# Patient Record
Sex: MALE | Race: WHITE | NOT HISPANIC OR LATINO | Employment: FULL TIME | URBAN - METROPOLITAN AREA
[De-identification: names, ages, dates, MRNs, and addresses within clinical notes are randomized per-mention and may not be internally consistent; named-entity substitution may affect disease eponyms.]

---

## 2017-03-07 ENCOUNTER — GENERIC CONVERSION - ENCOUNTER (OUTPATIENT)
Dept: OTHER | Facility: OTHER | Age: 57
End: 2017-03-07

## 2017-03-07 ENCOUNTER — HOSPITAL ENCOUNTER (OUTPATIENT)
Dept: NON INVASIVE DIAGNOSTICS | Facility: CLINIC | Age: 57
Discharge: HOME | End: 2017-03-07
Payer: COMMERCIAL

## 2017-03-07 DIAGNOSIS — I25.10 ATHEROSCLEROSIS OF NATIVE CORONARY ARTERY WITHOUT ANGINA PECTORIS, UNSPECIFIED WHETHER NATIVE OR TRANSPLANTED HEART: ICD-10-CM

## 2017-03-07 LAB
CHEST PAIN STATEMENT: NORMAL
MAX DIASTOLIC BP: 98 MMHG
MAX HEART RATE: 130 BPM
MAX PREDICTED HEART RATE: 162 BPM
MAX. SYSTOLIC BP: 190 MMHG
PROTOCOL NAME: NORMAL
REASON FOR TERMINATION: NORMAL
TARGET HR FORMULA: NORMAL
TIME IN EXERCISE PHASE: 567 S

## 2017-03-07 PROCEDURE — 93350 STRESS TTE ONLY: CPT

## 2017-03-22 ENCOUNTER — ALLSCRIPTS OFFICE VISIT (OUTPATIENT)
Dept: OTHER | Facility: OTHER | Age: 57
End: 2017-03-22

## 2017-03-22 DIAGNOSIS — E78.00 PURE HYPERCHOLESTEROLEMIA: ICD-10-CM

## 2017-03-22 DIAGNOSIS — Z95.5 PRESENCE OF CORONARY ANGIOPLASTY IMPLANT AND GRAFT: ICD-10-CM

## 2017-03-22 DIAGNOSIS — I10 ESSENTIAL (PRIMARY) HYPERTENSION: ICD-10-CM

## 2017-03-22 DIAGNOSIS — I21.19 ST ELEVATION (STEMI) MYOCARDIAL INFARCTION INVOLVING OTHER CORONARY ARTERY OF INFERIOR WALL (HCC): ICD-10-CM

## 2017-11-05 ENCOUNTER — GENERIC CONVERSION - ENCOUNTER (OUTPATIENT)
Dept: OTHER | Facility: OTHER | Age: 57
End: 2017-11-05

## 2017-12-15 ENCOUNTER — ALLSCRIPTS OFFICE VISIT (OUTPATIENT)
Dept: OTHER | Facility: OTHER | Age: 57
End: 2017-12-15

## 2017-12-16 NOTE — PROGRESS NOTES
Assessment  1  Hypertension (401 9) (I10)  2  Hypercholesterolemia (272 0) (E78 00)  3  Presence of bare metal stent in right coronary artery (V45 82) (Z95 5)  4  History of Old inferior wall myocardial infarction (412) (I25 2)    Discussion/Summary  Patient Clearance: Surgical Clearance: He is at a LOW risk from a cardiovascular standpoint at this time without any additional cardiac testing  Reevaluation needed, if he should present with symptoms prior to surgery/procedure  Discussion Summary:   1  CAD, asymptomaticA  Inferior wall MI 2011 with BMS RCAB  Normal LVEF, stress echo 3/56318  Hypertension, controlled3  Former tobacco use4  Hypercholesterolemia, on low dose statin due to myalgias with higher dose5  pre op shoulder arthro surgeryPlan  No cardiac contraindication planned shoulder surgery, proceed as planned without need for cardiac testing or changes in medications  Annual follow up with cmp, lipids, sensible diet and daily exercise encouraged  Chief Complaint  Chief Complaint Free Text Note Form: patient at the office for cardia clearance bursitis of the left shoulder surgery on 12/27/2017  History of Present Illness  Pre-Op Visit (Brief): The patient is being seen for a preoperative visit-- and-- shoulder arthoscopy  Surgical Risk Assessment: Exercise Capacity: able to walk four blocks without symptoms  HPI: Patient here preop shoulder arthroscopic surgery cardiac clearancePatient with history of CAD with IWMI 2011, BMS RCA, asymptomatic since  BP has been well controlled  Low dose statin due to myalgias at higher doses  LDL 86 last year      Review of Systems  Cardiology Male ROS:    Cardiac: No complaints of chest pain, no palpitations, no fainiting  Skin: No complaints of nonhealing sores or skin rash    Genitourinary: No complaints of recurrent urinary tract infections, frequent urination at night, difficult urination, blood in urine, kidney stones, loss of bladder control, no kidney or prostate problems, no erectile dysfunction  Psychological: No complaints of feeling depressed, anxiety, panic attacks, or difficulty concentrating  General: No complaints of trouble sleeping, lack of energy, fatigue, appetite changes, weight changes, fever, frequent infections, or night sweats  Respiratory: No complaints of shortness of breath, cough with sputum, or wheezing  HEENT: No complaints of serious problems, hearing problems, nose problems, throat problems, or snoring  Gastrointestinal: No complaints of liver problems, nausea, vomiting, heartburn, constipation, bloody stools, diarrhea, problems swallowing, adbominal pain, or rectal bleeding  Hematologic: No complaints of bleeding disorders, anemia, blood clots, or excessive brusing  Neurological: No complaints of numbness, tingling, dizziness, weakness, seizures, headaches, syncope or fainting, AM fatigue, daytime sleepiness, no witnessed apnea episodes  Musculoskeletal: No complaints of arthritis, back pain, or painfull swelling  Active Problems  1  Coronary atherosclerosis of native coronary artery (414 01) (I25 10)  2  Hypercholesterolemia (272 0) (E78 00)  3  Hypertension (401 9) (I10)  4  Inferior MI (410 40) (I21 19)  5  Presence of bare metal stent in right coronary artery (V45 82) (Z95 5)    Past Medical History  1  History of Acute myocardial infarction of inferior wall (410 40) (I21 19)  2  History of Cardiac Arrest (427 5)  3  Inferior MI (410 40) (I21 19)  4  History of Old inferior wall myocardial infarction (412) (I25 2)  Active Problems And Past Medical History Reviewed: The active problems and past medical history were reviewed and updated today  Surgical History  1  History of Cath Stent Placement  Surgical History Reviewed: The surgical history was reviewed and updated today  Family History  Mother   1  Family history of Arthritis (V17 7)  Family History Reviewed:    The family history was reviewed and updated today  Social History   · Former smoker (J25 80) (U52 371)  Social History Reviewed: The social history was reviewed and updated today  Current Meds  1  Aspirin 81 MG Oral Tablet Chewable; CHEW AND SWALLOW 1 TABLET DAILY; Therapy: 98EBD6463 to (Evaluate:17Mar2018); Last Rx:22Mar2017 Ordered  2  Atorvastatin Calcium 10 MG Oral Tablet; Take 1 tablet daily; Therapy: 10BZQ4643 to (Evaluate:02Jun2018)  Requested for: 24Gzo5059; Last Rx:54Obf7923 Ordered  3  Ezetimibe 10 MG Oral Tablet; TAKE 1 TABLET DAILY; Therapy: 50UOW9367 to (Evaluate:17Mar2018)  Requested for: 49AGU7468; Last Rx:22Mar2017 Ordered  4  Lisinopril 2 5 MG Oral Tablet; Take 1 tablet daily; Therapy: 62AXW1551 to (Evaluate:23Feb2018)  Requested for: 37JMA9227; Last Rx:31Slx8444 Ordered  5  Metoprolol Succinate ER 25 MG Oral Tablet Extended Release 24 Hour; Take 1 tablet daily; Therapy: 52LZU3085 to (Evaluate:23Feb2018)  Requested for: 04REY8134; Last Rx:67Vaw7409 Ordered  6  Nitrolingual 0 4 MG/SPRAY Translingual Solution; USE 1 SPRAY UNDER THE TONGUE AS NEEDED FOR CHEST PAIN Recorded  Medication List Reviewed: The medication list was reviewed and updated today  Allergies  1   No Known Drug Allergies    Vitals  Signs   Recorded: 51Vwq0332 12:55PM   Heart Rate: 69  Systolic: 806  Diastolic: 80  Height: 5 ft 8 in  Weight: 214 lb 1 oz  BMI Calculated: 32 55  BSA Calculated: 2 1  O2 Saturation: 97    Signatures   Electronically signed by : Paola Maldonado DO; Dec 15 2017  1:36PM EST                       (Author)    Electronically signed by : Paola Maldonado DO; Dec 15 2017  1:46PM EST                       (Author)

## 2018-01-09 NOTE — RESULT NOTES
Verified Results  ECHO STRESS TEST W CONTRAST IF INDICATED [id number] 08:07AM Ilia Pollack     Test Name Result Flag Reference   ECHO STRESS TEST W CONTRAST IF INDICATED (Report)     [address], [address] [facility]   [phone]     Exercise Stress Echocardiography     Study date: 07-Mar-2017     Patient: Gloria Washburn   MR number: [id number]   Account number: [de-identified]   : 1960   Age: 60 years   Gender: Male   Study date: 07-Mar-2017   Status: Outpatient   Location: [address] [place] [id number] [facility] lab   Height: 68 in   Weight: 209 lb   BP: 157// 88 mmHg     Indications: Evaluation of known coronary artery disease  Assessment of left ventricular function  Diagnosis: I21 19 - ST elevation (STEMI) myocardial infarction involving other coronary artery of inferior wall, I25 10 - Atherosclerotic heart disease of native coronary artery without angina pectoris     Sonographer: CLARENCE Carlisle   Interpreting Physician: Kaila Epps DO   Referring Physician: Awa Parikh DO   Group: [facility]   EKG Technician: Issa Mc     IMPRESSIONS:   Normal study after maximal exercise  No ischemia  Normal LVEF 65%  METS 10 8  Test done on Beta blockers  SUMMARY     STRESS RESULTS:   Duration of exercise was 9 min and 27 sec  Maximal work rate was 10 8 METs  Maximal heart rate during stress was 130 bpm ( 80 % of maximal predicted heart rate)  Target heart rate was not achieved  There was resting hypertension with an appropriate blood pressure response to stress  There was no chest pain during stress  ECG CONCLUSIONS:   The stress ECG was negative for ischemia  BASELINE:   There were no regional wall motion abnormalities  Estimated left ventricular ejection fraction was 65 %   PEAK STRESS:   There were no regional wall motion abnormalities       ECHO CONCLUSIONS:   There was no echocardiographic evidence for stress-induced ischemia  HISTORY: The patient is a 60year old  male  Coronary artery disease risk factors: hypertension  Cardiovascular history: coronary artery disease and prior myocardial infarction  Prior cardiovascular procedures: percutaneous   transcoronary angioplasty  Medications: a beta blocker, aspirin, a lipid lowering agent, and an antihypertensive agent  REST ECG: Sinus bradycardia  PROCEDURE: The study was performed in the stress lab  The study was performed in the [address]  The procedure was explained to the patient and informed consent was obtained  Treadmill exercise testing was performed,   using the Raoul protocol  Stress and rest echocardiographic evaluation with 2D imaging, spectral Doppler, and color Doppler was performed from multiple acoustic windows for evaluation of ventricular function  RAOUL PROTOCOL:   HR bpm SBP mmHg DBP mmHg Symptoms   Baseline 58 157 88 none   Stage 1 79 167 90 none   Stage 2 90 175 90 none   Stage 3 114 190 98 none   Stage 4 122 190 98 none   Recovery 1 71 140 87 none     STRESS RESULTS: Duration of exercise was 9 min and 27 sec  The patient exercised to protocol stage 4  Maximal work rate was 10 8 METs  Maximal heart rate during stress was 130 bpm ( 80 % of maximal predicted heart rate)  Target heart rate   was not achieved  The heart rate response to stress was blunted  Maximal systolic blood pressure during stress was 190 mmHg  There was resting hypertension with an appropriate blood pressure response to stress  The rate-pressure product   for the peak heart rate and blood pressure was 42143  There was no chest pain during stress  The stress test was terminated due to protocol completion and mild fatigue  After the procedure, the patient was discharged to home  ECG CONCLUSIONS: The stress ECG was negative for ischemia   Arrhythmia during stress: isolated atrial premature beats      STRESS 2D ECHO RESULTS:     BASELINE: There were no regional wall motion abnormalities  Left ventricular size was normal  Overall left ventricular systolic function was normal  Estimated left ventricular ejection fraction was 65 %   PEAK STRESS: There were no regional wall motion abnormalities  There was an appropriate reduction in left ventricular size  There was an appropriate augmentation in LV function  ECHO CONCLUSIONS: There was no echocardiographic evidence for stress-induced ischemia       Prepared and electronically signed by     Shy Pacheco DO   Signed 07-Mar-2017 13:48:33

## 2018-01-12 VITALS
DIASTOLIC BLOOD PRESSURE: 78 MMHG | HEIGHT: 68 IN | HEART RATE: 55 BPM | WEIGHT: 212.38 LBS | BODY MASS INDEX: 32.19 KG/M2 | SYSTOLIC BLOOD PRESSURE: 128 MMHG

## 2018-01-23 VITALS
WEIGHT: 214.06 LBS | DIASTOLIC BLOOD PRESSURE: 80 MMHG | OXYGEN SATURATION: 97 % | HEART RATE: 69 BPM | HEIGHT: 68 IN | SYSTOLIC BLOOD PRESSURE: 140 MMHG | BODY MASS INDEX: 32.44 KG/M2

## 2018-03-16 RX ORDER — METOPROLOL SUCCINATE 25 MG/1
1 TABLET, EXTENDED RELEASE ORAL DAILY
COMMUNITY
Start: 2014-06-03 | End: 2018-05-09 | Stop reason: SDUPTHER

## 2018-03-16 RX ORDER — EZETIMIBE 10 MG/1
1 TABLET ORAL DAILY
COMMUNITY
Start: 2017-03-22 | End: 2018-05-09 | Stop reason: SDUPTHER

## 2018-03-16 RX ORDER — HYDROMORPHONE HYDROCHLORIDE 2 MG/1
TABLET ORAL
Refills: 0 | COMMUNITY
Start: 2017-12-27 | End: 2018-03-19

## 2018-03-16 RX ORDER — BENZONATATE 100 MG/1
CAPSULE ORAL
Refills: 0 | COMMUNITY
Start: 2018-03-02 | End: 2019-12-13 | Stop reason: ALTCHOICE

## 2018-03-16 RX ORDER — OXYCODONE HYDROCHLORIDE 5 MG/1
5 TABLET ORAL EVERY 4 HOURS PRN
Refills: 0 | COMMUNITY
Start: 2017-12-27 | End: 2018-03-19

## 2018-03-16 RX ORDER — NITROGLYCERIN 400 UG/1
1 SPRAY ORAL
COMMUNITY
End: 2021-05-05 | Stop reason: CLARIF

## 2018-03-16 RX ORDER — LISINOPRIL 2.5 MG/1
1 TABLET ORAL DAILY
COMMUNITY
Start: 2014-06-03 | End: 2018-05-09 | Stop reason: SDUPTHER

## 2018-03-16 RX ORDER — ATORVASTATIN CALCIUM 10 MG/1
1 TABLET, FILM COATED ORAL DAILY
COMMUNITY
Start: 2012-06-19 | End: 2018-09-30 | Stop reason: SDUPTHER

## 2018-03-16 RX ORDER — ASPIRIN 81 MG/1
1 TABLET, CHEWABLE ORAL DAILY
COMMUNITY
Start: 2017-03-22

## 2018-03-19 ENCOUNTER — OFFICE VISIT (OUTPATIENT)
Dept: CARDIOLOGY CLINIC | Facility: CLINIC | Age: 58
End: 2018-03-19
Payer: COMMERCIAL

## 2018-03-19 VITALS
HEIGHT: 70 IN | BODY MASS INDEX: 30.21 KG/M2 | WEIGHT: 211 LBS | SYSTOLIC BLOOD PRESSURE: 132 MMHG | DIASTOLIC BLOOD PRESSURE: 78 MMHG | HEART RATE: 55 BPM

## 2018-03-19 DIAGNOSIS — Z95.5 STENTED CORONARY ARTERY: ICD-10-CM

## 2018-03-19 DIAGNOSIS — I10 ESSENTIAL HYPERTENSION: ICD-10-CM

## 2018-03-19 DIAGNOSIS — E78.5 HYPERLIPIDEMIA, UNSPECIFIED HYPERLIPIDEMIA TYPE: ICD-10-CM

## 2018-03-19 DIAGNOSIS — I25.2 HISTORY OF ACUTE INFERIOR WALL MI: ICD-10-CM

## 2018-03-19 DIAGNOSIS — Z95.5 PRESENCE OF BARE METAL STENT IN RIGHT CORONARY ARTERY: ICD-10-CM

## 2018-03-19 DIAGNOSIS — I25.2 OLD MYOCARDIAL INFARCTION: Primary | ICD-10-CM

## 2018-03-19 DIAGNOSIS — I25.10 CAD IN NATIVE ARTERY: ICD-10-CM

## 2018-03-19 PROCEDURE — 99214 OFFICE O/P EST MOD 30 MIN: CPT | Performed by: INTERNAL MEDICINE

## 2018-03-19 PROCEDURE — 93000 ELECTROCARDIOGRAM COMPLETE: CPT | Performed by: INTERNAL MEDICINE

## 2018-03-19 NOTE — PROGRESS NOTES
Cardiology Follow Up Visit    Leah Fritz  1960  [id number]  [address] [facility]  [address]    1  Old myocardial infarction  POCT ECG   2  Stented coronary artery  POCT ECG   3  History of acute inferior wall MI     4  CAD in native artery     5  Presence of bare metal stent in right coronary artery     6  Essential hypertension     7  Hyperlipidemia, unspecified hyperlipidemia type         Interval History:     Patient comes the office today in follow-up history of coronary artery disease  Had inferior wall myocardial infarction 2011  Has been asymptomatic since  Has had arthroscopic shoulder surgery since  Shoulder somewhat improved  He denies any lightheadedness dizziness  Gets stress testing every 2 years to comply with [employer] license  Last stress test March 2017 no evidence of ischemia  LVEF normal       No recent blood work or cholesterol profile  Last LDL was in the 80 range  On lower doses of statins due to myalgias at higher doses  Blood pressure has been well controlled on listed therapy    Patient Active Problem List   Diagnosis    CAD in native artery    History of acute inferior wall MI    Presence of bare metal stent in right coronary artery    Essential hypertension    Hyperlipidemia     Past Medical History:   Diagnosis Date    Hypercholesterolemia     Hypertension     Myocardial infarction      Social History     Social History    Marital status: /Civil Union     Spouse name: N/A    Number of children: N/A    Years of education: N/A     Occupational History    Not on file       Social History Main Topics    Smoking status: Former Smoker    Smokeless tobacco: Never Used    Alcohol use Not on file    Drug use: Unknown    Sexual activity: Not on file     Other Topics Concern    Not on file     Social History Narrative    No narrative on file      No family history on file  Past Surgical History:   Procedure Laterality Date    CORONARY STENT PLACEMENT      bare metal stent in Rt coronary artery       Current Outpatient Prescriptions:     aspirin 81 mg chewable tablet, Chew 1 tablet daily, Disp: , Rfl:     atorvastatin (LIPITOR) 10 mg tablet, Take 1 tablet by mouth daily, Disp: , Rfl:     ezetimibe (ZETIA) 10 mg tablet, Take 1 tablet by mouth daily, Disp: , Rfl:     lisinopril (ZESTRIL) 2 5 mg tablet, Take 1 tablet by mouth daily, Disp: , Rfl:     metoprolol succinate (TOPROL-XL) 25 mg 24 hr tablet, Take 1 tablet by mouth daily, Disp: , Rfl:     benzonatate (TESSALON PERLES) 100 mg capsule, TAKE 1 CAPSULE(S) 3 TIMES A DAY BY ORAL ROUTE AS NEEDED , Disp: , Rfl: 0    nitroglycerin (NITROLINGUAL) 0 4 mg/spray spray, 1 spray, Disp: , Rfl:   No Known Allergies    Labs:     No results found for: NA, K, CL, CO2, BUN, CREATININE, LABGLOM, GLUCOSE, CALCIUM    No results found for: WBC, HGB, HCT, PLT    Lab Results   Component Value Date    CHOL 162 06/24/2014     Lab Results   Component Value Date    HDL 41 06/24/2014     No results found for: [facility]  Lab Results   Component Value Date    TRIG 176 (H) 06/24/2014       No results found for: ALT, AST    No results found for: INR    No results found for: NTBNP    No results found for: HGBA1C    Imaging: Reviewed in epic    ECG:  Sinus bradycardia, otherwise within normal limits    Review of Systems:  14 systems reviewed and negative with exception of the above and the following  Review of Systems    PHYSICAL EXAM:    Physical Exam    Vitals:    03/19/18 0855   BP: 132/78     Body mass index is 30 28 kg/m²    Weight (last 2 days)     Date/Time   Weight    03/19/18 0855  95 7 (211)              Gen: No acute distress  HEENT: anicteric, mucous membranes moist  Neck: supple, no jugular venous distention, or carotid bruit  Heart: regular, normal s1 and s2, no murmur/rub or gallop  Lungs :clear to auscultation bilaterally, no rales/rhonchi or wheeze  Abdomen: soft nontender, normoactive bowel sounds, no organomegaly  Ext: warm and perfused, normal femoral pulses, no edema, or clubbing  Skin: warm, no rashes  Neuro: AAO x 3, no focal findings  Psychiatric: normal affect  Musculoskeletal: no obvious joint deformities  Discussion/Summary:    1  CAD, asymptomatic  Inferior wall MI 2011 with BMS RCA  A   Normal LVEF, stress echo 4/2015 no ischemia, 3/2016 no ischemia >10MET level      2  Hypertension, controlled  3  Former tobacco use  4  Hypercholesterolemia, on low dose statin due to myalgias with higher dose    Plan:  Patient continues to do well  Remains asymptomatic  We will keep meds same  Mediterranean type diet and daily exercise recommended  CMP and lipids ordered    Will continue to follow up annually, certainly sooner if clinically needed

## 2018-03-30 LAB
ALBUMIN SERPL-MCNC: 4.5 G/DL (ref 3.6–5.1)
ALBUMIN/GLOB SERPL: 2.3 (CALC) (ref 1–2.5)
ALP SERPL-CCNC: 73 U/L (ref 40–115)
ALT SERPL-CCNC: 21 U/L (ref 9–46)
AST SERPL-CCNC: 16 U/L (ref 10–35)
BILIRUB SERPL-MCNC: 0.4 MG/DL (ref 0.2–1.2)
BUN SERPL-MCNC: 26 MG/DL (ref 7–25)
BUN/CREAT SERPL: 25 (CALC) (ref 6–22)
CALCIUM SERPL-MCNC: 9.4 MG/DL (ref 8.6–10.3)
CHLORIDE SERPL-SCNC: 110 MMOL/L (ref 98–110)
CHOLEST SERPL-MCNC: 172 MG/DL
CHOLEST/HDLC SERPL: 4 (CALC)
CO2 SERPL-SCNC: 26 MMOL/L (ref 20–31)
CREAT SERPL-MCNC: 1.03 MG/DL (ref 0.7–1.33)
GLOBULIN SER CALC-MCNC: 2 G/DL (CALC) (ref 1.9–3.7)
GLUCOSE SERPL-MCNC: 97 MG/DL (ref 65–99)
HDLC SERPL-MCNC: 43 MG/DL
LDLC SERPL CALC-MCNC: 113 MG/DL (CALC)
NONHDLC SERPL-MCNC: 129 MG/DL (CALC)
POTASSIUM SERPL-SCNC: 4.5 MMOL/L (ref 3.5–5.3)
PROT SERPL-MCNC: 6.5 G/DL (ref 6.1–8.1)
SL AMB EGFR AFRICAN AMERICAN: 92 ML/MIN/1.73M2
SL AMB EGFR NON AFRICAN AMERICAN: 79 ML/MIN/1.73M2
SODIUM SERPL-SCNC: 143 MMOL/L (ref 135–146)
TRIGL SERPL-MCNC: 71 MG/DL

## 2018-05-09 DIAGNOSIS — E78.5 HYPERLIPIDEMIA, UNSPECIFIED HYPERLIPIDEMIA TYPE: ICD-10-CM

## 2018-05-09 DIAGNOSIS — I10 HYPERTENSION, UNSPECIFIED TYPE: Primary | ICD-10-CM

## 2018-05-11 RX ORDER — METOPROLOL SUCCINATE 25 MG/1
25 TABLET, EXTENDED RELEASE ORAL DAILY
Qty: 90 TABLET | Refills: 3 | Status: SHIPPED | OUTPATIENT
Start: 2018-05-11 | End: 2019-05-25 | Stop reason: SDUPTHER

## 2018-05-11 RX ORDER — LISINOPRIL 2.5 MG/1
2.5 TABLET ORAL DAILY
Qty: 90 TABLET | Refills: 3 | Status: SHIPPED | OUTPATIENT
Start: 2018-05-11 | End: 2019-05-25 | Stop reason: SDUPTHER

## 2018-05-11 RX ORDER — EZETIMIBE 10 MG/1
10 TABLET ORAL DAILY
Qty: 90 TABLET | Refills: 3 | Status: SHIPPED | OUTPATIENT
Start: 2018-05-11 | End: 2019-05-31 | Stop reason: SDUPTHER

## 2018-09-30 DIAGNOSIS — E78.00 PURE HYPERCHOLESTEROLEMIA: Primary | ICD-10-CM

## 2018-10-02 RX ORDER — ATORVASTATIN CALCIUM 10 MG/1
TABLET, FILM COATED ORAL
Qty: 90 TABLET | Refills: 2 | Status: SHIPPED | OUTPATIENT
Start: 2018-10-02 | End: 2019-05-31 | Stop reason: SDUPTHER

## 2019-03-21 ENCOUNTER — OFFICE VISIT (OUTPATIENT)
Dept: CARDIOLOGY CLINIC | Facility: CLINIC | Age: 59
End: 2019-03-21
Payer: COMMERCIAL

## 2019-03-21 VITALS
DIASTOLIC BLOOD PRESSURE: 76 MMHG | HEART RATE: 60 BPM | HEIGHT: 71 IN | SYSTOLIC BLOOD PRESSURE: 110 MMHG | BODY MASS INDEX: 29.79 KG/M2 | WEIGHT: 212.8 LBS

## 2019-03-21 DIAGNOSIS — E78.00 PURE HYPERCHOLESTEROLEMIA: ICD-10-CM

## 2019-03-21 DIAGNOSIS — I10 ESSENTIAL HYPERTENSION: Primary | ICD-10-CM

## 2019-03-21 DIAGNOSIS — I25.2 HISTORY OF ACUTE INFERIOR WALL MI: ICD-10-CM

## 2019-03-21 DIAGNOSIS — Z95.5 PRESENCE OF BARE METAL STENT IN RIGHT CORONARY ARTERY: ICD-10-CM

## 2019-03-21 PROCEDURE — 93000 ELECTROCARDIOGRAM COMPLETE: CPT | Performed by: INTERNAL MEDICINE

## 2019-03-21 PROCEDURE — 99213 OFFICE O/P EST LOW 20 MIN: CPT | Performed by: INTERNAL MEDICINE

## 2019-03-21 NOTE — PROGRESS NOTES
Cardiology Follow Up Visit    Lizy Hood  1960  [id number]  [address] [facility] Thao Sharif [address] [place] [id number]    1  Essential hypertension  POCT ECG       Interval History:  Patient presents follow-up known coronary artery disease  Inferior wall microinfarction 2011, bare metal stent placed right coronary artery  Asymptomatic since including stopping smoking since MI 2011  Patient is asymptomatic today  Offers no complaints  Specifically, no symptoms of coronary artery disease, blood pressure control, only able to tolerate low-dose atorvastatin 10 mg  Was on Zetia but stated felt worse on that (felt as though it affected his bones)      Patient Active Problem List   Diagnosis    CAD in native artery    History of acute inferior wall MI    Presence of bare metal stent in right coronary artery    Essential hypertension    Hyperlipidemia     Past Medical History:   Diagnosis Date    Hypercholesterolemia     Hypertension     Myocardial infarction [facility])      Social History     Socioeconomic History    Marital status: /Civil Union     Spouse name: Not on file    Number of children: Not on file    Years of education: Not on file    Highest education level: Not on file   Occupational History    Not on file   Social Needs    Financial resource strain: Not on file    Food insecurity:     Worry: Not on file     Inability: Not on file    Transportation needs:     Medical: Not on file     Non-medical: Not on file   Tobacco Use    Smoking status: Former Smoker    Smokeless tobacco: Never Used   Substance and Sexual Activity    Alcohol use: Not on file    Drug use: Not on file    Sexual activity: Not on file   Lifestyle    Physical activity:     Days per week: Not on file     Minutes per session: Not on file    Stress: Not on file   Relationships    Social connections: Talks on phone: Not on file     Gets together: Not on file     Attends Oriental orthodox service: Not on file     Active member of club or organization: Not on file     Attends meetings of clubs or organizations: Not on file     Relationship status: Not on file    Intimate partner violence:     Fear of current or ex partner: Not on file     Emotionally abused: Not on file     Physically abused: Not on file     Forced sexual activity: Not on file   Other Topics Concern    Not on file   Social History Narrative    Not on file      No family history on file    Past Surgical History:   Procedure Laterality Date    CORONARY STENT PLACEMENT      bare metal stent in Rt coronary artery       Current Outpatient Medications:     aspirin 81 mg chewable tablet, Chew 1 tablet daily, Disp: , Rfl:     atorvastatin (LIPITOR) 10 mg tablet, TAKE 1 TABLET DAILY, Disp: 90 tablet, Rfl: 2    lisinopril (ZESTRIL) 2 5 mg tablet, Take 1 tablet (2 5 mg total) by mouth daily, Disp: 90 tablet, Rfl: 3    metoprolol succinate (TOPROL-XL) 25 mg 24 hr tablet, Take 1 tablet (25 mg total) by mouth daily, Disp: 90 tablet, Rfl: 3    benzonatate (TESSALON PERLES) 100 mg capsule, TAKE 1 CAPSULE(S) 3 TIMES A DAY BY ORAL ROUTE AS NEEDED , Disp: , Rfl: 0    ezetimibe (ZETIA) 10 mg tablet, Take 1 tablet (10 mg total) by mouth daily (Patient not taking: Reported on 3/21/2019), Disp: 90 tablet, Rfl: 3    nitroglycerin (NITROLINGUAL) 0 4 mg/spray spray, 1 spray, Disp: , Rfl:   No Known Allergies      Social, Family, Medication history reviewed and updated as necessary      Labs:     Lab Results   Component Value Date    K 4 5 03/30/2018     03/30/2018    CO2 26 03/30/2018    BUN 26 (H) 03/30/2018    CALCIUM 9 4 03/30/2018       No results found for: WBC, HGB, HCT, PLT    Lab Results   Component Value Date    CHOL 162 06/24/2014     Lab Results   Component Value Date    HDL 43 03/30/2018    HDL 41 06/24/2014     No results found for: LDLCALC  No results found for: LDLDIRECT          Lab Results   Component Value Date    TRIG 71 03/30/2018    TRIG 176 (H) 06/24/2014       Lab Results   Component Value Date    ALT 21 03/30/2018    AST 16 03/30/2018       No results found for: INR    No results found for: NTBNP    No results found for: HGBA1C        Imaging: Reviewed in epic    ECG: Normal sinus rhythm    Review of Systems:  14 systems reviewed and negative with exception of the above       PHYSICAL EXAM:      Vitals:    03/21/19 1408   BP: 110/76   Pulse: 60     Body mass index is 30 1 kg/m²  Weight (last 2 days)     Date/Time   Weight    03/21/19 1408   96 5 (212 8)                Gen: No acute distress  HEENT: anicteric, mucous membranes moist  Neck: supple, no jugular venous distention, or carotid bruit  Heart: regular, normal s1 and s2, no murmur/rub or gallop  Lungs :clear to auscultation bilaterally, no rales/rhonchi or wheeze  Abdomen: soft nontender, normoactive bowel sounds, no organomegaly  Ext: warm and perfused, normal femoral pulses, no edema, or clubbing  Skin: warm, no rashes  Neuro: AAO x 3, no focal findings  Psychiatric: normal affect  Musculoskeletal: no obvious joint deformities   Discussion/Summary    1  Coronary artery disease, inferior wall myocardial infarction 2011, asymptomatic since     A  Stress test March 2017 no ischemia, normal LVEF    2  Hyperlipidemia, cholesterol status stable, higher doses of statins has cause myalgias in the past     3  Essential hypertension, controlled    Flank:  Patient continues to do well  Made no changes in his medical regimen  Continued Mediterranean type diet, daily activity/exercise    Will be 3 years since last stress test in next year's visit will plan a stress echocardiogram before

## 2019-05-25 DIAGNOSIS — I10 HYPERTENSION, UNSPECIFIED TYPE: ICD-10-CM

## 2019-05-28 RX ORDER — LISINOPRIL 2.5 MG/1
TABLET ORAL
Qty: 90 TABLET | Refills: 3 | Status: SHIPPED | OUTPATIENT
Start: 2019-05-28 | End: 2019-05-31 | Stop reason: SDUPTHER

## 2019-05-28 RX ORDER — METOPROLOL SUCCINATE 25 MG/1
TABLET, EXTENDED RELEASE ORAL
Qty: 90 TABLET | Refills: 3 | Status: SHIPPED | OUTPATIENT
Start: 2019-05-28 | End: 2019-05-31 | Stop reason: SDUPTHER

## 2019-05-29 DIAGNOSIS — I10 HYPERTENSION, UNSPECIFIED TYPE: ICD-10-CM

## 2019-05-29 RX ORDER — LISINOPRIL 2.5 MG/1
2.5 TABLET ORAL DAILY
Qty: 90 TABLET | Refills: 3 | Status: CANCELLED | OUTPATIENT
Start: 2019-05-29

## 2019-05-31 DIAGNOSIS — E78.5 HYPERLIPIDEMIA, UNSPECIFIED HYPERLIPIDEMIA TYPE: ICD-10-CM

## 2019-05-31 DIAGNOSIS — E78.00 PURE HYPERCHOLESTEROLEMIA: ICD-10-CM

## 2019-05-31 DIAGNOSIS — I10 HYPERTENSION, UNSPECIFIED TYPE: ICD-10-CM

## 2019-05-31 RX ORDER — METOPROLOL SUCCINATE 25 MG/1
25 TABLET, EXTENDED RELEASE ORAL DAILY
Qty: 90 TABLET | Refills: 3 | Status: SHIPPED | OUTPATIENT
Start: 2019-05-31 | End: 2020-09-22 | Stop reason: SDUPTHER

## 2019-05-31 RX ORDER — ATORVASTATIN CALCIUM 10 MG/1
10 TABLET, FILM COATED ORAL DAILY
Qty: 90 TABLET | Refills: 3 | Status: SHIPPED | OUTPATIENT
Start: 2019-05-31 | End: 2020-07-22 | Stop reason: SDUPTHER

## 2019-05-31 RX ORDER — LISINOPRIL 2.5 MG/1
2.5 TABLET ORAL DAILY
Qty: 90 TABLET | Refills: 3 | Status: SHIPPED | OUTPATIENT
Start: 2019-05-31 | End: 2020-08-25 | Stop reason: SDUPTHER

## 2019-05-31 RX ORDER — EZETIMIBE 10 MG/1
10 TABLET ORAL DAILY
Qty: 90 TABLET | Refills: 3 | Status: SHIPPED | OUTPATIENT
Start: 2019-05-31 | End: 2019-11-05 | Stop reason: SINTOL

## 2019-11-05 ENCOUNTER — OFFICE VISIT (OUTPATIENT)
Dept: FAMILY MEDICINE CLINIC | Facility: CLINIC | Age: 59
End: 2019-11-05
Payer: COMMERCIAL

## 2019-11-05 VITALS
HEART RATE: 62 BPM | TEMPERATURE: 98.2 F | OXYGEN SATURATION: 97 % | RESPIRATION RATE: 16 BRPM | HEIGHT: 71 IN | DIASTOLIC BLOOD PRESSURE: 92 MMHG | WEIGHT: 214 LBS | BODY MASS INDEX: 29.96 KG/M2 | SYSTOLIC BLOOD PRESSURE: 156 MMHG

## 2019-11-05 DIAGNOSIS — Z12.11 ENCOUNTER FOR SCREENING COLONOSCOPY: ICD-10-CM

## 2019-11-05 DIAGNOSIS — E78.49 OTHER HYPERLIPIDEMIA: ICD-10-CM

## 2019-11-05 DIAGNOSIS — R10.12 LUQ PAIN: Primary | ICD-10-CM

## 2019-11-05 DIAGNOSIS — Z12.5 PROSTATE CANCER SCREENING: ICD-10-CM

## 2019-11-05 DIAGNOSIS — I10 ESSENTIAL HYPERTENSION: ICD-10-CM

## 2019-11-05 DIAGNOSIS — K76.0 FATTY LIVER: ICD-10-CM

## 2019-11-05 DIAGNOSIS — M54.50 CHRONIC LEFT-SIDED LOW BACK PAIN WITHOUT SCIATICA: ICD-10-CM

## 2019-11-05 DIAGNOSIS — Z23 NEED FOR INFLUENZA VACCINATION: ICD-10-CM

## 2019-11-05 DIAGNOSIS — Z23 NEED FOR PNEUMOCOCCAL VACCINATION: ICD-10-CM

## 2019-11-05 DIAGNOSIS — G89.29 CHRONIC LEFT-SIDED LOW BACK PAIN WITHOUT SCIATICA: ICD-10-CM

## 2019-11-05 PROCEDURE — 99203 OFFICE O/P NEW LOW 30 MIN: CPT | Performed by: FAMILY MEDICINE

## 2019-11-05 PROCEDURE — 90732 PPSV23 VACC 2 YRS+ SUBQ/IM: CPT | Performed by: FAMILY MEDICINE

## 2019-11-05 PROCEDURE — 90471 IMMUNIZATION ADMIN: CPT | Performed by: FAMILY MEDICINE

## 2019-11-05 PROCEDURE — 90472 IMMUNIZATION ADMIN EACH ADD: CPT | Performed by: FAMILY MEDICINE

## 2019-11-05 PROCEDURE — 90686 IIV4 VACC NO PRSV 0.5 ML IM: CPT | Performed by: FAMILY MEDICINE

## 2019-11-05 RX ORDER — METHOCARBAMOL 500 MG/1
500 TABLET, FILM COATED ORAL 3 TIMES DAILY
Qty: 30 TABLET | Refills: 0 | Status: SHIPPED | OUTPATIENT
Start: 2019-11-05 | End: 2019-12-13 | Stop reason: ALTCHOICE

## 2019-11-05 RX ORDER — PANTOPRAZOLE SODIUM 20 MG/1
40 TABLET, DELAYED RELEASE ORAL DAILY
Refills: 0 | COMMUNITY
Start: 2019-10-29 | End: 2019-12-13 | Stop reason: DRUGHIGH

## 2019-11-05 NOTE — PATIENT INSTRUCTIONS

## 2019-11-05 NOTE — PROGRESS NOTES
Subjective:   Chief Complaint   Patient presents with    Back Pain     left sided back pain radiating into the abdomen and side seen in the ER     Abdominal Pain        Patient ID: Cyrus Kussmaul is a 62 y o  male  Patient new in my office and he concerned about the abdomen pain in the left upper quadrant her he describe it as achy and comes and goes and not related to postural not related to food not associated with any weight loss the and no fever no chills no constipation no diarrhea no blood in the stool patient was in the emergency on October 24, 2019 for same reason and and he did have a blood work and urine test and the CT scan of the abdomen review with the patient CT scan show he had mild enlarged in the prostate and mild the fatty liver  Patient also complained about the back pain and lower back mostly on the left side radiate to the front and no numbness no weakness no lose control of the urine or stool and no the recent trauma and no fever no change in the weight he describes his pain as achy 7/81 certain range of motion aggravated the pain patient work requires him to lift and push heavy stuff deny increased frequency urination no blood in the urine and no flank pain      The following portions of the patient's history were reviewed and updated as appropriate: allergies, current medications, past family history, past medical history, past social history, past surgical history and problem list     Review of Systems   Constitutional: Negative for fatigue and fever  HENT: Negative for ear pain, sinus pressure, sinus pain and sore throat  Eyes: Negative for pain and redness  Respiratory: Negative for cough, chest tightness and shortness of breath  Cardiovascular: Negative for chest pain, palpitations and leg swelling  Gastrointestinal: Positive for abdominal pain  Negative for blood in stool, constipation, diarrhea and nausea     Genitourinary: Negative for flank pain, frequency and hematuria  Musculoskeletal: Positive for back pain  Negative for joint swelling  Skin: Negative for rash  Neurological: Negative for dizziness, numbness and headaches  Hematological: Does not bruise/bleed easily  Objective:  Vitals:    11/05/19 1131   BP: 156/92   BP Location: Left arm   Patient Position: Sitting   Cuff Size: Large   Pulse: 62   Resp: 16   Temp: 98 2 °F (36 8 °C)   TempSrc: Tympanic   SpO2: 97%   Weight: 97 1 kg (214 lb)   Height: 5' 10 5" (1 791 m)      Physical Exam   Constitutional: He is oriented to person, place, and time  He appears well-developed and well-nourished  HENT:   Head: Normocephalic  Right Ear: External ear normal    Left Ear: External ear normal    Eyes: Conjunctivae and EOM are normal  Right eye exhibits no discharge  Left eye exhibits no discharge  Neck: No JVD present  Cardiovascular: Normal rate, regular rhythm and normal heart sounds  Exam reveals no gallop  No murmur heard  Pulmonary/Chest: Effort normal  No respiratory distress  He has no wheezes  He has no rales  He exhibits no tenderness  Abdominal: He exhibits no mass  There is no splenomegaly or hepatomegaly  There is no tenderness  There is no rigidity, no rebound and no guarding  Musculoskeletal: He exhibits tenderness  He exhibits no edema  Positive tenderness in lumbar spine leg raise test is negative bilateral   Neurological: He is alert and oriented to person, place, and time  Skin: No rash noted  No erythema           Assessment/Plan:    LUQ pain  Acute symptomatic status post ER visit the hospital record including blood work urine test and the CT scan of the abdomen discussed with the patient no significant sent finding  The positive patient get bloating and burping plan to give him pantoprazole 20 mg once a day for 4 week proper use of medication possible side effect discussed with the patient avoid eat and lie down avoid provoke food and will re-evaluate    Chronic left-sided low back pain without sciatica  New diagnosis chronic the plan to have x-ray of the lumbar spine will start him on Robaxin 500 mg 3 times a day proper use of medication possible side effect discussed with the patient    BMI 30 0-30 9,adult  The BMI is above average  BMI counseling and education was provided to the patient  Nutrition recommendations include reducing portion sizes, decreasing overall calorie intake, 3-5 servings of fruits/vegetables daily, reducing fast food intake, consuming healthier snacks, decreasing soda and/or juice intake, moderation in carbohydrate intake and reducing intake of saturated fat and trans fat  Exercise recommendations include moderate aerobic physical activity for 150 minutes/week, exercising 3-5 times per week and joining a gym  Fatty liver  Incident finding on the CT scan of the abdomen discussed the patient important lose weight and follow up with the low carb diet       Diagnoses and all orders for this visit:    LUQ pain    Chronic left-sided low back pain without sciatica  -     methocarbamol (ROBAXIN) 500 mg tablet; Take 1 tablet (500 mg total) by mouth 3 (three) times a day  -     XR spine lumbar minimum 4 views non injury; Future    BMI 30 0-30 9,adult    Need for influenza vaccination  -     FLUZONE: influenza vaccine, quadrivalent, 0 5 mL    Essential hypertension  -     Hepatitis C antibody; Future  -     TSH, 3rd generation with Free T4 reflex; Future  -     Lipid Panel with Direct LDL reflex; Future  -     CBC and differential; Future  -     Basic metabolic panel; Future    Other hyperlipidemia  -     Hepatitis C antibody; Future  -     TSH, 3rd generation with Free T4 reflex; Future  -     Lipid Panel with Direct LDL reflex; Future  -     CBC and differential; Future  -     Basic metabolic panel; Future    Prostate cancer screening  -     PSA, Total Screen;  Future    Encounter for screening colonoscopy  -     Ambulatory referral to Gastroenterology; Future    Need for pneumococcal vaccination  -     PNEUMOCOCCAL POLYSACCHARIDE VACCINE 23-VALENT =>1YO SQ IM    Fatty liver    Other orders  -     pantoprazole (PROTONIX) 20 mg tablet; Take 40 mg by mouth daily          BMI Counseling: Body mass index is 30 27 kg/m²  The BMI is above normal  Nutrition recommendations include reducing portion sizes, decreasing overall calorie intake, 3-5 servings of fruits/vegetables daily, reducing fast food intake and consuming healthier snacks  Exercise recommendations include moderate aerobic physical activity for 150 minutes/week

## 2019-11-07 ENCOUNTER — APPOINTMENT (OUTPATIENT)
Dept: RADIOLOGY | Facility: MEDICAL CENTER | Age: 59
End: 2019-11-07
Payer: COMMERCIAL

## 2019-11-07 ENCOUNTER — APPOINTMENT (OUTPATIENT)
Dept: LAB | Facility: MEDICAL CENTER | Age: 59
End: 2019-11-07
Payer: COMMERCIAL

## 2019-11-07 DIAGNOSIS — I10 ESSENTIAL HYPERTENSION: ICD-10-CM

## 2019-11-07 DIAGNOSIS — G89.29 CHRONIC LEFT-SIDED LOW BACK PAIN WITHOUT SCIATICA: ICD-10-CM

## 2019-11-07 DIAGNOSIS — E78.00 PURE HYPERCHOLESTEROLEMIA: ICD-10-CM

## 2019-11-07 DIAGNOSIS — I25.2 HISTORY OF ACUTE INFERIOR WALL MI: ICD-10-CM

## 2019-11-07 DIAGNOSIS — E78.49 OTHER HYPERLIPIDEMIA: ICD-10-CM

## 2019-11-07 DIAGNOSIS — Z12.5 PROSTATE CANCER SCREENING: ICD-10-CM

## 2019-11-07 DIAGNOSIS — M54.50 CHRONIC LEFT-SIDED LOW BACK PAIN WITHOUT SCIATICA: ICD-10-CM

## 2019-11-07 PROBLEM — K76.0 FATTY LIVER: Status: ACTIVE | Noted: 2019-11-07

## 2019-11-07 PROBLEM — N40.0 BENIGN PROSTATIC HYPERPLASIA WITHOUT LOWER URINARY TRACT SYMPTOMS: Status: ACTIVE | Noted: 2019-11-07

## 2019-11-07 PROBLEM — K76.0 FATTY LIVER: Status: ACTIVE | Noted: 2019-11-05

## 2019-11-07 LAB
ALBUMIN SERPL BCP-MCNC: 4.5 G/DL (ref 3.5–5)
ALP SERPL-CCNC: 91 U/L (ref 46–116)
ALT SERPL W P-5'-P-CCNC: 33 U/L (ref 12–78)
ANION GAP SERPL CALCULATED.3IONS-SCNC: 6 MMOL/L (ref 4–13)
AST SERPL W P-5'-P-CCNC: 17 U/L (ref 5–45)
BASOPHILS # BLD AUTO: 0.11 THOUSANDS/ΜL (ref 0–0.1)
BASOPHILS NFR BLD AUTO: 1 % (ref 0–1)
BILIRUB SERPL-MCNC: 0.87 MG/DL (ref 0.2–1)
BUN SERPL-MCNC: 17 MG/DL (ref 5–25)
CALCIUM SERPL-MCNC: 9.1 MG/DL (ref 8.3–10.1)
CHLORIDE SERPL-SCNC: 107 MMOL/L (ref 100–108)
CHOLEST SERPL-MCNC: 162 MG/DL (ref 50–200)
CO2 SERPL-SCNC: 27 MMOL/L (ref 21–32)
CREAT SERPL-MCNC: 1.03 MG/DL (ref 0.6–1.3)
EOSINOPHIL # BLD AUTO: 0.37 THOUSAND/ΜL (ref 0–0.61)
EOSINOPHIL NFR BLD AUTO: 3 % (ref 0–6)
ERYTHROCYTE [DISTWIDTH] IN BLOOD BY AUTOMATED COUNT: 12.7 % (ref 11.6–15.1)
GFR SERPL CREATININE-BSD FRML MDRD: 78 ML/MIN/1.73SQ M
GLUCOSE P FAST SERPL-MCNC: 94 MG/DL (ref 65–99)
HCT VFR BLD AUTO: 44.4 % (ref 36.5–49.3)
HCV AB SER QL: NORMAL
HDLC SERPL-MCNC: 43 MG/DL
HGB BLD-MCNC: 14.3 G/DL (ref 12–17)
IMM GRANULOCYTES # BLD AUTO: 0.05 THOUSAND/UL (ref 0–0.2)
IMM GRANULOCYTES NFR BLD AUTO: 0 % (ref 0–2)
LDLC SERPL CALC-MCNC: 104 MG/DL (ref 0–100)
LYMPHOCYTES # BLD AUTO: 1.75 THOUSANDS/ΜL (ref 0.6–4.47)
LYMPHOCYTES NFR BLD AUTO: 14 % (ref 14–44)
MCH RBC QN AUTO: 30.6 PG (ref 26.8–34.3)
MCHC RBC AUTO-ENTMCNC: 32.2 G/DL (ref 31.4–37.4)
MCV RBC AUTO: 95 FL (ref 82–98)
MONOCYTES # BLD AUTO: 0.83 THOUSAND/ΜL (ref 0.17–1.22)
MONOCYTES NFR BLD AUTO: 7 % (ref 4–12)
NEUTROPHILS # BLD AUTO: 9.35 THOUSANDS/ΜL (ref 1.85–7.62)
NEUTS SEG NFR BLD AUTO: 75 % (ref 43–75)
NRBC BLD AUTO-RTO: 0 /100 WBCS
PLATELET # BLD AUTO: 232 THOUSANDS/UL (ref 149–390)
PMV BLD AUTO: 10.4 FL (ref 8.9–12.7)
POTASSIUM SERPL-SCNC: 4.4 MMOL/L (ref 3.5–5.3)
PROT SERPL-MCNC: 7.5 G/DL (ref 6.4–8.2)
PSA SERPL-MCNC: 5.4 NG/ML (ref 0–4)
RBC # BLD AUTO: 4.68 MILLION/UL (ref 3.88–5.62)
SODIUM SERPL-SCNC: 140 MMOL/L (ref 136–145)
TRIGL SERPL-MCNC: 73 MG/DL
TSH SERPL DL<=0.05 MIU/L-ACNC: 0.74 UIU/ML (ref 0.36–3.74)
WBC # BLD AUTO: 12.46 THOUSAND/UL (ref 4.31–10.16)

## 2019-11-07 PROCEDURE — 36415 COLL VENOUS BLD VENIPUNCTURE: CPT

## 2019-11-07 PROCEDURE — 80061 LIPID PANEL: CPT

## 2019-11-07 PROCEDURE — 84443 ASSAY THYROID STIM HORMONE: CPT

## 2019-11-07 PROCEDURE — 86803 HEPATITIS C AB TEST: CPT

## 2019-11-07 PROCEDURE — 85025 COMPLETE CBC W/AUTO DIFF WBC: CPT

## 2019-11-07 PROCEDURE — G0103 PSA SCREENING: HCPCS

## 2019-11-07 PROCEDURE — 80053 COMPREHEN METABOLIC PANEL: CPT

## 2019-11-07 PROCEDURE — 72110 X-RAY EXAM L-2 SPINE 4/>VWS: CPT

## 2019-11-07 NOTE — ASSESSMENT & PLAN NOTE
New diagnosis chronic the plan to have x-ray of the lumbar spine will start him on Robaxin 500 mg 3 times a day proper use of medication possible side effect discussed with the patient

## 2019-11-07 NOTE — ASSESSMENT & PLAN NOTE
Incident finding on the CT scan of the abdomen discussed the patient important lose weight and follow up with the low carb diet

## 2019-11-07 NOTE — ASSESSMENT & PLAN NOTE
Acute symptomatic status post ER visit the hospital record including blood work urine test and the CT scan of the abdomen discussed with the patient no significant sent finding  The positive patient get bloating and burping plan to give him pantoprazole 20 mg once a day for 4 week proper use of medication possible side effect discussed with the patient avoid eat and lie down avoid provoke food and will re-evaluate

## 2019-11-08 ENCOUNTER — TELEPHONE (OUTPATIENT)
Dept: FAMILY MEDICINE CLINIC | Facility: CLINIC | Age: 59
End: 2019-11-08

## 2019-11-08 DIAGNOSIS — R97.20 ELEVATED PSA: ICD-10-CM

## 2019-11-08 DIAGNOSIS — G89.29 CHRONIC LEFT-SIDED LOW BACK PAIN WITHOUT SCIATICA: Primary | ICD-10-CM

## 2019-11-08 DIAGNOSIS — D72.829 LEUKOCYTOSIS, UNSPECIFIED TYPE: Primary | ICD-10-CM

## 2019-11-08 DIAGNOSIS — M54.50 CHRONIC LEFT-SIDED LOW BACK PAIN WITHOUT SCIATICA: Primary | ICD-10-CM

## 2019-11-08 NOTE — TELEPHONE ENCOUNTER
left message for patient to return call to set up fu up appointment after 4 weeks reay of labs patient had elvated WBC and elevated PSA labs placed for CBC in 1 month and  psa in 3 months

## 2019-11-08 NOTE — TELEPHONE ENCOUNTER
----- Message from Luna Yen MD sent at 11/8/2019  7:49 AM EST -----  Patietn does have elevated WBC ,to repeat in 4 w and schedule appoitment to f/up  Also patient has elevated PSA ,to repeat in 3month

## 2019-12-03 ENCOUNTER — APPOINTMENT (OUTPATIENT)
Dept: LAB | Facility: MEDICAL CENTER | Age: 59
End: 2019-12-03
Payer: COMMERCIAL

## 2019-12-03 DIAGNOSIS — D72.829 LEUKOCYTOSIS, UNSPECIFIED TYPE: ICD-10-CM

## 2019-12-03 DIAGNOSIS — R97.20 ELEVATED PSA: ICD-10-CM

## 2019-12-03 LAB
BASOPHILS # BLD AUTO: 0.1 THOUSANDS/ΜL (ref 0–0.1)
BASOPHILS NFR BLD AUTO: 1 % (ref 0–1)
EOSINOPHIL # BLD AUTO: 0.34 THOUSAND/ΜL (ref 0–0.61)
EOSINOPHIL NFR BLD AUTO: 4 % (ref 0–6)
ERYTHROCYTE [DISTWIDTH] IN BLOOD BY AUTOMATED COUNT: 12.9 % (ref 11.6–15.1)
HCT VFR BLD AUTO: 43.9 % (ref 36.5–49.3)
HGB BLD-MCNC: 14 G/DL (ref 12–17)
IMM GRANULOCYTES # BLD AUTO: 0.03 THOUSAND/UL (ref 0–0.2)
IMM GRANULOCYTES NFR BLD AUTO: 0 % (ref 0–2)
LYMPHOCYTES # BLD AUTO: 2.14 THOUSANDS/ΜL (ref 0.6–4.47)
LYMPHOCYTES NFR BLD AUTO: 28 % (ref 14–44)
MCH RBC QN AUTO: 30.2 PG (ref 26.8–34.3)
MCHC RBC AUTO-ENTMCNC: 31.9 G/DL (ref 31.4–37.4)
MCV RBC AUTO: 95 FL (ref 82–98)
MONOCYTES # BLD AUTO: 0.64 THOUSAND/ΜL (ref 0.17–1.22)
MONOCYTES NFR BLD AUTO: 8 % (ref 4–12)
NEUTROPHILS # BLD AUTO: 4.53 THOUSANDS/ΜL (ref 1.85–7.62)
NEUTS SEG NFR BLD AUTO: 59 % (ref 43–75)
NRBC BLD AUTO-RTO: 0 /100 WBCS
PLATELET # BLD AUTO: 258 THOUSANDS/UL (ref 149–390)
PMV BLD AUTO: 10.4 FL (ref 8.9–12.7)
PSA SERPL-MCNC: 6.5 NG/ML (ref 0–4)
RBC # BLD AUTO: 4.63 MILLION/UL (ref 3.88–5.62)
WBC # BLD AUTO: 7.78 THOUSAND/UL (ref 4.31–10.16)

## 2019-12-03 PROCEDURE — 36415 COLL VENOUS BLD VENIPUNCTURE: CPT

## 2019-12-03 PROCEDURE — 85025 COMPLETE CBC W/AUTO DIFF WBC: CPT

## 2019-12-03 PROCEDURE — 84153 ASSAY OF PSA TOTAL: CPT

## 2019-12-13 ENCOUNTER — OFFICE VISIT (OUTPATIENT)
Dept: FAMILY MEDICINE CLINIC | Facility: CLINIC | Age: 59
End: 2019-12-13
Payer: COMMERCIAL

## 2019-12-13 VITALS
WEIGHT: 218 LBS | OXYGEN SATURATION: 97 % | HEIGHT: 69 IN | HEART RATE: 65 BPM | BODY MASS INDEX: 32.29 KG/M2 | SYSTOLIC BLOOD PRESSURE: 130 MMHG | DIASTOLIC BLOOD PRESSURE: 80 MMHG | TEMPERATURE: 98.1 F

## 2019-12-13 DIAGNOSIS — K21.9 GASTROESOPHAGEAL REFLUX DISEASE WITHOUT ESOPHAGITIS: Primary | ICD-10-CM

## 2019-12-13 DIAGNOSIS — I10 ESSENTIAL HYPERTENSION: ICD-10-CM

## 2019-12-13 DIAGNOSIS — E78.49 OTHER HYPERLIPIDEMIA: ICD-10-CM

## 2019-12-13 DIAGNOSIS — N40.0 BENIGN PROSTATIC HYPERPLASIA WITHOUT LOWER URINARY TRACT SYMPTOMS: ICD-10-CM

## 2019-12-13 DIAGNOSIS — R97.20 ABNORMAL PROSTATE SPECIFIC ANTIGEN (PSA): ICD-10-CM

## 2019-12-13 PROCEDURE — 3008F BODY MASS INDEX DOCD: CPT | Performed by: FAMILY MEDICINE

## 2019-12-13 PROCEDURE — 3075F SYST BP GE 130 - 139MM HG: CPT | Performed by: FAMILY MEDICINE

## 2019-12-13 PROCEDURE — 99214 OFFICE O/P EST MOD 30 MIN: CPT | Performed by: FAMILY MEDICINE

## 2019-12-13 PROCEDURE — 1036F TOBACCO NON-USER: CPT | Performed by: FAMILY MEDICINE

## 2019-12-13 PROCEDURE — 3079F DIAST BP 80-89 MM HG: CPT | Performed by: FAMILY MEDICINE

## 2019-12-13 RX ORDER — PANTOPRAZOLE SODIUM 40 MG/1
40 TABLET, DELAYED RELEASE ORAL
Qty: 30 TABLET | Refills: 2 | Status: SHIPPED | OUTPATIENT
Start: 2019-12-13 | End: 2020-01-06 | Stop reason: SDUPTHER

## 2019-12-13 NOTE — ASSESSMENT & PLAN NOTE
Chronic symptomatic patient was off the medication for while and recently the symptom coming back we will refill on his pantoprazole 40 mg once a day proper use discussed with the patient discussed avoid provoke food do not eat and lie down

## 2019-12-13 NOTE — ASSESSMENT & PLAN NOTE
Chronic asymptomatic fair control continue with atorvastatin 10 mg once a day the low-fat diet important lose weight discussed with the patient

## 2019-12-13 NOTE — PROGRESS NOTES
Subjective:   Chief Complaint   Patient presents with    Physical Exam    Follow-up     chronic conditions        Patient ID: Lali Page is a 62 y o  male  Patient here follow-up with a chronic condition patient history of hypertension blood pressure fair control on current medication tolerated well without any side effect deny any chest pain short of breath no palpitation no dyspnea on exertion and no lower extremity edema patient was history of hyperlipidemia fair control on current medication tolerated well without any side effect deny any chest pain short of breath no palpitation no TIA symptom patient with no history of for GERD he been on the medication for while and recently he start having symptom heartburn nausea but no vomiting no abdomen distension no constipation no diarrhea no lose weight no blood in the stool  Recent blood work discussed with the patient show elevated PSA patient deny any family history of prostate cancer no blood in the urine no flank pain no nocturia no weak stream      The following portions of the patient's history were reviewed and updated as appropriate: allergies, current medications, past family history, past medical history, past social history, past surgical history and problem list     Review of Systems   Constitutional: Negative for fatigue and fever  HENT: Negative for ear pain, sinus pressure, sinus pain and sore throat  Eyes: Negative for pain and redness  Respiratory: Negative for cough, chest tightness and shortness of breath  Cardiovascular: Negative for chest pain, palpitations and leg swelling  Gastrointestinal: Negative for abdominal pain, blood in stool, constipation, diarrhea and nausea  Genitourinary: Negative for flank pain, frequency and hematuria  Musculoskeletal: Negative for back pain and joint swelling  Skin: Negative for rash  Neurological: Negative for dizziness, numbness and headaches     Hematological: Does not bruise/bleed easily  Objective:  Vitals:    12/13/19 1327   BP: 130/80   Pulse: 65   Temp: 98 1 °F (36 7 °C)   TempSrc: Tympanic   SpO2: 97%   Weight: 98 9 kg (218 lb)   Height: 5' 9" (1 753 m)      Physical Exam   Constitutional: He is oriented to person, place, and time  He appears well-developed and well-nourished  HENT:   Head: Normocephalic  Right Ear: External ear normal    Left Ear: External ear normal    Eyes: Conjunctivae and EOM are normal  Right eye exhibits no discharge  Left eye exhibits no discharge  Neck: No JVD present  Cardiovascular: Normal rate, regular rhythm and normal heart sounds  Exam reveals no gallop  No murmur heard  Pulmonary/Chest: Effort normal  No respiratory distress  He has no wheezes  He has no rales  He exhibits no tenderness  Abdominal: He exhibits no mass  There is no tenderness  There is no rebound  Musculoskeletal: He exhibits no edema or tenderness  Neurological: He is alert and oriented to person, place, and time  Skin: No rash noted  No erythema           Assessment/Plan:    Abnormal prostate specific antigen (PSA)  A new diagnosis asymptomatic will refer the patient to see Urology    Hyperlipidemia  Chronic asymptomatic fair control continue with atorvastatin 10 mg once a day the low-fat diet important lose weight discussed with the patient    Benign prostatic hyperplasia without lower urinary tract symptoms  A new finding on the CT scan of the abdomen and large in the prostate patient will be seen by Urology    Essential hypertension  A chronic asymptomatic continue current management discussed the patient important lose weight and low salt diet discussed with the patient    Gastroesophageal reflux disease without esophagitis  Chronic symptomatic patient was off the medication for while and recently the symptom coming back we will refill on his pantoprazole 40 mg once a day proper use discussed with the patient discussed avoid provoke food do not eat and lie down       Diagnoses and all orders for this visit:    Gastroesophageal reflux disease without esophagitis  -     pantoprazole (PROTONIX) 40 mg tablet; Take 1 tablet (40 mg total) by mouth daily before breakfast  -     CBC and differential; Future  -     Basic metabolic panel; Future  -     Lipid Panel with Direct LDL reflex; Future  -     TSH, 3rd generation with Free T4 reflex; Future    Benign prostatic hyperplasia without lower urinary tract symptoms  -     CBC and differential; Future  -     Basic metabolic panel; Future  -     Lipid Panel with Direct LDL reflex; Future  -     TSH, 3rd generation with Free T4 reflex; Future    Other hyperlipidemia  -     CBC and differential; Future  -     Basic metabolic panel; Future  -     Lipid Panel with Direct LDL reflex; Future  -     TSH, 3rd generation with Free T4 reflex; Future    Essential hypertension  -     CBC and differential; Future  -     Basic metabolic panel; Future  -     Lipid Panel with Direct LDL reflex; Future  -     TSH, 3rd generation with Free T4 reflex; Future    Abnormal prostate specific antigen (PSA)  -     Ambulatory referral to Urology; Future  -     CBC and differential; Future  -     Basic metabolic panel; Future  -     Lipid Panel with Direct LDL reflex; Future  -     TSH, 3rd generation with Free T4 reflex;  Future

## 2019-12-13 NOTE — ASSESSMENT & PLAN NOTE
A new finding on the CT scan of the abdomen and large in the prostate patient will be seen by Urology

## 2019-12-13 NOTE — ASSESSMENT & PLAN NOTE
A chronic asymptomatic continue current management discussed the patient important lose weight and low salt diet discussed with the patient

## 2020-01-06 DIAGNOSIS — K21.9 GASTROESOPHAGEAL REFLUX DISEASE WITHOUT ESOPHAGITIS: ICD-10-CM

## 2020-01-06 RX ORDER — PANTOPRAZOLE SODIUM 40 MG/1
40 TABLET, DELAYED RELEASE ORAL
Qty: 30 TABLET | Refills: 2 | Status: SHIPPED | OUTPATIENT
Start: 2020-01-06 | End: 2020-04-13

## 2020-02-20 ENCOUNTER — CONSULT (OUTPATIENT)
Dept: UROLOGY | Facility: MEDICAL CENTER | Age: 60
End: 2020-02-20
Payer: COMMERCIAL

## 2020-02-20 VITALS
BODY MASS INDEX: 31.4 KG/M2 | WEIGHT: 212 LBS | HEIGHT: 69 IN | HEART RATE: 57 BPM | SYSTOLIC BLOOD PRESSURE: 148 MMHG | DIASTOLIC BLOOD PRESSURE: 86 MMHG

## 2020-02-20 DIAGNOSIS — N40.2 PROSTATE NODULE WITHOUT URINARY OBSTRUCTION: ICD-10-CM

## 2020-02-20 DIAGNOSIS — R97.20 ABNORMAL PROSTATE SPECIFIC ANTIGEN (PSA): Primary | ICD-10-CM

## 2020-02-20 LAB
SL AMB  POCT GLUCOSE, UA: NEGATIVE
SL AMB LEUKOCYTE ESTERASE,UA: NEGATIVE
SL AMB POCT BILIRUBIN,UA: NEGATIVE
SL AMB POCT BLOOD,UA: ABNORMAL
SL AMB POCT CLARITY,UA: CLEAR
SL AMB POCT COLOR,UA: YELLOW
SL AMB POCT KETONES,UA: NEGATIVE
SL AMB POCT NITRITE,UA: NEGATIVE
SL AMB POCT PH,UA: 5.5
SL AMB POCT SPECIFIC GRAVITY,UA: 1.02
SL AMB POCT URINE PROTEIN: NEGATIVE
SL AMB POCT UROBILINOGEN: 0.2

## 2020-02-20 PROCEDURE — 81003 URINALYSIS AUTO W/O SCOPE: CPT | Performed by: UROLOGY

## 2020-02-20 PROCEDURE — 99243 OFF/OP CNSLTJ NEW/EST LOW 30: CPT | Performed by: UROLOGY

## 2020-02-20 PROCEDURE — 3079F DIAST BP 80-89 MM HG: CPT | Performed by: UROLOGY

## 2020-02-20 PROCEDURE — 1036F TOBACCO NON-USER: CPT | Performed by: UROLOGY

## 2020-02-20 PROCEDURE — 3077F SYST BP >= 140 MM HG: CPT | Performed by: UROLOGY

## 2020-02-20 PROCEDURE — 3008F BODY MASS INDEX DOCD: CPT | Performed by: UROLOGY

## 2020-02-20 RX ORDER — CIPROFLOXACIN 500 MG/1
500 TABLET, FILM COATED ORAL EVERY 12 HOURS SCHEDULED
Qty: 2 TABLET | Refills: 0 | Status: SHIPPED | OUTPATIENT
Start: 2020-02-20 | End: 2020-02-23

## 2020-02-20 NOTE — PROGRESS NOTES
Assessment/Plan:    Abnormal prostate specific antigen (PSA)  The patient has a persistently elevated PSA and a palpable prostate nodule  Biopsy scheduled  Diagnoses and all orders for this visit:    Abnormal prostate specific antigen (PSA)  -     Ambulatory referral to Urology  -     POCT urine dip auto non-scope  -     ciprofloxacin (CIPRO) 500 mg tablet; Take 1 tablet (500 mg total) by mouth every 12 (twelve) hours for 3 days Take one tablet the morning of the biopsy and one in the evening  Prostate nodule without urinary obstruction          Subjective:      Patient ID: Reji Pal is a 62 y o  male  HPI   Elevated PSA - Prostate nodule:  PSA on November 7, 2019 was 5 4 rising to 6 5 on December 3, 2019  There are no free PSA levels  There are no prior PSA's  No urinary or sexual sx  BPH:  He notes weak stream   He denies other significant urinary symptoms  He denies gross hematuria, urinary tract infections or incontinence  He is taking neither medications nor supplements for his symptoms  AUA SYMPTOM SCORE      Most Recent Value   AUA SYMPTOM SCORE   How often have you had a sensation of not emptying your bladder completely after you finished urinating? 0   How often have you had to urinate again less than two hours after you finished urinating? 0   How often have you found you stopped and started again several times when you urinate? 1   How often have you found it difficult to postpone urination? 0   How often have you had a weak urinary stream?  2   How often have you had to push or strain to begin urination?   0   How many times did you most typically get up to urinate from the time you went to bed at night until the time you got up in the morning?  0   Quality of Life: If you were to spend the rest of your life with your urinary condition just the way it is now, how would you feel about that?  1   AUA SYMPTOM SCORE  3          The following portions of the patient's history were reviewed and updated as appropriate: allergies, current medications, past family history, past medical history, past social history, past surgical history and problem list     Review of Systems   Constitutional: Negative for activity change and fatigue  Respiratory: Negative for shortness of breath and wheezing  Cardiovascular: Negative for chest pain  Hypertension  MI in 2011  Caries NGG but has never taken it  Followed annually by cardiologist     Gastrointestinal: Negative for abdominal pain  Genitourinary: Negative for difficulty urinating, dysuria, frequency, hematuria and urgency  Musculoskeletal: Negative for back pain and gait problem  Skin: Negative  Allergic/Immunologic: Negative  Neurological: Negative  Psychiatric/Behavioral: Negative  Objective:      /86 (BP Location: Left arm, Patient Position: Sitting, Cuff Size: Standard)   Pulse 57   Ht 5' 9" (1 753 m)   Wt 96 2 kg (212 lb)   BMI 31 31 kg/m²          Physical Exam   Constitutional: He is oriented to person, place, and time  He appears well-developed and well-nourished  HENT:   Head: Normocephalic and atraumatic  Eyes: EOM are normal    Neck: Normal range of motion  Neck supple  Pulmonary/Chest: Effort normal    Genitourinary: Rectum normal    Genitourinary Comments: The prostate is 40 grams, firm, smooth and non-tender  There is a pea-sized nodule along the right lat border at the mid-gland level  Musculoskeletal: Normal range of motion  Neurological: He is alert and oriented to person, place, and time  Skin: Skin is warm and dry  Psychiatric: He has a normal mood and affect   His behavior is normal  Judgment and thought content normal

## 2020-02-27 ENCOUNTER — TELEPHONE (OUTPATIENT)
Dept: CARDIOLOGY CLINIC | Facility: CLINIC | Age: 60
End: 2020-02-27

## 2020-02-27 NOTE — TELEPHONE ENCOUNTER
Received fax from [employer] (powering [employer]) plan authorization for ECHO STRESS TEST  was denied by medical review -office notes submitted from last office visit on 3/21/2019 are not sufficient - they are  requesting recent office note with new blood vessel complaints that show need for this test   The notes could give new exam findings that show why test is needed- otherwise this test is not covered service with members plan- patient will most likely be cancelled

## 2020-03-02 ENCOUNTER — CONSULT (OUTPATIENT)
Dept: GASTROENTEROLOGY | Facility: MEDICAL CENTER | Age: 60
End: 2020-03-02
Payer: COMMERCIAL

## 2020-03-02 VITALS
SYSTOLIC BLOOD PRESSURE: 118 MMHG | WEIGHT: 226 LBS | BODY MASS INDEX: 33.47 KG/M2 | DIASTOLIC BLOOD PRESSURE: 72 MMHG | HEIGHT: 69 IN | TEMPERATURE: 98.1 F | HEART RATE: 56 BPM

## 2020-03-02 DIAGNOSIS — K76.0 FATTY LIVER: ICD-10-CM

## 2020-03-02 DIAGNOSIS — Z12.11 ENCOUNTER FOR SCREENING COLONOSCOPY: Primary | ICD-10-CM

## 2020-03-02 PROCEDURE — 99244 OFF/OP CNSLTJ NEW/EST MOD 40: CPT | Performed by: INTERNAL MEDICINE

## 2020-03-02 RX ORDER — EZETIMIBE 10 MG/1
10 TABLET ORAL DAILY
COMMUNITY
End: 2020-03-26 | Stop reason: ALTCHOICE

## 2020-03-02 NOTE — PATIENT INSTRUCTIONS
The patient is scheduled at [facility] for a colonoscopy with Oscar Izaguirre on 04/27/2020   suprep  prep instructions have been gone over in the office, with the patient, by the MA  The patient is aware that they will receive a call with the arrival time the day prior to procedure and that they will need a  the day of the procedure   I have asked the patient to call with any questions that they might have prior to procedure

## 2020-03-02 NOTE — PROGRESS NOTES
[facility] - Outpatient Consultation  Claudia Ramirez 62 y o  male MRN: [id number]  Encounter: [id number]          ASSESSMENT AND PLAN:    Claudia Ramirez is a 62 y o  male who presents with complaint of fatty liver (previously seen on imaging) and need for a colonoscopy  He notes a recent abnormal BRICE  No GI symptoms  Available labs and imaging reviewed  1  Encounter for screening colonoscopy    2  Fatty liver        Orders Placed This Encounter   Procedures    Colonoscopy     -- Colonoscopy for screening  -- Diet and exercise to help with fatty liver previously seen on imaging    ______________________________________________________________________    HPI:    Claudia Ramirez is a 62 y o  male who presents with complaint of colonsocopy    He had a prostate exam and a lesion was found  Ion 3/17 he has a biopsy planned  No heartburn, dysphagia, odynophagia, nausea, vomiting, diarrhea, constipation, BRBPR, melena, abdominal pain, weight loss, fevers, chills  No prior colonoscopy  No prior EGD  + FH of colon cancer in maternal aunt (in her 63's)       REVIEW OF SYSTEMS:  10 point ROS reviewed and negative, except as above    Historical Information   Past Medical History:   Diagnosis Date    Coronary artery disease     Elevated PSA     Gastroesophageal reflux disease without esophagitis 12/13/2019    Hypercholesterolemia     Hypertension     Myocardial infarction [facility])      Past Surgical History:   Procedure Laterality Date    CORONARY STENT PLACEMENT      bare metal stent in Rt coronary artery    SHOULDER SURGERY Left      Social History   Social History     Substance and Sexual Activity   Alcohol Use Yes    Frequency: Monthly or less    Drinks per session: 1 or 2    Binge frequency: Never    Comment: rarely      Social History     Substance and Sexual Activity   Drug Use Never     Social History     Tobacco Use   Smoking Status Former Smoker   Smokeless Tobacco Former User Family History   Problem Relation Age of Onset    Sleep apnea Mother     Heart attack Father        Meds/Allergies       Current Outpatient Medications:     aspirin 81 mg chewable tablet    atorvastatin (LIPITOR) 10 mg tablet    ezetimibe (ZETIA) 10 mg tablet    lisinopril (ZESTRIL) 2 5 mg tablet    metoprolol succinate (TOPROL-XL) 25 mg 24 hr tablet    nitroglycerin (NITROLINGUAL) 0 4 mg/spray spray    pantoprazole (PROTONIX) 40 mg tablet    Na Sulfate-K Sulfate-Mg Sulf (Suprep Bowel Prep Kit) 17 5-3 13-1 6 GM/177ML SOLN    No Known Allergies        Objective     Blood pressure 118/72, pulse 56, temperature 98 1 °F (36 7 °C), height 5' 9" (1 753 m), weight 103 kg (226 lb)  Body mass index is 33 37 kg/m²  PHYSICAL EXAM:      General Appearance:   Alert, cooperative, no distress   HEENT:   Normocephalic, atraumatic, anicteric  Neck:  Supple, symmetrical, trachea midline   Lungs:   Clear to auscultation bilaterally; no rales, rhonchi or wheezing; respirations unlabored    Heart[de-identified]   Regular rate and rhythm; no murmur, rub, or gallop  Abdomen:   Soft, non-tender, non-distended; normal bowel sounds; no masses, no organomegaly    Genitalia:   Deferred    Rectal:   Deferred    Extremities:  No cyanosis, clubbing or edema    Pulses:  2+ and symmetric    Skin:  No jaundice, rashes, or lesions    Lymph nodes:  No palpable cervical lymphadenopathy        Lab Results:   No visits with results within 1 Day(s) from this visit     Latest known visit with results is:   Consult on 02/20/2020   Component Date Value     COLOR,UA 02/20/2020 Yellow     CLARITY,UA 02/20/2020 Clear     SPECIFIC GRAVITY,UA 02/20/2020 1 025      PH,UA 02/20/2020 5 5     LEUKOCYTE ESTERASE,UA 02/20/2020 Negative     NITRITE,UA 02/20/2020 Negative     GLUCOSE, UA 02/20/2020 Negative     KETONES,UA 02/20/2020 Negative     BILIRUBIN,UA 02/20/2020 Negative     BLOOD,UA 02/20/2020 Trace-Intact     POCT URINE PROTEIN 02/20/2020 Negative     SL AMB POCT UROBILINOGEN 02/20/2020 0 2        Lab Results   Component Value Date    WBC 7 78 12/03/2019    HGB 14 0 12/03/2019    HCT 43 9 12/03/2019    MCV 95 12/03/2019     12/03/2019       Lab Results   Component Value Date    SODIUM 140 11/07/2019    K 4 4 11/07/2019     11/07/2019    CO2 27 11/07/2019    AGAP 6 11/07/2019    BUN 17 11/07/2019    CREATININE 1 03 11/07/2019    GLUC 97 03/30/2018    GLUF 94 11/07/2019    CALCIUM 9 1 11/07/2019    AST 17 11/07/2019    ALT 33 11/07/2019    ALKPHOS 91 11/07/2019    TP 7 5 11/07/2019    TBILI 0 87 11/07/2019    EGFR 78 11/07/2019       No results found for: CRP    Lab Results   Component Value Date    ICK8PINNQICA 0 738 11/07/2019       No results found for: IRON, TIBC, FERRITIN    Radiology Results:   No results found

## 2020-03-16 ENCOUNTER — TELEPHONE (OUTPATIENT)
Dept: UROLOGY | Facility: MEDICAL CENTER | Age: 60
End: 2020-03-16

## 2020-03-16 NOTE — TELEPHONE ENCOUNTER
Left message on patients machine to give us a call back so we can properly screen him for his apt with us tomorrow

## 2020-03-17 ENCOUNTER — PROCEDURE VISIT (OUTPATIENT)
Dept: UROLOGY | Facility: MEDICAL CENTER | Age: 60
End: 2020-03-17
Payer: COMMERCIAL

## 2020-03-17 VITALS
SYSTOLIC BLOOD PRESSURE: 140 MMHG | HEART RATE: 58 BPM | DIASTOLIC BLOOD PRESSURE: 82 MMHG | BODY MASS INDEX: 32.38 KG/M2 | HEIGHT: 69 IN | WEIGHT: 218.6 LBS

## 2020-03-17 DIAGNOSIS — R97.20 ABNORMAL PROSTATE SPECIFIC ANTIGEN (PSA): Primary | ICD-10-CM

## 2020-03-17 LAB
SL AMB  POCT GLUCOSE, UA: NEGATIVE
SL AMB LEUKOCYTE ESTERASE,UA: NEGATIVE
SL AMB POCT BILIRUBIN,UA: NEGATIVE
SL AMB POCT BLOOD,UA: NEGATIVE
SL AMB POCT CLARITY,UA: CLEAR
SL AMB POCT COLOR,UA: YELLOW
SL AMB POCT KETONES,UA: NEGATIVE
SL AMB POCT NITRITE,UA: NEGATIVE
SL AMB POCT PH,UA: 5.5
SL AMB POCT SPECIFIC GRAVITY,UA: >=1.03
SL AMB POCT URINE PROTEIN: NEGATIVE
SL AMB POCT UROBILINOGEN: 0.2

## 2020-03-17 PROCEDURE — 96372 THER/PROPH/DIAG INJ SC/IM: CPT

## 2020-03-17 PROCEDURE — 88344 IMHCHEM/IMCYTCHM EA MLT ANTB: CPT | Performed by: PATHOLOGY

## 2020-03-17 PROCEDURE — 81003 URINALYSIS AUTO W/O SCOPE: CPT

## 2020-03-17 PROCEDURE — G0416 PROSTATE BIOPSY, ANY MTHD: HCPCS | Performed by: PATHOLOGY

## 2020-03-17 PROCEDURE — 55700 PR BIOPSY OF PROSTATE,NEEDLE/PUNCH: CPT

## 2020-03-17 PROCEDURE — 76942 ECHO GUIDE FOR BIOPSY: CPT

## 2020-03-17 RX ORDER — GENTAMICIN SULFATE 40 MG/ML
80 INJECTION, SOLUTION INTRAMUSCULAR; INTRAVENOUS ONCE
Status: COMPLETED | OUTPATIENT
Start: 2020-03-17 | End: 2020-03-17

## 2020-03-17 RX ADMIN — GENTAMICIN SULFATE 80 MG: 40 INJECTION, SOLUTION INTRAMUSCULAR; INTRAVENOUS at 08:41

## 2020-03-17 NOTE — PROGRESS NOTES
Procedures      Preoperative diagnosis:  Elevated PSA     Postoperative diagnosis: Same    Operation:  Transrectal ultrasound of the prostate with biopsy    Surgeon: Jose Luis Aguilera MD, FACS    Anesthesia:  Local    Procedure: The patient was placed on the examining table with his left side down  Xylocaine jelly, 10 cc,  was instilled into the rectum for mucosal analgesia and anesthesia  The ultrasound probe was inserted  Five mL of 1% xylocaine without epinephrine was infiltrated into each neurovascular bundle in the groove between the base of the prostate and the seminal vesicles  A total of 10 mL was injected  Ultrasound images were obtained scanning from the base to the apex and from the left side to the right side  The prostate volume was 58 g  No hypoechoic lesions were identified  Twelve biopsies were taken per protocol  The procedure was completed  The patient tolerated well  He was discharged home in satisfactory condition  Blood loss was insignificant  The patient was advised to complete his antibiotics  He was alerted to the possibility of hematuria, blood per rectum and blood his semen  The patient has a follow-up appointment on March 30, 2020  When we have the results, I will contact the patient  If the biopsy is benign, we can cancel the appointment on March 30

## 2020-03-18 ENCOUNTER — TELEPHONE (OUTPATIENT)
Dept: OTHER | Facility: HOSPITAL | Age: 60
End: 2020-03-18

## 2020-03-18 ENCOUNTER — TELEPHONE (OUTPATIENT)
Dept: OTHER | Facility: OTHER | Age: 60
End: 2020-03-18

## 2020-03-18 NOTE — TELEPHONE ENCOUNTER
Patient called with concerns s/p Prostate Biopsy in the office yesterday  Nausea, Fever to 100 6, Burning and Blood in urine  Patient advised with fever, should be evaluated in the ER for possible UTI  If fever persists or worsens, will proceed to ER  Recommended hydration for urinary symptoms       Estefany Padron PA-C  Date: 3/18/2020 Time: 7:13 PM

## 2020-03-19 ENCOUNTER — TELEPHONE (OUTPATIENT)
Dept: UROLOGY | Facility: MEDICAL CENTER | Age: 60
End: 2020-03-19

## 2020-03-19 NOTE — TELEPHONE ENCOUNTER
Telephone call from [facility] ([facility]) pathologist who wants Dr Autumn He to know pt has 2 positive cores on his prostate biopsy report  Routing to Dr Caitlyn Neves attention

## 2020-03-23 ENCOUNTER — TELEPHONE (OUTPATIENT)
Dept: UROLOGY | Facility: MEDICAL CENTER | Age: 60
End: 2020-03-23

## 2020-03-23 DIAGNOSIS — C61 PROSTATE CANCER (HCC): Primary | ICD-10-CM

## 2020-03-23 NOTE — TELEPHONE ENCOUNTER
I called the patient regarding his pathology report  He has 2 cores of [place] 6 prostate cancer, each with 40% involvement  We discussed NCCN guidelines as best we can over the phone in the era of Covid 19  Per NCCN guidelines, he falls into the very low risk group for progressive disease  We will plan active surveillance  He should return in 6 months with a PSA then  When I called the patient this morning, he was being discharged from [facility] having been admitted 1 day following his biopsy with E coli in his blood in urine  The E coli was resistant to Cipro but sensitive to all other antibiotics, particularly gentamicin and ceftriaxone  At this point, he is making an uneventful recovery from this event

## 2020-03-24 ENCOUNTER — TELEPHONE (OUTPATIENT)
Dept: UROLOGY | Facility: MEDICAL CENTER | Age: 60
End: 2020-03-24

## 2020-03-24 NOTE — TELEPHONE ENCOUNTER
Patient managed by Dr Keron Hussein seen at [facility]  Patient called in stating he had a biospy last Tuesday 3/17/2020 and was admitted on 3/18/2020  Patient called in requesting if he can get a work excuse   Patient can be reached at [phone]

## 2020-03-25 ENCOUNTER — TRANSITIONAL CARE MANAGEMENT (OUTPATIENT)
Dept: FAMILY MEDICINE CLINIC | Facility: CLINIC | Age: 60
End: 2020-03-25

## 2020-03-25 ENCOUNTER — TELEPHONE (OUTPATIENT)
Dept: FAMILY MEDICINE CLINIC | Facility: CLINIC | Age: 60
End: 2020-03-25

## 2020-03-25 NOTE — TELEPHONE ENCOUNTER
Second request  Patient called again in regard to Work release note    Patient can be reached at [phone]  Thank you

## 2020-03-26 ENCOUNTER — OFFICE VISIT (OUTPATIENT)
Dept: FAMILY MEDICINE CLINIC | Facility: CLINIC | Age: 60
End: 2020-03-26
Payer: COMMERCIAL

## 2020-03-26 VITALS
DIASTOLIC BLOOD PRESSURE: 70 MMHG | BODY MASS INDEX: 31.07 KG/M2 | SYSTOLIC BLOOD PRESSURE: 120 MMHG | WEIGHT: 217 LBS | OXYGEN SATURATION: 97 % | HEIGHT: 70 IN | HEART RATE: 64 BPM | TEMPERATURE: 97.7 F

## 2020-03-26 DIAGNOSIS — N39.0 URINARY TRACT INFECTION WITH HEMATURIA, SITE UNSPECIFIED: ICD-10-CM

## 2020-03-26 DIAGNOSIS — R31.9 URINARY TRACT INFECTION WITH HEMATURIA, SITE UNSPECIFIED: ICD-10-CM

## 2020-03-26 DIAGNOSIS — R71.8 ABNORMAL RBC: ICD-10-CM

## 2020-03-26 DIAGNOSIS — R31.29 OTHER MICROSCOPIC HEMATURIA: Primary | ICD-10-CM

## 2020-03-26 DIAGNOSIS — C61 ADENOCARCINOMA OF PROSTATE (HCC): ICD-10-CM

## 2020-03-26 DIAGNOSIS — A41.51 SEPSIS DUE TO ESCHERICHIA COLI, UNSPECIFIED WHETHER ACUTE ORGAN DYSFUNCTION PRESENT (HCC): ICD-10-CM

## 2020-03-26 DIAGNOSIS — R71.0 DROP IN HEMOGLOBIN: ICD-10-CM

## 2020-03-26 PROCEDURE — 99496 TRANSJ CARE MGMT HIGH F2F 7D: CPT | Performed by: FAMILY MEDICINE

## 2020-03-26 RX ORDER — SULFAMETHOXAZOLE AND TRIMETHOPRIM 800; 160 MG/1; MG/1
TABLET ORAL 4 TIMES DAILY
COMMUNITY
Start: 2020-03-23 | End: 2020-04-03

## 2020-03-26 NOTE — PROGRESS NOTES
Assessment/Plan:     Sepsis due to Escherichia coli ([facility] )  Status post hospitalization blood culture was positive for E coli patient had IV antibiotic cefepime seen by infection disease during hospitalization a repeat blood culture was negative patient was discharged home on Bactrim orally with a total course of 14 days  CBC show elevated white blood cell we repeat CBC in 1 week    UTI (urinary tract infection)  Status post hospitalization and the urine infection calls bacteremia and patient currently on oral antibiotic Bactrim for total 14 days plan to repeat urine culture in 1 week after finish the course of antibiotic discussed with the patient    Adenocarcinoma of prostate ([facility] )  A new diagnosis result of the biopsy was discussed with the patient he had adeno carcino will follow-up with the Urology       Diagnoses and all orders for this visit:    Other microscopic hematuria  -     UA w Reflex to Microscopic w Reflex to Culture -Lab Collect    Drop in hemoglobin  -     CBC and differential; Future    Abnormal RBC  -     CBC and differential; Future    Sepsis due to Escherichia coli, unspecified whether acute organ dysfunction present [facility])    Urinary tract infection with hematuria, site unspecified    Adenocarcinoma of prostate ([facility] )    Other orders  -     sulfamethoxazole-trimethoprim (BACTRIM DS) 800-160 mg per tablet; Take by mouth 4 (four) times a day         Subjective:     Patient ID: Tobe Lefort is a 62 y o  male      Patient here status post hospital patient went to [facility] ([place], [place]) on March 18, 2020 with the complain of fever chills blood in the urine and nausea day before on March 17 patient did have a prostate biopsy, in the emergency room urine had blood patient was admitted with the UTI patient did have the the positive for blood culture for E coli he was treated with IV antibiotic and and started with IV cefepime he been seen by infection disease during hospitalization he did have a repeat the blood culture was negative patient asymptomatic felt better he switch it to oral Bactrim and he discharged home to finish 14 days of antibiotic patient was discharged home in March 23rd with recommendation to follow-up with the infection disease and hospital record including blood work urine culture blood culture and CT scan of the abdomen and pelvic x-ray result discussed with the patient a blood work which show still elevated white blood cell  A medication reconciled with the patient  Patient today deny any abdomen pain no nausea no vomiting no decrease in oral intake no flank pain he does not have dysuria or increased frequency urination he does not see any blood in the urine      Review of Systems   Constitutional: Negative for fatigue and fever  HENT: Negative for ear pain, sinus pressure, sinus pain and sore throat  Eyes: Negative for pain and redness  Respiratory: Negative for cough, chest tightness and shortness of breath  Cardiovascular: Negative for chest pain, palpitations and leg swelling  Gastrointestinal: Negative for abdominal pain, blood in stool, constipation, diarrhea and nausea  Genitourinary: Negative for flank pain, frequency and hematuria  Musculoskeletal: Negative for back pain and joint swelling  Skin: Negative for rash  Neurological: Negative for dizziness, numbness and headaches  Hematological: Does not bruise/bleed easily  Objective:     Physical Exam   Constitutional: He is oriented to person, place, and time  He appears well-developed and well-nourished  HENT:   Head: Normocephalic  Right Ear: External ear normal    Left Ear: External ear normal    Eyes: Conjunctivae and EOM are normal  Right eye exhibits no discharge  Left eye exhibits no discharge  Neck: No JVD present  Cardiovascular: Normal rate, regular rhythm and normal heart sounds  Exam reveals no gallop  No murmur heard  Pulmonary/Chest: Effort normal  No respiratory distress   He has no wheezes  He has no rales  He exhibits no tenderness  Abdominal: He exhibits no mass  There is no tenderness  There is no rebound  Musculoskeletal: He exhibits no edema or tenderness  Neurological: He is alert and oriented to person, place, and time  Skin: No rash noted  No erythema  Vitals:    03/26/20 1415   BP: 120/70   Pulse: 64   Temp: 97 7 °F (36 5 °C)   TempSrc: Tympanic   SpO2: 97%   Weight: 98 4 kg (217 lb)   Height: 5' 10" (1 778 m)       Transitional Care Management Review:  Silvana Harris is a 62 y o  male here for TCM follow up  During the TCM phone call patient stated:    TCM Call (since 2/25/2020)     Date and time call was made  3/25/2020  1:35 PM    Hospital care reviewed  Records reviewed    Patient was hospitialized at  [facility]    Date of Admission  03/18/20    Date of discharge  03/23/20    Diagnosis  prostate biopsy     Disposition  Home    Were the patients medications reviewed and updated  No    Current Symptoms  None      TCM Call (since 2/25/2020)     Post hospital issues  None    Should patient be enrolled in anticoag monitoring? No    Scheduled for follow up?   Yes    Patients specialists  Urologist    Did you obtain your prescribed medications  Yes    Do you need help managing your prescriptions or medications  No    Is transportation to your appointment needed  No    I have advised the patient to call PCP with any new or worsening symptoms  leonarda ward     Living Arrangements  Family members    Support System  Family    The type of support provided  Physical; Emotional    Do you have social support  Yes, as much as I need    Are you recieving any outpatient services  No    Are you recieving home care services  No    Are you using any community resources  No    Current waiver services  No    Have you fallen in the last 12 months  No    Interperter language line needed  No    Counseling  Patient    Counseling topics  Importance of RX compliance Bryce Yanes MD

## 2020-03-27 ENCOUNTER — TELEPHONE (OUTPATIENT)
Dept: GASTROENTEROLOGY | Facility: MEDICAL CENTER | Age: 60
End: 2020-03-27

## 2020-03-27 PROBLEM — B96.20 BACTEREMIA DUE TO ESCHERICHIA COLI: Status: ACTIVE | Noted: 2020-03-19

## 2020-03-27 PROBLEM — C61 ADENOCARCINOMA OF PROSTATE (HCC): Status: ACTIVE | Noted: 2020-03-27

## 2020-03-27 PROBLEM — R78.81 BACTEREMIA DUE TO ESCHERICHIA COLI: Status: ACTIVE | Noted: 2020-03-19

## 2020-03-27 PROBLEM — N39.0 UTI (URINARY TRACT INFECTION): Status: ACTIVE | Noted: 2020-03-18

## 2020-03-27 PROBLEM — M75.50 BURSITIS OF SHOULDER: Status: ACTIVE | Noted: 2017-12-12

## 2020-03-27 PROBLEM — S40.012A CONTUSION OF LEFT SHOULDER: Status: ACTIVE | Noted: 2018-04-10

## 2020-03-27 PROBLEM — S46.009A INJURY OF TENDON OF ROTATOR CUFF: Status: ACTIVE | Noted: 2017-11-28

## 2020-03-27 PROBLEM — A41.51 SEPSIS DUE TO ESCHERICHIA COLI (HCC): Status: ACTIVE | Noted: 2020-03-19

## 2020-03-27 NOTE — ASSESSMENT & PLAN NOTE
A new diagnosis result of the biopsy was discussed with the patient he had adeno carcino will follow-up with the Urology

## 2020-03-27 NOTE — TELEPHONE ENCOUNTER
Left vm to call and reschedule procedure at [facility] with dr Jeannette Alvarado due to covid

## 2020-03-27 NOTE — ASSESSMENT & PLAN NOTE
Status post hospitalization blood culture was positive for E coli patient had IV antibiotic cefepime seen by infection disease during hospitalization a repeat blood culture was negative patient was discharged home on Bactrim orally with a total course of 14 days  CBC show elevated white blood cell we repeat CBC in 1 week

## 2020-04-13 DIAGNOSIS — K21.9 GASTROESOPHAGEAL REFLUX DISEASE WITHOUT ESOPHAGITIS: ICD-10-CM

## 2020-04-13 RX ORDER — PANTOPRAZOLE SODIUM 40 MG/1
TABLET, DELAYED RELEASE ORAL
Qty: 30 TABLET | Refills: 2 | Status: SHIPPED | OUTPATIENT
Start: 2020-04-13 | End: 2020-07-06

## 2020-04-21 ENCOUNTER — APPOINTMENT (OUTPATIENT)
Dept: LAB | Facility: MEDICAL CENTER | Age: 60
End: 2020-04-21
Payer: COMMERCIAL

## 2020-04-21 ENCOUNTER — DOCUMENTATION (OUTPATIENT)
Dept: UROLOGY | Facility: CLINIC | Age: 60
End: 2020-04-21

## 2020-04-21 DIAGNOSIS — R71.0 DROP IN HEMOGLOBIN: ICD-10-CM

## 2020-04-21 DIAGNOSIS — R71.8 ABNORMAL RBC: ICD-10-CM

## 2020-04-21 LAB
BASOPHILS # BLD AUTO: 0.12 THOUSANDS/ΜL (ref 0–0.1)
BASOPHILS NFR BLD AUTO: 1 % (ref 0–1)
BILIRUB UR QL STRIP: NEGATIVE
CLARITY UR: CLEAR
COLOR UR: YELLOW
EOSINOPHIL # BLD AUTO: 0.34 THOUSAND/ΜL (ref 0–0.61)
EOSINOPHIL NFR BLD AUTO: 4 % (ref 0–6)
ERYTHROCYTE [DISTWIDTH] IN BLOOD BY AUTOMATED COUNT: 13.9 % (ref 11.6–15.1)
GLUCOSE UR STRIP-MCNC: NEGATIVE MG/DL
HCT VFR BLD AUTO: 41.1 % (ref 36.5–49.3)
HGB BLD-MCNC: 13.3 G/DL (ref 12–17)
HGB UR QL STRIP.AUTO: NEGATIVE
IMM GRANULOCYTES # BLD AUTO: 0.02 THOUSAND/UL (ref 0–0.2)
IMM GRANULOCYTES NFR BLD AUTO: 0 % (ref 0–2)
KETONES UR STRIP-MCNC: NEGATIVE MG/DL
LEUKOCYTE ESTERASE UR QL STRIP: NEGATIVE
LYMPHOCYTES # BLD AUTO: 2.79 THOUSANDS/ΜL (ref 0.6–4.47)
LYMPHOCYTES NFR BLD AUTO: 33 % (ref 14–44)
MCH RBC QN AUTO: 30.4 PG (ref 26.8–34.3)
MCHC RBC AUTO-ENTMCNC: 32.4 G/DL (ref 31.4–37.4)
MCV RBC AUTO: 94 FL (ref 82–98)
MONOCYTES # BLD AUTO: 0.58 THOUSAND/ΜL (ref 0.17–1.22)
MONOCYTES NFR BLD AUTO: 7 % (ref 4–12)
NEUTROPHILS # BLD AUTO: 4.63 THOUSANDS/ΜL (ref 1.85–7.62)
NEUTS SEG NFR BLD AUTO: 55 % (ref 43–75)
NITRITE UR QL STRIP: NEGATIVE
NRBC BLD AUTO-RTO: 0 /100 WBCS
PH UR STRIP.AUTO: 5.5 [PH]
PLATELET # BLD AUTO: 225 THOUSANDS/UL (ref 149–390)
PMV BLD AUTO: 10.2 FL (ref 8.9–12.7)
PROT UR STRIP-MCNC: NEGATIVE MG/DL
RBC # BLD AUTO: 4.38 MILLION/UL (ref 3.88–5.62)
SP GR UR STRIP.AUTO: 1.03 (ref 1–1.03)
UROBILINOGEN UR QL STRIP.AUTO: 0.2 E.U./DL
WBC # BLD AUTO: 8.48 THOUSAND/UL (ref 4.31–10.16)

## 2020-04-21 PROCEDURE — 36415 COLL VENOUS BLD VENIPUNCTURE: CPT

## 2020-04-21 PROCEDURE — 85025 COMPLETE CBC W/AUTO DIFF WBC: CPT

## 2020-04-21 PROCEDURE — 81003 URINALYSIS AUTO W/O SCOPE: CPT | Performed by: FAMILY MEDICINE

## 2020-04-24 ENCOUNTER — OFFICE VISIT (OUTPATIENT)
Dept: FAMILY MEDICINE CLINIC | Facility: CLINIC | Age: 60
End: 2020-04-24
Payer: COMMERCIAL

## 2020-04-24 VITALS
HEART RATE: 50 BPM | HEIGHT: 70 IN | WEIGHT: 218 LBS | SYSTOLIC BLOOD PRESSURE: 130 MMHG | BODY MASS INDEX: 31.21 KG/M2 | DIASTOLIC BLOOD PRESSURE: 80 MMHG | OXYGEN SATURATION: 98 % | TEMPERATURE: 97.5 F

## 2020-04-24 DIAGNOSIS — K21.9 GASTROESOPHAGEAL REFLUX DISEASE WITHOUT ESOPHAGITIS: ICD-10-CM

## 2020-04-24 DIAGNOSIS — Z23 NEED FOR TDAP VACCINATION: ICD-10-CM

## 2020-04-24 DIAGNOSIS — Z00.01 ENCOUNTER FOR WELL ADULT EXAM WITH ABNORMAL FINDINGS: Primary | ICD-10-CM

## 2020-04-24 DIAGNOSIS — E78.49 OTHER HYPERLIPIDEMIA: ICD-10-CM

## 2020-04-24 DIAGNOSIS — Z12.11 COLON CANCER SCREENING: ICD-10-CM

## 2020-04-24 DIAGNOSIS — I25.10 CAD IN NATIVE ARTERY: ICD-10-CM

## 2020-04-24 DIAGNOSIS — I10 ESSENTIAL HYPERTENSION: ICD-10-CM

## 2020-04-24 DIAGNOSIS — K76.0 FATTY LIVER: ICD-10-CM

## 2020-04-24 PROCEDURE — 99396 PREV VISIT EST AGE 40-64: CPT | Performed by: FAMILY MEDICINE

## 2020-04-24 PROCEDURE — 90715 TDAP VACCINE 7 YRS/> IM: CPT

## 2020-04-24 PROCEDURE — 1036F TOBACCO NON-USER: CPT | Performed by: FAMILY MEDICINE

## 2020-04-24 PROCEDURE — 99214 OFFICE O/P EST MOD 30 MIN: CPT | Performed by: FAMILY MEDICINE

## 2020-04-24 PROCEDURE — 90471 IMMUNIZATION ADMIN: CPT

## 2020-04-24 PROCEDURE — 3079F DIAST BP 80-89 MM HG: CPT | Performed by: FAMILY MEDICINE

## 2020-04-24 PROCEDURE — 3075F SYST BP GE 130 - 139MM HG: CPT | Performed by: FAMILY MEDICINE

## 2020-04-26 PROBLEM — Z00.01 ENCOUNTER FOR WELL ADULT EXAM WITH ABNORMAL FINDINGS: Status: ACTIVE | Noted: 2020-04-26

## 2020-04-26 PROBLEM — Z00.01 ENCOUNTER FOR WELL ADULT EXAM WITH ABNORMAL FINDINGS: Status: ACTIVE | Noted: 2020-04-24

## 2020-05-28 ENCOUNTER — TELEPHONE (OUTPATIENT)
Dept: FAMILY MEDICINE CLINIC | Facility: CLINIC | Age: 60
End: 2020-05-28

## 2020-06-04 ENCOUNTER — TELEMEDICINE (OUTPATIENT)
Dept: CARDIOLOGY CLINIC | Facility: CLINIC | Age: 60
End: 2020-06-04
Payer: COMMERCIAL

## 2020-06-04 VITALS
DIASTOLIC BLOOD PRESSURE: 75 MMHG | WEIGHT: 212 LBS | HEART RATE: 55 BPM | BODY MASS INDEX: 30.35 KG/M2 | HEIGHT: 70 IN | SYSTOLIC BLOOD PRESSURE: 130 MMHG

## 2020-06-04 DIAGNOSIS — E78.5 HYPERLIPIDEMIA, UNSPECIFIED HYPERLIPIDEMIA TYPE: ICD-10-CM

## 2020-06-04 DIAGNOSIS — I25.10 CAD IN NATIVE ARTERY: Primary | ICD-10-CM

## 2020-06-04 DIAGNOSIS — I25.2 HISTORY OF ACUTE INFERIOR WALL MI: ICD-10-CM

## 2020-06-04 DIAGNOSIS — Z95.5 PRESENCE OF BARE METAL STENT IN RIGHT CORONARY ARTERY: ICD-10-CM

## 2020-06-04 DIAGNOSIS — M79.10 MYALGIA DUE TO STATIN: ICD-10-CM

## 2020-06-04 DIAGNOSIS — E78.00 HYPERCHOLESTEREMIA: Primary | ICD-10-CM

## 2020-06-04 DIAGNOSIS — T46.6X5A MYALGIA DUE TO STATIN: ICD-10-CM

## 2020-06-04 DIAGNOSIS — I10 ESSENTIAL HYPERTENSION: ICD-10-CM

## 2020-06-04 PROCEDURE — 99214 OFFICE O/P EST MOD 30 MIN: CPT | Performed by: INTERNAL MEDICINE

## 2020-06-04 PROCEDURE — 3078F DIAST BP <80 MM HG: CPT | Performed by: INTERNAL MEDICINE

## 2020-06-04 PROCEDURE — 3075F SYST BP GE 130 - 139MM HG: CPT | Performed by: INTERNAL MEDICINE

## 2020-06-05 ENCOUNTER — DOCUMENTATION (OUTPATIENT)
Dept: CARDIOLOGY CLINIC | Facility: CLINIC | Age: 60
End: 2020-06-05

## 2020-06-12 ENCOUNTER — TELEPHONE (OUTPATIENT)
Dept: FAMILY MEDICINE CLINIC | Facility: CLINIC | Age: 60
End: 2020-06-12

## 2020-06-12 DIAGNOSIS — R19.5 POSITIVE COLORECTAL CANCER SCREENING USING COLOGUARD TEST: Primary | ICD-10-CM

## 2020-06-17 ENCOUNTER — PREP FOR PROCEDURE (OUTPATIENT)
Dept: GASTROENTEROLOGY | Facility: MEDICAL CENTER | Age: 60
End: 2020-06-17

## 2020-06-17 ENCOUNTER — TELEPHONE (OUTPATIENT)
Dept: GASTROENTEROLOGY | Facility: MEDICAL CENTER | Age: 60
End: 2020-06-17

## 2020-06-17 DIAGNOSIS — Z20.822 ENCOUNTER FOR LABORATORY TESTING FOR COVID-19 VIRUS: Primary | ICD-10-CM

## 2020-06-17 DIAGNOSIS — Z20.822 ENCOUNTER FOR LABORATORY TESTING FOR COVID-19 VIRUS: ICD-10-CM

## 2020-06-17 PROCEDURE — U0003 INFECTIOUS AGENT DETECTION BY NUCLEIC ACID (DNA OR RNA); SEVERE ACUTE RESPIRATORY SYNDROME CORONAVIRUS 2 (SARS-COV-2) (CORONAVIRUS DISEASE [COVID-19]), AMPLIFIED PROBE TECHNIQUE, MAKING USE OF HIGH THROUGHPUT TECHNOLOGIES AS DESCRIBED BY CMS-2020-01-R: HCPCS

## 2020-06-18 ENCOUNTER — ANESTHESIA EVENT (OUTPATIENT)
Dept: GASTROENTEROLOGY | Facility: MEDICAL CENTER | Age: 60
End: 2020-06-18

## 2020-06-19 LAB — SARS-COV-2 RNA SPEC QL NAA+PROBE: NOT DETECTED

## 2020-06-22 ENCOUNTER — HOSPITAL ENCOUNTER (OUTPATIENT)
Dept: GASTROENTEROLOGY | Facility: MEDICAL CENTER | Age: 60
Setting detail: OUTPATIENT SURGERY
Discharge: HOME | End: 2020-06-22
Admitting: ANESTHESIOLOGY
Payer: COMMERCIAL

## 2020-06-22 ENCOUNTER — ANESTHESIA (OUTPATIENT)
Dept: GASTROENTEROLOGY | Facility: MEDICAL CENTER | Age: 60
End: 2020-06-22

## 2020-06-22 VITALS
DIASTOLIC BLOOD PRESSURE: 84 MMHG | TEMPERATURE: 98.6 F | OXYGEN SATURATION: 98 % | WEIGHT: 212 LBS | SYSTOLIC BLOOD PRESSURE: 149 MMHG | HEART RATE: 47 BPM | HEIGHT: 70 IN | RESPIRATION RATE: 18 BRPM | BODY MASS INDEX: 30.35 KG/M2

## 2020-06-22 DIAGNOSIS — Z20.822 ENCOUNTER FOR LABORATORY TESTING FOR COVID-19 VIRUS: ICD-10-CM

## 2020-06-22 PROCEDURE — 45385 COLONOSCOPY W/LESION REMOVAL: CPT | Performed by: INTERNAL MEDICINE

## 2020-06-22 PROCEDURE — 45380 COLONOSCOPY AND BIOPSY: CPT | Performed by: INTERNAL MEDICINE

## 2020-06-22 PROCEDURE — 88305 TISSUE EXAM BY PATHOLOGIST: CPT | Performed by: PATHOLOGY

## 2020-06-22 RX ORDER — PROPOFOL 10 MG/ML
INJECTION, EMULSION INTRAVENOUS AS NEEDED
Status: DISCONTINUED | OUTPATIENT
Start: 2020-06-22 | End: 2020-06-22 | Stop reason: SURG

## 2020-06-22 RX ORDER — LIDOCAINE HYDROCHLORIDE 20 MG/ML
INJECTION, SOLUTION EPIDURAL; INFILTRATION; INTRACAUDAL; PERINEURAL AS NEEDED
Status: DISCONTINUED | OUTPATIENT
Start: 2020-06-22 | End: 2020-06-22 | Stop reason: SURG

## 2020-06-22 RX ORDER — SODIUM CHLORIDE 9 MG/ML
125 INJECTION, SOLUTION INTRAVENOUS CONTINUOUS
Status: DISCONTINUED | OUTPATIENT
Start: 2020-06-22 | End: 2020-06-22

## 2020-06-22 RX ADMIN — PROPOFOL 50 MG: 10 INJECTION, EMULSION INTRAVENOUS at 14:18

## 2020-06-22 RX ADMIN — PROPOFOL 100 MG: 10 INJECTION, EMULSION INTRAVENOUS at 14:13

## 2020-06-22 RX ADMIN — PROPOFOL 50 MG: 10 INJECTION, EMULSION INTRAVENOUS at 14:44

## 2020-06-22 RX ADMIN — PROPOFOL 50 MG: 10 INJECTION, EMULSION INTRAVENOUS at 14:24

## 2020-06-22 RX ADMIN — PROPOFOL 50 MG: 10 INJECTION, EMULSION INTRAVENOUS at 14:37

## 2020-06-22 RX ADMIN — PROPOFOL 50 MG: 10 INJECTION, EMULSION INTRAVENOUS at 14:15

## 2020-06-22 RX ADMIN — PROPOFOL 50 MG: 10 INJECTION, EMULSION INTRAVENOUS at 14:29

## 2020-06-22 RX ADMIN — LIDOCAINE HYDROCHLORIDE 100 MG: 20 INJECTION, SOLUTION EPIDURAL; INFILTRATION; INTRACAUDAL; PERINEURAL at 14:13

## 2020-07-05 DIAGNOSIS — K21.9 GASTROESOPHAGEAL REFLUX DISEASE WITHOUT ESOPHAGITIS: ICD-10-CM

## 2020-07-06 RX ORDER — PANTOPRAZOLE SODIUM 40 MG/1
TABLET, DELAYED RELEASE ORAL
Qty: 30 TABLET | Refills: 2 | Status: SHIPPED | OUTPATIENT
Start: 2020-07-06 | End: 2020-11-24 | Stop reason: SDUPTHER

## 2020-07-22 DIAGNOSIS — E78.00 PURE HYPERCHOLESTEROLEMIA: ICD-10-CM

## 2020-07-22 RX ORDER — ATORVASTATIN CALCIUM 10 MG/1
10 TABLET, FILM COATED ORAL DAILY
Qty: 90 TABLET | Refills: 3 | Status: SHIPPED | OUTPATIENT
Start: 2020-07-22 | End: 2020-08-25 | Stop reason: SDUPTHER

## 2020-07-24 ENCOUNTER — OFFICE VISIT (OUTPATIENT)
Dept: FAMILY MEDICINE CLINIC | Facility: CLINIC | Age: 60
End: 2020-07-24
Payer: COMMERCIAL

## 2020-07-24 VITALS
DIASTOLIC BLOOD PRESSURE: 70 MMHG | SYSTOLIC BLOOD PRESSURE: 120 MMHG | OXYGEN SATURATION: 97 % | BODY MASS INDEX: 31.5 KG/M2 | TEMPERATURE: 97.6 F | WEIGHT: 220 LBS | HEART RATE: 58 BPM | HEIGHT: 70 IN

## 2020-07-24 DIAGNOSIS — E78.2 MIXED HYPERLIPIDEMIA: ICD-10-CM

## 2020-07-24 DIAGNOSIS — Z23 NEED FOR ZOSTER VACCINATION: ICD-10-CM

## 2020-07-24 DIAGNOSIS — I10 ESSENTIAL HYPERTENSION: Primary | ICD-10-CM

## 2020-07-24 DIAGNOSIS — K21.9 GASTROESOPHAGEAL REFLUX DISEASE WITHOUT ESOPHAGITIS: ICD-10-CM

## 2020-07-24 PROBLEM — K63.5 COLON POLYP: Status: ACTIVE | Noted: 2020-07-24

## 2020-07-24 PROCEDURE — 90750 HZV VACC RECOMBINANT IM: CPT

## 2020-07-24 PROCEDURE — 1036F TOBACCO NON-USER: CPT | Performed by: FAMILY MEDICINE

## 2020-07-24 PROCEDURE — 3008F BODY MASS INDEX DOCD: CPT | Performed by: FAMILY MEDICINE

## 2020-07-24 PROCEDURE — 90471 IMMUNIZATION ADMIN: CPT

## 2020-07-24 PROCEDURE — 3074F SYST BP LT 130 MM HG: CPT | Performed by: FAMILY MEDICINE

## 2020-07-24 PROCEDURE — 99214 OFFICE O/P EST MOD 30 MIN: CPT | Performed by: FAMILY MEDICINE

## 2020-07-24 PROCEDURE — 3078F DIAST BP <80 MM HG: CPT | Performed by: FAMILY MEDICINE

## 2020-07-24 NOTE — ASSESSMENT & PLAN NOTE
Chronic asymptomatic fair control patient on pantoprazole we discussed the patient important wean him self off of it if symptom recurrent to restart medication avoid provoke food do not eat and lie down

## 2020-07-24 NOTE — ASSESSMENT & PLAN NOTE
Chronic asymptomatic patient seen by Cardiology recently who start him on Repatha continue atorvastatin 10 mg low-fat diet discussed the patient

## 2020-07-24 NOTE — PROGRESS NOTES
BMI Counseling: Body mass index is 32 02 kg/m²  The BMI is above normal  Nutrition recommendations include consuming healthier snacks and limiting drinks that contain sugar  Exercise recommendations include exercising 3-5 times per week  Depression Screening and Follow-up Plan: Patient's depression screening was positive with a PHQ-2 score of 0  Clincally patient does not have depression  No treatment is required  Subjective:   Chief Complaint   Patient presents with    Follow-up     chronic confitions        Patient ID: Luis Miguel Weinberg is a 56 y o  male  Patient here follow-up with a chronic condition patient history of hyperlipidemia on statin recently seen by Cardiology who had the [place] [place] he tolerated well without any side effect patient deny any chest pain short of breath no palpitation no TIA symptom patient was history of GERD on pantoprazole he been on it for almost the 9 months deny any heartburn nausea no vomiting no abdomen distension and no weight loss and patient with the history of the hypertension blood pressure fair control on current medication tolerated well without any side effect a deny any chest pain short of breath no palpitation noted lower extremity edema no dyspnea on exertion patient who had a positive Cologuard as screening for colon cancer recently had a colonoscopy it show he had a multiple polyp the pathology report not back yet   Recent blood work discussed with the patient      The following portions of the patient's history were reviewed and updated as appropriate: allergies, current medications, past family history, past medical history, past social history, past surgical history and problem list     Review of Systems   Constitutional: Negative for activity change, appetite change, fatigue and fever  HENT: Negative for congestion, ear pain, sinus pressure, sinus pain and sore throat  Eyes: Negative for pain, discharge, redness and itching     Respiratory: Negative for cough, chest tightness, shortness of breath and stridor  Cardiovascular: Negative for chest pain, palpitations and leg swelling  Gastrointestinal: Negative for abdominal pain, blood in stool, constipation, diarrhea and nausea  Genitourinary: Negative for dysuria, flank pain, frequency and hematuria  Musculoskeletal: Negative for back pain, joint swelling and neck pain  Skin: Negative for pallor and rash  Neurological: Negative for dizziness, tremors, weakness, numbness and headaches  Hematological: Does not bruise/bleed easily  Objective:  Vitals:    07/24/20 1126   BP: 120/70   Pulse: 58   Temp: 97 6 °F (36 4 °C)   TempSrc: Tympanic   SpO2: 97%   Weight: 99 8 kg (220 lb)   Height: 5' 9 5" (1 765 m)      Physical Exam   Constitutional: He is oriented to person, place, and time  He appears well-developed and well-nourished  No distress  HENT:   Head: Normocephalic  Right Ear: External ear normal    Left Ear: External ear normal    Eyes: Conjunctivae and EOM are normal  Right eye exhibits no discharge  Left eye exhibits no discharge  Neck: Normal range of motion  Neck supple  No JVD present  Cardiovascular: Normal rate, regular rhythm and normal heart sounds  Exam reveals no gallop  No murmur heard  Pulmonary/Chest: Effort normal and breath sounds normal  No stridor  No respiratory distress  He has no wheezes  He has no rales  He exhibits no tenderness  Abdominal: Soft  He exhibits no distension and no mass  There is no tenderness  There is no rebound  Musculoskeletal: He exhibits no edema or tenderness  Lymphadenopathy:     He has no cervical adenopathy  Neurological: He is alert and oriented to person, place, and time  Skin: Skin is warm  No rash noted  He is not diaphoretic  No erythema           Assessment/Plan:    Essential hypertension  Chronic asymptomatic fair control continue current medication including lisinopril 2 5 mg once a day low-salt diet and important lose weight discussed the patient    Gastroesophageal reflux disease without esophagitis  Chronic asymptomatic fair control patient on pantoprazole we discussed the patient important wean him self off of it if symptom recurrent to restart medication avoid provoke food do not eat and lie down    Hyperlipidemia  Chronic asymptomatic patient seen by Cardiology recently who start him on Repatha continue atorvastatin 10 mg low-fat diet discussed the patient    BMI 32 0-32 9,adult  Chronic uncontrolled encouraged patient to watch for the portion low carb low-fat diet increase physical activity toe to 60 minute a day 5 days a week       Diagnoses and all orders for this visit:    Essential hypertension    BMI 32 0-32 9,adult    Need for zoster vaccination  -     Zoster Vaccine Recombinant IM    Gastroesophageal reflux disease without esophagitis    Mixed hyperlipidemia

## 2020-07-24 NOTE — ASSESSMENT & PLAN NOTE
Chronic uncontrolled encouraged patient to watch for the portion low carb low-fat diet increase physical activity toe to 60 minute a day 5 days a week

## 2020-07-24 NOTE — ASSESSMENT & PLAN NOTE
Chronic asymptomatic fair control continue current medication including lisinopril 2 5 mg once a day low-salt diet and important lose weight discussed the patient

## 2020-08-25 DIAGNOSIS — I10 HYPERTENSION, UNSPECIFIED TYPE: ICD-10-CM

## 2020-08-25 DIAGNOSIS — E78.00 PURE HYPERCHOLESTEROLEMIA: ICD-10-CM

## 2020-08-25 RX ORDER — ATORVASTATIN CALCIUM 10 MG/1
10 TABLET, FILM COATED ORAL DAILY
Qty: 90 TABLET | Refills: 3 | Status: SHIPPED | OUTPATIENT
Start: 2020-08-25 | End: 2020-09-22 | Stop reason: SDUPTHER

## 2020-08-25 RX ORDER — LISINOPRIL 2.5 MG/1
2.5 TABLET ORAL DAILY
Qty: 90 TABLET | Refills: 3 | Status: SHIPPED | OUTPATIENT
Start: 2020-08-25 | End: 2021-02-26

## 2020-09-22 DIAGNOSIS — I10 HYPERTENSION, UNSPECIFIED TYPE: ICD-10-CM

## 2020-09-22 DIAGNOSIS — E78.00 PURE HYPERCHOLESTEROLEMIA: ICD-10-CM

## 2020-09-22 RX ORDER — ATORVASTATIN CALCIUM 10 MG/1
10 TABLET, FILM COATED ORAL DAILY
Qty: 90 TABLET | Refills: 3 | Status: SHIPPED | OUTPATIENT
Start: 2020-09-22 | End: 2021-07-30

## 2020-09-22 RX ORDER — METOPROLOL SUCCINATE 25 MG/1
25 TABLET, EXTENDED RELEASE ORAL DAILY
Qty: 90 TABLET | Refills: 3 | Status: SHIPPED | OUTPATIENT
Start: 2020-09-22 | End: 2020-10-12 | Stop reason: SDUPTHER

## 2020-09-23 ENCOUNTER — OFFICE VISIT (OUTPATIENT)
Dept: UROLOGY | Facility: MEDICAL CENTER | Age: 60
End: 2020-09-23
Payer: COMMERCIAL

## 2020-09-23 VITALS
HEART RATE: 64 BPM | WEIGHT: 218 LBS | HEIGHT: 70 IN | SYSTOLIC BLOOD PRESSURE: 150 MMHG | DIASTOLIC BLOOD PRESSURE: 90 MMHG | TEMPERATURE: 97.7 F | BODY MASS INDEX: 31.21 KG/M2

## 2020-09-23 DIAGNOSIS — R97.20 ABNORMAL PROSTATE SPECIFIC ANTIGEN (PSA): ICD-10-CM

## 2020-09-23 DIAGNOSIS — C61 ADENOCARCINOMA OF PROSTATE (HCC): Primary | ICD-10-CM

## 2020-09-23 LAB
SL AMB  POCT GLUCOSE, UA: NORMAL
SL AMB LEUKOCYTE ESTERASE,UA: NORMAL
SL AMB POCT BILIRUBIN,UA: NORMAL
SL AMB POCT BLOOD,UA: NORMAL
SL AMB POCT CLARITY,UA: CLEAR
SL AMB POCT COLOR,UA: NORMAL
SL AMB POCT KETONES,UA: NORMAL
SL AMB POCT NITRITE,UA: NORMAL
SL AMB POCT PH,UA: 5.5
SL AMB POCT SPECIFIC GRAVITY,UA: 1.03
SL AMB POCT URINE PROTEIN: NORMAL
SL AMB POCT UROBILINOGEN: 0.2

## 2020-09-23 PROCEDURE — 99214 OFFICE O/P EST MOD 30 MIN: CPT | Performed by: UROLOGY

## 2020-09-23 PROCEDURE — 81003 URINALYSIS AUTO W/O SCOPE: CPT | Performed by: UROLOGY

## 2020-09-23 RX ORDER — EZETIMIBE 10 MG/1
10 TABLET ORAL DAILY
COMMUNITY
End: 2020-10-12 | Stop reason: SDUPTHER

## 2020-09-23 NOTE — ASSESSMENT & PLAN NOTE
Continue active surveillance  Note the prostate nodule palpable on the right side of the patient's prostate is unrelated to the positive biopsies which came from the left side

## 2020-09-23 NOTE — PROGRESS NOTES
Assessment/Plan:    Adenocarcinoma of prostate ([facility] )  Continue active surveillance  Note the prostate nodule palpable on the right side of the patient's prostate is unrelated to the positive biopsies which came from the left side  Diagnoses and all orders for this visit:    [facility] ([facility] )  -     PSA, Total Screen; Future    Abnormal prostate specific antigen (PSA)  -     POCT urine dip auto non-scope    Other orders  -     ezetimibe (ZETIA) 10 mg tablet; Take 10 mg by mouth daily          Subjective:      Patient ID: Adelina Linda is a 56 y o  male  HPI  Prostate Cancer on Active Surveillance:  Diagnosed with new 6 in 2 cores from the right side in March 2020  Presented at tumor board  Active surveillance recommended  He notes no significant urinary symptoms  He denies other significant urinary symptoms  He denies gross hematuria, urinary tract infections or incontinence  He is taking neither medications nor supplements for his symptoms  PSA:    No PSA since diagnosis  Scheduled for blood work with PCP next month       0   Lab Value Date/Time    PSA 6 5 (H) 12/03/2019 0709    PSA 5 4 (H) 11/07/2019 9923   ]    AUA SYMPTOM SCORE      Most Recent Value   AUA SYMPTOM SCORE   How often have you had a sensation of not emptying your bladder completely after you finished urinating? 0   How often have you had to urinate again less than two hours after you finished urinating? 0   How often have you found you stopped and started again several times when you urinate? 1   How often have you found it difficult to postpone urination? 0   How often have you had a weak urinary stream?  1   How often have you had to push or strain to begin urination?   0   How many times did you most typically get up to urinate from the time you went to bed at night until the time you got up in the morning?  0   Quality of Life: If you were to spend the rest of your life with your urinary condition just the way it is now, how would you feel about that?  0   AUA SYMPTOM SCORE  2          The following portions of the patient's history were reviewed and updated as appropriate: allergies, current medications, past family history, past medical history, past social history, past surgical history and problem list     Review of Systems    Constitutional: Negative for activity change and fatigue  Respiratory: Negative for shortness of breath and wheezing  Cardiovascular: Negative for chest pain  Hypertension  MI in 2011  Caries NGG but has never taken it  Followed annually by cardiologist     Gastrointestinal: Negative for abdominal pain  Genitourinary: Negative for difficulty urinating, dysuria, frequency, hematuria and urgency  Musculoskeletal: Negative for back pain and gait problem  Skin: Negative  Allergic/Immunologic: Negative  Neurological: Negative  Psychiatric/Behavioral: Negative  Objective:      /90   Pulse 64   Temp 97 7 °F (36 5 °C)   Ht 5' 9 5" (1 765 m)   Wt 98 9 kg (218 lb)   BMI 31 73 kg/m²          Physical Exam  Constitutional:       Appearance: He is well-developed  HENT:      Head: Normocephalic and atraumatic  Neck:      Musculoskeletal: Normal range of motion and neck supple  Pulmonary:      Effort: Pulmonary effort is normal    Genitourinary:     Rectum: Normal       Comments: The prostate is 40 grams, firm, smooth and non-tender  Pea-sized nodule at the right lat border  The tumor is on the left  Musculoskeletal: Normal range of motion  Skin:     General: Skin is warm and dry  Neurological:      Mental Status: He is alert and oriented to person, place, and time  Psychiatric:         Behavior: Behavior normal          Thought Content:  Thought content normal          Judgment: Judgment normal

## 2020-10-12 DIAGNOSIS — I10 HYPERTENSION, UNSPECIFIED TYPE: Primary | ICD-10-CM

## 2020-10-13 ENCOUNTER — OFFICE VISIT (OUTPATIENT)
Dept: FAMILY MEDICINE CLINIC | Facility: CLINIC | Age: 60
End: 2020-10-13
Payer: COMMERCIAL

## 2020-10-13 VITALS
BODY MASS INDEX: 31.35 KG/M2 | HEIGHT: 70 IN | OXYGEN SATURATION: 97 % | TEMPERATURE: 97 F | WEIGHT: 219 LBS | HEART RATE: 70 BPM | DIASTOLIC BLOOD PRESSURE: 88 MMHG | SYSTOLIC BLOOD PRESSURE: 132 MMHG | RESPIRATION RATE: 16 BRPM

## 2020-10-13 DIAGNOSIS — K21.9 GASTROESOPHAGEAL REFLUX DISEASE WITHOUT ESOPHAGITIS: ICD-10-CM

## 2020-10-13 DIAGNOSIS — Z23 NEED FOR INFLUENZA VACCINATION: ICD-10-CM

## 2020-10-13 DIAGNOSIS — R10.32 LLQ PAIN: Primary | ICD-10-CM

## 2020-10-13 LAB
BACTERIA UR QL AUTO: ABNORMAL /HPF
BILIRUB UR QL STRIP: ABNORMAL
CAOX CRY URNS QL MICRO: ABNORMAL /HPF
CLARITY UR: CLEAR
COLOR UR: ABNORMAL
GLUCOSE UR STRIP-MCNC: NEGATIVE MG/DL
HGB UR QL STRIP.AUTO: NEGATIVE
KETONES UR STRIP-MCNC: NEGATIVE MG/DL
LEUKOCYTE ESTERASE UR QL STRIP: NEGATIVE
NITRITE UR QL STRIP: NEGATIVE
NON-SQ EPI CELLS URNS QL MICRO: ABNORMAL /HPF
PH UR STRIP.AUTO: 5.5 [PH]
PROT UR STRIP-MCNC: ABNORMAL MG/DL
RBC #/AREA URNS AUTO: ABNORMAL /HPF
SP GR UR STRIP.AUTO: 1.03 (ref 1–1.03)
UROBILINOGEN UR QL STRIP.AUTO: 1 E.U./DL
WBC #/AREA URNS AUTO: ABNORMAL /HPF

## 2020-10-13 PROCEDURE — 81001 URINALYSIS AUTO W/SCOPE: CPT | Performed by: FAMILY MEDICINE

## 2020-10-13 PROCEDURE — 99214 OFFICE O/P EST MOD 30 MIN: CPT | Performed by: FAMILY MEDICINE

## 2020-10-13 PROCEDURE — 90471 IMMUNIZATION ADMIN: CPT | Performed by: FAMILY MEDICINE

## 2020-10-13 PROCEDURE — 90682 RIV4 VACC RECOMBINANT DNA IM: CPT | Performed by: FAMILY MEDICINE

## 2020-10-13 PROCEDURE — 1036F TOBACCO NON-USER: CPT | Performed by: FAMILY MEDICINE

## 2020-10-13 PROCEDURE — 3079F DIAST BP 80-89 MM HG: CPT | Performed by: FAMILY MEDICINE

## 2020-10-13 PROCEDURE — 87086 URINE CULTURE/COLONY COUNT: CPT | Performed by: FAMILY MEDICINE

## 2020-10-13 RX ORDER — METOPROLOL SUCCINATE 25 MG/1
25 TABLET, EXTENDED RELEASE ORAL DAILY
Qty: 90 TABLET | Refills: 3 | Status: SHIPPED | OUTPATIENT
Start: 2020-10-13 | End: 2021-05-05 | Stop reason: SDUPTHER

## 2020-10-13 RX ORDER — EZETIMIBE 10 MG/1
10 TABLET ORAL DAILY
Qty: 90 TABLET | Refills: 3 | Status: SHIPPED | OUTPATIENT
Start: 2020-10-13 | End: 2021-05-05 | Stop reason: ALTCHOICE

## 2020-10-14 LAB — BACTERIA UR CULT: NORMAL

## 2020-10-21 ENCOUNTER — HOSPITAL ENCOUNTER (OUTPATIENT)
Dept: CT IMAGING | Facility: HOSPITAL | Age: 60
Discharge: HOME | End: 2020-10-21
Payer: COMMERCIAL

## 2020-10-21 DIAGNOSIS — R10.32 LLQ PAIN: ICD-10-CM

## 2020-10-21 PROCEDURE — 74177 CT ABD & PELVIS W/CONTRAST: CPT

## 2020-10-21 PROCEDURE — G1004 CDSM NDSC: HCPCS

## 2020-10-21 RX ADMIN — IOHEXOL 100 ML: 350 INJECTION, SOLUTION INTRAVENOUS at 17:51

## 2020-11-17 ENCOUNTER — LAB (OUTPATIENT)
Dept: LAB | Facility: MEDICAL CENTER | Age: 60
End: 2020-11-17
Payer: COMMERCIAL

## 2020-11-17 DIAGNOSIS — E78.49 OTHER HYPERLIPIDEMIA: ICD-10-CM

## 2020-11-17 DIAGNOSIS — K76.0 FATTY LIVER: ICD-10-CM

## 2020-11-17 DIAGNOSIS — K21.9 GASTROESOPHAGEAL REFLUX DISEASE WITHOUT ESOPHAGITIS: ICD-10-CM

## 2020-11-17 DIAGNOSIS — I10 ESSENTIAL HYPERTENSION: ICD-10-CM

## 2020-11-17 LAB
ANION GAP SERPL CALCULATED.3IONS-SCNC: 4 MMOL/L (ref 4–13)
BASOPHILS # BLD AUTO: 0.1 THOUSANDS/ΜL (ref 0–0.1)
BASOPHILS NFR BLD AUTO: 1 % (ref 0–1)
BUN SERPL-MCNC: 23 MG/DL (ref 5–25)
CALCIUM SERPL-MCNC: 9.3 MG/DL (ref 8.3–10.1)
CHLORIDE SERPL-SCNC: 109 MMOL/L (ref 100–108)
CHOLEST SERPL-MCNC: 85 MG/DL (ref 50–200)
CO2 SERPL-SCNC: 27 MMOL/L (ref 21–32)
CREAT SERPL-MCNC: 1.06 MG/DL (ref 0.6–1.3)
EOSINOPHIL # BLD AUTO: 0.29 THOUSAND/ΜL (ref 0–0.61)
EOSINOPHIL NFR BLD AUTO: 4 % (ref 0–6)
ERYTHROCYTE [DISTWIDTH] IN BLOOD BY AUTOMATED COUNT: 13.2 % (ref 11.6–15.1)
GFR SERPL CREATININE-BSD FRML MDRD: 75 ML/MIN/1.73SQ M
GLUCOSE P FAST SERPL-MCNC: 94 MG/DL (ref 65–99)
HCT VFR BLD AUTO: 44.7 % (ref 36.5–49.3)
HDLC SERPL-MCNC: 46 MG/DL
HGB BLD-MCNC: 14.5 G/DL (ref 12–17)
IMM GRANULOCYTES # BLD AUTO: 0.02 THOUSAND/UL (ref 0–0.2)
IMM GRANULOCYTES NFR BLD AUTO: 0 % (ref 0–2)
LDLC SERPL CALC-MCNC: 22 MG/DL (ref 0–100)
LYMPHOCYTES # BLD AUTO: 2.03 THOUSANDS/ΜL (ref 0.6–4.47)
LYMPHOCYTES NFR BLD AUTO: 28 % (ref 14–44)
MCH RBC QN AUTO: 30.1 PG (ref 26.8–34.3)
MCHC RBC AUTO-ENTMCNC: 32.4 G/DL (ref 31.4–37.4)
MCV RBC AUTO: 93 FL (ref 82–98)
MONOCYTES # BLD AUTO: 0.5 THOUSAND/ΜL (ref 0.17–1.22)
MONOCYTES NFR BLD AUTO: 7 % (ref 4–12)
NEUTROPHILS # BLD AUTO: 4.27 THOUSANDS/ΜL (ref 1.85–7.62)
NEUTS SEG NFR BLD AUTO: 60 % (ref 43–75)
NRBC BLD AUTO-RTO: 0 /100 WBCS
PLATELET # BLD AUTO: 262 THOUSANDS/UL (ref 149–390)
PMV BLD AUTO: 10.4 FL (ref 8.9–12.7)
POTASSIUM SERPL-SCNC: 4.4 MMOL/L (ref 3.5–5.3)
RBC # BLD AUTO: 4.81 MILLION/UL (ref 3.88–5.62)
SODIUM SERPL-SCNC: 140 MMOL/L (ref 136–145)
TRIGL SERPL-MCNC: 85 MG/DL
TSH SERPL DL<=0.05 MIU/L-ACNC: 0.79 UIU/ML (ref 0.36–3.74)
WBC # BLD AUTO: 7.21 THOUSAND/UL (ref 4.31–10.16)

## 2020-11-17 PROCEDURE — 80048 BASIC METABOLIC PNL TOTAL CA: CPT

## 2020-11-17 PROCEDURE — 80061 LIPID PANEL: CPT

## 2020-11-17 PROCEDURE — 84443 ASSAY THYROID STIM HORMONE: CPT

## 2020-11-17 PROCEDURE — 36415 COLL VENOUS BLD VENIPUNCTURE: CPT

## 2020-11-17 PROCEDURE — 85025 COMPLETE CBC W/AUTO DIFF WBC: CPT

## 2020-11-19 ENCOUNTER — OFFICE VISIT (OUTPATIENT)
Dept: GASTROENTEROLOGY | Facility: MEDICAL CENTER | Age: 60
End: 2020-11-19
Payer: COMMERCIAL

## 2020-11-19 VITALS
DIASTOLIC BLOOD PRESSURE: 88 MMHG | BODY MASS INDEX: 31.07 KG/M2 | HEART RATE: 56 BPM | HEIGHT: 70 IN | TEMPERATURE: 98.2 F | WEIGHT: 217 LBS | SYSTOLIC BLOOD PRESSURE: 148 MMHG

## 2020-11-19 DIAGNOSIS — K21.9 GASTROESOPHAGEAL REFLUX DISEASE WITHOUT ESOPHAGITIS: Primary | ICD-10-CM

## 2020-11-19 DIAGNOSIS — R10.32 LLQ PAIN: ICD-10-CM

## 2020-11-19 PROCEDURE — 3008F BODY MASS INDEX DOCD: CPT | Performed by: FAMILY MEDICINE

## 2020-11-19 PROCEDURE — 3077F SYST BP >= 140 MM HG: CPT | Performed by: PHYSICIAN ASSISTANT

## 2020-11-19 PROCEDURE — 99214 OFFICE O/P EST MOD 30 MIN: CPT | Performed by: PHYSICIAN ASSISTANT

## 2020-11-19 PROCEDURE — 3079F DIAST BP 80-89 MM HG: CPT | Performed by: PHYSICIAN ASSISTANT

## 2020-11-19 RX ORDER — OXYCODONE HYDROCHLORIDE 5 MG/1
TABLET ORAL
COMMUNITY
End: 2020-11-19

## 2020-11-23 ENCOUNTER — HOSPITAL ENCOUNTER (OUTPATIENT)
Dept: ULTRASOUND IMAGING | Facility: HOSPITAL | Age: 60
Discharge: HOME | End: 2020-11-23
Payer: COMMERCIAL

## 2020-11-23 DIAGNOSIS — R10.32 LLQ PAIN: ICD-10-CM

## 2020-11-23 PROCEDURE — 76705 ECHO EXAM OF ABDOMEN: CPT

## 2020-11-24 ENCOUNTER — TELEMEDICINE (OUTPATIENT)
Dept: FAMILY MEDICINE CLINIC | Facility: CLINIC | Age: 60
End: 2020-11-24
Payer: COMMERCIAL

## 2020-11-24 VITALS — TEMPERATURE: 99.9 F

## 2020-11-24 DIAGNOSIS — R50.9 FEVER, UNSPECIFIED FEVER CAUSE: ICD-10-CM

## 2020-11-24 DIAGNOSIS — R50.9 FEVER, UNSPECIFIED FEVER CAUSE: Primary | ICD-10-CM

## 2020-11-24 DIAGNOSIS — K21.9 GASTROESOPHAGEAL REFLUX DISEASE WITHOUT ESOPHAGITIS: ICD-10-CM

## 2020-11-24 PROCEDURE — 99214 OFFICE O/P EST MOD 30 MIN: CPT | Performed by: FAMILY MEDICINE

## 2020-11-24 PROCEDURE — 3725F SCREEN DEPRESSION PERFORMED: CPT | Performed by: FAMILY MEDICINE

## 2020-11-24 PROCEDURE — U0003 INFECTIOUS AGENT DETECTION BY NUCLEIC ACID (DNA OR RNA); SEVERE ACUTE RESPIRATORY SYNDROME CORONAVIRUS 2 (SARS-COV-2) (CORONAVIRUS DISEASE [COVID-19]), AMPLIFIED PROBE TECHNIQUE, MAKING USE OF HIGH THROUGHPUT TECHNOLOGIES AS DESCRIBED BY CMS-2020-01-R: HCPCS | Performed by: FAMILY MEDICINE

## 2020-11-24 PROCEDURE — 1036F TOBACCO NON-USER: CPT | Performed by: FAMILY MEDICINE

## 2020-11-24 RX ORDER — PANTOPRAZOLE SODIUM 40 MG/1
40 TABLET, DELAYED RELEASE ORAL
Qty: 30 TABLET | Refills: 2 | Status: SHIPPED | OUTPATIENT
Start: 2020-11-24 | End: 2021-04-19

## 2020-11-25 ENCOUNTER — VBI (OUTPATIENT)
Dept: ADMINISTRATIVE | Facility: OTHER | Age: 60
End: 2020-11-25

## 2020-11-25 LAB — SARS-COV-2 RNA SPEC QL NAA+PROBE: DETECTED

## 2020-11-26 ENCOUNTER — TELEMEDICINE (OUTPATIENT)
Dept: FAMILY MEDICINE CLINIC | Facility: CLINIC | Age: 60
End: 2020-11-26
Payer: COMMERCIAL

## 2020-11-26 DIAGNOSIS — U07.1 COVID-19 VIRUS INFECTION: Primary | ICD-10-CM

## 2020-11-26 PROCEDURE — 99213 OFFICE O/P EST LOW 20 MIN: CPT | Performed by: FAMILY MEDICINE

## 2020-11-27 PROBLEM — U07.1 COVID-19 VIRUS INFECTION: Status: ACTIVE | Noted: 2020-11-26

## 2020-11-27 PROBLEM — U07.1 COVID-19 VIRUS INFECTION: Status: ACTIVE | Noted: 2020-11-27

## 2020-12-31 ENCOUNTER — ANESTHESIA EVENT (OUTPATIENT)
Dept: GASTROENTEROLOGY | Facility: MEDICAL CENTER | Age: 60
End: 2020-12-31

## 2020-12-31 RX ORDER — ONDANSETRON 2 MG/ML
4 INJECTION INTRAMUSCULAR; INTRAVENOUS EVERY 6 HOURS PRN
Status: CANCELLED | OUTPATIENT
Start: 2020-12-31

## 2021-01-04 ENCOUNTER — HOSPITAL ENCOUNTER (OUTPATIENT)
Dept: GASTROENTEROLOGY | Facility: MEDICAL CENTER | Age: 61
Setting detail: OUTPATIENT SURGERY
Discharge: HOME | End: 2021-01-04
Payer: COMMERCIAL

## 2021-01-04 ENCOUNTER — ANESTHESIA (OUTPATIENT)
Dept: GASTROENTEROLOGY | Facility: MEDICAL CENTER | Age: 61
End: 2021-01-04

## 2021-01-04 VITALS
DIASTOLIC BLOOD PRESSURE: 81 MMHG | OXYGEN SATURATION: 100 % | WEIGHT: 207 LBS | HEART RATE: 68 BPM | TEMPERATURE: 97.1 F | SYSTOLIC BLOOD PRESSURE: 134 MMHG | RESPIRATION RATE: 18 BRPM | BODY MASS INDEX: 28.98 KG/M2 | HEIGHT: 71 IN

## 2021-01-04 VITALS — HEART RATE: 52 BPM

## 2021-01-04 DIAGNOSIS — K21.9 GASTROESOPHAGEAL REFLUX DISEASE WITHOUT ESOPHAGITIS: ICD-10-CM

## 2021-01-04 PROBLEM — K22.70 BARRETT'S ESOPHAGUS: Status: ACTIVE | Noted: 2021-01-04

## 2021-01-04 PROCEDURE — 88305 TISSUE EXAM BY PATHOLOGIST: CPT | Performed by: PATHOLOGY

## 2021-01-04 PROCEDURE — 43239 EGD BIOPSY SINGLE/MULTIPLE: CPT | Performed by: INTERNAL MEDICINE

## 2021-01-04 RX ORDER — PROPOFOL 10 MG/ML
INJECTION, EMULSION INTRAVENOUS AS NEEDED
Status: DISCONTINUED | OUTPATIENT
Start: 2021-01-04 | End: 2021-01-04

## 2021-01-04 RX ORDER — SODIUM CHLORIDE 9 MG/ML
125 INJECTION, SOLUTION INTRAVENOUS CONTINUOUS
Status: DISCONTINUED | OUTPATIENT
Start: 2021-01-04 | End: 2021-01-08 | Stop reason: HOSPADM

## 2021-01-04 RX ADMIN — PROPOFOL 140 MG: 10 INJECTION, EMULSION INTRAVENOUS at 10:37

## 2021-01-04 RX ADMIN — PROPOFOL 50 MG: 10 INJECTION, EMULSION INTRAVENOUS at 10:42

## 2021-01-04 RX ADMIN — SODIUM CHLORIDE 125 ML/HR: 0.9 INJECTION, SOLUTION INTRAVENOUS at 10:25

## 2021-01-04 NOTE — ANESTHESIA PREPROCEDURE EVALUATION
Procedure:  EGD    Relevant Problems   CARDIO   (+) CAD in native artery   (+) Essential hypertension   (+) Hyperlipidemia   (+) Inferior MI (HCC)      GI/HEPATIC   (+) Fatty liver   (+) Gastroesophageal reflux disease without esophagitis      /RENAL   (+) Adenocarcinoma of prostate (HCC)   (+) Benign prostatic hyperplasia without lower urinary tract symptoms   (+) Prostate nodule without urinary obstruction      MUSCULOSKELETAL   (+) Chronic left-sided low back pain without sciatica      NEURO/PSYCH   (+) History of acute inferior wall MI        Physical Exam    Airway    Mallampati score: II  TM Distance: >3 FB  Neck ROM: full     Dental       Cardiovascular  Cardiovascular exam normal    Pulmonary  Pulmonary exam normal     Other Findings        Anesthesia Plan  ASA Score- 3     Anesthesia Type- IV sedation with anesthesia with ASA Monitors  Additional Monitors:   Airway Plan:           Plan Factors-Exercise tolerance (METS): >4 METS  Chart reviewed  Obstructive sleep apnea risk education given perioperatively  Induction- intravenous  Postoperative Plan-     Informed Consent- Anesthetic plan and risks discussed with patient

## 2021-01-04 NOTE — DISCHARGE INSTRUCTIONS
Upper Endoscopy   WHAT YOU NEED TO KNOW:   An upper endoscopy is also called an upper gastrointestinal (GI) endoscopy, or an esophagogastroduodenoscopy (EGD)  You may feel bloated, gassy, or have some abdominal discomfort after your procedure  Your throat may be sore for 24 to 36 hours  You may burp or pass gas from air that is still inside your body  DISCHARGE INSTRUCTIONS:   Call 911 for any of the following:   · You have sudden chest pain or trouble breathing  Seek care immediately if:   · You feel dizzy or faint  · You have trouble swallowing  · Your bowel movements are very dark or black  · Your abdomen is hard and firm and you have severe pain  · You vomit blood  Contact your healthcare provider if:   · You feel full or bloated and cannot burp or pass gas  · You have not had a bowel movement for 3 days after your procedure  · You have neck pain  · You have a fever or chills  · You have nausea or are vomiting  · You have a rash or hives  · You have questions or concerns about your endoscopy  Relieve a sore throat:  Suck on throat lozenges or crushed ice  Gargle with a small amount of warm salt water  Mix 1 teaspoon of salt and 1 cup of warm water to make salt water  Relieve gas and discomfort from bloating:  Lie on your right side with a heating pad on your abdomen  Take short walks to help pass gas  Eat small meals until bloating is relieved  Rest after your procedure: You have been given medicine to relax you  Do not  drive or make important decisions until the day after your procedure  Return to your normal activity as directed  You can usually return to work the day after your procedure  Follow up with your healthcare provider as directed:  Write down your questions so you remember to ask them during your visits     © 2017 [address] is for End User's use only and may not be sold, redistributed or otherwise used for commercial purposes  All illustrations and images included in CareNotes® are the copyrighted property of A [employer]  or [employer] Randhawa  The above information is an  only  It is not intended as medical advice for individual conditions or treatments  Talk to your doctor, nurse or pharmacist before following any medical regimen to see if it is safe and effective for you

## 2021-01-04 NOTE — H&P
History and Physical -  [facility]  Kaden Craig 56 y o  male MRN: [id number]                  HPI: Kaden Craig is a 56y o  year old male who presents for reflux  REVIEW OF SYSTEMS: Per the HPI, and otherwise unremarkable  Historical Information   Past Medical History:   Diagnosis Date    Adenocarcinoma of prostate ([address] ) 3/27/2020    Colon polyp     Coronary artery disease     Elevated PSA     Gastroesophageal reflux disease without esophagitis 12/13/2019    Hypercholesterolemia     Hypertension     Myocardial infarction [facility]) 2011    UTI (urinary tract infection) 3/18/2020     Past Surgical History:   Procedure Laterality Date    COLONOSCOPY      CORONARY STENT PLACEMENT      bare metal stent in Rt coronary artery    SHOULDER SURGERY Left      Social History   Social History     Substance and Sexual Activity   Alcohol Use Yes    Frequency: Monthly or less    Drinks per session: 1 or 2    Binge frequency: Never    Comment: rarely      Social History     Substance and Sexual Activity   Drug Use Never     Social History     Tobacco Use   Smoking Status Former Smoker    Types: Cigarettes    Quit date: 2011    Years since quitting: 10 0   Smokeless Tobacco Former User     Family History   Problem Relation Age of Onset    Sleep apnea Mother     Heart attack Father        Meds/Allergies     (Not in a hospital admission)      No Known Allergies    Objective     Ht 5' 10 75" (1 797 m)   Wt 93 9 kg (207 lb)   BMI 29 08 kg/m²       PHYSICAL EXAM    Gen: NAD  CV: RRR  CHEST: Clear  ABD: soft, NT/ND  EXT: no edema      ASSESSMENT/PLAN:  This is a 56y o  year old male here for upper endoscopy, and he is stable and optimized for his procedure

## 2021-01-05 ENCOUNTER — TELEPHONE (OUTPATIENT)
Dept: FAMILY MEDICINE CLINIC | Facility: CLINIC | Age: 61
End: 2021-01-05

## 2021-01-27 ENCOUNTER — OFFICE VISIT (OUTPATIENT)
Dept: GASTROENTEROLOGY | Facility: MEDICAL CENTER | Age: 61
End: 2021-01-27
Payer: COMMERCIAL

## 2021-01-27 VITALS
WEIGHT: 220 LBS | BODY MASS INDEX: 30.9 KG/M2 | DIASTOLIC BLOOD PRESSURE: 78 MMHG | SYSTOLIC BLOOD PRESSURE: 124 MMHG | TEMPERATURE: 98.4 F | HEART RATE: 95 BPM

## 2021-01-27 DIAGNOSIS — K22.70 BARRETT'S ESOPHAGUS WITHOUT DYSPLASIA: Primary | ICD-10-CM

## 2021-01-27 DIAGNOSIS — D36.9 TUBULAR ADENOMA: ICD-10-CM

## 2021-01-27 PROCEDURE — 99214 OFFICE O/P EST MOD 30 MIN: CPT | Performed by: PHYSICIAN ASSISTANT

## 2021-01-27 RX ORDER — FAMOTIDINE 40 MG/1
40 TABLET, FILM COATED ORAL DAILY
Qty: 90 TABLET | Refills: 1 | Status: SHIPPED | OUTPATIENT
Start: 2021-01-27 | End: 2021-07-12

## 2021-01-27 RX ORDER — SODIUM, POTASSIUM,MAG SULFATES 17.5-3.13G
SOLUTION, RECONSTITUTED, ORAL ORAL
Qty: 2 BOTTLE | Refills: 0 | Status: SHIPPED | OUTPATIENT
Start: 2021-01-27 | End: 2021-06-07 | Stop reason: ALTCHOICE

## 2021-01-27 NOTE — PATIENT INSTRUCTIONS
The patient is scheduled at [facility] for a colonoscopy with Dr Armain Epperson on 06/07/2021  Suprep prep instructions have been gone over in the office, with the patient, by the MA  The patient is aware that they will receive a call with the arrival time the day prior to procedure and that they will need a  the day of the procedure   I have asked the patient to call with any questions that they might have prior to procedure

## 2021-01-27 NOTE — PROGRESS NOTES
Assessment/Plan:     Diagnoses and all orders for this visit:    Gold's esophagus without dysplasia  Patient has a history of GERD, symptoms have improved with pantoprazole 40 mg daily and Pepcid 2 pills in the evening  This is likely 40 mg as these are OTC  I will send the prescription strength  We did discuss the reflux diet and foods to avoid as well as cutting back as he is a fairly heavy coffee and soda drinker  Hopefully patient's he can decrease some of these medications in the future  -     famotidine (PEPCID) 40 MG tablet; Take 1 tablet (40 mg total) by mouth daily      Tubular adenoma  Patient was found to have multiple tubular adenomatous and sessile serrated adenomas on his last colonoscopy in June  Is recommended to be repeated in 1 year's time  We will go ahead and schedule  -     Na Sulfate-K Sulfate-Mg Sulf (Suprep Bowel Prep Kit) 17 5-3 13-1 6 GM/177ML SOLN; day before, use 6 oz bottle  4 hours before procedure, take 2nd dose        -     Colonoscopy; Future    Will see him back after to discuss all results  Subjective:      Patient ID: Tatyana Faulkner is a 56 y o  male  HPI     This is a follow-up for GERD, abdominal pain and to discuss his recent upper endoscopy  She is currently taking pantoprazole 40 mg daily which seems to control his daytime symptoms however he does admit to nocturnal symptoms  He describes burning and reflux with coughing  He states he started taking over-the-counter Nexium at bedtime recently increased it to 2 pills with good improvement his symptoms  He does not follow a reflux diet  He was previously seen for left-sided abdominal pain but has not had this recently  He was diagnosed with COVID at the end of November  He states he had cough congestion, poor appetite, diarrhea   His symptoms have since resolved  His endoscopy did show small sliding hiatal hernia with Gold's  Biopsy confirmed this, no EOE     He underwent colonoscopy in June was found to have multiple sessile serrated and tubular adenomatous polyps and was recommended to have colonoscopy repeated in 1 year's time      Patient Active Problem List   Diagnosis    CAD in native artery    History of acute inferior wall MI    Presence of bare metal stent in right coronary artery    Essential hypertension    Hyperlipidemia    [facility]ior [facility] )    LUQ pain    Chronic left-sided low back pain without sciatica    BMI 32 0-32 9,adult    Fatty liver    Benign prostatic hyperplasia without lower urinary tract symptoms    Gastroesophageal reflux disease without esophagitis    Abnormal prostate specific antigen (PSA)    Prostate nodule without urinary obstruction    Bacteremia due to Escherichia coli    Bursitis of shoulder    Contusion of left shoulder    Injury of tendon of rotator cuff    Sepsis due to Escherichia coli ([facility] )    UTI (urinary tract infection)    Adenocarcinoma of prostate ([facility] )    Encounter for well adult exam with abnormal findings    Myalgia with higher doses statins    Colon polyp    LLQ pain    Fever    COVID-19 virus infection    Gold's esophagus     No Known Allergies  Current Outpatient Medications on File Prior to Visit   Medication Sig    aspirin 81 mg chewable tablet Chew 1 tablet daily    atorvastatin (LIPITOR) 10 mg tablet Take 1 tablet (10 mg total) by mouth daily    Evolocumab 140 MG/ML SOAJ Inject 1 mL (140 mg total) under the skin every 14 (fourteen) days    ezetimibe (ZETIA) 10 mg tablet Take 1 tablet (10 mg total) by mouth daily    lisinopril (ZESTRIL) 2 5 mg tablet Take 1 tablet (2 5 mg total) by mouth daily    metoprolol succinate (TOPROL-XL) 25 mg 24 hr tablet Take 1 tablet (25 mg total) by mouth daily    pantoprazole (PROTONIX) 40 mg tablet Take 1 tablet (40 mg total) by mouth daily before breakfast    nitroglycerin (NITROLINGUAL) 0 4 mg/spray spray 1 spray     No current facility-administered medications on file prior to visit        Family History   Problem Relation Age of Onset    Sleep apnea Mother     Heart attack Father      Past Medical History:   Diagnosis Date    Adenocarcinoma of [address] [facility] [address] ) 3/27/2020    Gold's esophagus 1/4/2021    EGD 01/04/21 recommend to repeat in 3 y    Colon polyp     Coronary artery disease     Elevated PSA     Gastroesophageal reflux disease without esophagitis 12/13/2019    Hypercholesterolemia     Hypertension     Myocardial infarction [facility]) 2011    UTI (urinary tract infection) 3/18/2020     Social History     Socioeconomic History    Marital status: /Civil Union     Spouse name: None    Number of children: None    Years of education: None    Highest education level: None   Occupational History    None   Social Needs    Financial resource strain: Not hard at all   [place] insecurity     Worry: Never true     Inability: Never true    Transportation needs     Medical: No     Non-medical: No   Tobacco Use    Smoking status: Former Smoker     Types: Cigarettes     Quit date: 2011     Years since quitting: 10 0    Smokeless tobacco: Former User   Substance and Sexual Activity    Alcohol use: Yes     Frequency: Monthly or less     Drinks per session: 1 or 2     Binge frequency: Never     Comment: rarely     Drug use: Never    Sexual activity: Yes     Partners: Female   Lifestyle    Physical activity     Days per week: 5 days     Minutes per session: 30 min    Stress: Not at all   Relationships    Social connections     Talks on phone: More than three times a week     Gets together: More than three times a week     Attends Faith service: More than 4 times per year     Active member of club or organization: No     Attends meetings of clubs or organizations: Never     Relationship status:     Intimate partner violence     Fear of current or ex partner: No     Emotionally abused: No     Physically abused: No     Forced sexual activity: No   Other Topics Concern    None   Social History Narrative    None     Past Surgical History:   Procedure Laterality Date    COLONOSCOPY      CORONARY STENT PLACEMENT      bare metal stent in Rt coronary artery    SHOULDER SURGERY Left          Review of Systems   All other systems reviewed and are negative  Objective:      /78   Pulse 95   Temp 98 4 °F (36 9 °C)   Wt 99 8 kg (220 lb)   BMI 30 90 kg/m²          Physical Exam  Constitutional:       Appearance: Normal appearance  He is well-developed  HENT:      Head: Normocephalic and atraumatic  Eyes:      Conjunctiva/sclera: Conjunctivae normal    Neck:      Musculoskeletal: Normal range of motion  Cardiovascular:      Rate and Rhythm: Normal rate and regular rhythm  Pulmonary:      Effort: Pulmonary effort is normal       Breath sounds: Normal breath sounds  Abdominal:      General: Bowel sounds are normal  There is no distension  Palpations: Abdomen is soft  Tenderness: There is no abdominal tenderness  Musculoskeletal: Normal range of motion  Skin:     General: Skin is warm and dry  Neurological:      Mental Status: He is alert and oriented to person, place, and time     Psychiatric:         Mood and Affect: Mood normal          Behavior: Behavior normal

## 2021-02-26 DIAGNOSIS — I10 HYPERTENSION, UNSPECIFIED TYPE: ICD-10-CM

## 2021-02-26 RX ORDER — LISINOPRIL 2.5 MG/1
2.5 TABLET ORAL DAILY
Qty: 90 TABLET | Refills: 3 | Status: SHIPPED | OUTPATIENT
Start: 2021-02-26 | End: 2022-04-14

## 2021-03-10 DIAGNOSIS — Z23 ENCOUNTER FOR IMMUNIZATION: ICD-10-CM

## 2021-03-23 ENCOUNTER — TELEPHONE (OUTPATIENT)
Dept: GASTROENTEROLOGY | Facility: MEDICAL CENTER | Age: 61
End: 2021-03-23

## 2021-03-23 NOTE — TELEPHONE ENCOUNTER
lvm for patient explaining that Dr Nancy Kowalski is no longer scoping that day  Patient is welcome to keep his procedure that day and Dr Rhonda Hanson would be the physician scoping or we can reschedule to another day when Dr Nancy Kowalski is available

## 2021-04-19 DIAGNOSIS — I10 ESSENTIAL HYPERTENSION: Primary | ICD-10-CM

## 2021-04-19 DIAGNOSIS — C61 ADENOCARCINOMA OF PROSTATE (HCC): ICD-10-CM

## 2021-04-19 DIAGNOSIS — E78.2 MIXED HYPERLIPIDEMIA: ICD-10-CM

## 2021-04-19 DIAGNOSIS — K21.9 GASTROESOPHAGEAL REFLUX DISEASE WITHOUT ESOPHAGITIS: ICD-10-CM

## 2021-04-19 RX ORDER — PANTOPRAZOLE SODIUM 40 MG/1
TABLET, DELAYED RELEASE ORAL
Qty: 30 TABLET | Refills: 0 | Status: SHIPPED | OUTPATIENT
Start: 2021-04-19 | End: 2021-05-16

## 2021-04-26 ENCOUNTER — TELEPHONE (OUTPATIENT)
Dept: FAMILY MEDICINE CLINIC | Facility: CLINIC | Age: 61
End: 2021-04-26

## 2021-04-27 ENCOUNTER — APPOINTMENT (OUTPATIENT)
Dept: LAB | Facility: MEDICAL CENTER | Age: 61
End: 2021-04-27
Payer: COMMERCIAL

## 2021-04-27 DIAGNOSIS — I10 ESSENTIAL HYPERTENSION: ICD-10-CM

## 2021-04-27 DIAGNOSIS — C61 ADENOCARCINOMA OF PROSTATE (HCC): ICD-10-CM

## 2021-04-27 DIAGNOSIS — E78.2 MIXED HYPERLIPIDEMIA: ICD-10-CM

## 2021-04-27 LAB
ALBUMIN SERPL BCP-MCNC: 4.2 G/DL (ref 3.5–5)
ALP SERPL-CCNC: 97 U/L (ref 46–116)
ALT SERPL W P-5'-P-CCNC: 41 U/L (ref 12–78)
ANION GAP SERPL CALCULATED.3IONS-SCNC: 2 MMOL/L (ref 4–13)
AST SERPL W P-5'-P-CCNC: 19 U/L (ref 5–45)
BASOPHILS # BLD AUTO: 0.1 THOUSANDS/ΜL (ref 0–0.1)
BASOPHILS NFR BLD AUTO: 1 % (ref 0–1)
BILIRUB SERPL-MCNC: 0.79 MG/DL (ref 0.2–1)
BUN SERPL-MCNC: 26 MG/DL (ref 5–25)
CALCIUM SERPL-MCNC: 9.5 MG/DL (ref 8.3–10.1)
CHLORIDE SERPL-SCNC: 111 MMOL/L (ref 100–108)
CHOLEST SERPL-MCNC: 73 MG/DL (ref 50–200)
CO2 SERPL-SCNC: 26 MMOL/L (ref 21–32)
CREAT SERPL-MCNC: 1.27 MG/DL (ref 0.6–1.3)
EOSINOPHIL # BLD AUTO: 0.29 THOUSAND/ΜL (ref 0–0.61)
EOSINOPHIL NFR BLD AUTO: 2 % (ref 0–6)
ERYTHROCYTE [DISTWIDTH] IN BLOOD BY AUTOMATED COUNT: 13.2 % (ref 11.6–15.1)
GFR SERPL CREATININE-BSD FRML MDRD: 60 ML/MIN/1.73SQ M
GLUCOSE P FAST SERPL-MCNC: 92 MG/DL (ref 65–99)
HCT VFR BLD AUTO: 46 % (ref 36.5–49.3)
HDLC SERPL-MCNC: 43 MG/DL
HGB BLD-MCNC: 15.1 G/DL (ref 12–17)
IMM GRANULOCYTES # BLD AUTO: 0.05 THOUSAND/UL (ref 0–0.2)
IMM GRANULOCYTES NFR BLD AUTO: 0 % (ref 0–2)
LDLC SERPL CALC-MCNC: 10 MG/DL (ref 0–100)
LYMPHOCYTES # BLD AUTO: 2.27 THOUSANDS/ΜL (ref 0.6–4.47)
LYMPHOCYTES NFR BLD AUTO: 19 % (ref 14–44)
MCH RBC QN AUTO: 30 PG (ref 26.8–34.3)
MCHC RBC AUTO-ENTMCNC: 32.8 G/DL (ref 31.4–37.4)
MCV RBC AUTO: 92 FL (ref 82–98)
MONOCYTES # BLD AUTO: 0.74 THOUSAND/ΜL (ref 0.17–1.22)
MONOCYTES NFR BLD AUTO: 6 % (ref 4–12)
NEUTROPHILS # BLD AUTO: 8.66 THOUSANDS/ΜL (ref 1.85–7.62)
NEUTS SEG NFR BLD AUTO: 72 % (ref 43–75)
NONHDLC SERPL-MCNC: 30 MG/DL
NRBC BLD AUTO-RTO: 0 /100 WBCS
PLATELET # BLD AUTO: 261 THOUSANDS/UL (ref 149–390)
PMV BLD AUTO: 10.3 FL (ref 8.9–12.7)
POTASSIUM SERPL-SCNC: 4.8 MMOL/L (ref 3.5–5.3)
PROT SERPL-MCNC: 7.6 G/DL (ref 6.4–8.2)
RBC # BLD AUTO: 5.03 MILLION/UL (ref 3.88–5.62)
SODIUM SERPL-SCNC: 139 MMOL/L (ref 136–145)
TRIGL SERPL-MCNC: 101 MG/DL
TSH SERPL DL<=0.05 MIU/L-ACNC: 0.87 UIU/ML (ref 0.36–3.74)
WBC # BLD AUTO: 12.11 THOUSAND/UL (ref 4.31–10.16)

## 2021-04-27 PROCEDURE — 85025 COMPLETE CBC W/AUTO DIFF WBC: CPT

## 2021-04-27 PROCEDURE — 36415 COLL VENOUS BLD VENIPUNCTURE: CPT

## 2021-04-27 PROCEDURE — 80053 COMPREHEN METABOLIC PANEL: CPT

## 2021-04-27 PROCEDURE — 84443 ASSAY THYROID STIM HORMONE: CPT

## 2021-04-27 PROCEDURE — 80061 LIPID PANEL: CPT

## 2021-04-29 ENCOUNTER — RA CDI HCC (OUTPATIENT)
Dept: OTHER | Facility: HOSPITAL | Age: 61
End: 2021-04-29

## 2021-04-29 NOTE — PROGRESS NOTES
[place] [address]  coding opportunities             Chart reviewed, (number of) suggestions sent to provider: 2     Problem listed updated  Provider Accepted, (number of) suggestions accepted: 2        Patients insurance company: [employer] (Medicare Advantage and Commercial)     Visit status: Patient arrived for their scheduled appointment        [place] [address]  coding opportunities             Chart reviewed, (number of) suggestions sent to provider: 2           Patients insurance company: [employer] (Medicare Advantage and [employer])      Provider did not use any suggestions      Found on active problem list:  1) I21 19 Inferior MI ([place] [address] )  Please assess current status and document for 2021 billing  ----As per CMS/ICD Guidelines - Aftercare code to be used if pt still receiving care related to MI    If healed, I25 2 Old Myocardial infarction may be appropriate    2) C61 Adenocarcinoma of prostate ([place] [address] ) - please assess using MEAT for 2021 billing

## 2021-04-30 ENCOUNTER — OFFICE VISIT (OUTPATIENT)
Dept: UROLOGY | Facility: MEDICAL CENTER | Age: 61
End: 2021-04-30
Payer: COMMERCIAL

## 2021-04-30 VITALS
HEIGHT: 71 IN | WEIGHT: 220 LBS | DIASTOLIC BLOOD PRESSURE: 80 MMHG | BODY MASS INDEX: 30.8 KG/M2 | SYSTOLIC BLOOD PRESSURE: 130 MMHG | HEART RATE: 56 BPM

## 2021-04-30 DIAGNOSIS — C61 ADENOCARCINOMA OF PROSTATE (HCC): Primary | ICD-10-CM

## 2021-04-30 DIAGNOSIS — R97.20 ABNORMAL PROSTATE SPECIFIC ANTIGEN (PSA): ICD-10-CM

## 2021-04-30 LAB
SL AMB  POCT GLUCOSE, UA: NORMAL
SL AMB LEUKOCYTE ESTERASE,UA: NORMAL
SL AMB POCT BILIRUBIN,UA: NORMAL
SL AMB POCT BLOOD,UA: NORMAL
SL AMB POCT CLARITY,UA: CLEAR
SL AMB POCT COLOR,UA: YELLOW
SL AMB POCT KETONES,UA: NORMAL
SL AMB POCT NITRITE,UA: NORMAL
SL AMB POCT PH,UA: 5.5
SL AMB POCT SPECIFIC GRAVITY,UA: 1.03
SL AMB POCT URINE PROTEIN: NORMAL
SL AMB POCT UROBILINOGEN: 0.2

## 2021-04-30 PROCEDURE — 3008F BODY MASS INDEX DOCD: CPT | Performed by: UROLOGY

## 2021-04-30 PROCEDURE — 1036F TOBACCO NON-USER: CPT | Performed by: UROLOGY

## 2021-04-30 PROCEDURE — 81003 URINALYSIS AUTO W/O SCOPE: CPT | Performed by: UROLOGY

## 2021-04-30 PROCEDURE — 3079F DIAST BP 80-89 MM HG: CPT | Performed by: UROLOGY

## 2021-04-30 PROCEDURE — 99214 OFFICE O/P EST MOD 30 MIN: CPT | Performed by: UROLOGY

## 2021-04-30 PROCEDURE — 3075F SYST BP GE 130 - 139MM HG: CPT | Performed by: UROLOGY

## 2021-04-30 NOTE — ASSESSMENT & PLAN NOTE
PSA pending  The patient's prostate nodule may be a little bit larger although I can not say that for sure  The patient and I discussed an MRI if his PSA continues to rise  Await lab results

## 2021-04-30 NOTE — PROGRESS NOTES
Assessment/Plan:    Adenocarcinoma of prostate ([facility] [address] )    PSA pending  The patient's prostate nodule may be a little bit larger although I can not say that for sure  The patient and I discussed an MRI if his PSA continues to rise  Await lab results  Diagnoses and all orders for this visit:    Adenocarcinoma of prostate ([facility] [address] )  -     PSA, Total Screen; Future  -     PSA, Total Screen; Future    Abnormal prostate specific antigen (PSA)  -     POCT urine dip auto non-scope          Subjective:      Patient ID: Melly Perez is a 56 y o  male  HPI  Prostate Cancer on Active Surveillance:  Diagnosed with new 6 in 2 cores from the right sideoin March 17, 2020  Presented at tumor board  Active surveillance recommended  He notes mild urinary frequency  He denies other significant urinary symptoms  He denies gross hematuria, urinary tract infections or incontinence  He is taking neither medications nor supplements for his symptoms  PSA:  [  0   Lab Value Date/Time    PSA 6 5 (H) 12/03/2019 0709    PSA 5 4 (H) 11/07/2019 8921   ]    AUA SYMPTOM SCORE      Most Recent Value   AUA SYMPTOM SCORE   How often have you had a sensation of not emptying your bladder completely after you finished urinating? 1   How often have you had to urinate again less than two hours after you finished urinating? 2   How often have you found you stopped and started again several times when you urinate? 1   How often have you found it difficult to postpone urination? 0   How often have you had a weak urinary stream?  1   How often have you had to push or strain to begin urination?   0   How many times did you most typically get up to urinate from the time you went to bed at night until the time you got up in the morning?  0   Quality of Life: If you were to spend the rest of your life with your urinary condition just the way it is now, how would you feel about that?  1   AUA SYMPTOM SCORE  5          The following portions of the patient's history were reviewed and updated as appropriate: allergies, current medications, past family history, past medical history, past social history, past surgical history and problem list     Review of Systems        Objective:      /80   Pulse 56   Ht 5' 10 5" (1 791 m)   Wt 99 8 kg (220 lb)   BMI 31 12 kg/m²          Physical Exam  Constitutional:       Appearance: He is well-developed  HENT:      Head: Normocephalic and atraumatic  Neck:      Musculoskeletal: Normal range of motion and neck supple  Pulmonary:      Effort: Pulmonary effort is normal    Genitourinary:     Rectum: Normal       Comments: The prostate is 40 grams, firm, smooth and non-tender  Lima bean-sized nodule at the right lat border  The tumor is on the left  I am not sure if this is a real change in size or just a change in my description  Note that the nodule was on the right side but the patient's positive biopsies came from the left  Musculoskeletal: Normal range of motion  Skin:     General: Skin is warm and dry  Neurological:      Mental Status: He is alert and oriented to person, place, and time  Psychiatric:         Behavior: Behavior normal          Thought Content:  Thought content normal          Judgment: Judgment normal

## 2021-05-05 ENCOUNTER — OFFICE VISIT (OUTPATIENT)
Dept: FAMILY MEDICINE CLINIC | Facility: CLINIC | Age: 61
End: 2021-05-05
Payer: COMMERCIAL

## 2021-05-05 VITALS
WEIGHT: 219 LBS | HEIGHT: 69 IN | HEART RATE: 52 BPM | BODY MASS INDEX: 32.44 KG/M2 | SYSTOLIC BLOOD PRESSURE: 130 MMHG | TEMPERATURE: 98.1 F | OXYGEN SATURATION: 98 % | DIASTOLIC BLOOD PRESSURE: 70 MMHG

## 2021-05-05 DIAGNOSIS — I10 ESSENTIAL HYPERTENSION: ICD-10-CM

## 2021-05-05 DIAGNOSIS — E78.00 HYPERCHOLESTEREMIA: ICD-10-CM

## 2021-05-05 DIAGNOSIS — D72.9 ABNORMAL WBC COUNT: ICD-10-CM

## 2021-05-05 DIAGNOSIS — E78.2 MIXED HYPERLIPIDEMIA: ICD-10-CM

## 2021-05-05 DIAGNOSIS — K22.70 BARRETT'S ESOPHAGUS WITHOUT DYSPLASIA: ICD-10-CM

## 2021-05-05 DIAGNOSIS — Z00.01 ENCOUNTER FOR WELL ADULT EXAM WITH ABNORMAL FINDINGS: Primary | ICD-10-CM

## 2021-05-05 PROCEDURE — 3725F SCREEN DEPRESSION PERFORMED: CPT | Performed by: FAMILY MEDICINE

## 2021-05-05 PROCEDURE — 3078F DIAST BP <80 MM HG: CPT | Performed by: FAMILY MEDICINE

## 2021-05-05 PROCEDURE — 3075F SYST BP GE 130 - 139MM HG: CPT | Performed by: FAMILY MEDICINE

## 2021-05-05 PROCEDURE — 99214 OFFICE O/P EST MOD 30 MIN: CPT | Performed by: FAMILY MEDICINE

## 2021-05-05 PROCEDURE — 99396 PREV VISIT EST AGE 40-64: CPT | Performed by: FAMILY MEDICINE

## 2021-05-05 RX ORDER — METOPROLOL SUCCINATE 25 MG/1
12.5 TABLET, EXTENDED RELEASE ORAL DAILY
Qty: 45 TABLET | Refills: 1
Start: 2021-05-05 | End: 2021-12-06 | Stop reason: SDUPTHER

## 2021-05-06 PROBLEM — D72.9 ABNORMAL WBC COUNT: Status: ACTIVE | Noted: 2021-05-06

## 2021-05-06 PROBLEM — A41.51 SEPSIS DUE TO ESCHERICHIA COLI (HCC): Status: RESOLVED | Noted: 2020-03-19 | Resolved: 2021-05-06

## 2021-05-06 PROBLEM — R10.12 LUQ PAIN: Status: RESOLVED | Noted: 2019-11-05 | Resolved: 2021-05-06

## 2021-05-06 PROBLEM — R10.32 LLQ PAIN: Status: RESOLVED | Noted: 2020-10-13 | Resolved: 2021-05-06

## 2021-05-06 PROBLEM — R50.9 FEVER: Status: RESOLVED | Noted: 2020-11-24 | Resolved: 2021-05-06

## 2021-05-07 NOTE — PATIENT INSTRUCTIONS

## 2021-05-07 NOTE — ASSESSMENT & PLAN NOTE
A chronic asymptomatic and uncontrolled discussed the patient portion control low carb low-fat diet increase physical activity
Advice and education were given regarding nutrition, aerobic exercises, weight bearing exercises, cardiovascular risk reduction, fall risk reduction, and age appropriate supplements  The patient was counseled regarding instructions for management, risk factor reductions, prognosis, risks and benefits of treatment options, patient and family education, and importance of compliance with treatment 
Chronic asymptomatic ,recent Blood work LD 10 continue with atorvastatin 10 mg once a day  Stop Zetia  Low fat diet discuss with patient
Chronic asymptomatic fair control the looking vital sign his heart rate and when he came to the office it was 55 I recheck it is 52 and patient on metoprolol  Succinate 25 mg  1 tablet a day will decrease it to half tablet a day   continue with the lisinopril 2 5 mg once a day low-salt diet discussed the patient case of the dizziness light headache to call the office control recommend to continue monitor blood pressure
Finding on recent blood work no fever no weight loss recommend to repeat it
New diagnosis ,on Pantoprazole a 40 mg once a day and patient does follow-up with GI recommend to repeat the endoscopy in 3 years   recommend to avoid provoke food do not eat and lie down and important lose weight
Yes

## 2021-05-07 NOTE — PROGRESS NOTES
BMI Counseling: Body mass index is 32 34 kg/m²  The BMI is above normal  Nutrition recommendations include decreasing portion sizes, decreasing fast food intake and limiting drinks that contain sugar  Exercise recommendations include exercising 3-5 times per week  No pharmacotherapy was ordered  Subjective:   Chief Complaint   Patient presents with    Physical Exam        Patient ID: Adelina Linda is a 56 y o  male  Patient here for annual physical exam follow-up with a chronic condition patient history of hyperlipidemia and on atorvastatin 10 mg and Zetia 10 mg tolerated well without side effect deny any rash or muscle pain patient history of hypertension  His currently on metoprolol succinate 25 mg and lisinopril 2 5 mg tolerated well the looking at his vital sign when his heart rate is low he deny dizziness light headache no fatigue a deny any chest pain short of breath no palpitation no dyspnea on exertion patient more recently seen by GI had EGD diagnosed with [place]  Currently on pantoprazole 40 mg tolerated well without side effect deny any heartburn nausea vomiting no abdomen distension recent blood work discussed with the patient      The following portions of the patient's history were reviewed and updated as appropriate: allergies, current medications, past family history, past medical history, past social history, past surgical history and problem list     Review of Systems   Constitutional: Negative for activity change, appetite change, fatigue and fever  HENT: Negative for congestion, ear pain, sinus pressure, sinus pain and sore throat  Eyes: Negative for pain, discharge, redness and itching  Respiratory: Negative for cough, chest tightness, shortness of breath and stridor  Cardiovascular: Negative for chest pain, palpitations and leg swelling  Gastrointestinal: Negative for abdominal pain, blood in stool, constipation, diarrhea and nausea     Genitourinary: Negative for dysuria, flank pain, frequency and hematuria  Musculoskeletal: Negative for back pain, joint swelling and neck pain  Skin: Negative for pallor and rash  Neurological: Negative for dizziness, tremors, weakness, numbness and headaches  Hematological: Does not bruise/bleed easily  Objective:  Vitals:    05/05/21 1351 05/05/21 1410   BP: 130/70    Pulse: (!) 54 (!) 52   Temp: 98 1 °F (36 7 °C)    TempSrc: Tympanic    SpO2: 98%    Weight: 99 3 kg (219 lb)    Height: 5' 9" (1 753 m)       Physical Exam  Vitals signs and nursing note reviewed  Constitutional:       General: He is not in acute distress  Appearance: Normal appearance  He is well-developed  He is not diaphoretic  HENT:      Head: Normocephalic  Right Ear: Tympanic membrane, ear canal and external ear normal       Left Ear: Tympanic membrane, ear canal and external ear normal       Nose: Nose normal  No congestion or rhinorrhea  Mouth/Throat:      Mouth: Mucous membranes are moist       Pharynx: Oropharynx is clear  No oropharyngeal exudate or posterior oropharyngeal erythema  Eyes:      General:         Right eye: No discharge  Left eye: No discharge  Conjunctiva/sclera: Conjunctivae normal    Neck:      Musculoskeletal: Normal range of motion and neck supple  Vascular: No JVD  Cardiovascular:      Rate and Rhythm: Normal rate and regular rhythm  Heart sounds: No murmur  No gallop  Pulmonary:      Effort: Pulmonary effort is normal  No respiratory distress  Breath sounds: Normal breath sounds  No stridor  No wheezing or rales  Chest:      Chest wall: No tenderness  Abdominal:      General: There is no distension  Palpations: Abdomen is soft  There is no mass  Tenderness: There is no abdominal tenderness  There is no rebound  Musculoskeletal: Normal range of motion  General: No tenderness  Lymphadenopathy:      Cervical: No cervical adenopathy     Skin: General: Skin is warm  Findings: No erythema or rash  Neurological:      Mental Status: He is alert and oriented to person, place, and time  Sensory: No sensory deficit  Gait: Gait normal    Psychiatric:         Mood and Affect: Mood normal          Behavior: Behavior normal            Assessment/Plan:    Encounter for well adult exam with abnormal findings    Advice and education were given regarding nutrition, aerobic exercises, weight bearing exercises, cardiovascular risk reduction, fall risk reduction, and age appropriate supplements  The patient was counseled regarding instructions for management, risk factor reductions, prognosis, risks and benefits of treatment options, patient and family education, and importance of compliance with treatment           BMI 32 0-32 9,adult   A chronic asymptomatic and uncontrolled discussed the patient portion control low carb low-fat diet increase physical activity    Essential hypertension   Chronic asymptomatic fair control the looking vital sign his heart rate and when he came to the office it was 55 I recheck it is 52 and patient on metoprolol  Succinate 25 mg  1 tablet a day will decrease it to half tablet a day   continue with the lisinopril 2 5 mg once a day low-salt diet discussed the patient case of the dizziness light headache to call the office control recommend to continue monitor blood pressure    Hyperlipidemia    Chronic asymptomatic ,recent Blood work LD 10 continue with atorvastatin 10 mg once a day  Stop Zetia  Low fat diet discuss with patient    Gold's esophagus  New diagnosis ,on Pantoprazole a 40 mg once a day and patient does follow-up with GI recommend to repeat the endoscopy in 3 years   recommend to avoid provoke food do not eat and lie down and important lose weight    Abnormal WBC count  Finding on recent blood work no fever no weight loss recommend to repeat it       Diagnoses and all orders for this visit:    Encounter for well adult exam with abnormal findings    BMI 32 0-32 9,adult    Abnormal WBC count  -     CBC and differential; Future  -     Comprehensive metabolic panel; Future  -     Lipid Panel with Direct LDL reflex; Future  -     TSH, 3rd generation with Free T4 reflex; Future  -     CBC and differential; Future    Mixed hyperlipidemia  -     Comprehensive metabolic panel; Future  -     Lipid Panel with Direct LDL reflex; Future  -     TSH, 3rd generation with Free T4 reflex; Future  -     CBC and differential; Future    Essential hypertension  -     metoprolol succinate (TOPROL-XL) 25 mg 24 hr tablet; Take 0 5 tablets (12 5 mg total) by mouth daily  -     Comprehensive metabolic panel; Future  -     Lipid Panel with Direct LDL reflex; Future  -     TSH, 3rd generation with Free T4 reflex;  Future  -     CBC and differential; Future    Gold's esophagus without dysplasia

## 2021-05-07 NOTE — PROGRESS NOTES
[id number]  PRIMARY CARE [facility]    NAME: Felicity Weathers  AGE: 56 y o   SEX: male  : 1960     DATE: 2021     Assessment and Plan:     Problem List Items Addressed This Visit        Digestive    Gold's esophagus     New diagnosis ,on Pantoprazole a 40 mg once a day and patient does follow-up with GI recommend to repeat the endoscopy in 3 years   recommend to avoid provoke food do not eat and lie down and important lose weight            Cardiovascular and Mediastinum    Essential hypertension      Chronic asymptomatic fair control the looking vital sign his heart rate and when he came to the office it was 55 I recheck it is 52 and patient on metoprolol  Succinate 25 mg  1 tablet a day will decrease it to half tablet a day   continue with the lisinopril 2 5 mg once a day low-salt diet discussed the patient case of the dizziness light headache to call the office control recommend to continue monitor blood pressure         Relevant Medications    metoprolol succinate (TOPROL-XL) 25 mg 24 hr tablet    Other Relevant Orders    Comprehensive metabolic panel    Lipid Panel with Direct LDL reflex    TSH, 3rd generation with Free T4 reflex    CBC and differential       Other    Hyperlipidemia       Chronic asymptomatic ,recent Blood work LD 10 continue with atorvastatin 10 mg once a day  Stop Zetia  Low fat diet discuss with patient         Relevant Orders    Comprehensive metabolic panel    Lipid Panel with Direct LDL reflex    TSH, 3rd generation with Free T4 reflex    CBC and differential    BMI 32 0-32 9,adult      A chronic asymptomatic and uncontrolled discussed the patient portion control low carb low-fat diet increase physical activity         Encounter for well adult exam with abnormal findings - Primary       Advice and education were given regarding nutrition, aerobic exercises, weight bearing exercises, cardiovascular risk reduction, fall risk reduction, and age appropriate supplements  The patient was counseled regarding instructions for management, risk factor reductions, prognosis, risks and benefits of treatment options, patient and family education, and importance of compliance with treatment  Abnormal WBC count     Finding on recent blood work no fever no weight loss recommend to repeat it         Relevant Orders    CBC and differential    Comprehensive metabolic panel    Lipid Panel with Direct LDL reflex    TSH, 3rd generation with Free T4 reflex    CBC and differential          Immunizations and preventive care screenings were discussed with patient today  Appropriate education was printed on patient's after visit summary  Counseling:  Alcohol/drug use: discussed moderation in alcohol intake, the recommendations for healthy alcohol use, and avoidance of illicit drug use  Dental Health: discussed importance of regular tooth brushing, flossing, and dental visits  Injury prevention: discussed safety/seat belts, safety helmets, smoke detectors, carbon dioxide detectors, and smoking near bedding or upholstery  Sexual health: discussed sexually transmitted diseases, partner selection, use of condoms, avoidance of unintended pregnancy, and contraceptive alternatives  · Exercise: the importance of regular exercise/physical activity was discussed  Recommend exercise 3-5 times per week for at least 30 minutes  BMI Counseling: Body mass index is 32 34 kg/m²  The BMI is above normal  Nutrition recommendations include decreasing portion sizes, decreasing fast food intake and limiting drinks that contain sugar  Exercise recommendations include exercising 3-5 times per week  No pharmacotherapy was ordered  No follow-ups on file       Chief Complaint:     Chief Complaint   Patient presents with    Physical Exam      History of Present Illness:     Adult Annual Physical   Patient here for a comprehensive physical exam  The patient reports F/up with chronic condition  Diet and Physical Activity  · Diet/Nutrition: no special diet  · Exercise: no formal exercise  Depression Screening  PHQ-9 Depression Screening    PHQ-9:   Frequency of the following problems over the past two weeks:      Little interest or pleasure in doing things: 0 - not at all  Feeling down, depressed, or hopeless: 0 - not at all  PHQ-2 Score: 0       General Health  · Sleep: sleeps well  · Hearing: normal - bilateral   · Vision: wears glasses  · Dental: brushes teeth twice daily   Health  · Symptoms include: patient with HX of BPH he does F/up with Urology     Review of Systems:     Review of Systems   Constitutional: Negative for activity change, appetite change, fatigue and fever  HENT: Negative for congestion, ear pain, sinus pressure, sinus pain and sore throat  Eyes: Negative for pain, discharge, redness and itching  Respiratory: Negative for cough, chest tightness, shortness of breath and stridor  Cardiovascular: Negative for chest pain, palpitations and leg swelling  Gastrointestinal: Negative for abdominal pain, blood in stool, constipation, diarrhea and nausea  Endocrine: Negative for heat intolerance and polydipsia  Genitourinary: Negative for dysuria, flank pain, frequency and hematuria  Musculoskeletal: Negative for back pain, joint swelling and neck pain  Skin: Negative for pallor and rash  Neurological: Negative for dizziness, tremors, weakness, numbness and headaches  Hematological: Does not bruise/bleed easily  Psychiatric/Behavioral: Negative for agitation and behavioral problems        Past Medical History:     Past Medical History:   Diagnosis Date    Adenocarcinoma of prostate ([facility] [address] ) 3/27/2020    Gold's esophagus 1/4/2021    EGD 01/04/21 recommend to repeat in 3 y    Colon polyp     Coronary artery disease     Elevated PSA     Fever 11/24/2020    Gastroesophageal reflux disease without esophagitis 12/13/2019    Hypercholesterolemia     Hypertension     Inferior [facility] ([facility] [address] ) 4/7/2016    LLQ pain 10/13/2020    LUQ pain 11/5/2019    Myocardial infarction [facility]) 2011    Sepsis due to Escherichia coli ([facility] [address] ) 3/19/2020    UTI (urinary tract infection) 3/18/2020      Past Surgical History:     Past Surgical History:   Procedure Laterality Date    COLONOSCOPY      CORONARY STENT PLACEMENT      bare metal stent in Rt coronary artery    SHOULDER SURGERY Left       Family History:     Family History   Problem Relation Age of Onset    Sleep apnea Mother     Heart attack Father       Social History:     E-Cigarette/Vaping    E-Cigarette Use Never User      E-Cigarette/Vaping Substances    Nicotine No     THC No     CBD No     Flavoring No     Other No     Unknown No      Social History     Socioeconomic History    Marital status: /Civil Union     Spouse name: None    Number of children: None    Years of education: None    Highest education level: None   Occupational History    None   Social Needs    Financial resource strain: Not hard at all   [employer] insecurity     Worry: Never true     Inability: Never true    Transportation needs     Medical: No     Non-medical: No   Tobacco Use    Smoking status: Former Smoker     Types: Cigarettes     Quit date: 2011     Years since quitting: 10 3    Smokeless tobacco: Former User   Substance and Sexual Activity    Alcohol use: Yes     Frequency: Monthly or less     Drinks per session: 1 or 2     Binge frequency: Never     Comment: rarely     Drug use: Never    Sexual activity: Yes     Partners: Female   Lifestyle    Physical activity     Days per week: 5 days     Minutes per session: 30 min    Stress: Not at all   Relationships    Social connections     Talks on phone: More than three times a week     Gets together: More than three times a week     Attends Congregational service: More than 4 times per year     Active member of club or organization: No     Attends meetings of clubs or organizations: Never     Relationship status:     Intimate partner violence     Fear of current or ex partner: No     Emotionally abused: No     Physically abused: No     Forced sexual activity: No   Other Topics Concern    None   Social History Narrative    None      Current Medications:     Current Outpatient Medications   Medication Sig Dispense Refill    aspirin 81 mg chewable tablet Chew 1 tablet daily      atorvastatin (LIPITOR) 10 mg tablet Take 1 tablet (10 mg total) by mouth daily 90 tablet 3    famotidine (PEPCID) 40 MG tablet Take 1 tablet (40 mg total) by mouth daily 90 tablet 1    lisinopril (ZESTRIL) 2 5 mg tablet Take 1 tablet (2 5 mg total) by mouth daily 90 tablet 3    metoprolol succinate (TOPROL-XL) 25 mg 24 hr tablet Take 0 5 tablets (12 5 mg total) by mouth daily 45 tablet 1    Na Sulfate-K Sulfate-Mg Sulf (Suprep Bowel Prep Kit) 17 5-3 13-1 6 GM/177ML SOLN day before, use 6 oz bottle  4 hours before procedure, take 2nd dose 2 Bottle 0    pantoprazole (PROTONIX) 40 mg tablet TAKE 1 TABLET BY MOUTH EVERY DAY BEFORE BREAKFAST 30 tablet 0    Evolocumab 140 MG/ML SOAJ Inject 1 mL (140 mg total) under the skin every 14 (fourteen) days 6 mL 3     No current facility-administered medications for this visit  Allergies:     No Known Allergies   Physical Exam:     /70   Pulse (!) 52   Temp 98 1 °F (36 7 °C) (Tympanic)   Ht 5' 9" (1 753 m)   Wt 99 3 kg (219 lb)   SpO2 98%   BMI 32 34 kg/m²     Physical Exam  Vitals signs and nursing note reviewed  Exam conducted with a chaperone present  Constitutional:       General: He is not in acute distress  Appearance: Normal appearance  He is not ill-appearing, toxic-appearing or diaphoretic  HENT:      Head: Normocephalic  Right Ear: Tympanic membrane, ear canal and external ear normal  There is no impacted cerumen        Left Ear: Ear canal and external ear normal  There is no impacted cerumen  Nose: Nose normal  No congestion or rhinorrhea  Mouth/Throat:      Mouth: Mucous membranes are moist       Pharynx: Oropharynx is clear  No oropharyngeal exudate or posterior oropharyngeal erythema  Eyes:      General:         Right eye: No discharge  Left eye: No discharge  Conjunctiva/sclera: Conjunctivae normal       Pupils: Pupils are equal, round, and reactive to light  Neck:      Musculoskeletal: Normal range of motion  Vascular: No JVD  Cardiovascular:      Rate and Rhythm: Normal rate and regular rhythm  Heart sounds: Normal heart sounds  No murmur  Pulmonary:      Effort: Pulmonary effort is normal       Breath sounds: Normal breath sounds  No wheezing or rales  Abdominal:      General: There is no distension  Palpations: Abdomen is soft  Tenderness: There is no abdominal tenderness  Hernia: No hernia is present  Genitourinary:     Scrotum/Testes: Normal    Musculoskeletal: Normal range of motion  General: No deformity  Right lower leg: No edema  Left lower leg: No edema  Lymphadenopathy:      Cervical: No cervical adenopathy  Skin:     General: Skin is warm  Coloration: Skin is not jaundiced  Findings: No bruising, erythema or rash  Neurological:      General: No focal deficit present  Mental Status: He is alert and oriented to person, place, and time  Sensory: No sensory deficit  Motor: No weakness        Gait: Gait normal    Psychiatric:         Mood and Affect: Mood normal          Behavior: Behavior normal           Carina Guardado MD  [address] [place] [address]

## 2021-05-14 ENCOUNTER — APPOINTMENT (OUTPATIENT)
Dept: LAB | Facility: MEDICAL CENTER | Age: 61
End: 2021-05-14
Attending: UROLOGY
Payer: COMMERCIAL

## 2021-05-14 ENCOUNTER — OFFICE VISIT (OUTPATIENT)
Dept: CARDIOLOGY CLINIC | Facility: CLINIC | Age: 61
End: 2021-05-14
Payer: COMMERCIAL

## 2021-05-14 VITALS
HEIGHT: 69 IN | SYSTOLIC BLOOD PRESSURE: 150 MMHG | HEART RATE: 56 BPM | DIASTOLIC BLOOD PRESSURE: 90 MMHG | BODY MASS INDEX: 32.44 KG/M2 | WEIGHT: 219 LBS

## 2021-05-14 DIAGNOSIS — E78.2 MIXED HYPERLIPIDEMIA: ICD-10-CM

## 2021-05-14 DIAGNOSIS — C61 ADENOCARCINOMA OF PROSTATE (HCC): ICD-10-CM

## 2021-05-14 DIAGNOSIS — I25.10 CAD IN NATIVE ARTERY: Primary | ICD-10-CM

## 2021-05-14 DIAGNOSIS — D72.9 ABNORMAL WBC COUNT: ICD-10-CM

## 2021-05-14 DIAGNOSIS — I10 ESSENTIAL HYPERTENSION: ICD-10-CM

## 2021-05-14 DIAGNOSIS — I25.2 HISTORY OF ACUTE INFERIOR WALL MI: ICD-10-CM

## 2021-05-14 LAB
BASOPHILS # BLD AUTO: 0.09 THOUSANDS/ΜL (ref 0–0.1)
BASOPHILS NFR BLD AUTO: 1 % (ref 0–1)
EOSINOPHIL # BLD AUTO: 0.32 THOUSAND/ΜL (ref 0–0.61)
EOSINOPHIL NFR BLD AUTO: 4 % (ref 0–6)
ERYTHROCYTE [DISTWIDTH] IN BLOOD BY AUTOMATED COUNT: 13.2 % (ref 11.6–15.1)
HCT VFR BLD AUTO: 45.4 % (ref 36.5–49.3)
HGB BLD-MCNC: 14.8 G/DL (ref 12–17)
IMM GRANULOCYTES # BLD AUTO: 0.03 THOUSAND/UL (ref 0–0.2)
IMM GRANULOCYTES NFR BLD AUTO: 0 % (ref 0–2)
LYMPHOCYTES # BLD AUTO: 2.06 THOUSANDS/ΜL (ref 0.6–4.47)
LYMPHOCYTES NFR BLD AUTO: 28 % (ref 14–44)
MCH RBC QN AUTO: 30.3 PG (ref 26.8–34.3)
MCHC RBC AUTO-ENTMCNC: 32.6 G/DL (ref 31.4–37.4)
MCV RBC AUTO: 93 FL (ref 82–98)
MONOCYTES # BLD AUTO: 0.57 THOUSAND/ΜL (ref 0.17–1.22)
MONOCYTES NFR BLD AUTO: 8 % (ref 4–12)
NEUTROPHILS # BLD AUTO: 4.18 THOUSANDS/ΜL (ref 1.85–7.62)
NEUTS SEG NFR BLD AUTO: 59 % (ref 43–75)
NRBC BLD AUTO-RTO: 0 /100 WBCS
PLATELET # BLD AUTO: 237 THOUSANDS/UL (ref 149–390)
PMV BLD AUTO: 10.6 FL (ref 8.9–12.7)
PSA SERPL-MCNC: 6.2 NG/ML (ref 0–4)
RBC # BLD AUTO: 4.88 MILLION/UL (ref 3.88–5.62)
WBC # BLD AUTO: 7.25 THOUSAND/UL (ref 4.31–10.16)

## 2021-05-14 PROCEDURE — 36415 COLL VENOUS BLD VENIPUNCTURE: CPT

## 2021-05-14 PROCEDURE — 3008F BODY MASS INDEX DOCD: CPT | Performed by: INTERNAL MEDICINE

## 2021-05-14 PROCEDURE — 85025 COMPLETE CBC W/AUTO DIFF WBC: CPT

## 2021-05-14 PROCEDURE — G0103 PSA SCREENING: HCPCS

## 2021-05-14 PROCEDURE — 1036F TOBACCO NON-USER: CPT | Performed by: INTERNAL MEDICINE

## 2021-05-14 PROCEDURE — 93000 ELECTROCARDIOGRAM COMPLETE: CPT | Performed by: INTERNAL MEDICINE

## 2021-05-14 PROCEDURE — 99214 OFFICE O/P EST MOD 30 MIN: CPT | Performed by: INTERNAL MEDICINE

## 2021-05-14 NOTE — PROGRESS NOTES
Cardiology Follow Up Visit    Luis Miguel Weinberg  1960  [id number]  [place]  [address]  [facility] [place] [facility]  [phone]    1  CAD in native artery  POCT ECG   2  Essential hypertension     3  History of acute inferior wall MI           Discussion/Summary:       1  CAD, asymptomatic  A  Inferior MI 2011, BMS RCA   B  St echo 2017 no ischemia     2  Hyperlipidemia, LDL   Doing well now on Repatha and low-dose atorvastatin    3  Essential htn,   Elevated on intake today, home blood pressure checks within normal limits per his report      Plan:   Patient doing very well  He is on Repatha low-dose atorvastatin with excellent reduction in his lipid panel  Would continue low-dose atorvastatin with Repatha  25% patients in [facility] trial achieved LDL levels below 30 with benefit so will continue given CAD and history of MI  This is not too low  Continue sensible diet and   Daily activity/exercise if blood pressure should  Be elevated would increase lisinopril to 10 mg daily        Interval History:     59year-old  Here for follow-up known coronary artery disease     Index inferior MI 2011 remote stent right coronary artery  Has done well since  No recurrent vascular events     Unable to tolerate coronary artery disease dose statin/high potency statin  Has been on Repatha now with good result  LDL at goal   Remains on atorvastatin 10 mg        blood pressure isolated elevation today  States home blood pressure checks all within normal limits      Patient Active Problem List   Diagnosis    CAD in native artery    History of acute inferior wall MI    Presence of bare metal stent in right coronary artery    Essential hypertension    Hyperlipidemia    Chronic left-sided low back pain without sciatica    BMI 32 0-32 9,adult    Fatty liver    Benign prostatic hyperplasia without lower urinary tract symptoms    Gastroesophageal reflux disease without esophagitis    Abnormal prostate specific antigen (PSA)    Prostate nodule without urinary obstruction    Bacteremia due to Escherichia coli    Bursitis of shoulder    Contusion of left shoulder    Injury of tendon of rotator cuff    UTI (urinary tract infection)    [facility] prostate ([facility] )    Encounter for well adult exam with abnormal findings    Myalgia with higher doses statins    Colon polyp    COVID-19 virus infection    Gold's esophagus    Abnormal WBC count     Past Medical History:   Diagnosis Date    Adenocarcinoma of prostate ([place] [address] ) 3/27/2020    Gold's esophagus 1/4/2021    EGD 01/04/21 recommend to repeat in 3 y    Colon polyp     Coronary artery disease     Elevated PSA     Fever 11/24/2020    Gastroesophageal reflux disease without esophagitis 12/13/2019    Hypercholesterolemia     Hypertension     Inferior MI ([place] [address] ) 4/7/2016    LLQ pain 10/13/2020    LUQ pain 11/5/2019    Myocardial infarction ([place] [address] ) 2011    Sepsis due to Escherichia coli ([place] [address] ) 3/19/2020    UTI (urinary tract infection) 3/18/2020     Social History     Socioeconomic History    Marital status: /Civil Union     Spouse name: Not on file    Number of children: Not on file    Years of education: Not on file    Highest education level: Not on file   Occupational History    Not on file   Social Needs    Financial resource strain: Not hard at all   [employer] insecurity     Worry: Never true     Inability: Never true    Transportation needs     Medical: No     Non-medical: No   Tobacco Use    Smoking status: Former Smoker     Types: Cigarettes     Quit date: 2011     Years since quitting: 10 3    Smokeless tobacco: Former User   Substance and Sexual Activity    Alcohol use: Yes     Frequency: Monthly or less     Drinks per session: 1 or 2     Binge frequency: Never     Comment: rarely     Drug use: Never    Sexual activity: Yes Partners: Female   Lifestyle    Physical activity     Days per week: 5 days     Minutes per session: 30 min    Stress: Not at all   Relationships    Social connections     Talks on phone: More than three times a week     Gets together: More than three times a week     Attends Rastafari service: More than 4 times per year     Active member of club or organization: No     Attends meetings of clubs or organizations: Never     Relationship status:     Intimate partner violence     Fear of current or ex partner: No     Emotionally abused: No     Physically abused: No     Forced sexual activity: No   Other Topics Concern    Not on file   Social History Narrative    Not on file      Family History   Problem Relation Age of Onset    Sleep apnea Mother     Heart attack Father      Past Surgical History:   Procedure Laterality Date    COLONOSCOPY      CORONARY STENT PLACEMENT      bare metal stent in Rt coronary artery    SHOULDER SURGERY Left        Current Outpatient Medications:     aspirin 81 mg chewable tablet, Chew 1 tablet daily, Disp: , Rfl:     atorvastatin (LIPITOR) 10 mg tablet, Take 1 tablet (10 mg total) by mouth daily, Disp: 90 tablet, Rfl: 3    Evolocumab 140 MG/ML SOAJ, Inject 1 mL (140 mg total) under the skin every 14 (fourteen) days, Disp: 6 mL, Rfl: 3    famotidine (PEPCID) 40 MG tablet, Take 1 tablet (40 mg total) by mouth daily, Disp: 90 tablet, Rfl: 1    lisinopril (ZESTRIL) 2 5 mg tablet, Take 1 tablet (2 5 mg total) by mouth daily, Disp: 90 tablet, Rfl: 3    metoprolol succinate (TOPROL-XL) 25 mg 24 hr tablet, Take 0 5 tablets (12 5 mg total) by mouth daily, Disp: 45 tablet, Rfl: 1    pantoprazole (PROTONIX) 40 mg tablet, TAKE 1 TABLET BY MOUTH EVERY DAY BEFORE BREAKFAST, Disp: 30 tablet, Rfl: 0    Na Sulfate-K Sulfate-Mg Sulf (Suprep Bowel Prep Kit) 17 5-3 13-1 6 GM/177ML SOLN, day before, use 6 oz bottle   4 hours before procedure, take 2nd dose (Patient not taking: Reported on 5/14/2021), Disp: 2 Bottle, Rfl: 0  No Known Allergies      Social, Family, Medication history reviewed and updated as necessary      Labs:     Lab Results   Component Value Date    K 4 8 04/27/2021     (H) 04/27/2021    CO2 26 04/27/2021    BUN 26 (H) 04/27/2021    CREATININE 1 27 04/27/2021    CREATININE 1 06 11/17/2020    CALCIUM 9 5 04/27/2021       Lab Results   Component Value Date    WBC 7 25 05/14/2021    HGB 14 8 05/14/2021    HGB 15 1 04/27/2021    HCT 45 4 05/14/2021    HCT 46 0 04/27/2021     05/14/2021     04/27/2021       Lab Results   Component Value Date    CHOL 162 06/24/2014     Lab Results   Component Value Date    HDL 43 04/27/2021    HDL 46 11/17/2020     Lab Results   Component Value Date    LDLCALC 10 04/27/2021    LDLCALC 22 11/17/2020     No results found for: LDLDIRECT          Lab Results   Component Value Date    TRIG 101 04/27/2021    TRIG 85 11/17/2020       Lab Results   Component Value Date    ALT 41 04/27/2021    ALT 33 11/07/2019    AST 19 04/27/2021    AST 17 11/07/2019       No results found for: INR    No results found for: NTBNP    No results found for: HGBA1C        Imaging: Reviewed in epic        Review of Systems:  14 systems reviewed and negative with exception of the above        PHYSICAL EXAM:      Vitals:    05/14/21 1408   BP: 150/90   Pulse: 56     Body mass index is 32 34 kg/m²     Weight (last 2 days)     Date/Time   Weight    05/14/21 1408   99 3 (219)                Gen: No acute distress  HEENT: anicteric, mucous membranes moist  Neck: supple, no jugular venous distention, or carotid bruit  Heart: regular, normal s1 and s2, no murmur/rub or gallop  Lungs :clear to auscultation bilaterally, no rales/rhonchi or wheeze  Abdomen: soft nontender, normoactive bowel sounds, no organomegaly  Ext: warm and perfused, normal femoral pulses, no edema, or clubbing  Skin: warm, no rashes  Neuro: AAO x 3, no focal findings  Psychiatric: normal affect  Musculoskeletal: no obvious joint deformities

## 2021-05-15 DIAGNOSIS — K21.9 GASTROESOPHAGEAL REFLUX DISEASE WITHOUT ESOPHAGITIS: ICD-10-CM

## 2021-05-16 RX ORDER — PANTOPRAZOLE SODIUM 40 MG/1
TABLET, DELAYED RELEASE ORAL
Qty: 30 TABLET | Refills: 0 | Status: SHIPPED | OUTPATIENT
Start: 2021-05-16 | End: 2021-06-15

## 2021-05-18 ENCOUNTER — TELEPHONE (OUTPATIENT)
Dept: UROLOGY | Facility: MEDICAL CENTER | Age: 61
End: 2021-05-18

## 2021-05-18 NOTE — TELEPHONE ENCOUNTER
----- Message from Hortensia Mead MD sent at 5/17/2021  8:34 PM EDT -----  PSA stable  No action now  The patient should keep his follow-up appointment as scheduled

## 2021-05-18 NOTE — TELEPHONE ENCOUNTER
4/30 appt states to "Return for Pending lab results "  No appt scheduled at this time  When should pt return?

## 2021-06-04 ENCOUNTER — TELEPHONE (OUTPATIENT)
Dept: GASTROENTEROLOGY | Facility: MEDICAL CENTER | Age: 61
End: 2021-06-04

## 2021-06-04 RX ORDER — SODIUM CHLORIDE 9 MG/ML
125 INJECTION, SOLUTION INTRAVENOUS CONTINUOUS
Status: CANCELLED | OUTPATIENT
Start: 2021-06-04

## 2021-06-06 ENCOUNTER — ANESTHESIA EVENT (OUTPATIENT)
Dept: ANESTHESIOLOGY | Facility: HOSPITAL | Age: 61
End: 2021-06-06

## 2021-06-06 ENCOUNTER — ANESTHESIA EVENT (OUTPATIENT)
Dept: GASTROENTEROLOGY | Facility: MEDICAL CENTER | Age: 61
End: 2021-06-06

## 2021-06-06 ENCOUNTER — ANESTHESIA (OUTPATIENT)
Dept: ANESTHESIOLOGY | Facility: HOSPITAL | Age: 61
End: 2021-06-06

## 2021-06-07 ENCOUNTER — TELEPHONE (OUTPATIENT)
Dept: GASTROENTEROLOGY | Facility: CLINIC | Age: 61
End: 2021-06-07

## 2021-06-07 ENCOUNTER — ANESTHESIA (OUTPATIENT)
Dept: GASTROENTEROLOGY | Facility: MEDICAL CENTER | Age: 61
End: 2021-06-07
Payer: COMMERCIAL

## 2021-06-07 ENCOUNTER — HOSPITAL ENCOUNTER (OUTPATIENT)
Dept: GASTROENTEROLOGY | Facility: MEDICAL CENTER | Age: 61
Setting detail: OUTPATIENT SURGERY
Discharge: HOME | End: 2021-06-07
Admitting: INTERNAL MEDICINE
Payer: COMMERCIAL

## 2021-06-07 VITALS
RESPIRATION RATE: 20 BRPM | SYSTOLIC BLOOD PRESSURE: 148 MMHG | BODY MASS INDEX: 30.24 KG/M2 | TEMPERATURE: 97.8 F | WEIGHT: 216 LBS | HEIGHT: 71 IN | OXYGEN SATURATION: 98 % | DIASTOLIC BLOOD PRESSURE: 82 MMHG | HEART RATE: 50 BPM

## 2021-06-07 DIAGNOSIS — D36.9 TUBULAR ADENOMA: ICD-10-CM

## 2021-06-07 PROCEDURE — 45385 COLONOSCOPY W/LESION REMOVAL: CPT | Performed by: INTERNAL MEDICINE

## 2021-06-07 PROCEDURE — 88305 TISSUE EXAM BY PATHOLOGIST: CPT | Performed by: PATHOLOGY

## 2021-06-07 RX ORDER — PROPOFOL 10 MG/ML
INJECTION, EMULSION INTRAVENOUS AS NEEDED
Status: DISCONTINUED | OUTPATIENT
Start: 2021-06-07 | End: 2021-06-07

## 2021-06-07 RX ORDER — SODIUM CHLORIDE 9 MG/ML
125 INJECTION, SOLUTION INTRAVENOUS CONTINUOUS
Status: DISCONTINUED | OUTPATIENT
Start: 2021-06-07 | End: 2021-06-11 | Stop reason: HOSPADM

## 2021-06-07 RX ADMIN — PROPOFOL 20 MG: 10 INJECTION, EMULSION INTRAVENOUS at 07:59

## 2021-06-07 RX ADMIN — PROPOFOL 30 MG: 10 INJECTION, EMULSION INTRAVENOUS at 08:06

## 2021-06-07 RX ADMIN — PROPOFOL 20 MG: 10 INJECTION, EMULSION INTRAVENOUS at 08:14

## 2021-06-07 RX ADMIN — PROPOFOL 100 MG: 10 INJECTION, EMULSION INTRAVENOUS at 07:41

## 2021-06-07 RX ADMIN — PROPOFOL 30 MG: 10 INJECTION, EMULSION INTRAVENOUS at 07:56

## 2021-06-07 RX ADMIN — PROPOFOL 30 MG: 10 INJECTION, EMULSION INTRAVENOUS at 08:18

## 2021-06-07 RX ADMIN — PROPOFOL 50 MG: 10 INJECTION, EMULSION INTRAVENOUS at 08:10

## 2021-06-07 RX ADMIN — PROPOFOL 30 MG: 10 INJECTION, EMULSION INTRAVENOUS at 07:47

## 2021-06-07 RX ADMIN — PROPOFOL 10 MG: 10 INJECTION, EMULSION INTRAVENOUS at 07:44

## 2021-06-07 RX ADMIN — SODIUM CHLORIDE 125 ML/HR: 0.9 INJECTION, SOLUTION INTRAVENOUS at 07:05

## 2021-06-07 RX ADMIN — PROPOFOL 30 MG: 10 INJECTION, EMULSION INTRAVENOUS at 07:51

## 2021-06-07 RX ADMIN — PROPOFOL 20 MG: 10 INJECTION, EMULSION INTRAVENOUS at 08:02

## 2021-06-07 NOTE — H&P
History and Physical - [facility]  Alli Lares 59 y o  male MRN: [id number]                  HPI: Alli Lares is a 59y o  year old male who presents for colonoscopy for history of colon polyps  REVIEW OF SYSTEMS: Per the HPI, and otherwise unremarkable      Historical Information   Past Medical History:   Diagnosis Date    Adenocarcinoma of prostate ([facility] ) 3/27/2020    Gold's esophagus 1/4/2021    EGD 01/04/21 recommend to repeat in 3 y    Colon polyp     Coronary artery disease     Elevated PSA     Fever 11/24/2020    Gastroesophageal reflux disease without esophagitis 12/13/2019    Hypercholesterolemia     Hypertension     Inferior MI ([facility] ) 4/7/2016    LLQ pain 10/13/2020    LUQ pain 11/5/2019    Myocardial infarction ([facility] ) 2011    Sepsis due to Escherichia coli ([place] [address] ) 3/19/2020    UTI (urinary tract infection) 3/18/2020     Past Surgical History:   Procedure Laterality Date    COLONOSCOPY      CORONARY STENT PLACEMENT      bare metal stent in Rt coronary artery    SHOULDER SURGERY Left      Social History   Social History     Substance and Sexual Activity   Alcohol Use Yes    Frequency: Monthly or less    Drinks per session: 1 or 2    Binge frequency: Never    Comment: rarely      Social History     Substance and Sexual Activity   Drug Use Never     Social History     Tobacco Use   Smoking Status Former Smoker    Types: Cigarettes    Quit date: 2011    Years since quitting: 10 4   Smokeless Tobacco Former User     Family History   Problem Relation Age of Onset    Sleep apnea Mother     Heart attack Father        Meds/Allergies       Current Outpatient Medications:     aspirin 81 mg chewable tablet    atorvastatin (LIPITOR) 10 mg tablet    Evolocumab 140 MG/ML SOAJ    famotidine (PEPCID) 40 MG tablet    lisinopril (ZESTRIL) 2 5 mg tablet    metoprolol succinate (TOPROL-XL) 25 mg 24 hr tablet    pantoprazole (PROTONIX) 40 mg tablet    Current Facility-Administered Medications:     sodium chloride 0 9 % infusion, 125 mL/hr, Intravenous, Continuous, 125 mL/hr at 06/07/21 0705    No Known Allergies    Objective     /89   Pulse 59   Temp 97 8 °F (36 6 °C)   Resp 16   Ht 5' 10 5" (1 791 m)   Wt 98 kg (216 lb)   SpO2 99%   BMI 30 55 kg/m²       PHYSICAL EXAM    Gen: NAD  Head: NCAT  CV: RRR  CHEST: Clear  ABD: soft, NT/ND  EXT: no edema      ASSESSMENT/PLAN:  This is a 59y o  year old male here for colonoscopy, and he is stable and optimized for his procedure

## 2021-06-07 NOTE — TELEPHONE ENCOUNTER
He was having some chills, questionable low-grade temperature and nausea/vomiting after his procedure today  I reached out to him he states he is feeling much better but also took an aspirin  We discussed closely monitoring symptoms  No alarm symptoms at this time which would warrant immediate ER visit  I will check back with him again later today

## 2021-06-07 NOTE — ANESTHESIA PREPROCEDURE EVALUATION
Procedure:  COLONOSCOPY    Relevant Problems   ANESTHESIA   (-) History of anesthesia complications      CARDIO   (+) CAD in native artery   (+) Essential hypertension   (+) Hyperlipidemia      GI/HEPATIC   (+) Fatty liver   (+) Gastroesophageal reflux disease without esophagitis      /RENAL   (+) Adenocarcinoma of prostate (HCC)   (+) Benign prostatic hyperplasia without lower urinary tract symptoms   (+) Prostate nodule without urinary obstruction      MUSCULOSKELETAL   (+) Chronic left-sided low back pain without sciatica      NEURO/PSYCH   (+) History of acute inferior wall MI        Physical Exam    Airway    Mallampati score: I  TM Distance: >3 FB  Neck ROM: full     Dental       Cardiovascular  Rhythm: regular, Rate: normal,     Pulmonary  Breath sounds clear to auscultation,     Other Findings        Anesthesia Plan  ASA Score- 3     Anesthesia Type- IV sedation with anesthesia with ASA Monitors  Additional Monitors:   Airway Plan:           Plan Factors-Exercise tolerance (METS): >4 METS  Chart reviewed  Existing labs reviewed  Patient is not a current smoker  Patient did not smoke on day of surgery  Induction- intravenous  Postoperative Plan-     Informed Consent- Anesthetic plan and risks discussed with patient

## 2021-06-07 NOTE — ANESTHESIA PREPROCEDURE EVALUATION
Procedure:  PRE-OP ONLY    Relevant Problems   CARDIO   (+) CAD in native artery   (+) Essential hypertension   (+) Hyperlipidemia      GI/HEPATIC   (+) Fatty liver   (+) Gastroesophageal reflux disease without esophagitis      /RENAL   (+) Adenocarcinoma of prostate (HCC)   (+) Benign prostatic hyperplasia without lower urinary tract symptoms   (+) Prostate nodule without urinary obstruction      MUSCULOSKELETAL   (+) Chronic left-sided low back pain without sciatica      NEURO/PSYCH   (+) History of acute inferior wall MI             Anesthesia Plan  ASA Score- 3     Anesthesia Type- IV sedation with anesthesia with ASA Monitors  Additional Monitors:   Airway Plan:           Plan Factors-    Induction- intravenous  Postoperative Plan-     Informed Consent- Anesthetic plan and risks discussed with patient

## 2021-06-07 NOTE — ANESTHESIA POSTPROCEDURE EVALUATION
Post-Op Assessment Note    CV Status:  Stable    Pain management: adequate     Mental Status:  Alert and awake   Hydration Status:  Stable   PONV Controlled:  Controlled   Airway Patency:  Patent      Post Op Vitals Reviewed: Yes      Staff: Anesthesiologist         No complications documented      BP      Temp      Pulse    Resp      SpO2

## 2021-06-07 NOTE — DISCHARGE INSTRUCTIONS
Colonoscopy   WHAT YOU NEED TO KNOW:   A colonoscopy is a procedure to examine the inside of your colon (intestine) with a scope  Polyps or tissue growths may have been removed during your colonoscopy  It is normal to feel bloated and to have some abdominal discomfort  You should be passing gas  If you have hemorrhoids or you had polyps removed, you may have a small amount of bleeding  DISCHARGE INSTRUCTIONS:   Seek care immediately if:    You have sudden, severe abdominal pain   You have problems swallowing   You have a large amount of black, sticky bowel movements or blood in your bowel movements   You have sudden trouble breathing   You feel weak, lightheaded, or faint or your heart beats faster than normal for you  Contact your healthcare provider if:    You have a fever and chills   You have nausea or are vomiting   Your abdomen is bloated or feels full and hard   You have abdominal pain   You have black, sticky bowel movements or blood in your bowel movements   You have not had a bowel movement for 3 days after your procedure   You have rash or hives   You have questions or concerns about your procedure  Activity:    Do not lift, strain, or run for 24 hours after your procedure   Rest after your procedure  You have been given medicine to relax you  Do not drive or make important decisions until the day after your procedure  Return to your normal activity as directed   Relieve gas and discomfort from bloating by lying on your right side with a heating pad on your abdomen  You may need to take short walks to help the gas move out  Eat small meals until bloating is relieved  Follow up with your healthcare provider as directed: Write down your questions so you remember to ask them during your visits  If you take a blood thinner, please review the specific instructions from your endoscopist about when you should resume it   These can be found in the Recommendation and Your Medication list sections of this After Visit Summary  Colorectal Polyps   WHAT YOU NEED TO KNOW:   Colorectal polyps are small growths of tissue in the lining of the colon and rectum  Most polyps are hyperplastic polyps and are usually benign (noncancerous)  Certain types of polyps, called adenomatous polyps, may turn into cancer  DISCHARGE INSTRUCTIONS:   Follow up with your healthcare provider or gastroenterologist as directed: You may need to return for more tests, such as another colonoscopy  Write down your questions so you remember to ask them during your visits  Reduce your risk for colorectal polyps:   · Eat a variety of healthy foods:  Healthy foods include fruit, vegetables, whole-grain breads, low-fat dairy products, beans, lean meat, and fish  Ask if you need to be on a special diet  · Maintain a healthy weight:  Ask your healthcare provider if you need to lose weight and how much you need to lose  Ask for help with a weight loss program     · Exercise:  Begin to exercise slowly and do more as you get stronger  Talk with your healthcare provider before you start an exercise program      · Limit alcohol:  Your risk for polyps increases the more you drink  · Do not smoke: If you smoke, it is never too late to quit  Ask for information about how to stop  For support and more information:   · Addy Israel ([facility]) [address], [place] [address]  Phone: [phone]  Web Address: www digestive  niddk nih gov    Contact your healthcare provider or gastroenterologist if:   · You have a fever  · You have chills, a cough, or feel weak and achy  · You have abdominal pain that does not go away or gets worse after you take medicine  · Your abdomen is swollen  · You are losing weight without trying  · You have questions or concerns about your condition or care      Seek care immediately or call 911 if:   · You have sudden shortness of breath  · You have a fast heart rate, fast breathing, or are too dizzy to stand up  · You have severe abdominal pain  · You see blood in your bowel movement  © Copyright [address] Information is for End User's use only and may not be sold, redistributed or otherwise used for commercial purposes  All illustrations and images included in CareNotes® are the copyrighted property of [employer]  or [facility] Milagro Sloan   The above information is an  only  It is not intended as medical advice for individual conditions or treatments  Talk to your doctor, nurse or pharmacist before following any medical regimen to see if it is safe and effective for you

## 2021-06-10 DIAGNOSIS — K21.9 GASTROESOPHAGEAL REFLUX DISEASE WITHOUT ESOPHAGITIS: ICD-10-CM

## 2021-06-15 RX ORDER — PANTOPRAZOLE SODIUM 40 MG/1
TABLET, DELAYED RELEASE ORAL
Qty: 90 TABLET | Refills: 0 | Status: SHIPPED | OUTPATIENT
Start: 2021-06-15 | End: 2021-09-20

## 2021-07-06 ENCOUNTER — TELEPHONE (OUTPATIENT)
Dept: CARDIOLOGY CLINIC | Facility: CLINIC | Age: 61
End: 2021-07-06

## 2021-07-11 DIAGNOSIS — K22.70 BARRETT'S ESOPHAGUS WITHOUT DYSPLASIA: ICD-10-CM

## 2021-07-12 RX ORDER — FAMOTIDINE 40 MG/1
TABLET, FILM COATED ORAL
Qty: 30 TABLET | Refills: 5 | Status: SHIPPED | OUTPATIENT
Start: 2021-07-12 | End: 2022-01-13

## 2021-07-30 DIAGNOSIS — E78.00 PURE HYPERCHOLESTEROLEMIA: ICD-10-CM

## 2021-07-30 RX ORDER — ATORVASTATIN CALCIUM 10 MG/1
10 TABLET, FILM COATED ORAL DAILY
Qty: 90 TABLET | Refills: 3 | Status: SHIPPED | OUTPATIENT
Start: 2021-07-30 | End: 2021-12-06 | Stop reason: SDUPTHER

## 2021-08-11 ENCOUNTER — OFFICE VISIT (OUTPATIENT)
Dept: FAMILY MEDICINE CLINIC | Facility: CLINIC | Age: 61
End: 2021-08-11
Payer: COMMERCIAL

## 2021-08-11 VITALS
WEIGHT: 213 LBS | OXYGEN SATURATION: 97 % | HEART RATE: 61 BPM | SYSTOLIC BLOOD PRESSURE: 120 MMHG | TEMPERATURE: 98.2 F | BODY MASS INDEX: 31.55 KG/M2 | DIASTOLIC BLOOD PRESSURE: 80 MMHG | HEIGHT: 69 IN

## 2021-08-11 DIAGNOSIS — R10.9 FLANK PAIN: ICD-10-CM

## 2021-08-11 DIAGNOSIS — R14.0 BLOATING: Primary | ICD-10-CM

## 2021-08-11 LAB
BACTERIA UR QL AUTO: NORMAL /HPF
BILIRUB UR QL STRIP: ABNORMAL
CLARITY UR: CLEAR
COLOR UR: ABNORMAL
GLUCOSE UR STRIP-MCNC: NEGATIVE MG/DL
HGB UR QL STRIP.AUTO: NEGATIVE
HYALINE CASTS #/AREA URNS LPF: NORMAL /LPF
KETONES UR STRIP-MCNC: NEGATIVE MG/DL
LEUKOCYTE ESTERASE UR QL STRIP: NEGATIVE
NITRITE UR QL STRIP: NEGATIVE
NON-SQ EPI CELLS URNS QL MICRO: NORMAL /HPF
PH UR STRIP.AUTO: 6 [PH]
PROT UR STRIP-MCNC: ABNORMAL MG/DL
RBC #/AREA URNS AUTO: NORMAL /HPF
SL AMB  POCT GLUCOSE, UA: ABNORMAL
SL AMB LEUKOCYTE ESTERASE,UA: ABNORMAL
SL AMB POCT BILIRUBIN,UA: ABNORMAL
SL AMB POCT BLOOD,UA: ABNORMAL
SL AMB POCT CLARITY,UA: CLEAR
SL AMB POCT COLOR,UA: ABNORMAL
SL AMB POCT KETONES,UA: ABNORMAL
SL AMB POCT NITRITE,UA: ABNORMAL
SL AMB POCT PH,UA: 6
SL AMB POCT SPECIFIC GRAVITY,UA: 1.02
SL AMB POCT URINE PROTEIN: ABNORMAL
SL AMB POCT UROBILINOGEN: 0.2
SP GR UR STRIP.AUTO: 1.03 (ref 1–1.03)
UROBILINOGEN UR QL STRIP.AUTO: 0.2 E.U./DL
WBC #/AREA URNS AUTO: NORMAL /HPF

## 2021-08-11 PROCEDURE — 81001 URINALYSIS AUTO W/SCOPE: CPT | Performed by: FAMILY MEDICINE

## 2021-08-11 PROCEDURE — 87086 URINE CULTURE/COLONY COUNT: CPT | Performed by: FAMILY MEDICINE

## 2021-08-11 PROCEDURE — 81002 URINALYSIS NONAUTO W/O SCOPE: CPT | Performed by: FAMILY MEDICINE

## 2021-08-11 PROCEDURE — 3725F SCREEN DEPRESSION PERFORMED: CPT | Performed by: FAMILY MEDICINE

## 2021-08-11 PROCEDURE — 99214 OFFICE O/P EST MOD 30 MIN: CPT | Performed by: FAMILY MEDICINE

## 2021-08-11 RX ORDER — SUCRALFATE ORAL 1 G/10ML
1 SUSPENSION ORAL 4 TIMES DAILY
Qty: 420 ML | Refills: 0 | Status: SHIPPED | OUTPATIENT
Start: 2021-08-11 | End: 2022-03-08 | Stop reason: ALTCHOICE

## 2021-08-12 LAB — BACTERIA UR CULT: NORMAL

## 2021-08-15 PROBLEM — R14.0 BLOATING: Status: ACTIVE | Noted: 2021-08-15

## 2021-08-15 PROBLEM — R10.9 FLANK PAIN: Status: ACTIVE | Noted: 2021-08-15

## 2021-08-15 PROBLEM — R14.0 BLOATING: Status: ACTIVE | Noted: 2021-08-11

## 2021-08-15 PROBLEM — R10.9 FLANK PAIN: Status: ACTIVE | Noted: 2021-08-11

## 2021-08-15 NOTE — ASSESSMENT & PLAN NOTE
Left flank pain and LLQ pain ,no fever no weight change ,dariniew his record Colonoscopy 01/21 seven polyp ,Ctscan of abdomen ad pelvic on 10/20 also review ,urine dip in office is negative will send out for UA and culture   Keep well hydration Tylenol for pain if persistent to call

## 2021-08-15 NOTE — PROGRESS NOTES
Subjective:   Chief Complaint   Patient presents with    Abdominal Pain     L side        Patient ID: Catina Cavanaugh is a 59 y o  male  Abdominal Pain  This is a new problem  The current episode started in the past 7 days  The problem occurs intermittently  The problem has been unchanged  The pain is located in the left flank  The pain is at a severity of 4/10  The quality of the pain is dull  The abdominal pain radiates to the LLQ  Associated symptoms include nausea  Pertinent negatives include no anorexia, belching, constipation, diarrhea, dysuria, fever, flatus, frequency, headaches, hematochezia, hematuria, melena, myalgias or weight loss  Nothing aggravates the pain  He has tried acetaminophen for the symptoms  His past medical history is significant for GERD  There is no history of abdominal surgery, colon cancer, pancreatitis or ulcerative colitis  The following portions of the patient's history were reviewed and updated as appropriate: allergies, current medications, past family history, past medical history, past social history, past surgical history and problem list     Review of Systems   Constitutional: Negative for activity change, appetite change, fatigue, fever and weight loss  HENT: Negative for congestion, ear pain, sinus pressure, sinus pain and sore throat  Eyes: Negative for pain, discharge, redness and itching  Respiratory: Negative for cough, chest tightness, shortness of breath and stridor  Cardiovascular: Negative for chest pain, palpitations and leg swelling  Gastrointestinal: Positive for abdominal pain and nausea  Negative for anorexia, blood in stool, constipation, diarrhea, flatus, hematochezia and melena  Bloating   Genitourinary: Negative for dysuria, flank pain, frequency and hematuria  Musculoskeletal: Negative for back pain, joint swelling, myalgias and neck pain  Skin: Negative for pallor and rash     Neurological: Negative for dizziness, tremors, weakness, numbness and headaches  Hematological: Does not bruise/bleed easily  Objective:  Vitals:    08/11/21 1317   BP: 120/80   Pulse: 61   Temp: 98 2 °F (36 8 °C)   TempSrc: Tympanic   SpO2: 97%   Weight: 96 6 kg (213 lb)   Height: 5' 9" (1 753 m)      Physical Exam  Vitals and nursing note reviewed  Constitutional:       General: He is not in acute distress  Appearance: Normal appearance  He is well-developed  He is not diaphoretic  HENT:      Head: Normocephalic  Right Ear: Tympanic membrane, ear canal and external ear normal       Left Ear: Tympanic membrane, ear canal and external ear normal       Nose: Nose normal  No congestion or rhinorrhea  Mouth/Throat:      Mouth: Mucous membranes are moist       Pharynx: Oropharynx is clear  No oropharyngeal exudate or posterior oropharyngeal erythema  Eyes:      General:         Right eye: No discharge  Left eye: No discharge  Conjunctiva/sclera: Conjunctivae normal    Neck:      Vascular: No JVD  Cardiovascular:      Rate and Rhythm: Normal rate and regular rhythm  Heart sounds: Normal heart sounds  No murmur heard  No gallop  Pulmonary:      Effort: Pulmonary effort is normal  No respiratory distress  Breath sounds: Normal breath sounds  No stridor  No wheezing or rales  Chest:      Chest wall: No tenderness  Abdominal:      General: There is no distension  Palpations: Abdomen is soft  There is no mass  Tenderness: There is no abdominal tenderness  There is no right CVA tenderness, left CVA tenderness or rebound  Negative signs include psoas sign and obturator sign  Musculoskeletal:         General: No tenderness  Normal range of motion  Cervical back: Normal range of motion and neck supple  Lymphadenopathy:      Cervical: No cervical adenopathy  Skin:     General: Skin is warm  Findings: No erythema or rash     Neurological:      Mental Status: He is alert and oriented to person, place, and time  Sensory: No sensory deficit  Gait: Gait normal    Psychiatric:         Mood and Affect: Mood normal          Behavior: Behavior normal            Assessment/Plan:    Flank pain  Left flank pain and LLQ pain ,no fever no weight change ,eldonw his record Colonoscopy 01/21 seven polyp ,Ctscan of abdomen ad pelvic on 10/20 also review ,urine dip in office is negative will send out for UA and culture   Keep well hydration Tylenol for pain if persistent to call    Bloating   Asymptomatic patient will continue with the pantoprazole 40 milligram will add Carafate the a to take 10 mL 4 times a day proper use discussed with the patient avoid provoke food do not eat and lie down       Diagnoses and all orders for this visit:    Bloating  -     sucralfate (CARAFATE) 1 g/10 mL suspension;  Take 10 mL (1 g total) by mouth 4 (four) times a day  -     UA (URINE) with reflex to Scope  -     Urine culture  -     Urine Microscopic    Flank pain  -     POCT urine dip  -     UA (URINE) with reflex to Scope  -     Urine culture  -     Urine Microscopic

## 2021-08-15 NOTE — ASSESSMENT & PLAN NOTE
Asymptomatic patient will continue with the pantoprazole 40 milligram will add Carafate the a to take 10 mL 4 times a day proper use discussed with the patient avoid provoke food do not eat and lie down

## 2021-08-18 ENCOUNTER — RA CDI HCC (OUTPATIENT)
Dept: OTHER | Facility: HOSPITAL | Age: 61
End: 2021-08-18

## 2021-08-18 NOTE — PROGRESS NOTES
[facility]  coding opportunities       Chart reviewed, no opportunity found: CHART REVIEWED, NO OPPORTUNITY FOUND                        Patients insurance company: [employer] (Medicare Advantage and Commercial)

## 2021-08-25 ENCOUNTER — OFFICE VISIT (OUTPATIENT)
Dept: FAMILY MEDICINE CLINIC | Facility: CLINIC | Age: 61
End: 2021-08-25
Payer: COMMERCIAL

## 2021-08-25 VITALS
SYSTOLIC BLOOD PRESSURE: 112 MMHG | BODY MASS INDEX: 31.84 KG/M2 | HEART RATE: 62 BPM | RESPIRATION RATE: 16 BRPM | TEMPERATURE: 97.4 F | OXYGEN SATURATION: 95 % | WEIGHT: 215 LBS | DIASTOLIC BLOOD PRESSURE: 72 MMHG | HEIGHT: 69 IN

## 2021-08-25 DIAGNOSIS — G89.29 CHRONIC LEFT-SIDED LOW BACK PAIN WITHOUT SCIATICA: ICD-10-CM

## 2021-08-25 DIAGNOSIS — M54.50 CHRONIC LEFT-SIDED LOW BACK PAIN WITHOUT SCIATICA: ICD-10-CM

## 2021-08-25 DIAGNOSIS — R60.9 SWELLING: Primary | ICD-10-CM

## 2021-08-25 PROCEDURE — 99214 OFFICE O/P EST MOD 30 MIN: CPT | Performed by: FAMILY MEDICINE

## 2021-08-25 PROCEDURE — 3008F BODY MASS INDEX DOCD: CPT | Performed by: FAMILY MEDICINE

## 2021-08-25 PROCEDURE — 1036F TOBACCO NON-USER: CPT | Performed by: FAMILY MEDICINE

## 2021-08-25 RX ORDER — METHOCARBAMOL 500 MG/1
500 TABLET, FILM COATED ORAL 2 TIMES DAILY
Qty: 20 TABLET | Refills: 0 | Status: SHIPPED | OUTPATIENT
Start: 2021-08-25 | End: 2021-08-25 | Stop reason: SDUPTHER

## 2021-08-25 RX ORDER — METHOCARBAMOL 500 MG/1
500 TABLET, FILM COATED ORAL 2 TIMES DAILY
Qty: 20 TABLET | Refills: 0 | Status: SHIPPED | OUTPATIENT
Start: 2021-08-25 | End: 2022-03-08 | Stop reason: ALTCHOICE

## 2021-08-26 ENCOUNTER — HOSPITAL ENCOUNTER (OUTPATIENT)
Dept: ULTRASOUND IMAGING | Facility: HOSPITAL | Age: 61
Discharge: HOME | End: 2021-08-26
Payer: COMMERCIAL

## 2021-08-26 DIAGNOSIS — R22.2 LUMP OF SKIN OF BACK: ICD-10-CM

## 2021-08-26 PROBLEM — R60.9 SWELLING: Status: ACTIVE | Noted: 2021-08-26

## 2021-08-26 PROBLEM — R60.9 SWELLING: Status: ACTIVE | Noted: 2021-08-25

## 2021-08-26 PROCEDURE — 76705 ECHO EXAM OF ABDOMEN: CPT

## 2021-08-26 NOTE — ASSESSMENT & PLAN NOTE
Swelling in the mid upper back and and he notice getting bigger recently and no redness the a no fever plan for ultrasound to rule out lipoma

## 2021-08-26 NOTE — PROGRESS NOTES
Subjective:   Chief Complaint   Patient presents with    Follow-up     chronic conditions        Patient ID: Elma Read is a 59 y o  male  Patient who known to have history of chronic back pain came concerned about the low back pain on the left side he graded as 6/10 describe it as achy worse when he twists or lie down localized in the area no radiation no numbness no muscle weakness no rash no fall no trauma no drop on the foot no weight change no fever also swelling in the upper back midline notice getting bigger recently no change in the color of the skin no itchy no rash and the no fever no weight change      The following portions of the patient's history were reviewed and updated as appropriate: allergies, current medications, past family history, past medical history, past social history, past surgical history and problem list     Review of Systems   Constitutional: Negative for activity change, appetite change, fatigue and fever  HENT: Negative for congestion, ear pain, sinus pressure, sinus pain and sore throat  Eyes: Negative for pain, discharge, redness and itching  Respiratory: Negative for cough, chest tightness, shortness of breath and stridor  Cardiovascular: Negative for chest pain, palpitations and leg swelling  Gastrointestinal: Negative for abdominal pain, blood in stool, constipation, diarrhea and nausea  Genitourinary: Negative for dysuria, flank pain, frequency and hematuria  Musculoskeletal: Positive for back pain  Negative for gait problem, joint swelling and neck pain  Skin: Negative for pallor and rash  Neurological: Negative for dizziness, tremors, weakness, numbness and headaches  Hematological: Does not bruise/bleed easily               Objective:  Vitals:    08/25/21 1314   BP: 112/72   BP Location: Left arm   Patient Position: Sitting   Cuff Size: Adult   Pulse: 62   Resp: 16   Temp: (!) 97 4 °F (36 3 °C)   TempSrc: Tympanic   SpO2: 95%   Weight: 97 5 kg (215 lb)   Height: 5' 9" (1 753 m)      Physical Exam  Vitals and nursing note reviewed  Constitutional:       General: He is not in acute distress  Appearance: Normal appearance  He is well-developed  He is not diaphoretic  HENT:      Head: Normocephalic  Right Ear: Tympanic membrane, ear canal and external ear normal       Left Ear: Tympanic membrane, ear canal and external ear normal       Nose: Nose normal  No congestion or rhinorrhea  Mouth/Throat:      Mouth: Mucous membranes are moist       Pharynx: Oropharynx is clear  No oropharyngeal exudate or posterior oropharyngeal erythema  Eyes:      General:         Right eye: No discharge  Left eye: No discharge  Conjunctiva/sclera: Conjunctivae normal    Neck:      Vascular: No JVD  Cardiovascular:      Rate and Rhythm: Normal rate and regular rhythm  Heart sounds: Normal heart sounds  No murmur heard  No gallop  Pulmonary:      Effort: Pulmonary effort is normal  No respiratory distress  Breath sounds: Normal breath sounds  No stridor  No wheezing or rales  Chest:      Chest wall: No tenderness  Abdominal:      General: There is no distension  Palpations: Abdomen is soft  There is no mass  Tenderness: There is no abdominal tenderness  There is no rebound  Musculoskeletal:      Cervical back: Normal range of motion and neck supple  Lumbar back: Tenderness present  No bony tenderness  Normal range of motion  Positive left straight leg raise test  No scoliosis  Lymphadenopathy:      Cervical: No cervical adenopathy  Skin:     General: Skin is warm  Findings: No erythema or rash  Neurological:      Mental Status: He is alert and oriented to person, place, and time  Sensory: No sensory deficit        Gait: Gait normal    Psychiatric:         Mood and Affect: Mood normal          Behavior: Behavior normal            Assessment/Plan:    Chronic left-sided low back pain without sciatica   Acute on chronic symptomatic secondary to muscle spasm start him on Robaxin 500 mg twice a day proper use and possible side effect discussed the patient and if there is no improvement to call the office proper postural important lose weight review with the patient    Swelling   Swelling in the mid upper back and and he notice getting bigger recently and no redness the a no fever plan for ultrasound to rule out lipoma       Diagnoses and all orders for this visit:    Swelling  -     Cancel: US extremity soft tissue; Future    Chronic left-sided low back pain without sciatica  -     Discontinue: methocarbamol (ROBAXIN) 500 mg tablet;  Take 1 tablet (500 mg total) by mouth 2 (two) times a day

## 2021-08-26 NOTE — ASSESSMENT & PLAN NOTE
Acute on chronic symptomatic secondary to muscle spasm start him on Robaxin 500 mg twice a day proper use and possible side effect discussed the patient and if there is no improvement to call the office proper postural important lose weight review with the patient

## 2021-09-03 ENCOUNTER — TELEPHONE (OUTPATIENT)
Dept: FAMILY MEDICINE CLINIC | Facility: CLINIC | Age: 61
End: 2021-09-03

## 2021-09-14 ENCOUNTER — CONSULT (OUTPATIENT)
Dept: SURGERY | Facility: CLINIC | Age: 61
End: 2021-09-14
Payer: COMMERCIAL

## 2021-09-14 VITALS
TEMPERATURE: 97 F | SYSTOLIC BLOOD PRESSURE: 128 MMHG | WEIGHT: 215 LBS | HEART RATE: 58 BPM | DIASTOLIC BLOOD PRESSURE: 84 MMHG | BODY MASS INDEX: 31.84 KG/M2 | HEIGHT: 69 IN

## 2021-09-14 DIAGNOSIS — D17.1 LIPOMA OF BACK: Primary | ICD-10-CM

## 2021-09-14 PROCEDURE — 1036F TOBACCO NON-USER: CPT | Performed by: SURGERY

## 2021-09-14 PROCEDURE — 99203 OFFICE O/P NEW LOW 30 MIN: CPT | Performed by: SURGERY

## 2021-09-14 PROCEDURE — 3008F BODY MASS INDEX DOCD: CPT | Performed by: SURGERY

## 2021-09-14 NOTE — PROGRESS NOTES
Assessment/Plan:   he has likely lipoma of his right upper mid back though he seems to be asymptomatic from this  He is not interested in surgery at this time  Discuss that if it were to increase in size or cause him any discomfort this can certainly be excised he will return as needed  No problem-specific Assessment & Plan notes found for this encounter  Diagnoses and all orders for this visit:    Lipoma of back          Subjective:      Patient ID: Nohemy Reyes is a 59 y o  male  He presents with swelling of his midback  He says his wife found it be he does not really notice anything nor does it cause him much pain or discomfort  The wound was primary care doctor who ordered an ultrasound that showed a 4 6 x 3 9 x 1 1 cm well-circumscribed mass in the right mid upper back consistent with a lipoma  He does not know if it is increasing in size as he does not really notice its presence  He does have a history of low back pain was recently started on a muscle relaxant  His back pain does not stem from the mass in his opinion  A chart review was performed and previous primary care visit notes were reviewed  All applicable imaging studies were reviewed and images were reviewed personally  All applicable laboratory studies were reviewed personally  Care everywhere review was performed if  available and all pertinent notes were reviewed      The following portions of the patient's history were reviewed and updated as appropriate:   He  has a past medical history of Adenocarcinoma of prostate ([address] ) (3/27/2020), Gold's esophagus (1/4/2021), Colon polyp, Coronary artery disease, Elevated PSA, Fever (11/24/2020), Gastroesophageal reflux disease without esophagitis (12/13/2019), Hypercholesterolemia, Hypertension, Inferior MI ([address] ) (4/7/2016), LLQ pain (10/13/2020), LUQ pain (11/5/2019), Myocardial infarction ([address] ) (2011), Sepsis due to Escherichia coli ([address] ) (3/19/2020), and UTI (urinary tract infection) (3/18/2020)  He   Patient Active Problem List    Diagnosis Date Noted    Swelling 08/25/2021    Bloating 08/11/2021    Flank pain 08/11/2021    Abnormal WBC count 05/06/2021    Gold's esophagus 01/04/2021    COVID-19 virus infection 11/26/2020    Colon polyp 07/24/2020    Myalgia with higher doses statins 06/04/2020    Encounter for well adult exam with abnormal findings 04/24/2020    Adenocarcinoma of [facility] ([facility] ) 03/27/2020    Bacteremia due to Escherichia coli 03/19/2020    UTI (urinary tract infection) 03/18/2020    Prostate nodule without urinary obstruction 02/20/2020    Gastroesophageal reflux disease without esophagitis 12/13/2019    Abnormal prostate specific antigen (PSA) 12/13/2019    BMI 32 0-32 9,adult 11/07/2019    Benign prostatic hyperplasia without lower urinary tract symptoms 11/07/2019    Chronic left-sided low back pain without sciatica 11/05/2019    Fatty liver 11/05/2019    Contusion of left shoulder 04/10/2018    CAD in native artery 03/19/2018    History of acute inferior wall MI 03/19/2018    Presence of bare metal stent in right coronary artery 03/19/2018    Essential hypertension 03/19/2018    Hyperlipidemia 03/19/2018    Bursitis of shoulder 12/12/2017    Injury of tendon of rotator cuff 11/28/2017     He  has a past surgical history that includes Coronary stent placement; Shoulder surgery (Left); and Colonoscopy  His family history includes Heart attack in his father; Sleep apnea in his mother  He  reports that he quit smoking about 10 years ago  His smoking use included cigarettes  He has quit using smokeless tobacco  He reports current alcohol use  He reports that he does not use drugs    Current Outpatient Medications   Medication Sig Dispense Refill    aspirin 81 mg chewable tablet Chew 1 tablet daily      atorvastatin (LIPITOR) 10 mg tablet Take 1 tablet (10 mg total) by mouth daily 90 tablet 3    Evolocumab 140 MG/ML SOAJ Inject 1 mL (140 mg total) under the skin every 14 (fourteen) days 6 mL 3    famotidine (PEPCID) 40 MG tablet TAKE 1 TABLET BY MOUTH EVERY DAY 30 tablet 5    lisinopril (ZESTRIL) 2 5 mg tablet Take 1 tablet (2 5 mg total) by mouth daily 90 tablet 3    methocarbamol (ROBAXIN) 500 mg tablet Take 1 tablet (500 mg total) by mouth 2 (two) times a day 20 tablet 0    metoprolol succinate (TOPROL-XL) 25 mg 24 hr tablet Take 0 5 tablets (12 5 mg total) by mouth daily 45 tablet 1    pantoprazole (PROTONIX) 40 mg tablet TAKE 1 TABLET BY MOUTH EVERY DAY BEFORE BREAKFAST 90 tablet 0    sucralfate (CARAFATE) 1 g/10 mL suspension Take 10 mL (1 g total) by mouth 4 (four) times a day 420 mL 0     No current facility-administered medications for this visit  He has No Known Allergies       Review of Systems   Musculoskeletal: Positive for back pain  All other systems reviewed and are negative  Objective: There were no vitals taken for this visit  Physical Exam  Vitals reviewed  Constitutional:       General: He is not in acute distress  Appearance: Normal appearance  He is obese  HENT:      Head: Normocephalic and atraumatic  Eyes:      Extraocular Movements: Extraocular movements intact  Conjunctiva/sclera: Conjunctivae normal       Pupils: Pupils are equal, round, and reactive to light  Cardiovascular:      Rate and Rhythm: Normal rate and regular rhythm  Pulmonary:      Effort: Pulmonary effort is normal  No respiratory distress  Breath sounds: Normal breath sounds  Abdominal:      General: Abdomen is flat  There is no distension  Palpations: Abdomen is soft  Tenderness: There is no abdominal tenderness  Musculoskeletal:         General: No swelling or tenderness  Normal range of motion  Cervical back: Normal range of motion and neck supple  Back:    Skin:     General: Skin is warm and dry  Neurological:      General: No focal deficit present        Mental Status: He is alert and oriented to person, place, and time

## 2021-09-18 DIAGNOSIS — K21.9 GASTROESOPHAGEAL REFLUX DISEASE WITHOUT ESOPHAGITIS: ICD-10-CM

## 2021-09-20 RX ORDER — PANTOPRAZOLE SODIUM 40 MG/1
TABLET, DELAYED RELEASE ORAL
Qty: 30 TABLET | Refills: 2 | Status: SHIPPED | OUTPATIENT
Start: 2021-09-20 | End: 2021-12-16

## 2021-11-08 ENCOUNTER — OFFICE VISIT (OUTPATIENT)
Dept: FAMILY MEDICINE CLINIC | Facility: CLINIC | Age: 61
End: 2021-11-08
Payer: COMMERCIAL

## 2021-11-08 VITALS
DIASTOLIC BLOOD PRESSURE: 80 MMHG | SYSTOLIC BLOOD PRESSURE: 132 MMHG | RESPIRATION RATE: 16 BRPM | HEART RATE: 62 BPM | TEMPERATURE: 97.2 F | HEIGHT: 69 IN | BODY MASS INDEX: 32.44 KG/M2 | OXYGEN SATURATION: 98 % | WEIGHT: 219 LBS

## 2021-11-08 DIAGNOSIS — Z23 NEED FOR INFLUENZA VACCINATION: ICD-10-CM

## 2021-11-08 DIAGNOSIS — Z23 NEED FOR SHINGLES VACCINE: ICD-10-CM

## 2021-11-08 DIAGNOSIS — K21.9 GASTROESOPHAGEAL REFLUX DISEASE WITHOUT ESOPHAGITIS: ICD-10-CM

## 2021-11-08 DIAGNOSIS — E78.2 MIXED HYPERLIPIDEMIA: ICD-10-CM

## 2021-11-08 DIAGNOSIS — I10 ESSENTIAL HYPERTENSION: Primary | ICD-10-CM

## 2021-11-08 DIAGNOSIS — C61 ADENOCARCINOMA OF PROSTATE (HCC): ICD-10-CM

## 2021-11-08 PROCEDURE — 90682 RIV4 VACC RECOMBINANT DNA IM: CPT

## 2021-11-08 PROCEDURE — 99214 OFFICE O/P EST MOD 30 MIN: CPT | Performed by: FAMILY MEDICINE

## 2021-11-08 PROCEDURE — 90472 IMMUNIZATION ADMIN EACH ADD: CPT

## 2021-11-08 PROCEDURE — 90750 HZV VACC RECOMBINANT IM: CPT

## 2021-11-08 PROCEDURE — 3725F SCREEN DEPRESSION PERFORMED: CPT | Performed by: FAMILY MEDICINE

## 2021-11-08 PROCEDURE — 1036F TOBACCO NON-USER: CPT | Performed by: FAMILY MEDICINE

## 2021-11-08 PROCEDURE — 90471 IMMUNIZATION ADMIN: CPT

## 2021-11-08 PROCEDURE — 3008F BODY MASS INDEX DOCD: CPT | Performed by: FAMILY MEDICINE

## 2021-11-10 PROBLEM — D72.9 ABNORMAL WBC COUNT: Status: RESOLVED | Noted: 2021-05-06 | Resolved: 2021-11-10

## 2021-11-10 PROBLEM — R60.9 SWELLING: Status: RESOLVED | Noted: 2021-08-25 | Resolved: 2021-11-10

## 2021-12-06 DIAGNOSIS — I10 ESSENTIAL HYPERTENSION: ICD-10-CM

## 2021-12-06 DIAGNOSIS — E78.00 PURE HYPERCHOLESTEROLEMIA: ICD-10-CM

## 2021-12-06 RX ORDER — METOPROLOL SUCCINATE 25 MG/1
12.5 TABLET, EXTENDED RELEASE ORAL DAILY
Qty: 45 TABLET | Refills: 1 | Status: SHIPPED | OUTPATIENT
Start: 2021-12-06 | End: 2021-12-22 | Stop reason: SDUPTHER

## 2021-12-06 RX ORDER — ATORVASTATIN CALCIUM 10 MG/1
10 TABLET, FILM COATED ORAL DAILY
Qty: 90 TABLET | Refills: 3 | Status: SHIPPED | OUTPATIENT
Start: 2021-12-06 | End: 2022-03-08 | Stop reason: SDUPTHER

## 2021-12-16 DIAGNOSIS — K21.9 GASTROESOPHAGEAL REFLUX DISEASE WITHOUT ESOPHAGITIS: ICD-10-CM

## 2021-12-16 RX ORDER — PANTOPRAZOLE SODIUM 40 MG/1
TABLET, DELAYED RELEASE ORAL
Qty: 30 TABLET | Refills: 2 | Status: SHIPPED | OUTPATIENT
Start: 2021-12-16 | End: 2022-02-14 | Stop reason: SDUPTHER

## 2021-12-17 ENCOUNTER — APPOINTMENT (OUTPATIENT)
Dept: LAB | Facility: MEDICAL CENTER | Age: 61
End: 2021-12-17
Payer: COMMERCIAL

## 2021-12-17 DIAGNOSIS — I10 ESSENTIAL HYPERTENSION: ICD-10-CM

## 2021-12-17 DIAGNOSIS — E78.2 MIXED HYPERLIPIDEMIA: ICD-10-CM

## 2021-12-17 LAB
ALBUMIN SERPL BCP-MCNC: 3.9 G/DL (ref 3.5–5)
ALP SERPL-CCNC: 87 U/L (ref 46–116)
ALT SERPL W P-5'-P-CCNC: 35 U/L (ref 12–78)
ANION GAP SERPL CALCULATED.3IONS-SCNC: 6 MMOL/L (ref 4–13)
AST SERPL W P-5'-P-CCNC: 17 U/L (ref 5–45)
BASOPHILS # BLD AUTO: 0.1 THOUSANDS/ΜL (ref 0–0.1)
BASOPHILS NFR BLD AUTO: 1 % (ref 0–1)
BILIRUB SERPL-MCNC: 0.56 MG/DL (ref 0.2–1)
BUN SERPL-MCNC: 18 MG/DL (ref 5–25)
CALCIUM SERPL-MCNC: 9.2 MG/DL (ref 8.3–10.1)
CHLORIDE SERPL-SCNC: 112 MMOL/L (ref 100–108)
CHOLEST SERPL-MCNC: 81 MG/DL
CO2 SERPL-SCNC: 25 MMOL/L (ref 21–32)
CREAT SERPL-MCNC: 1.15 MG/DL (ref 0.6–1.3)
EOSINOPHIL # BLD AUTO: 0.43 THOUSAND/ΜL (ref 0–0.61)
EOSINOPHIL NFR BLD AUTO: 5 % (ref 0–6)
ERYTHROCYTE [DISTWIDTH] IN BLOOD BY AUTOMATED COUNT: 12.8 % (ref 11.6–15.1)
GFR SERPL CREATININE-BSD FRML MDRD: 67 ML/MIN/1.73SQ M
GLUCOSE P FAST SERPL-MCNC: 100 MG/DL (ref 65–99)
HCT VFR BLD AUTO: 44.2 % (ref 36.5–49.3)
HDLC SERPL-MCNC: 45 MG/DL
HGB BLD-MCNC: 14.5 G/DL (ref 12–17)
IMM GRANULOCYTES # BLD AUTO: 0.03 THOUSAND/UL (ref 0–0.2)
IMM GRANULOCYTES NFR BLD AUTO: 0 % (ref 0–2)
LDLC SERPL CALC-MCNC: 20 MG/DL (ref 0–100)
LYMPHOCYTES # BLD AUTO: 2.48 THOUSANDS/ΜL (ref 0.6–4.47)
LYMPHOCYTES NFR BLD AUTO: 30 % (ref 14–44)
MCH RBC QN AUTO: 30.8 PG (ref 26.8–34.3)
MCHC RBC AUTO-ENTMCNC: 32.8 G/DL (ref 31.4–37.4)
MCV RBC AUTO: 94 FL (ref 82–98)
MONOCYTES # BLD AUTO: 0.58 THOUSAND/ΜL (ref 0.17–1.22)
MONOCYTES NFR BLD AUTO: 7 % (ref 4–12)
NEUTROPHILS # BLD AUTO: 4.66 THOUSANDS/ΜL (ref 1.85–7.62)
NEUTS SEG NFR BLD AUTO: 57 % (ref 43–75)
NRBC BLD AUTO-RTO: 0 /100 WBCS
PLATELET # BLD AUTO: 283 THOUSANDS/UL (ref 149–390)
PMV BLD AUTO: 10.1 FL (ref 8.9–12.7)
POTASSIUM SERPL-SCNC: 4.3 MMOL/L (ref 3.5–5.3)
PROT SERPL-MCNC: 7 G/DL (ref 6.4–8.2)
RBC # BLD AUTO: 4.71 MILLION/UL (ref 3.88–5.62)
SODIUM SERPL-SCNC: 143 MMOL/L (ref 136–145)
TRIGL SERPL-MCNC: 78 MG/DL
TSH SERPL DL<=0.05 MIU/L-ACNC: 1.06 UIU/ML (ref 0.36–3.74)
WBC # BLD AUTO: 8.28 THOUSAND/UL (ref 4.31–10.16)

## 2021-12-17 PROCEDURE — 80053 COMPREHEN METABOLIC PANEL: CPT

## 2021-12-17 PROCEDURE — 80061 LIPID PANEL: CPT

## 2021-12-17 PROCEDURE — 36415 COLL VENOUS BLD VENIPUNCTURE: CPT

## 2021-12-17 PROCEDURE — 85025 COMPLETE CBC W/AUTO DIFF WBC: CPT

## 2021-12-17 PROCEDURE — 84443 ASSAY THYROID STIM HORMONE: CPT

## 2021-12-22 DIAGNOSIS — I10 ESSENTIAL HYPERTENSION: ICD-10-CM

## 2021-12-22 RX ORDER — METOPROLOL SUCCINATE 25 MG/1
12.5 TABLET, EXTENDED RELEASE ORAL DAILY
Qty: 45 TABLET | Refills: 3 | OUTPATIENT
Start: 2021-12-22

## 2022-01-05 ENCOUNTER — IMMUNIZATIONS (OUTPATIENT)
Dept: FAMILY MEDICINE CLINIC | Facility: HOSPITAL | Age: 62
End: 2022-01-05

## 2022-01-05 DIAGNOSIS — Z23 ENCOUNTER FOR IMMUNIZATION: Primary | ICD-10-CM

## 2022-01-05 PROCEDURE — 0064A COVID-19 MODERNA VACC 0.25 ML BOOSTER: CPT

## 2022-01-05 PROCEDURE — 91306 COVID-19 MODERNA VACC 0.25 ML BOOSTER: CPT

## 2022-01-07 DIAGNOSIS — E78.00 HYPERCHOLESTEREMIA: ICD-10-CM

## 2022-01-12 DIAGNOSIS — E78.00 HYPERCHOLESTEREMIA: ICD-10-CM

## 2022-01-12 DIAGNOSIS — K22.70 BARRETT'S ESOPHAGUS WITHOUT DYSPLASIA: ICD-10-CM

## 2022-01-13 RX ORDER — FAMOTIDINE 40 MG/1
TABLET, FILM COATED ORAL
Qty: 30 TABLET | Refills: 5 | Status: SHIPPED | OUTPATIENT
Start: 2022-01-13 | End: 2022-02-14 | Stop reason: SDUPTHER

## 2022-02-07 DIAGNOSIS — E78.00 HYPERCHOLESTEREMIA: ICD-10-CM

## 2022-02-14 DIAGNOSIS — K21.9 GASTROESOPHAGEAL REFLUX DISEASE WITHOUT ESOPHAGITIS: ICD-10-CM

## 2022-02-14 DIAGNOSIS — K22.70 BARRETT'S ESOPHAGUS WITHOUT DYSPLASIA: ICD-10-CM

## 2022-02-14 RX ORDER — PANTOPRAZOLE SODIUM 40 MG/1
40 TABLET, DELAYED RELEASE ORAL
Qty: 90 TABLET | Refills: 0 | Status: SHIPPED | OUTPATIENT
Start: 2022-02-14 | End: 2022-05-06 | Stop reason: SDUPTHER

## 2022-02-14 RX ORDER — FAMOTIDINE 40 MG/1
40 TABLET, FILM COATED ORAL DAILY
Qty: 90 TABLET | Refills: 0 | Status: SHIPPED | OUTPATIENT
Start: 2022-02-14 | End: 2022-05-06 | Stop reason: SDUPTHER

## 2022-02-15 DIAGNOSIS — E78.00 HYPERCHOLESTEREMIA: ICD-10-CM

## 2022-02-24 DIAGNOSIS — E78.00 HYPERCHOLESTEREMIA: ICD-10-CM

## 2022-03-02 ENCOUNTER — RA CDI HCC (OUTPATIENT)
Dept: OTHER | Facility: HOSPITAL | Age: 62
End: 2022-03-02

## 2022-03-08 ENCOUNTER — OFFICE VISIT (OUTPATIENT)
Dept: FAMILY MEDICINE CLINIC | Facility: CLINIC | Age: 62
End: 2022-03-08
Payer: COMMERCIAL

## 2022-03-08 VITALS
RESPIRATION RATE: 16 BRPM | OXYGEN SATURATION: 97 % | DIASTOLIC BLOOD PRESSURE: 80 MMHG | HEART RATE: 57 BPM | SYSTOLIC BLOOD PRESSURE: 132 MMHG | TEMPERATURE: 97.4 F | BODY MASS INDEX: 33.03 KG/M2 | HEIGHT: 69 IN | WEIGHT: 223 LBS

## 2022-03-08 DIAGNOSIS — C61 ADENOCARCINOMA OF PROSTATE (HCC): ICD-10-CM

## 2022-03-08 DIAGNOSIS — E78.2 MIXED HYPERLIPIDEMIA: ICD-10-CM

## 2022-03-08 DIAGNOSIS — R73.01 IFG (IMPAIRED FASTING GLUCOSE): Primary | ICD-10-CM

## 2022-03-08 DIAGNOSIS — E78.00 PURE HYPERCHOLESTEROLEMIA: ICD-10-CM

## 2022-03-08 DIAGNOSIS — I10 ESSENTIAL HYPERTENSION: ICD-10-CM

## 2022-03-08 PROCEDURE — 1036F TOBACCO NON-USER: CPT | Performed by: FAMILY MEDICINE

## 2022-03-08 PROCEDURE — 3725F SCREEN DEPRESSION PERFORMED: CPT | Performed by: FAMILY MEDICINE

## 2022-03-08 PROCEDURE — 99214 OFFICE O/P EST MOD 30 MIN: CPT | Performed by: FAMILY MEDICINE

## 2022-03-08 PROCEDURE — 3008F BODY MASS INDEX DOCD: CPT | Performed by: FAMILY MEDICINE

## 2022-03-08 RX ORDER — ATORVASTATIN CALCIUM 10 MG/1
5 TABLET, FILM COATED ORAL DAILY
Qty: 45 TABLET | Refills: 1 | Status: SHIPPED | OUTPATIENT
Start: 2022-03-08 | End: 2022-07-18

## 2022-03-09 NOTE — PROGRESS NOTES
Subjective:   Chief Complaint   Patient presents with    Follow-up     chronic conditions        Patient ID: Stoney Barboza is a 59 y o  male  Patient here follow-up with a chronic condition including hypertension hyperlipidemia a patient compliant with medication and tolerated well without side effect patient compliant with diet he did gain weight compared with before no new concern recent blood work reviewed the patient      The following portions of the patient's history were reviewed and updated as appropriate: allergies, current medications, past family history, past medical history, past social history, past surgical history and problem list     Review of Systems   Constitutional: Negative for activity change, appetite change, fatigue and fever  HENT: Negative for congestion, ear pain, sinus pressure, sinus pain and sore throat  Eyes: Negative for pain, discharge, redness and itching  Respiratory: Negative for cough, chest tightness, shortness of breath and stridor  Cardiovascular: Negative for chest pain, palpitations and leg swelling  Gastrointestinal: Negative for abdominal pain, blood in stool, constipation, diarrhea and nausea  Genitourinary: Negative for dysuria, flank pain, frequency and hematuria  Musculoskeletal: Negative for back pain, joint swelling and neck pain  Skin: Negative for pallor and rash  Neurological: Negative for dizziness, tremors, weakness, numbness and headaches  Hematological: Does not bruise/bleed easily  Objective:  Vitals:    03/08/22 1035   BP: 132/80   BP Location: Left arm   Patient Position: Sitting   Cuff Size: Large   Pulse: 57   Resp: 16   Temp: (!) 97 4 °F (36 3 °C)   TempSrc: Tympanic   SpO2: 97%   Weight: 101 kg (223 lb)   Height: 5' 9" (1 753 m)      Physical Exam  Vitals and nursing note reviewed  Constitutional:       General: He is not in acute distress  Appearance: Normal appearance  He is well-developed   He is not diaphoretic  HENT:      Head: Normocephalic  Right Ear: Tympanic membrane, ear canal and external ear normal       Left Ear: Tympanic membrane, ear canal and external ear normal       Nose: Nose normal  No congestion or rhinorrhea  Mouth/Throat:      Mouth: Mucous membranes are moist       Pharynx: Oropharynx is clear  No oropharyngeal exudate or posterior oropharyngeal erythema  Eyes:      General:         Right eye: No discharge  Left eye: No discharge  Conjunctiva/sclera: Conjunctivae normal    Neck:      Vascular: No JVD  Cardiovascular:      Rate and Rhythm: Normal rate and regular rhythm  Heart sounds: Normal heart sounds  No murmur heard  No gallop  Pulmonary:      Effort: Pulmonary effort is normal  No respiratory distress  Breath sounds: Normal breath sounds  No stridor  No wheezing or rales  Chest:      Chest wall: No tenderness  Abdominal:      General: There is no distension  Palpations: Abdomen is soft  There is no mass  Tenderness: There is no abdominal tenderness  There is no rebound  Musculoskeletal:         General: No tenderness  Normal range of motion  Cervical back: Normal range of motion and neck supple  Lymphadenopathy:      Cervical: No cervical adenopathy  Skin:     General: Skin is warm  Findings: No erythema or rash  Neurological:      Mental Status: He is alert and oriented to person, place, and time  Sensory: No sensory deficit        Gait: Gait normal    Psychiatric:         Mood and Affect: Mood normal          Behavior: Behavior normal            Assessment/Plan:    IFG (impaired fasting glucose)  A new diagnosis finding on recent blood work discussed with the patient low carb diet    Essential hypertension  Chronic asymptomatic fair control continue lisinopril 2 5 mg once a day metoprolol succinate 25 mg once a day low-salt diet discussed with the patient    Adenocarcinoma of prostate [facility])  Patient does follow up with the Urology he is due for appointment in April 2022    Hyperlipidemia  Chronic asymptomatic recent blood work lipid panel fair controlled continue atorvastatin 10 mg once a day       Diagnoses and all orders for this visit:    IFG (impaired fasting glucose)  -     Basic metabolic panel; Future  -     Lipid panel; Future    Essential hypertension  -     Basic metabolic panel; Future  -     Lipid panel; Future    Mixed hyperlipidemia  -     Basic metabolic panel; Future  -     Lipid panel; Future    Adenocarcinoma of prostate [facility])  -     Ambulatory Referral to Urology; Future    Pure hypercholesterolemia  -     atorvastatin (LIPITOR) 10 mg tablet; Take 0 5 tablets (5 mg total) by mouth daily  -     Basic metabolic panel; Future  -     Lipid panel;  Future

## 2022-03-09 NOTE — ASSESSMENT & PLAN NOTE
Chronic asymptomatic recent blood work lipid panel fair controlled continue atorvastatin 10 mg once a day

## 2022-03-09 NOTE — ASSESSMENT & PLAN NOTE
Chronic asymptomatic fair control continue lisinopril 2 5 mg once a day metoprolol succinate 25 mg once a day low-salt diet discussed with the patient

## 2022-04-13 DIAGNOSIS — I10 HYPERTENSION, UNSPECIFIED TYPE: ICD-10-CM

## 2022-04-14 RX ORDER — LISINOPRIL 2.5 MG/1
TABLET ORAL
Qty: 90 TABLET | Refills: 0 | Status: SHIPPED | OUTPATIENT
Start: 2022-04-14 | End: 2022-06-10

## 2022-04-21 ENCOUNTER — APPOINTMENT (OUTPATIENT)
Dept: LAB | Facility: MEDICAL CENTER | Age: 62
End: 2022-04-21
Payer: COMMERCIAL

## 2022-04-21 DIAGNOSIS — E78.2 MIXED HYPERLIPIDEMIA: ICD-10-CM

## 2022-04-21 DIAGNOSIS — R73.01 IFG (IMPAIRED FASTING GLUCOSE): ICD-10-CM

## 2022-04-21 DIAGNOSIS — C61 ADENOCARCINOMA OF PROSTATE (HCC): ICD-10-CM

## 2022-04-21 DIAGNOSIS — E78.00 PURE HYPERCHOLESTEROLEMIA: ICD-10-CM

## 2022-04-21 DIAGNOSIS — I10 ESSENTIAL HYPERTENSION: ICD-10-CM

## 2022-04-21 DIAGNOSIS — D72.9 ABNORMAL WBC COUNT: ICD-10-CM

## 2022-04-21 LAB
ALBUMIN SERPL BCP-MCNC: 4.2 G/DL (ref 3.5–5)
ALP SERPL-CCNC: 88 U/L (ref 46–116)
ALT SERPL W P-5'-P-CCNC: 44 U/L (ref 12–78)
ANION GAP SERPL CALCULATED.3IONS-SCNC: 3 MMOL/L (ref 4–13)
AST SERPL W P-5'-P-CCNC: 23 U/L (ref 5–45)
BASOPHILS # BLD AUTO: 0.11 THOUSANDS/ΜL (ref 0–0.1)
BASOPHILS NFR BLD AUTO: 1 % (ref 0–1)
BILIRUB SERPL-MCNC: 0.69 MG/DL (ref 0.2–1)
BUN SERPL-MCNC: 29 MG/DL (ref 5–25)
CALCIUM SERPL-MCNC: 9.3 MG/DL (ref 8.3–10.1)
CHLORIDE SERPL-SCNC: 108 MMOL/L (ref 100–108)
CHOLEST SERPL-MCNC: 102 MG/DL
CO2 SERPL-SCNC: 30 MMOL/L (ref 21–32)
CREAT SERPL-MCNC: 1.18 MG/DL (ref 0.6–1.3)
EOSINOPHIL # BLD AUTO: 0.26 THOUSAND/ΜL (ref 0–0.61)
EOSINOPHIL NFR BLD AUTO: 3 % (ref 0–6)
ERYTHROCYTE [DISTWIDTH] IN BLOOD BY AUTOMATED COUNT: 13.3 % (ref 11.6–15.1)
GFR SERPL CREATININE-BSD FRML MDRD: 65 ML/MIN/1.73SQ M
GLUCOSE P FAST SERPL-MCNC: 108 MG/DL (ref 65–99)
HCT VFR BLD AUTO: 45 % (ref 36.5–49.3)
HDLC SERPL-MCNC: 37 MG/DL
HGB BLD-MCNC: 14.8 G/DL (ref 12–17)
IMM GRANULOCYTES # BLD AUTO: 0.02 THOUSAND/UL (ref 0–0.2)
IMM GRANULOCYTES NFR BLD AUTO: 0 % (ref 0–2)
LDLC SERPL CALC-MCNC: 33 MG/DL (ref 0–100)
LYMPHOCYTES # BLD AUTO: 2.18 THOUSANDS/ΜL (ref 0.6–4.47)
LYMPHOCYTES NFR BLD AUTO: 27 % (ref 14–44)
MCH RBC QN AUTO: 30.5 PG (ref 26.8–34.3)
MCHC RBC AUTO-ENTMCNC: 32.9 G/DL (ref 31.4–37.4)
MCV RBC AUTO: 93 FL (ref 82–98)
MONOCYTES # BLD AUTO: 0.64 THOUSAND/ΜL (ref 0.17–1.22)
MONOCYTES NFR BLD AUTO: 8 % (ref 4–12)
NEUTROPHILS # BLD AUTO: 4.86 THOUSANDS/ΜL (ref 1.85–7.62)
NEUTS SEG NFR BLD AUTO: 61 % (ref 43–75)
NONHDLC SERPL-MCNC: 65 MG/DL
NRBC BLD AUTO-RTO: 0 /100 WBCS
PLATELET # BLD AUTO: 248 THOUSANDS/UL (ref 149–390)
PMV BLD AUTO: 10.4 FL (ref 8.9–12.7)
POTASSIUM SERPL-SCNC: 4.6 MMOL/L (ref 3.5–5.3)
PROT SERPL-MCNC: 7.4 G/DL (ref 6.4–8.2)
PSA SERPL-MCNC: 6.5 NG/ML (ref 0–4)
RBC # BLD AUTO: 4.85 MILLION/UL (ref 3.88–5.62)
SODIUM SERPL-SCNC: 141 MMOL/L (ref 136–145)
TRIGL SERPL-MCNC: 158 MG/DL
TSH SERPL DL<=0.05 MIU/L-ACNC: 0.88 UIU/ML (ref 0.45–4.5)
WBC # BLD AUTO: 8.07 THOUSAND/UL (ref 4.31–10.16)

## 2022-04-21 PROCEDURE — 80053 COMPREHEN METABOLIC PANEL: CPT

## 2022-04-21 PROCEDURE — 36415 COLL VENOUS BLD VENIPUNCTURE: CPT

## 2022-04-21 PROCEDURE — 80061 LIPID PANEL: CPT

## 2022-04-21 PROCEDURE — 85025 COMPLETE CBC W/AUTO DIFF WBC: CPT

## 2022-04-21 PROCEDURE — 84443 ASSAY THYROID STIM HORMONE: CPT

## 2022-04-21 PROCEDURE — G0103 PSA SCREENING: HCPCS

## 2022-04-22 ENCOUNTER — OFFICE VISIT (OUTPATIENT)
Dept: UROLOGY | Facility: MEDICAL CENTER | Age: 62
End: 2022-04-22
Payer: COMMERCIAL

## 2022-04-22 VITALS
WEIGHT: 212 LBS | BODY MASS INDEX: 31.4 KG/M2 | HEART RATE: 53 BPM | DIASTOLIC BLOOD PRESSURE: 80 MMHG | HEIGHT: 69 IN | SYSTOLIC BLOOD PRESSURE: 110 MMHG

## 2022-04-22 DIAGNOSIS — N13.8 BPH WITH URINARY OBSTRUCTION: ICD-10-CM

## 2022-04-22 DIAGNOSIS — C61 PROSTATE CANCER (HCC): Primary | ICD-10-CM

## 2022-04-22 DIAGNOSIS — N40.1 BPH WITH URINARY OBSTRUCTION: ICD-10-CM

## 2022-04-22 PROCEDURE — 99214 OFFICE O/P EST MOD 30 MIN: CPT | Performed by: UROLOGY

## 2022-04-22 PROCEDURE — 1036F TOBACCO NON-USER: CPT | Performed by: UROLOGY

## 2022-04-22 PROCEDURE — 3008F BODY MASS INDEX DOCD: CPT | Performed by: UROLOGY

## 2022-04-22 NOTE — PROGRESS NOTES
HISTORY:    Follow-up prostate cancer on active surveillance    March 2020: Biopsy showed Bora six in two cores on the left side  Of note, there was a nodule far right lateral edge just beyond apex  That part did not have cancer  Mild BPH symptoms tolerates well, no need for meds  prostate 58 mL in volume at time of biopsy    PSA 6 5 in  April 2022   6 2 in May 2021   6 5 in December 2019   5 4 in November 2019         ASSESSMENT / PLAN:    1  PSA in same range it has been the past 2 5 years  2  Minimal symptoms  3  PSA in six months with phone report if still in same range does not need to be seen at that time  4  Office visit one year    The following portions of the patient's history were reviewed and updated as appropriate: allergies, current medications, past family history, past medical history, past social history, past surgical history and problem list     Review of Systems   All other systems reviewed and are negative  Objective:     Physical Exam  Constitutional:       General: He is not in acute distress  Appearance: He is well-developed  He is not diaphoretic  HENT:      Head: Normocephalic and atraumatic  Eyes:      General: No scleral icterus  Pulmonary:      Effort: Pulmonary effort is normal    Genitourinary:     Comments: Penis testes normal     Prostate mildly enlarged 1 cm nodule far right lateral edge as before  Skin:     Coloration: Skin is not pale  Neurological:      Mental Status: He is alert and oriented to person, place, and time  Psychiatric:         Behavior: Behavior normal          Thought Content:  Thought content normal          Judgment: Judgment normal            0   Lab Value Date/Time    PSA 6 5 (H) 04/21/2022 0636    PSA 6 2 (H) 05/14/2021 0857    PSA 6 5 (H) 12/03/2019 0709    PSA 5 4 (H) 11/07/2019 0904   ]  BUN   Date Value Ref Range Status   04/21/2022 29 (H) 5 - 25 mg/dL Final   03/30/2018 26 (H) 7 - 25 mg/dL Final     Creatinine   Date Value Ref Range Status   04/21/2022 1 18 0 60 - 1 30 mg/dL Final     Comment:     Standardized to IDMS reference method     No components found for: CBC      Patient Active Problem List   Diagnosis    CAD in native artery    History of acute inferior wall MI    Presence of bare metal stent in right coronary artery    Essential hypertension    Hyperlipidemia    Chronic left-sided low back pain without sciatica    BMI 32 0-32 9,adult    Fatty liver    Benign prostatic hyperplasia without lower urinary tract symptoms    Gastroesophageal reflux disease without esophagitis    Abnormal prostate specific antigen (PSA)    Prostate nodule without urinary obstruction    Bacteremia due to Escherichia coli    Bursitis of shoulder    Contusion of left shoulder    Injury of tendon of rotator cuff    UTI (urinary tract infection)    [facility]    Encounter for well adult exam with abnormal findings    Myalgia with higher doses statins    Colon polyp    COVID-19 virus infection    Gold's esophagus    Bloating    Flank pain    IFG (impaired fasting glucose)        Diagnoses and all orders for this visit:    Prostate cancer ([facility] )  -     PSA Total, Diagnostic; Future           Patient ID: Bishop Delgado is a 59 y o  male        Current Outpatient Medications:     aspirin 81 mg chewable tablet, Chew 1 tablet daily, Disp: , Rfl:     atorvastatin (LIPITOR) 10 mg tablet, Take 0 5 tablets (5 mg total) by mouth daily, Disp: 45 tablet, Rfl: 1    Evolocumab 140 MG/ML SOAJ, Inject 1 mL (140 mg total) under the skin every 14 (fourteen) days, Disp: 6 mL, Rfl: 3    famotidine (PEPCID) 40 MG tablet, Take 1 tablet (40 mg total) by mouth daily, Disp: 90 tablet, Rfl: 0    lisinopril (ZESTRIL) 2 5 mg tablet, TAKE 1 TABLET BY MOUTH DAILY, Disp: 90 tablet, Rfl: 0    metoprolol succinate (TOPROL-XL) 25 mg 24 hr tablet, Take 0 5 tablets (12 5 mg total) by mouth daily, Disp: 45 tablet, Rfl: 3   pantoprazole (PROTONIX) 40 mg tablet, Take 1 tablet (40 mg total) by mouth daily before breakfast, Disp: 90 tablet, Rfl: 0    Past Medical History:   Diagnosis Date    Abnormal WBC count 5/6/2021    [facility] 3/27/2020    Godl's esophagus 1/4/2021    EGD 01/04/21 recommend to repeat in 3 y    Colon polyp     Coronary artery disease     Elevated PSA     Fever 11/24/2020    Gastroesophageal reflux disease without esophagitis 12/13/2019    Hypercholesterolemia     Hypertension     [facility] ) 4/7/2016    LLQ pain 10/13/2020    LUQ pain 11/5/2019    Myocardial infarction ([facility] ) 2011    Sepsis due to Escherichia coli ([facility] ) 3/19/2020    Swelling 8/25/2021    UTI (urinary tract infection) 3/18/2020       Past Surgical History:   Procedure Laterality Date    COLONOSCOPY      CORONARY STENT PLACEMENT      bare metal stent in Rt coronary artery    SHOULDER SURGERY Left        Social History

## 2022-04-22 NOTE — PATIENT INSTRUCTIONS
Have the PSA blood test done in 6 months October  We will get you a phone report  If the number is acceptable, you will not need an exam at that time  If you do not hear from us within one week after having the blood test done, please call us and say, I am waiting for my PSA! 

## 2022-05-06 ENCOUNTER — OFFICE VISIT (OUTPATIENT)
Dept: FAMILY MEDICINE CLINIC | Facility: CLINIC | Age: 62
End: 2022-05-06
Payer: COMMERCIAL

## 2022-05-06 VITALS
HEIGHT: 69 IN | TEMPERATURE: 97.7 F | BODY MASS INDEX: 31.93 KG/M2 | HEART RATE: 56 BPM | WEIGHT: 215.6 LBS | OXYGEN SATURATION: 98 % | SYSTOLIC BLOOD PRESSURE: 142 MMHG | DIASTOLIC BLOOD PRESSURE: 82 MMHG

## 2022-05-06 DIAGNOSIS — Z00.00 ANNUAL PHYSICAL EXAM: Primary | ICD-10-CM

## 2022-05-06 DIAGNOSIS — E66.09 CLASS 1 OBESITY DUE TO EXCESS CALORIES WITH SERIOUS COMORBIDITY AND BODY MASS INDEX (BMI) OF 31.0 TO 31.9 IN ADULT: ICD-10-CM

## 2022-05-06 DIAGNOSIS — K22.70 BARRETT'S ESOPHAGUS WITHOUT DYSPLASIA: ICD-10-CM

## 2022-05-06 DIAGNOSIS — K21.9 GASTROESOPHAGEAL REFLUX DISEASE WITHOUT ESOPHAGITIS: ICD-10-CM

## 2022-05-06 DIAGNOSIS — Z00.01 ENCOUNTER FOR WELL ADULT EXAM WITH ABNORMAL FINDINGS: ICD-10-CM

## 2022-05-06 PROBLEM — E66.811 CLASS 1 OBESITY DUE TO EXCESS CALORIES WITH SERIOUS COMORBIDITY AND BODY MASS INDEX (BMI) OF 31.0 TO 31.9 IN ADULT: Status: ACTIVE | Noted: 2019-11-07

## 2022-05-06 PROCEDURE — 3008F BODY MASS INDEX DOCD: CPT | Performed by: FAMILY MEDICINE

## 2022-05-06 PROCEDURE — 99396 PREV VISIT EST AGE 40-64: CPT | Performed by: FAMILY MEDICINE

## 2022-05-06 PROCEDURE — 1036F TOBACCO NON-USER: CPT | Performed by: FAMILY MEDICINE

## 2022-05-06 RX ORDER — FAMOTIDINE 40 MG/1
40 TABLET, FILM COATED ORAL DAILY
Qty: 90 TABLET | Refills: 0 | Status: SHIPPED | OUTPATIENT
Start: 2022-05-06

## 2022-05-06 RX ORDER — PANTOPRAZOLE SODIUM 40 MG/1
40 TABLET, DELAYED RELEASE ORAL
Qty: 90 TABLET | Refills: 0 | Status: SHIPPED | OUTPATIENT
Start: 2022-05-06 | End: 2022-05-09 | Stop reason: SDUPTHER

## 2022-05-06 NOTE — ASSESSMENT & PLAN NOTE
Patient lost the weight the of from 223 lb to 215 lb encouraged patient to watch for the portion low carb low-fat diet and increased physical activity

## 2022-05-06 NOTE — PATIENT INSTRUCTIONS

## 2022-05-06 NOTE — PROGRESS NOTES
[address] [place] [address] PRIMARY CARE [facility]    NAME: Tobe Lefort  AGE: 59 y o  SEX: male  : 1960     DATE: 2022     Assessment and Plan:     Problem List Items Addressed This Visit        Digestive    Gastroesophageal reflux disease without esophagitis    Relevant Medications    pantoprazole (PROTONIX) 40 mg tablet    famotidine (PEPCID) 40 MG tablet    Other Relevant Orders    Basic metabolic panel    Gold's esophagus    Relevant Medications    pantoprazole (PROTONIX) 40 mg tablet    famotidine (PEPCID) 40 MG tablet    Other Relevant Orders    Basic metabolic panel       Other    Class 1 obesity due to excess calories with serious comorbidity and body mass index (BMI) of 31 0 to 31 9 in adult     Patient lost the weight the of from 223 lb to 215 lb encouraged patient to watch for the portion low carb low-fat diet and increased physical activity         Relevant Orders    Basic metabolic panel    Encounter for well adult exam with abnormal findings     Advice and education were given regarding nutrition, aerobic exercises, weight bearing exercises, cardiovascular risk reduction, fall risk reduction, and age appropriate supplements  The patient was counseled regarding instructions for management, risk factor reductions, prognosis, risks and benefits of treatment options, patient and family education, and importance of compliance with treatment  Relevant Orders    Basic metabolic panel      Other Visit Diagnoses     Annual physical exam    -  Primary    Relevant Orders    Basic metabolic panel          Immunizations and preventive care screenings were discussed with patient today  Appropriate education was printed on patient's after visit summary  Counseling:  Alcohol/drug use: discussed moderation in alcohol intake, the recommendations for healthy alcohol use, and avoidance of illicit drug use    Dental Health: discussed importance of regular tooth brushing, flossing, and dental visits  Injury prevention: discussed safety/seat belts, safety helmets, smoke detectors, carbon dioxide detectors, and smoking near bedding or upholstery  Sexual health: discussed sexually transmitted diseases, partner selection, use of condoms, avoidance of unintended pregnancy, and contraceptive alternatives  Exercise: the importance of regular exercise/physical activity was discussed  Recommend exercise 3-5 times per week for at least 30 minutes  BMI Counseling: Body mass index is 31 84 kg/m²  The BMI is above normal  Nutrition recommendations include decreasing portion sizes, decreasing fast food intake and limiting drinks that contain sugar  Exercise recommendations include exercising 3-5 times per week  No pharmacotherapy was ordered  Rationale for BMI follow-up plan is due to patient being overweight or obese  No follow-ups on file  Chief Complaint:     Chief Complaint   Patient presents with    Annual Exam      History of Present Illness:     Adult Annual Physical   Patient here for a comprehensive physical exam  The patient reports no problems  Diet and Physical Activity  Diet/Nutrition: Portion control low carb diet  Exercise: no formal exercise  Depression Screening  PHQ-2/9 Depression Screening         General Health  Sleep: sleeps well  Hearing: normal - bilateral   Vision: wears glasses  Dental: regular dental visits   Health  Symptoms include:  history of adeno carcinoma of prostate with elevated PSA follow up with the Urology periodically     Review of Systems:     Review of Systems   Constitutional: Negative for activity change, appetite change, fatigue and fever  HENT: Negative for congestion, ear pain, sinus pressure, sinus pain and sore throat  Eyes: Negative for pain, discharge, redness and itching     Respiratory: Negative for cough, chest tightness, shortness of breath and stridor  Cardiovascular: Negative for chest pain, palpitations and leg swelling  Gastrointestinal: Negative for abdominal pain, blood in stool, constipation, diarrhea and nausea  Genitourinary: Negative for dysuria, flank pain, frequency and hematuria  Musculoskeletal: Negative for back pain, joint swelling and neck pain  Skin: Negative for pallor and rash  Neurological: Negative for dizziness, tremors, weakness, numbness and headaches  Hematological: Does not bruise/bleed easily        Past Medical History:     Past Medical History:   Diagnosis Date    Abnormal WBC count 2021    [facility] ([facility] ) 3/27/2020    Gold's esophagus 2021    EGD 21 recommend to repeat in 3 y    Colon polyp     Coronary artery disease     Elevated PSA     Fever 2020    Gastroesophageal reflux disease without esophagitis 2019    Hypercholesterolemia     Hypertension     [facility]    LLQ pain 10/13/2020    LUQ pain 2019    Myocardial infarction ([facility] )     Sepsis due to Escherichia coli ([place] [address] ) 3/19/2020    Swelling 2021    UTI (urinary tract infection) 3/18/2020      Past Surgical History:     Past Surgical History:   Procedure Laterality Date    COLONOSCOPY      CORONARY STENT PLACEMENT      bare metal stent in Rt coronary artery    SHOULDER SURGERY Left       Family History:     Family History   Problem Relation Age of Onset    Sleep apnea Mother     Heart attack Father       Social History:     Social History     Socioeconomic History    Marital status: /Civil Union     Spouse name: None    Number of children: None    Years of education: None    Highest education level: None   Occupational History    None   Tobacco Use    Smoking status: Former Smoker     Types: Cigarettes     Quit date:      Years since quittin 3    Smokeless tobacco: Former User   Vaping Use    Vaping Use: Never used   Substance and Sexual Activity    Alcohol use: Not Currently     Comment: rarely     Drug use: Never    Sexual activity: Yes     Partners: Female   Other Topics Concern    None   Social History Narrative    None     Social Determinants of Health     Financial Resource Strain: Low Risk     Difficulty of Paying Living Expenses: Not hard at all   Food Insecurity: No Food Insecurity    Worried About Running Out of Food in the Last Year: Never true    Daniel of Food in the Last Year: Never true   Transportation Needs: No Transportation Needs    Lack of Transportation (Medical): No    Lack of Transportation (Non-Medical): No   Physical Activity: Sufficiently Active    Days of Exercise per Week: 5 days    Minutes of Exercise per Session: 30 min   Stress: No Stress Concern Present    Feeling of Stress : Not at all   Social Connections:  Moderately Isolated    Frequency of Communication with Friends and Family: More than three times a week    Frequency of Social Gatherings with Friends and Family: Twice a week    Attends Yazdanism Services: Never    Active Member of Clubs or Organizations: No    Attends Club or Organization Meetings: Never    Marital Status:    Intimate Partner Violence: Not At Risk    Fear of Current or Ex-Partner: No    Emotionally Abused: No    Physically Abused: No    Sexually Abused: No   Housing Stability: Unknown    Unable to Pay for Housing in the Last Year: No    Number of [place] in the Last Year: Not on file    Unstable Housing in the Last Year: No      Current Medications:     Current Outpatient Medications   Medication Sig Dispense Refill    aspirin 81 mg chewable tablet Chew 1 tablet daily      atorvastatin (LIPITOR) 10 mg tablet Take 0 5 tablets (5 mg total) by mouth daily 45 tablet 1    Evolocumab 140 MG/ML SOAJ Inject 1 mL (140 mg total) under the skin every 14 (fourteen) days 6 mL 3    famotidine (PEPCID) 40 MG tablet Take 1 tablet (40 mg total) by mouth daily 90 tablet 0    lisinopril (ZESTRIL) 2 5 mg tablet TAKE 1 TABLET BY MOUTH DAILY 90 tablet 0    metoprolol succinate (TOPROL-XL) 25 mg 24 hr tablet Take 0 5 tablets (12 5 mg total) by mouth daily 45 tablet 3    pantoprazole (PROTONIX) 40 mg tablet Take 1 tablet (40 mg total) by mouth daily before breakfast 90 tablet 0     No current facility-administered medications for this visit  Allergies:     No Known Allergies   Physical Exam:     /82 (BP Location: Left arm, Patient Position: Sitting, Cuff Size: Large)   Pulse 56   Temp 97 7 °F (36 5 °C)   Ht 5' 9" (1 753 m)   Wt 97 8 kg (215 lb 9 6 oz)   SpO2 98%   BMI 31 84 kg/m²     Physical Exam  Vitals and nursing note reviewed  Constitutional:       General: He is not in acute distress  Appearance: Normal appearance  He is not ill-appearing, toxic-appearing or diaphoretic  HENT:      Head: Normocephalic  Right Ear: Tympanic membrane, ear canal and external ear normal  There is no impacted cerumen  Left Ear: Ear canal and external ear normal  There is no impacted cerumen  Nose: Nose normal  No congestion or rhinorrhea  Mouth/Throat:      Mouth: Mucous membranes are moist       Pharynx: Oropharynx is clear  No oropharyngeal exudate or posterior oropharyngeal erythema  Eyes:      General:         Right eye: No discharge  Left eye: No discharge  Conjunctiva/sclera: Conjunctivae normal       Pupils: Pupils are equal, round, and reactive to light  Neck:      Vascular: No JVD  Cardiovascular:      Rate and Rhythm: Normal rate and regular rhythm  Heart sounds: Normal heart sounds  No murmur heard  Pulmonary:      Effort: Pulmonary effort is normal       Breath sounds: Normal breath sounds  No wheezing or rales  Abdominal:      General: There is no distension  Palpations: Abdomen is soft  Tenderness: There is no abdominal tenderness  Hernia: No hernia is present     Musculoskeletal:         General: No deformity  Normal range of motion  Cervical back: Normal range of motion  Right lower leg: No edema  Left lower leg: No edema  Lymphadenopathy:      Cervical: No cervical adenopathy  Skin:     General: Skin is warm  Coloration: Skin is not jaundiced  Findings: No bruising, erythema or rash  Neurological:      General: No focal deficit present  Mental Status: He is alert and oriented to person, place, and time  Sensory: No sensory deficit  Motor: No weakness        Gait: Gait normal    Psychiatric:         Mood and Affect: Mood normal          Behavior: Behavior normal           Estela Hernandez MD

## 2022-05-09 DIAGNOSIS — K21.9 GASTROESOPHAGEAL REFLUX DISEASE WITHOUT ESOPHAGITIS: ICD-10-CM

## 2022-05-09 RX ORDER — PANTOPRAZOLE SODIUM 40 MG/1
40 TABLET, DELAYED RELEASE ORAL
Qty: 90 TABLET | Refills: 0 | Status: SHIPPED | OUTPATIENT
Start: 2022-05-09 | End: 2022-05-10 | Stop reason: SDUPTHER

## 2022-05-10 DIAGNOSIS — K21.9 GASTROESOPHAGEAL REFLUX DISEASE WITHOUT ESOPHAGITIS: ICD-10-CM

## 2022-05-10 RX ORDER — PANTOPRAZOLE SODIUM 40 MG/1
40 TABLET, DELAYED RELEASE ORAL
Qty: 90 TABLET | Refills: 1 | Status: SHIPPED | OUTPATIENT
Start: 2022-05-10

## 2022-05-23 NOTE — ASSESSMENT & PLAN NOTE
Status post hospitalization and the urine infection calls bacteremia and patient currently on oral antibiotic Bactrim for total 14 days plan to repeat urine culture in 1 week after finish the course of antibiotic discussed with the patient no

## 2022-06-10 ENCOUNTER — OFFICE VISIT (OUTPATIENT)
Dept: CARDIAC SURGERY | Facility: CLINIC | Age: 62
End: 2022-06-10
Payer: COMMERCIAL

## 2022-06-10 VITALS
WEIGHT: 213 LBS | BODY MASS INDEX: 31.55 KG/M2 | OXYGEN SATURATION: 100 % | RESPIRATION RATE: 18 BRPM | HEIGHT: 69 IN | SYSTOLIC BLOOD PRESSURE: 160 MMHG | HEART RATE: 53 BPM | DIASTOLIC BLOOD PRESSURE: 86 MMHG

## 2022-06-10 DIAGNOSIS — I25.10 CAD IN NATIVE ARTERY: Primary | ICD-10-CM

## 2022-06-10 DIAGNOSIS — I10 HYPERTENSION, UNSPECIFIED TYPE: ICD-10-CM

## 2022-06-10 DIAGNOSIS — E78.2 MIXED HYPERLIPIDEMIA: ICD-10-CM

## 2022-06-10 DIAGNOSIS — I25.2 HISTORY OF ACUTE INFERIOR WALL MI: ICD-10-CM

## 2022-06-10 DIAGNOSIS — I10 ESSENTIAL HYPERTENSION: ICD-10-CM

## 2022-06-10 PROCEDURE — 99214 OFFICE O/P EST MOD 30 MIN: CPT | Performed by: INTERNAL MEDICINE

## 2022-06-10 PROCEDURE — 1036F TOBACCO NON-USER: CPT | Performed by: INTERNAL MEDICINE

## 2022-06-10 PROCEDURE — 93000 ELECTROCARDIOGRAM COMPLETE: CPT | Performed by: INTERNAL MEDICINE

## 2022-06-10 PROCEDURE — 3008F BODY MASS INDEX DOCD: CPT | Performed by: INTERNAL MEDICINE

## 2022-06-10 RX ORDER — LISINOPRIL 10 MG/1
10 TABLET ORAL DAILY
Qty: 90 TABLET | Refills: 3 | Status: SHIPPED | OUTPATIENT
Start: 2022-06-10

## 2022-06-10 NOTE — PROGRESS NOTES
Cardiology Follow Up Visit    Lali Page  1960  [phone]  [place]  [address]  [facility]  [phone]    1  CAD in native artery     2  Hypertension, unspecified type  lisinopril (ZESTRIL) 10 mg tablet   3  Essential hypertension     4  Mixed hyperlipidemia     5  History of acute inferior wall MI           Discussion/Summary:       1  CAD, asymptomatic  A  Inferior MI 2011, BMS RCA   B  St echo 2017 no ischemia     2  Hyperlipidemia, LDL   Doing well now on Repatha and low-dose atorvastatin    3  Essential htn,   Elevated on intake today, home blood pressure checks within normal limits per his report      Plan:   Patient doing very well  He is on Repatha low-dose atorvastatin with excellent reduction in his lipid panel  Continue sensible diet and   Daily activity/exercise if blood pressure should  BP elevated would increase lisinopril to 10 mg daily  Follow-up home blood pressure checks, goal consistently less than 140/90 with ideal 120/80       Interval History:     57year-old  Here for follow-up known coronary artery disease     Index inferior MI 2011 remote stent right coronary artery  Has done well since  No recurrent vascular events     Unable to tolerate coronary artery disease dose statin/high potency statin  Has been on Repatha now with good result  LDL at goal   Remains on atorvastatin 10 mg        blood pressure isolated elevation today  States home blood pressure checks all within normal limits          Patient Active Problem List   Diagnosis    CAD in native artery    History of acute inferior wall MI    Presence of bare metal stent in right coronary artery    Essential hypertension    Hyperlipidemia    Chronic left-sided low back pain without sciatica    Class 1 obesity due to excess calories with serious comorbidity and body mass index (BMI) of 31 0 to 31 9 in adult    Fatty liver    Benign prostatic hyperplasia without lower urinary tract symptoms    Gastroesophageal reflux disease without esophagitis    Abnormal prostate specific antigen (PSA)    Prostate nodule without urinary obstruction    Bacteremia due to Escherichia coli    Bursitis of shoulder    Contusion of left shoulder    Injury of tendon of rotator cuff    UTI (urinary tract infection)    [facility]en[facility]    Encounter for well adult exam with abnormal findings    Myalgia with higher doses statins    Colon polyp    COVID-19 virus infection    Gold's esophagus    Bloating    Flank pain    IFG (impaired fasting glucose)     Past Medical History:   Diagnosis Date    Abnormal WBC count 2021    Adenocarcinoma of prostate ([facility] ) 3/27/2020    Gold's esophagus 2021    EGD 21 recommend to repeat in 3 y    Colon polyp     Coronary artery disease     Elevated PSA     Fever 2020    Gastroesophageal reflux disease without esophagitis 2019    Hypercholesterolemia     Hypertension     Inferior MI ([facility] ) 2016    LLQ pain 10/13/2020    LUQ pain 2019    Myocardial infarction ([facility] )     Sepsis due to Escherichia coli ([facility] ) 3/19/2020    Swelling 2021    UTI (urinary tract infection) 3/18/2020     Social History     Socioeconomic History    Marital status: /Civil Union     Spouse name: Not on file    Number of children: Not on file    Years of education: Not on file    Highest education level: Not on file   Occupational History    Not on file   Tobacco Use    Smoking status: Former Smoker     Types: Cigarettes     Quit date:      Years since quittin 4    Smokeless tobacco: Former User   Vaping Use    Vaping Use: Never used   Substance and Sexual Activity    Alcohol use: Not Currently     Comment: rarely     Drug use: Never    Sexual activity: Yes     Partners: Female   Other Topics Concern  Not on file   Social History Narrative    Not on file     Social Determinants of Health     Financial Resource Strain: Low Risk     Difficulty of Paying Living Expenses: Not hard at all   Food Insecurity: No Food Insecurity    Worried About Running Out of Food in the Last Year: Never true    [address] in the Last Year: Never true   Transportation Needs: No Transportation Needs    Lack of Transportation (Medical): No    Lack of Transportation (Non-Medical): No   Physical Activity: Sufficiently Active    Days of Exercise per Week: 5 days    Minutes of Exercise per Session: 30 min   Stress: No Stress Concern Present    Feeling of Stress : Not at all   Social Connections:  Moderately Isolated    Frequency of Communication with Friends and Family: More than three times a week    Frequency of Social Gatherings with Friends and Family: Twice a week    Attends Jain Services: Never    Active Member of Clubs or Organizations: No    Attends Club or Organization Meetings: Never    Marital Status:    Intimate Partner Violence: Not At Risk    Fear of Current or Ex-Partner: No    Emotionally Abused: No    Physically Abused: No    Sexually Abused: No   Housing Stability: Unknown    Unable to Pay for Housing in the Last Year: No    Number of [place] in the Last Year: Not on file    Unstable Housing in the Last Year: No      Family History   Problem Relation Age of Onset    Sleep apnea Mother     Heart attack Father      Past Surgical History:   Procedure Laterality Date    COLONOSCOPY      CORONARY STENT PLACEMENT      bare metal stent in Rt coronary artery    SHOULDER SURGERY Left        Current Outpatient Medications:     aspirin 81 mg chewable tablet, Chew 1 tablet daily, Disp: , Rfl:     atorvastatin (LIPITOR) 10 mg tablet, Take 0 5 tablets (5 mg total) by mouth daily, Disp: 45 tablet, Rfl: 1    Evolocumab 140 MG/ML SOAJ, Inject 1 mL (140 mg total) under the skin every 14 (fourteen) days, Disp: 6 mL, Rfl: 3    famotidine (PEPCID) 40 MG tablet, Take 1 tablet (40 mg total) by mouth daily, Disp: 90 tablet, Rfl: 0    lisinopril (ZESTRIL) 10 mg tablet, Take 1 tablet (10 mg total) by mouth daily, Disp: 90 tablet, Rfl: 3    metoprolol succinate (TOPROL-XL) 25 mg 24 hr tablet, Take 0 5 tablets (12 5 mg total) by mouth daily, Disp: 45 tablet, Rfl: 3    pantoprazole (PROTONIX) 40 mg tablet, Take 1 tablet (40 mg total) by mouth daily before breakfast, Disp: 90 tablet, Rfl: 1  No Known Allergies      Social, Family, Medication history reviewed and updated as necessary      Labs:     Lab Results   Component Value Date    K 4 6 04/21/2022     04/21/2022    CO2 30 04/21/2022    BUN 29 (H) 04/21/2022    CREATININE 1 18 04/21/2022    CREATININE 1 15 12/17/2021    CALCIUM 9 3 04/21/2022       Lab Results   Component Value Date    WBC 8 07 04/21/2022    HGB 14 8 04/21/2022    HGB 14 5 12/17/2021    HCT 45 0 04/21/2022    HCT 44 2 12/17/2021     04/21/2022     12/17/2021       Lab Results   Component Value Date    CHOL 162 06/24/2014     Lab Results   Component Value Date    HDL 37 (L) 04/21/2022    HDL 45 12/17/2021     Lab Results   Component Value Date    LDLCALC 33 04/21/2022    [address] 12/17/2021     No results found for: LDLDIRECT          Lab Results   Component Value Date    TRIG 158 (H) 04/21/2022    TRIG 78 12/17/2021       Lab Results   Component Value Date    ALT 44 04/21/2022    ALT 35 12/17/2021    AST 23 04/21/2022    AST 17 12/17/2021       No results found for: INR    No results found for: NTBNP    No results found for: HGBA1C        Imaging: Reviewed in epic        Review of Systems:  14 systems reviewed and negative with exception of the above        PHYSICAL EXAM:      Vitals:    06/10/22 1146   BP: 160/86   Pulse: (!) 53   Resp: 18   SpO2: 100%     Body mass index is 31 45 kg/m²     Weight (last 2 days)     Date/Time Weight    06/10/22 1146 96 6 (025) Gen: No acute distress  HEENT: anicteric, mucous membranes moist  Neck: supple, no jugular venous distention, or carotid bruit  Heart: regular, normal s1 and s2, no murmur/rub or gallop  Lungs :clear to auscultation bilaterally, no rales/rhonchi or wheeze  Abdomen: soft nontender, normoactive bowel sounds, no organomegaly  Ext: warm and perfused, normal femoral pulses, no edema, or clubbing  Skin: warm, no rashes  Neuro: AAO x 3, no focal findings  Psychiatric: normal affect  Musculoskeletal: no obvious joint deformities

## 2022-07-18 DIAGNOSIS — E78.00 PURE HYPERCHOLESTEROLEMIA: ICD-10-CM

## 2022-07-18 RX ORDER — ATORVASTATIN CALCIUM 10 MG/1
TABLET, FILM COATED ORAL
Qty: 90 TABLET | Refills: 3 | Status: SHIPPED | OUTPATIENT
Start: 2022-07-18

## 2022-09-06 ENCOUNTER — OFFICE VISIT (OUTPATIENT)
Dept: FAMILY MEDICINE CLINIC | Facility: CLINIC | Age: 62
End: 2022-09-06
Payer: COMMERCIAL

## 2022-09-06 VITALS
HEIGHT: 69 IN | WEIGHT: 218 LBS | SYSTOLIC BLOOD PRESSURE: 122 MMHG | RESPIRATION RATE: 16 BRPM | HEART RATE: 62 BPM | BODY MASS INDEX: 32.29 KG/M2 | OXYGEN SATURATION: 98 % | DIASTOLIC BLOOD PRESSURE: 84 MMHG | TEMPERATURE: 98.5 F

## 2022-09-06 DIAGNOSIS — K21.9 GASTROESOPHAGEAL REFLUX DISEASE WITHOUT ESOPHAGITIS: ICD-10-CM

## 2022-09-06 DIAGNOSIS — R73.01 IFG (IMPAIRED FASTING GLUCOSE): ICD-10-CM

## 2022-09-06 DIAGNOSIS — I25.10 CAD IN NATIVE ARTERY: ICD-10-CM

## 2022-09-06 DIAGNOSIS — E78.2 MIXED HYPERLIPIDEMIA: ICD-10-CM

## 2022-09-06 DIAGNOSIS — I10 ESSENTIAL HYPERTENSION: Primary | ICD-10-CM

## 2022-09-06 DIAGNOSIS — N40.0 BENIGN PROSTATIC HYPERPLASIA WITHOUT LOWER URINARY TRACT SYMPTOMS: ICD-10-CM

## 2022-09-06 DIAGNOSIS — C61 ADENOCARCINOMA OF PROSTATE (HCC): ICD-10-CM

## 2022-09-06 PROCEDURE — 99214 OFFICE O/P EST MOD 30 MIN: CPT | Performed by: FAMILY MEDICINE

## 2022-09-06 NOTE — ASSESSMENT & PLAN NOTE
A chronic asymptomatic patient currently not on any medication follow-up with the Urology periodically

## 2022-09-06 NOTE — ASSESSMENT & PLAN NOTE
A chronic asymptomatic fair control patient on pantoprazole 40 mg and famotidine tolerated well recommend avoid provoke food do not eat and lie down

## 2022-09-06 NOTE — ASSESSMENT & PLAN NOTE
Chronic asymptomatic patient already on Ace the beta-blocker statin and aspirin follow-up with the Cardiology periodically

## 2022-09-06 NOTE — ASSESSMENT & PLAN NOTE
Chronic asymptomatic fair control a recent adjustment on the medication by Cardiology he has recently increased his dose of lisinopril to 10 mg and the a metoprolol succinate 25 mg once a day tolerated well continue current management low-salt diet and important lose weight review with the patient

## 2022-09-06 NOTE — PROGRESS NOTES
Subjective:   Chief Complaint   Patient presents with    Follow-up     Chronic conditions        Patient ID: Alicia Shown is a 57 y o  male  Patient here follow-up with a chronic condition a since patient seen last time he been evaluated by a cardiology an June at the time a blood pressure was uncontrolled lisinopril dose has been adjust to 10 mg patient has been taking lisinopril 10 mg and metoprolol a XL 25 mg tolerated well blood pressure well controlled and no new concern we in patient forgot to do his blood work      The following portions of the patient's history were reviewed and updated as appropriate: allergies, current medications, past family history, past medical history, past social history, past surgical history and problem list     Review of Systems   Constitutional: Negative for chills and fever  HENT: Negative for ear pain and sore throat  Eyes: Negative for pain and visual disturbance  Respiratory: Negative for cough and shortness of breath  Cardiovascular: Negative for chest pain and palpitations  Gastrointestinal: Negative for abdominal pain and vomiting  Genitourinary: Negative for dysuria and hematuria  Musculoskeletal: Negative for arthralgias and back pain  Skin: Negative for color change and rash  Neurological: Negative for seizures and syncope  All other systems reviewed and are negative  Objective:  Vitals:    09/06/22 1014   BP: 122/84   BP Location: Left arm   Patient Position: Sitting   Cuff Size: Large   Pulse: 62   Resp: 16   Temp: 98 5 °F (36 9 °C)   TempSrc: Tympanic   SpO2: 98%   Weight: 98 9 kg (218 lb)   Height: 5' 9" (1 753 m)      Physical Exam  Constitutional:       General: He is not in acute distress  Appearance: He is well-developed  He is not diaphoretic  HENT:      Head: Normocephalic  Right Ear: External ear normal       Left Ear: External ear normal    Eyes:      General:         Right eye: No discharge           Left eye: No discharge  Conjunctiva/sclera: Conjunctivae normal    Neck:      Vascular: No JVD  Cardiovascular:      Rate and Rhythm: Normal rate and regular rhythm  Heart sounds: Normal heart sounds  No murmur heard  No gallop  Pulmonary:      Effort: Pulmonary effort is normal  No respiratory distress  Breath sounds: Normal breath sounds  No stridor  No wheezing or rales  Chest:      Chest wall: No tenderness  Abdominal:      General: There is no distension  Palpations: Abdomen is soft  There is no mass  Tenderness: There is no abdominal tenderness  There is no rebound  Musculoskeletal:         General: No tenderness  Cervical back: Normal range of motion and neck supple  Lymphadenopathy:      Cervical: No cervical adenopathy  Skin:     General: Skin is warm  Findings: No erythema or rash  Neurological:      Mental Status: He is alert and oriented to person, place, and time             Assessment/Plan:    Essential hypertension  Chronic asymptomatic fair control a recent adjustment on the medication by Cardiology he has recently increased his dose of lisinopril to 10 mg and the a metoprolol succinate 25 mg once a day tolerated well continue current management low-salt diet and important lose weight review with the patient    Gastroesophageal reflux disease without esophagitis  A chronic asymptomatic fair control patient on pantoprazole 40 mg and famotidine tolerated well recommend avoid provoke food do not eat and lie down    Adenocarcinoma of prostate ([address] )  Follow-up with the Urology periodically    Benign prostatic hyperplasia without lower urinary tract symptoms  A chronic asymptomatic patient currently not on any medication follow-up with the Urology periodically    IFG (impaired fasting glucose)  Chronic asymptomatic encouraged patient to follow up with the low carb diet important lose weight the a due for blood work to check fasting blood sugar    CAD in native artery  Chronic asymptomatic patient already on Ace the beta-blocker statin and aspirin follow-up with the Cardiology periodically       Diagnoses and all orders for this visit:    Essential hypertension    IFG (impaired fasting glucose)    Mixed hyperlipidemia    Gastroesophageal reflux disease without esophagitis    Adenocarcinoma of prostate (HCC)    Benign prostatic hyperplasia without lower urinary tract symptoms    CAD in native artery

## 2022-09-06 NOTE — ASSESSMENT & PLAN NOTE
Chronic asymptomatic encouraged patient to follow up with the low carb diet important lose weight the a due for blood work to check fasting blood sugar

## 2022-09-07 ENCOUNTER — TELEPHONE (OUTPATIENT)
Dept: CARDIOLOGY CLINIC | Facility: CLINIC | Age: 62
End: 2022-09-07

## 2022-09-07 ENCOUNTER — APPOINTMENT (OUTPATIENT)
Dept: LAB | Facility: MEDICAL CENTER | Age: 62
End: 2022-09-07
Attending: UROLOGY
Payer: COMMERCIAL

## 2022-09-07 DIAGNOSIS — C61 PROSTATE CANCER (HCC): ICD-10-CM

## 2022-09-07 DIAGNOSIS — K21.9 GASTROESOPHAGEAL REFLUX DISEASE WITHOUT ESOPHAGITIS: ICD-10-CM

## 2022-09-07 DIAGNOSIS — E66.09 CLASS 1 OBESITY DUE TO EXCESS CALORIES WITH SERIOUS COMORBIDITY AND BODY MASS INDEX (BMI) OF 31.0 TO 31.9 IN ADULT: ICD-10-CM

## 2022-09-07 DIAGNOSIS — Z00.00 ANNUAL PHYSICAL EXAM: ICD-10-CM

## 2022-09-07 DIAGNOSIS — Z00.01 ENCOUNTER FOR WELL ADULT EXAM WITH ABNORMAL FINDINGS: ICD-10-CM

## 2022-09-07 DIAGNOSIS — K22.70 BARRETT'S ESOPHAGUS WITHOUT DYSPLASIA: ICD-10-CM

## 2022-09-07 LAB
ANION GAP SERPL CALCULATED.3IONS-SCNC: 3 MMOL/L (ref 4–13)
BUN SERPL-MCNC: 26 MG/DL (ref 5–25)
CALCIUM SERPL-MCNC: 9.2 MG/DL (ref 8.3–10.1)
CHLORIDE SERPL-SCNC: 110 MMOL/L (ref 96–108)
CO2 SERPL-SCNC: 28 MMOL/L (ref 21–32)
CREAT SERPL-MCNC: 1.22 MG/DL (ref 0.6–1.3)
GFR SERPL CREATININE-BSD FRML MDRD: 62 ML/MIN/1.73SQ M
GLUCOSE P FAST SERPL-MCNC: 100 MG/DL (ref 65–99)
POTASSIUM SERPL-SCNC: 4.9 MMOL/L (ref 3.5–5.3)
PSA SERPL-MCNC: 9.8 NG/ML (ref 0–4)
SODIUM SERPL-SCNC: 141 MMOL/L (ref 135–147)

## 2022-09-07 PROCEDURE — 80048 BASIC METABOLIC PNL TOTAL CA: CPT

## 2022-09-07 PROCEDURE — 36415 COLL VENOUS BLD VENIPUNCTURE: CPT

## 2022-09-07 PROCEDURE — 84153 ASSAY OF PSA TOTAL: CPT

## 2022-09-08 ENCOUNTER — TELEPHONE (OUTPATIENT)
Dept: UROLOGY | Facility: CLINIC | Age: 62
End: 2022-09-08

## 2022-09-08 NOTE — TELEPHONE ENCOUNTER
Patient returning call  Patient scheduled 9/19/22 at 9:30 with Dr Tanika Pierson in [address] [facility]  Used same day as per previous encounter

## 2022-09-08 NOTE — TELEPHONE ENCOUNTER
----- Message from [id number] [address] Garg sent at 9/7/2022  2:04 PM EDT -----  Please inform patient results of recent PSA now 9 8, previously 6 5  Recommend scheduling office visit to further discuss

## 2022-09-08 NOTE — TELEPHONE ENCOUNTER
Called patient - LMOM that we got his recent blood work patient and we need to schedule a follow up appointment  If patient calls back, please schedule him with Dr Gio Valverde (09/19/22 Same Day) if still available

## 2022-09-19 ENCOUNTER — OFFICE VISIT (OUTPATIENT)
Dept: UROLOGY | Facility: MEDICAL CENTER | Age: 62
End: 2022-09-19
Payer: COMMERCIAL

## 2022-09-19 VITALS
OXYGEN SATURATION: 99 % | WEIGHT: 214 LBS | HEART RATE: 52 BPM | SYSTOLIC BLOOD PRESSURE: 130 MMHG | HEIGHT: 69 IN | BODY MASS INDEX: 31.7 KG/M2 | DIASTOLIC BLOOD PRESSURE: 80 MMHG

## 2022-09-19 DIAGNOSIS — C61 PROSTATE CANCER (HCC): ICD-10-CM

## 2022-09-19 DIAGNOSIS — R97.20 ABNORMAL PROSTATE SPECIFIC ANTIGEN (PSA): Primary | ICD-10-CM

## 2022-09-19 LAB
SL AMB  POCT GLUCOSE, UA: ABNORMAL
SL AMB LEUKOCYTE ESTERASE,UA: ABNORMAL
SL AMB POCT BILIRUBIN,UA: ABNORMAL
SL AMB POCT BLOOD,UA: ABNORMAL
SL AMB POCT CLARITY,UA: CLEAR
SL AMB POCT COLOR,UA: YELLOW
SL AMB POCT KETONES,UA: ABNORMAL
SL AMB POCT NITRITE,UA: ABNORMAL
SL AMB POCT PH,UA: 5.5
SL AMB POCT SPECIFIC GRAVITY,UA: 1.03
SL AMB POCT URINE PROTEIN: ABNORMAL
SL AMB POCT UROBILINOGEN: 0.2

## 2022-09-19 PROCEDURE — 81003 URINALYSIS AUTO W/O SCOPE: CPT | Performed by: UROLOGY

## 2022-09-19 PROCEDURE — 99214 OFFICE O/P EST MOD 30 MIN: CPT | Performed by: UROLOGY

## 2022-09-19 NOTE — PROGRESS NOTES
HISTORY:    Follow-up minimal prostate cancer on active surveillance     March 2020: Biopsy showed Bora six in two cores on the left side  Of note, there was a nodule far right lateral edge just beyond apex  That part did not have cancer  Patient became septic from the biopsy, six days in the hospital on IV antibiotics, was a terrible experience for him     Mild BPH symptoms tolerates well, no need for meds  prostate 58 mL in volume at time of biopsy     PSA 9 8 in September 2022  6 5 in  April 2022   6 2 in May 2021   6 5 in December 2019   5 4 in November 2019         ASSESSMENT / PLAN:    Big jump in PSA  Needs prostate biopsy, will do MRI first to see if there is a specific focus of interest     As above, the nodule on his far right lateral lobe was not not the location of the cancer at the time of his biopsy in 2020    The following portions of the patient's history were reviewed and updated as appropriate: allergies, current medications, past family history, past medical history, past social history, past surgical history and problem list     Review of Systems   All other systems reviewed and are negative  Objective:     Physical Exam  Constitutional:       General: He is not in acute distress  Appearance: He is well-developed  He is not diaphoretic  HENT:      Head: Normocephalic and atraumatic  Eyes:      General: No scleral icterus  Pulmonary:      Effort: Pulmonary effort is normal    Genitourinary:     Comments: Penis testes normal    Prostate mildly enlarged  Nodule far right lateral edge about 1 x 1 5 cm, no change  Skin:     Coloration: Skin is not pale  Neurological:      Mental Status: He is alert and oriented to person, place, and time  Psychiatric:         Behavior: Behavior normal          Thought Content:  Thought content normal          Judgment: Judgment normal            0   Lab Value Date/Time    PSA 9 8 (H) 09/07/2022 2546    PSA 6 5 (H) 04/21/2022 7159 PSA 6 2 (H) 05/14/2021 0857    PSA 6 5 (H) 12/03/2019 0709    PSA 5 4 (H) 11/07/2019 0904   ]  BUN   Date Value Ref Range Status   09/07/2022 26 (H) 5 - 25 mg/dL Final   03/30/2018 26 (H) 7 - 25 mg/dL Final     Creatinine   Date Value Ref Range Status   09/07/2022 1 22 0 60 - 1 30 mg/dL Final     Comment:     Standardized to IDMS reference method     No components found for: CBC      Patient Active Problem List   Diagnosis    CAD in native artery    History of acute inferior wall MI    Presence of bare metal stent in right coronary artery    Essential hypertension    Hyperlipidemia    Chronic left-sided low back pain without sciatica    Class 1 obesity due to excess calories with serious comorbidity and body mass index (BMI) of 31 0 to 31 9 in adult    Fatty liver    Benign prostatic hyperplasia without lower urinary tract symptoms    Gastroesophageal reflux disease without esophagitis    Abnormal prostate specific antigen (PSA)    Prostate nodule without urinary obstruction    Bacteremia due to Escherichia coli    Bursitis of shoulder    Contusion of left shoulder    Injury of tendon of rotator cuff    UTI (urinary tract infection)    Aden[facility] ([facility] )    Encounter for well adult exam with abnormal findings    Myalgia with higher doses statins    Colon polyp    COVID-19 virus infection    Gold's esophagus    Bloating    Flank pain    IFG (impaired fasting glucose)        Diagnoses and all orders for this visit:    Abnormal prostate specific antigen (PSA)  -     POCT urine dip auto non-scope  -     MRI prostate multiparametric wo w contrast; Future    Prostate cancer ([facility] )  -     MRI prostate multiparametric wo w contrast; Future           Patient ID: Melly Perez is a 57 y o  male        Current Outpatient Medications:     aspirin 81 mg chewable tablet, Chew 1 tablet daily, Disp: , Rfl:     atorvastatin (LIPITOR) 10 mg tablet, TAKE 1 TABLET BY MOUTH DAILY, Disp: 90 tablet, Rfl: 3    Evolocumab 140 MG/ML SOAJ, Inject 1 mL (140 mg total) under the skin every 14 (fourteen) days, Disp: 6 mL, Rfl: 3    famotidine (PEPCID) 40 MG tablet, Take 1 tablet (40 mg total) by mouth daily, Disp: 90 tablet, Rfl: 0    lisinopril (ZESTRIL) 10 mg tablet, Take 1 tablet (10 mg total) by mouth daily, Disp: 90 tablet, Rfl: 3    metoprolol succinate (TOPROL-XL) 25 mg 24 hr tablet, Take 0 5 tablets (12 5 mg total) by mouth daily, Disp: 45 tablet, Rfl: 3    pantoprazole (PROTONIX) 40 mg tablet, Take 1 tablet (40 mg total) by mouth daily before breakfast, Disp: 90 tablet, Rfl: 1    Past Medical History:   Diagnosis Date    Abnormal WBC count 5/6/2021    Adenocarcinoma of prostate ([facility] ) 3/27/2020    Gold's esophagus 1/4/2021    EGD 01/04/21 recommend to repeat in 3 y    Colon polyp     Coronary artery disease     Elevated PSA     Fever 11/24/2020    Gastroesophageal reflux disease without esophagitis 12/13/2019    Hypercholesterolemia     Hypertension     Inferior MI ([facility] ) 4/7/2016    LLQ pain 10/13/2020    LUQ pain 11/5/2019    Myocardial infarction ([facility] ) 2011    Sepsis due to Escherichia coli ([facility] ) 3/19/2020    Swelling 8/25/2021    UTI (urinary tract infection) 3/18/2020       Past Surgical History:   Procedure Laterality Date    COLONOSCOPY      CORONARY STENT PLACEMENT      bare metal stent in Rt coronary artery    SHOULDER SURGERY Left        Social History

## 2022-09-27 ENCOUNTER — TELEPHONE (OUTPATIENT)
Dept: UROLOGY | Facility: MEDICAL CENTER | Age: 62
End: 2022-09-27

## 2022-09-28 NOTE — TELEPHONE ENCOUNTER
Patient called in returning [facility] call       Please call patient back at [phone]

## 2022-09-28 NOTE — TELEPHONE ENCOUNTER
I spoke to the patient and scheduled his procedure for 11/29/2022 at [facility] with Dr Ramandeep Ortiz     -instructions given verbally and mailed  -patient aware to be NPO, needs a  and use an enema 1 hour prior to leaving the house morning of procedure  -patient aware to avoid any potentially blood thinning medications 7 days prior  -CBC, CMP, Urine C&S  2 weeks prior  -Cap BC - on auth tracker 9/28/2022

## 2022-09-29 ENCOUNTER — VBI (OUTPATIENT)
Dept: ADMINISTRATIVE | Facility: OTHER | Age: 62
End: 2022-09-29

## 2022-10-04 ENCOUNTER — OFFICE VISIT (OUTPATIENT)
Dept: FAMILY MEDICINE CLINIC | Facility: CLINIC | Age: 62
End: 2022-10-04
Payer: COMMERCIAL

## 2022-10-04 VITALS
BODY MASS INDEX: 31.9 KG/M2 | DIASTOLIC BLOOD PRESSURE: 78 MMHG | TEMPERATURE: 97.5 F | WEIGHT: 215.4 LBS | SYSTOLIC BLOOD PRESSURE: 130 MMHG | HEART RATE: 67 BPM | HEIGHT: 69 IN | OXYGEN SATURATION: 98 % | RESPIRATION RATE: 16 BRPM

## 2022-10-04 DIAGNOSIS — R19.02 LEFT UPPER QUADRANT ABDOMINAL MASS: Primary | ICD-10-CM

## 2022-10-04 DIAGNOSIS — R52 PAIN: ICD-10-CM

## 2022-10-04 DIAGNOSIS — Z23 NEED FOR INFLUENZA VACCINATION: ICD-10-CM

## 2022-10-04 DIAGNOSIS — R82.90 ABNORMAL URINE FINDINGS: ICD-10-CM

## 2022-10-04 LAB
BACTERIA UR QL AUTO: ABNORMAL /HPF
BILIRUB UR QL STRIP: NEGATIVE
CLARITY UR: CLEAR
COLOR UR: ABNORMAL
GLUCOSE UR STRIP-MCNC: NEGATIVE MG/DL
HGB UR QL STRIP.AUTO: ABNORMAL
KETONES UR STRIP-MCNC: NEGATIVE MG/DL
LEUKOCYTE ESTERASE UR QL STRIP: NEGATIVE
MUCOUS THREADS UR QL AUTO: ABNORMAL
NITRITE UR QL STRIP: NEGATIVE
NON-SQ EPI CELLS URNS QL MICRO: ABNORMAL /HPF
PH UR STRIP.AUTO: 5.5 [PH]
PROT UR STRIP-MCNC: ABNORMAL MG/DL
RBC #/AREA URNS AUTO: ABNORMAL /HPF
SL AMB  POCT GLUCOSE, UA: NEGATIVE
SL AMB LEUKOCYTE ESTERASE,UA: NEGATIVE
SL AMB POCT BILIRUBIN,UA: NEGATIVE
SL AMB POCT BLOOD,UA: ABNORMAL
SL AMB POCT CLARITY,UA: CLEAR
SL AMB POCT COLOR,UA: YELLOW
SL AMB POCT KETONES,UA: ABNORMAL
SL AMB POCT NITRITE,UA: NEGATIVE
SL AMB POCT PH,UA: 5
SL AMB POCT SPECIFIC GRAVITY,UA: 1.03
SL AMB POCT URINE PROTEIN: ABNORMAL
SL AMB POCT UROBILINOGEN: NEGATIVE
SP GR UR STRIP.AUTO: 1.03 (ref 1–1.03)
UROBILINOGEN UR STRIP-ACNC: <2 MG/DL
WBC #/AREA URNS AUTO: ABNORMAL /HPF

## 2022-10-04 PROCEDURE — 90682 RIV4 VACC RECOMBINANT DNA IM: CPT | Performed by: FAMILY MEDICINE

## 2022-10-04 PROCEDURE — 90471 IMMUNIZATION ADMIN: CPT | Performed by: FAMILY MEDICINE

## 2022-10-04 PROCEDURE — 81001 URINALYSIS AUTO W/SCOPE: CPT | Performed by: FAMILY MEDICINE

## 2022-10-04 PROCEDURE — 99214 OFFICE O/P EST MOD 30 MIN: CPT | Performed by: FAMILY MEDICINE

## 2022-10-04 PROCEDURE — 87086 URINE CULTURE/COLONY COUNT: CPT | Performed by: FAMILY MEDICINE

## 2022-10-04 PROCEDURE — 81002 URINALYSIS NONAUTO W/O SCOPE: CPT | Performed by: FAMILY MEDICINE

## 2022-10-04 RX ORDER — METHOCARBAMOL 500 MG/1
500 TABLET, FILM COATED ORAL 2 TIMES DAILY
Qty: 10 TABLET | Refills: 0 | Status: SHIPPED | OUTPATIENT
Start: 2022-10-04

## 2022-10-04 NOTE — PROGRESS NOTES
Name: Fide Fields      :       MRN: [id number]  Encounter Provider: Yuko Leonard MD  Encounter Date: 10/4/2022   Encounter department: [facility]     1  Left upper quadrant abdominal mass  Assessment & Plan:  The palpable swelling on the left the upper abdomen wall tender no erythema a patient notice getting bigger previously workup the including ultrasound of the area no abnormal finding the recommend MRI of abdomen wall to rule out lipoma versus the lump in the muscle  Recommend Robaxin muscle relax for the pain proper use and possible side effect discussed the patient    Orders:  -     Urinalysis with microscopic  -     Urine culture  -     POCT urine dip  -     MRI abdomen wo contrast; Future; Expected date: 10/04/2022    2  Need for influenza vaccination  -     FLUBLOK: influenza vaccine, quadrivalent, recombinant, PF, 0 5 mL    3  Pain  -     methocarbamol (ROBAXIN) 500 mg tablet; Take 1 tablet (500 mg total) by mouth 2 (two) times a day    4  Abnormal urine findings  Assessment & Plan:  Abnormal finding and UA will send the urine for microscopy and culture patient had history of prostate cancer    Orders:  -     POCT urine dip           Subjective      The patient here concerned about the swelling painful in the abdomen left upper side the he had the before the and the he notice now it is getting bigger and painful to touch no change in the color of the skin no fall no trauma and a no nausea vomiting no diarrhea and no constipation no abdomen distension no change in the diet no change in the weight and no fever    Review of Systems   Constitutional: Negative for chills and fever  HENT: Negative for ear pain and sore throat  Eyes: Negative for pain and visual disturbance  Respiratory: Negative for cough and shortness of breath  Cardiovascular: Negative for chest pain and palpitations  Gastrointestinal: Negative for vomiting  Painful swelling in abdomen wall LUQ   Genitourinary: Negative for dysuria and hematuria  Musculoskeletal: Negative for arthralgias and back pain  Skin: Negative for color change and rash  Neurological: Negative for seizures and syncope  All other systems reviewed and are negative  Current Outpatient Medications on File Prior to Visit   Medication Sig    aspirin 81 mg chewable tablet Chew 1 tablet daily    atorvastatin (LIPITOR) 10 mg tablet TAKE 1 TABLET BY MOUTH DAILY    Evolocumab 140 MG/ML SOAJ Inject 1 mL (140 mg total) under the skin every 14 (fourteen) days    famotidine (PEPCID) 40 MG tablet Take 1 tablet (40 mg total) by mouth daily    lisinopril (ZESTRIL) 10 mg tablet Take 1 tablet (10 mg total) by mouth daily    metoprolol succinate (TOPROL-XL) 25 mg 24 hr tablet Take 0 5 tablets (12 5 mg total) by mouth daily    pantoprazole (PROTONIX) 40 mg tablet Take 1 tablet (40 mg total) by mouth daily before breakfast       Objective     /78 (BP Location: Left arm, Patient Position: Sitting, Cuff Size: Large)   Pulse 67   Temp 97 5 °F (36 4 °C) (Tympanic)   Resp 16   Ht 5' 9" (1 753 m)   Wt 97 7 kg (215 lb 6 4 oz)   SpO2 98%   BMI 31 81 kg/m²     Physical Exam  Constitutional:       General: He is not in acute distress  Appearance: He is well-developed  He is not diaphoretic  HENT:      Head: Normocephalic  Right Ear: External ear normal       Left Ear: External ear normal    Eyes:      General:         Right eye: No discharge  Left eye: No discharge  Conjunctiva/sclera: Conjunctivae normal    Neck:      Vascular: No JVD  Cardiovascular:      Rate and Rhythm: Normal rate and regular rhythm  Heart sounds: Normal heart sounds  No murmur heard  No gallop  Pulmonary:      Effort: Pulmonary effort is normal  No respiratory distress  Breath sounds: Normal breath sounds  No stridor  No wheezing or rales  Chest:      Chest wall: No tenderness  Abdominal:      General: There is no distension  Palpations: Abdomen is soft  There is no mass  Tenderness: There is no abdominal tenderness  There is no rebound  Musculoskeletal:         General: No tenderness  Cervical back: Normal range of motion and neck supple  Lymphadenopathy:      Cervical: No cervical adenopathy  Skin:     General: Skin is warm  Findings: No erythema or rash  Neurological:      Mental Status: He is alert and oriented to person, place, and time         Jeanette Byrd MD

## 2022-10-05 ENCOUNTER — TELEPHONE (OUTPATIENT)
Dept: FAMILY MEDICINE CLINIC | Facility: CLINIC | Age: 62
End: 2022-10-05

## 2022-10-05 LAB — BACTERIA UR CULT: NORMAL

## 2022-10-05 NOTE — TELEPHONE ENCOUNTER
----- Message from Cristine Stanley MD sent at 10/4/2022  6:36 PM EDT -----  Hematuria ,patient f/up with Urology

## 2022-10-06 PROBLEM — R19.02 LEFT UPPER QUADRANT ABDOMINAL MASS: Status: ACTIVE | Noted: 2022-10-06

## 2022-10-06 PROBLEM — R82.90 ABNORMAL URINE FINDINGS: Status: ACTIVE | Noted: 2022-10-04

## 2022-10-06 PROBLEM — R82.90 ABNORMAL URINE FINDINGS: Status: ACTIVE | Noted: 2022-10-06

## 2022-10-06 PROBLEM — R19.02 LEFT UPPER QUADRANT ABDOMINAL MASS: Status: ACTIVE | Noted: 2022-10-04

## 2022-10-06 NOTE — ASSESSMENT & PLAN NOTE
The palpable swelling on the left the upper abdomen wall tender no erythema a patient notice getting bigger previously workup the including ultrasound of the area no abnormal finding the recommend MRI of abdomen wall to rule out lipoma versus the lump in the muscle  Recommend Robaxin muscle relax for the pain proper use and possible side effect discussed the patient

## 2022-10-06 NOTE — ASSESSMENT & PLAN NOTE
Abnormal finding and UA will send the urine for microscopy and culture patient had history of prostate cancer

## 2022-10-19 ENCOUNTER — TELEPHONE (OUTPATIENT)
Dept: FAMILY MEDICINE CLINIC | Facility: CLINIC | Age: 62
End: 2022-10-19

## 2022-10-19 DIAGNOSIS — R19.02 LEFT UPPER QUADRANT ABDOMINAL MASS: Primary | ICD-10-CM

## 2022-10-19 NOTE — TELEPHONE ENCOUNTER
Called for peer to peer and they advised MRI is still denied not enough information or documentation for MRI to be approved  Per Karli Corley refer to [address]

## 2022-10-31 DIAGNOSIS — I10 ESSENTIAL HYPERTENSION: ICD-10-CM

## 2022-10-31 RX ORDER — METOPROLOL SUCCINATE 25 MG/1
TABLET, EXTENDED RELEASE ORAL
Qty: 45 TABLET | Refills: 3 | Status: SHIPPED | OUTPATIENT
Start: 2022-10-31

## 2022-11-01 ENCOUNTER — TELEPHONE (OUTPATIENT)
Dept: UROLOGY | Facility: MEDICAL CENTER | Age: 62
End: 2022-11-01

## 2022-11-02 ENCOUNTER — HOSPITAL ENCOUNTER (OUTPATIENT)
Dept: RADIOLOGY | Age: 62
Discharge: HOME | End: 2022-11-02

## 2022-11-02 DIAGNOSIS — C61 PROSTATE CANCER (HCC): ICD-10-CM

## 2022-11-02 DIAGNOSIS — R97.20 ABNORMAL PROSTATE SPECIFIC ANTIGEN (PSA): ICD-10-CM

## 2022-11-02 RX ADMIN — GADOBUTROL 10 ML: 604.72 INJECTION INTRAVENOUS at 11:02

## 2022-11-04 ENCOUNTER — TELEPHONE (OUTPATIENT)
Dept: UROLOGY | Facility: MEDICAL CENTER | Age: 62
End: 2022-11-04

## 2022-11-04 NOTE — TELEPHONE ENCOUNTER
----- Message from Vincent Mora MD sent at 11/4/2022 10:11 AM EDT -----   He has prostate biopsy November 29 with Monia Sacks  The MRI also showed a small bladder tumor  He has appointment with me December 19th, to follow-up the prostate biopsy        Add cystoscopy to that visit with me, I will do it the same time

## 2022-11-04 NOTE — TELEPHONE ENCOUNTER
I discussed results of MRI  Nothing suspicious  However, he is planned for biopsy because of the big jump in PSA     Also, MRI suggest a small polypoid lesion in the bladder  I will do cystoscopy at the December 19 appointment I have with him

## 2022-11-09 NOTE — PROGRESS NOTES
Assessment/Plan:   Gayathri Diaz is a 57 y o male who is here for   Chief Complaint   Patient presents with   • Hernia     Possible hernia on left side of abdomen  Had U/S and Ct done within the last 1-2 years  Plan: upon clinical exam extremely unlikely to be a hernia, could be chronic musculoskeletal pain  Patient would benefit from a CT abdomen and pelvis for left flank pain to evaluate  Post Op Pain Management:       Preoperative Clearance: None      No surgery planned  Continue normal medications  Imaging:    Patient understands hernia occurrence or re-occurrence risk is higher in a diabetic, tobacco user, with elevated BMIs    _____________________________________________      HPI:  Gayathri Diaz is a 57 y o male who was referred for evaluation of Hernia (Possible hernia on left side of abdomen  Had U/S and Ct done within the last 1-2 years  )    Currently complaining of  recurrent Lump in the   worse with Cold, improves with heat,     no nausea and no vomiting,     regular bowel movement  Duration of pain or symptoms:  over 1 year      Prior abdominal surgery:   None    BMI: Body mass index is 32 52 kg/m²       Smoking Status: Non-smoker     Lab Results   Component Value Date    WBC 8 07 04/21/2022    HGB 14 8 04/21/2022    HCT 45 0 04/21/2022    MCV 93 04/21/2022     04/21/2022     Lab Results   Component Value Date    K 4 9 09/07/2022     (H) 09/07/2022    CO2 28 09/07/2022    BUN 26 (H) 09/07/2022    CREATININE 1 22 09/07/2022    GLUF 100 (H) 09/07/2022    CALCIUM 9 2 09/07/2022    AST 23 04/21/2022    ALT 44 04/21/2022    ALKPHOS 88 04/21/2022    EGFR 62 09/07/2022     No results found for: INR, PROTIME        ROS:  General ROS: negative  negative for - chills, fatigue, fever or night sweats, weight loss  Respiratory ROS: no cough, shortness of breath, or wheezing  Cardiovascular ROS: no chest pain or dyspnea on exertion  Genito-Urinary ROS: no dysuria, trouble voiding, or hematuria  Musculoskeletal ROS: negative for - gait disturbance, joint pain or muscle pain  Neurological ROS: no TIA or stroke symptoms  Abdominal ROS: see HPI  GI ROS: see HPI  Skin ROS: no new rashes or lesions   Lymphatic ROS: no new adenopathy noted by pt  GYN ROS: see HPI, no new GYN history or bleeding noted  Psy ROS: no new mental or behavioral disturbances       Patient Active Problem List   Diagnosis   • CAD in native artery   • History of acute inferior wall MI   • Presence of bare metal stent in right coronary artery   • Essential hypertension   • Hyperlipidemia   • Chronic left-sided low back pain without sciatica   • Class 1 obesity due to excess calories with serious comorbidity and body mass index (BMI) of 31 0 to 31 9 in adult   • Fatty liver   • Benign prostatic hyperplasia without lower urinary tract symptoms   • Gastroesophageal reflux disease without esophagitis   • Abnormal prostate specific antigen (PSA)   • Prostate nodule without urinary obstruction   • Bacteremia due to Escherichia coli   • Bursitis of shoulder   • Contusion of left shoulder   • Injury of tendon of rotator cuff   • UTI (urinary tract infection)   • Prostate cancer ([facility] )   • Encounter for well adult exam with abnormal findings   • Myalgia with higher doses statins   • Colon polyp   • COVID-19 virus infection   • Gold's esophagus   • Bloating   • Flank pain   • IFG (impaired fasting glucose)   • Left upper quadrant abdominal mass   • Abnormal urine findings         Allergies: Patient has no known allergies      Meds:  Current Outpatient Medications:   •  aspirin 81 mg chewable tablet, Chew 1 tablet daily, Disp: , Rfl:   •  atorvastatin (LIPITOR) 10 mg tablet, TAKE 1 TABLET BY MOUTH DAILY, Disp: 90 tablet, Rfl: 3  •  Evolocumab 140 MG/ML SOAJ, Inject 1 mL (140 mg total) under the skin every 14 (fourteen) days, Disp: 6 mL, Rfl: 3  •  famotidine (PEPCID) 40 MG tablet, Take 1 tablet (40 mg total) by mouth daily, Disp: 90 tablet, Rfl: 0  •  lisinopril (ZESTRIL) 10 mg tablet, Take 1 tablet (10 mg total) by mouth daily, Disp: 90 tablet, Rfl: 3  •  methocarbamol (ROBAXIN) 500 mg tablet, Take 1 tablet (500 mg total) by mouth 2 (two) times a day, Disp: 10 tablet, Rfl: 0  •  metoprolol succinate (TOPROL-XL) 25 mg 24 hr tablet, TAKE ONE-HALF TABLET BY MOUTH DAILY, Disp: 45 tablet, Rfl: 3  •  pantoprazole (PROTONIX) 40 mg tablet, Take 1 tablet (40 mg total) by mouth daily before breakfast, Disp: 90 tablet, Rfl: 1    PMH:  Past Medical History:   Diagnosis Date   • Abnormal WBC count 5/6/2021   • [facility] ) 3/27/2020   • Gold's esophagus 1/4/2021    EGD 01/04/21 recommend to repeat in 3 y   • Colon polyp    • Coronary artery disease    • Elevated PSA    • Fever 11/24/2020   • Gastroesophageal reflux disease without esophagitis 12/13/2019   • Hypercholesterolemia    • Hypertension    • Inferior MI ([facility] ) 4/7/2016   • LLQ pain 10/13/2020   • LUQ pain 11/5/2019   • Myocardial infarction [facility]) 2011   • Sepsis due to Escherichia coli ([facility] ) 3/19/2020   • Swelling 8/25/2021   • UTI (urinary tract infection) 3/18/2020       PSH:  Past Surgical History:   Procedure Laterality Date   • COLONOSCOPY     • CORONARY STENT PLACEMENT      bare metal stent in Rt coronary artery   • SHOULDER SURGERY Left        Family History   Problem Relation Age of Onset   • Sleep apnea Mother    • Heart attack Father         reports that he quit smoking about 11 years ago  His smoking use included cigarettes  He started smoking about 48 years ago  He has a 9 25 pack-year smoking history  He has never used smokeless tobacco  He reports current alcohol use  He reports that he does not use drugs      Vitals:    11/11/22 1116   BP: 120/80   Pulse: (!) 53   Temp: (!) 97 3 °F (36 3 °C)       PHYSICAL EXAM  General Appearance:    Alert, cooperative, no distress   Head:    Normocephalic without obvious abnormality   Eyes:    PERRL, conjunctiva/corneas clear, Neck:   Supple, no adenopathy, no JVD   Back:     Symmetric, no spinal or CVA tenderness   Lungs:     Clear to auscultation bilaterally, no wheezing or rhonchi   Heart:    Regular rate and rhythm, S1 and S2 normal, no murmur   Abdomen:      abdomen is soft without significant tenderness, masses, organomegaly or guarding, no hernias noted Bowel sounds are normal     no inguinal or femoral hernias noted      Extremities:   Extremities normal  No clubbing, cyanosis or edema   Psych:   Normal Affect, AOx3  Neurologic:  Skin:   CNII-XII intact  Strength symmetric, speech intact    Warm, dry, intact, no visible rashes or lesions                   Informed consent for procedure was personally discussed, reviewed, and signed by Dr Espinoza Serrano  Discussion by Dr Espinoza Serrano was carried out regarding risks, benefits, and alternatives with the patient  Risks include but are not limited to:  bleeding, infection, and delayed wound healing or an open wound, pulmonary embolus, leaks from bowel or bile ducts or other viscus, transfusions, death  Discussed in further detail the more common complications and their rates of occurrence   was used if necessary  Patient expressed understanding of the issues discussed and wished/consented to proceed  All questions were answered by Dr Espinoza Serrano  Discussion performed between patient and the provider signing below  Signature:   Nghia Vinson MD    Date: 11/11/2022 Time: 12:12 PM       Some portions of this record may have been generated with voice recognition software  There may be translation, syntax,  or grammatical errors  Occasional wrong word or "sound-a-like" substitutions may have occurred due to the inherent limitations of the voice recognition software  Read the chart carefully and recognize, using context, where substitutions may have occurred  If you have any questions, please contact the dictating provider for clarification or correction, as needed  This encounter has been coded by a non-certified coder

## 2022-11-11 ENCOUNTER — OFFICE VISIT (OUTPATIENT)
Dept: SURGERY | Facility: CLINIC | Age: 62
End: 2022-11-11

## 2022-11-11 VITALS
BODY MASS INDEX: 32.61 KG/M2 | HEART RATE: 53 BPM | DIASTOLIC BLOOD PRESSURE: 80 MMHG | HEIGHT: 69 IN | TEMPERATURE: 97.3 F | WEIGHT: 220.2 LBS | SYSTOLIC BLOOD PRESSURE: 120 MMHG

## 2022-11-11 DIAGNOSIS — R19.02 LEFT UPPER QUADRANT ABDOMINAL MASS: ICD-10-CM

## 2022-11-11 DIAGNOSIS — R19.02 LEFT UPPER QUADRANT ABDOMINAL MASS: Primary | ICD-10-CM

## 2022-11-15 ENCOUNTER — APPOINTMENT (OUTPATIENT)
Dept: LAB | Facility: MEDICAL CENTER | Age: 62
End: 2022-11-15
Attending: UROLOGY

## 2022-11-15 DIAGNOSIS — R97.20 ABNORMAL PROSTATE SPECIFIC ANTIGEN (PSA): ICD-10-CM

## 2022-11-15 LAB
ALBUMIN SERPL BCP-MCNC: 4.1 G/DL (ref 3.5–5)
ALP SERPL-CCNC: 81 U/L (ref 46–116)
ALT SERPL W P-5'-P-CCNC: 41 U/L (ref 12–78)
ANION GAP SERPL CALCULATED.3IONS-SCNC: 3 MMOL/L (ref 4–13)
AST SERPL W P-5'-P-CCNC: 22 U/L (ref 5–45)
BASOPHILS # BLD AUTO: 0.11 THOUSANDS/ÂΜL (ref 0–0.1)
BASOPHILS NFR BLD AUTO: 1 % (ref 0–1)
BILIRUB SERPL-MCNC: 0.73 MG/DL (ref 0.2–1)
BUN SERPL-MCNC: 21 MG/DL (ref 5–25)
CALCIUM SERPL-MCNC: 9.2 MG/DL (ref 8.3–10.1)
CHLORIDE SERPL-SCNC: 107 MMOL/L (ref 96–108)
CO2 SERPL-SCNC: 28 MMOL/L (ref 21–32)
CREAT SERPL-MCNC: 1.18 MG/DL (ref 0.6–1.3)
EOSINOPHIL # BLD AUTO: 0.37 THOUSAND/ÂΜL (ref 0–0.61)
EOSINOPHIL NFR BLD AUTO: 5 % (ref 0–6)
ERYTHROCYTE [DISTWIDTH] IN BLOOD BY AUTOMATED COUNT: 13 % (ref 11.6–15.1)
GFR SERPL CREATININE-BSD FRML MDRD: 64 ML/MIN/1.73SQ M
GLUCOSE P FAST SERPL-MCNC: 100 MG/DL (ref 65–99)
HCT VFR BLD AUTO: 45.4 % (ref 36.5–49.3)
HGB BLD-MCNC: 14.8 G/DL (ref 12–17)
IMM GRANULOCYTES # BLD AUTO: 0.02 THOUSAND/UL (ref 0–0.2)
IMM GRANULOCYTES NFR BLD AUTO: 0 % (ref 0–2)
LYMPHOCYTES # BLD AUTO: 2.35 THOUSANDS/ÂΜL (ref 0.6–4.47)
LYMPHOCYTES NFR BLD AUTO: 30 % (ref 14–44)
MCH RBC QN AUTO: 31.2 PG (ref 26.8–34.3)
MCHC RBC AUTO-ENTMCNC: 32.6 G/DL (ref 31.4–37.4)
MCV RBC AUTO: 96 FL (ref 82–98)
MONOCYTES # BLD AUTO: 0.65 THOUSAND/ÂΜL (ref 0.17–1.22)
MONOCYTES NFR BLD AUTO: 8 % (ref 4–12)
NEUTROPHILS # BLD AUTO: 4.29 THOUSANDS/ÂΜL (ref 1.85–7.62)
NEUTS SEG NFR BLD AUTO: 56 % (ref 43–75)
NRBC BLD AUTO-RTO: 0 /100 WBCS
PLATELET # BLD AUTO: 245 THOUSANDS/UL (ref 149–390)
PMV BLD AUTO: 10.4 FL (ref 8.9–12.7)
POTASSIUM SERPL-SCNC: 4.2 MMOL/L (ref 3.5–5.3)
PROT SERPL-MCNC: 7.2 G/DL (ref 6.4–8.4)
RBC # BLD AUTO: 4.74 MILLION/UL (ref 3.88–5.62)
SODIUM SERPL-SCNC: 138 MMOL/L (ref 135–147)
WBC # BLD AUTO: 7.79 THOUSAND/UL (ref 4.31–10.16)

## 2022-11-16 LAB — BACTERIA UR CULT: NORMAL

## 2022-11-22 ENCOUNTER — TELEPHONE (OUTPATIENT)
Dept: UROLOGY | Facility: CLINIC | Age: 62
End: 2022-11-22

## 2022-11-22 NOTE — TELEPHONE ENCOUNTER
Called and left detailed message about the prep for patient's upcoming procedure     I am calling in regards to your prep instructions for your appointment at the [place] [address] [facility] [address] [place] [address]  on 11/29/22 with Dr Melba Ellison  under IV Se  We need you to please make sure to stop all blood thinners (including multivitamins) ( Eliquis or Warfarin should be clarfied with Cardiology before stopping) 7 day prior to you appointment  Pt should have nothing to eat after midnight the day before the procedure  The pt will need to make sure they have a   Finally the Pt will need to use an enema at least 2 hour prior to the appointment  If you have any questions please call [phone] and ask for [facility]

## 2022-11-28 ENCOUNTER — TELEPHONE (OUTPATIENT)
Dept: SURGERY | Facility: CLINIC | Age: 62
End: 2022-11-28

## 2022-11-28 NOTE — TELEPHONE ENCOUNTER
(delayed entry)    Called and left message for patient last week, to return call to office - if needed  Patient was wondering since CT was denied, did Dr De Leon Can want to order another ultrasound? Per Dr Christiano Mitchell - that would not be helpful  She wanted me to reach out to patient - still symptomatic? Better/worse? Maybe patient would benefit from second opinion?

## 2022-11-29 ENCOUNTER — ANESTHESIA EVENT (OUTPATIENT)
Dept: GASTROENTEROLOGY | Facility: HOSPITAL | Age: 62
End: 2022-11-29

## 2022-11-29 ENCOUNTER — ANESTHESIA (OUTPATIENT)
Dept: GASTROENTEROLOGY | Facility: HOSPITAL | Age: 62
End: 2022-11-29

## 2022-11-29 ENCOUNTER — HOSPITAL ENCOUNTER (OUTPATIENT)
Facility: HOSPITAL | Age: 62
Setting detail: OUTPATIENT SURGERY
Discharge: HOME | End: 2022-11-29
Attending: UROLOGY | Admitting: UROLOGY

## 2022-11-29 VITALS
BODY MASS INDEX: 30.24 KG/M2 | SYSTOLIC BLOOD PRESSURE: 113 MMHG | WEIGHT: 216 LBS | DIASTOLIC BLOOD PRESSURE: 65 MMHG | RESPIRATION RATE: 16 BRPM | HEIGHT: 71 IN | HEART RATE: 70 BPM | OXYGEN SATURATION: 93 % | TEMPERATURE: 96.5 F

## 2022-11-29 DIAGNOSIS — C61 PROSTATE CANCER (HCC): Primary | ICD-10-CM

## 2022-11-29 DIAGNOSIS — R97.20 ELEVATED PROSTATE SPECIFIC ANTIGEN (PSA): ICD-10-CM

## 2022-11-29 RX ORDER — PROPOFOL 10 MG/ML
INJECTION, EMULSION INTRAVENOUS AS NEEDED
Status: DISCONTINUED | OUTPATIENT
Start: 2022-11-29 | End: 2022-11-29

## 2022-11-29 RX ORDER — CEPHALEXIN 500 MG/1
500 CAPSULE ORAL EVERY 6 HOURS SCHEDULED
Qty: 12 CAPSULE | Refills: 0 | Status: SHIPPED | OUTPATIENT
Start: 2022-11-29 | End: 2022-12-02

## 2022-11-29 RX ORDER — LIDOCAINE HYDROCHLORIDE 20 MG/ML
INJECTION, SOLUTION EPIDURAL; INFILTRATION; INTRACAUDAL; PERINEURAL AS NEEDED
Status: DISCONTINUED | OUTPATIENT
Start: 2022-11-29 | End: 2022-11-29 | Stop reason: HOSPADM

## 2022-11-29 RX ORDER — SODIUM CHLORIDE 9 MG/ML
INJECTION, SOLUTION INTRAVENOUS CONTINUOUS PRN
Status: DISCONTINUED | OUTPATIENT
Start: 2022-11-29 | End: 2022-11-29

## 2022-11-29 RX ORDER — ONDANSETRON 4 MG/1
4 TABLET, ORALLY DISINTEGRATING ORAL ONCE
Status: COMPLETED | OUTPATIENT
Start: 2022-11-29 | End: 2022-11-29

## 2022-11-29 RX ORDER — EPHEDRINE SULFATE 50 MG/ML
INJECTION INTRAVENOUS AS NEEDED
Status: DISCONTINUED | OUTPATIENT
Start: 2022-11-29 | End: 2022-11-29

## 2022-11-29 RX ORDER — FENTANYL CITRATE 50 UG/ML
INJECTION, SOLUTION INTRAMUSCULAR; INTRAVENOUS AS NEEDED
Status: DISCONTINUED | OUTPATIENT
Start: 2022-11-29 | End: 2022-11-29

## 2022-11-29 RX ORDER — HYDROCODONE BITARTRATE AND ACETAMINOPHEN 5; 325 MG/1; MG/1
1 TABLET ORAL EVERY 6 HOURS PRN
Status: DISCONTINUED | OUTPATIENT
Start: 2022-11-29 | End: 2022-11-30 | Stop reason: HOSPADM

## 2022-11-29 RX ORDER — CEFAZOLIN SODIUM 2 G/50ML
2000 SOLUTION INTRAVENOUS ONCE
Status: COMPLETED | OUTPATIENT
Start: 2022-11-29 | End: 2022-11-29

## 2022-11-29 RX ADMIN — PROPOFOL 50 MG: 10 INJECTION, EMULSION INTRAVENOUS at 09:11

## 2022-11-29 RX ADMIN — CEFAZOLIN SODIUM 2000 MG: 2 SOLUTION INTRAVENOUS at 08:47

## 2022-11-29 RX ADMIN — EPHEDRINE SULFATE 5 MG: 50 INJECTION INTRAVENOUS at 09:10

## 2022-11-29 RX ADMIN — PROPOFOL 70 MG: 10 INJECTION, EMULSION INTRAVENOUS at 08:56

## 2022-11-29 RX ADMIN — FENTANYL CITRATE 25 MCG: 50 INJECTION INTRAMUSCULAR; INTRAVENOUS at 09:07

## 2022-11-29 RX ADMIN — FENTANYL CITRATE 25 MCG: 50 INJECTION INTRAMUSCULAR; INTRAVENOUS at 08:56

## 2022-11-29 RX ADMIN — PROPOFOL 40 MG: 10 INJECTION, EMULSION INTRAVENOUS at 09:07

## 2022-11-29 RX ADMIN — SODIUM CHLORIDE: 0.9 INJECTION, SOLUTION INTRAVENOUS at 08:47

## 2022-11-29 RX ADMIN — FENTANYL CITRATE 25 MCG: 50 INJECTION INTRAMUSCULAR; INTRAVENOUS at 09:13

## 2022-11-29 RX ADMIN — PROPOFOL 30 MG: 10 INJECTION, EMULSION INTRAVENOUS at 08:58

## 2022-11-29 RX ADMIN — FENTANYL CITRATE 25 MCG: 50 INJECTION INTRAMUSCULAR; INTRAVENOUS at 09:02

## 2022-11-29 RX ADMIN — ONDANSETRON 4 MG: 4 TABLET, ORALLY DISINTEGRATING ORAL at 10:29

## 2022-11-29 RX ADMIN — PROPOFOL 40 MG: 10 INJECTION, EMULSION INTRAVENOUS at 09:02

## 2022-11-29 RX ADMIN — PROPOFOL 50 MG: 10 INJECTION, EMULSION INTRAVENOUS at 09:00

## 2022-11-29 NOTE — H&P
H&P Exam - Urology   Claudia Ramirez 1960 [id number]      Assessment/Plan     Assessment:  History of [place] 6 prostate cancer, 2 cores on the left side on biopsy 9/19/2022, PSA up to 9 8  Here for confirmatory biopsy  MRI negative but showed a small polypoid lesion in the bladder  Plan:  Transperineal prostate biopsy with transrectal ultrasound guidance  No need for MRI  Patient has an appointment December 19th for cystoscopy for the bladder lesion  History of Present Illness   HPI:  The patient presents for transperineal prostate biopsy to check for staging grade migration with [place] 6 prostate cancer 2 cores on the left side  The risks of bleeding, infection, urinary retention and need for additional procedures has been explained and informed consent given  Past Medical History:   Diagnosis Date   • Abnormal WBC count 5/6/2021   • Adenocarcinoma of prostate ([facility] [facility] ) 3/27/2020   • Gold's esophagus 1/4/2021    EGD 01/04/21 recommend to repeat in 3 y   • Colon polyp    • Coronary artery disease    • Elevated PSA    • Fever 11/24/2020   • Gastroesophageal reflux disease without esophagitis 12/13/2019   • Hypercholesterolemia    • Hypertension    • Inferior MI ([facility] [facility] ) 4/7/2016   • LLQ pain 10/13/2020   • LUQ pain 11/5/2019   • Myocardial infarction [facility]) 2011   • [facility] 3/19/2020   • Swelling 8/25/2021   • UTI (urinary tract infection) 3/18/2020       Past Surgical History:   Procedure Laterality Date   • COLONOSCOPY     • CORONARY STENT PLACEMENT      bare metal stent in Rt coronary artery   • SHOULDER SURGERY Left          Review of systems:    General: No fever  CVS: No chest pain or new SOB  Abdomen: No diarrhea or blood in stool  : No new voiding difficulties  Neuro: No syncope/weakness/loss of sensation/paresis  Ophthalmologic: No new visual changes  Ortho: No new back/joint pains  Social History- as per previous notes          Family History: non-contributory    Meds/Allergies   PTA meds:   Prior to Admission Medications   Prescriptions Last Dose Informant Patient Reported? Taking? Evolocumab 140 MG/ML SOAJ   No No   Sig: Inject 1 mL (140 mg total) under the skin every 14 (fourteen) days   aspirin 81 mg chewable tablet   Yes No   Sig: Chew 1 tablet daily   atorvastatin (LIPITOR) 10 mg tablet   No No   Sig: TAKE 1 TABLET BY MOUTH DAILY   famotidine (PEPCID) 40 MG tablet   No No   Sig: Take 1 tablet (40 mg total) by mouth daily   lisinopril (ZESTRIL) 10 mg tablet   No No   Sig: Take 1 tablet (10 mg total) by mouth daily   methocarbamol (ROBAXIN) 500 mg tablet   No No   Sig: Take 1 tablet (500 mg total) by mouth 2 (two) times a day   metoprolol succinate (TOPROL-XL) 25 mg 24 hr tablet   No No   Sig: TAKE ONE-HALF TABLET BY MOUTH DAILY   pantoprazole (PROTONIX) 40 mg tablet   No No   Sig: TAKE 1 TABLET DAILY BEFORE BREAKFAST      Facility-Administered Medications: None         Objective   Vitals: There were no vitals taken for this visit  No intake/output data recorded  Physical Exam:    Awake, alert, in NAD  HEENT: Atraumatic, Normocephalic    Lungs: Normal Respiratory effort, no wheezes,stridor or rales  CVS- RRR    Abdomen: Nondistended  Extremiites: Normal ROM, no cyanosis/edema  Lab Results: I have personally reviewed pertinent reports  Imaging: I have personally reviewed pertinent reports

## 2022-11-29 NOTE — OP NOTE
OPERATIVE REPORT  PATIENT NAME: Peter Carr    :    MRN: [id number]  Pt Location: [facility] GI ROOM 01    SURGERY DATE: 2022    Surgeon(s) and Role:     Zena Devine MD - Primary    Preop Diagnosis:  Elevated prostate specific antigen (PSA) [R97 20] negative MRI, prostate cancer [place] 6 on the left on active surveillance    Post-Op Diagnosis Codes:     * Elevated prostate specific antigen (PSA) [R97 20] same    Procedures:  Transperineal prostate biopsy with transrectal ultrasound guidance    Specimen(s):  ID Type Source Tests Collected by Time Destination   1 : Right Anterior Medial Tissue Prostate TISSUE EXAM Mady Su MD 2022 [id number]    2 : Right Anterior Lateral x2 Tissue Prostate TISSUE EXAM Mady Su MD 2022 0905    3 : Left Anterior Medial x2 Tissue Prostate TISSUE EXAM Mady Su MD 2022 0905    4 : Left Anterior Lateral x3 Tissue Prostate TISSUE EXAM Mady Su MD 2022 0905    5 : Left Posterior Lateral x3 Tissue Prostate TISSUE EXAM Mady Su MD 2022 0905    6 : Left Posterior Medial x2 Tissue Prostate TISSUE EXAM Mady Su MD 2022 [id number]    7 : Left Base x2 Tissue Prostate TISSUE EXAM Mady Su MD 2022 0905    8 : Right Posterior Medial Tissue Prostate TISSUE EXAM Mady Su MD 2022 0905    9 : Right Base Tissue Prostate TISSUE EXAM Mady Su MD 2022 0905    10 : Right Posterior Lateral x3 Tissue Prostate TISSUE EXAM Mady Su MD 2022 [id number]        Estimated Blood Loss:   Minimal    Drains:  * No LDAs found *    Anesthesia Type:   IV Sedation with Anesthesia    Operative Indications:  [place] 6 prostate cancer on the left on active surveillance    Operative Findings:  Large gland, no overt findings  Approximately 25 cores taken  overall      Complications:   None    Procedure and Technique:  The patient is  is here for transperineal prostate biopsy guided by biplanar transrectal ultrasound for confirmation on active surveillance with rising PSA-has [place] 6 prostate cancer on the left diagnosed 2 years ago    The procedure, as well as the risks of bleeding, infection, and urinary retention have been explained he gives informed consent  The patient was brought to the operating room and identified properly  IV sedation was induced, and he was placed in the dorsal lithotomy position  The perineum was shaved, a ChloraPrep scrub was used of the perineum and anal regions, and dried  A large Tegaderm was used to tape the scrotum in a cranial direction to keep it out of the way the perineum  A time-out was performed  Care was taken when placing the patient in the dorsal lithotomy position to make sure all pressure points were protected     I then prepared the biplanar ultrasound probe with ultrasound gel covering mostly the distal sensor, and covered it with a condom  The precision Point kit was secured to this  The condom was secured , and I placed the transrectal ultrasound probe into the rectum and visualized the prostate in sagittal and transverse views  This was used with a split screen    The perineum was anesthetized with 2% xylocaine 10 cc both superficially and deep with a spinal needle on both sides of the median raphe through the central aperture of the precision Point kit  I then placed the introducer needle through the upper apertures to start taking the anterior portions of the peripheral zone of the prostate  Samples were taken right anterior medial, right anterior lateral, then left anterior medial left anterior lateral   The biopsy needle was dipped in sterile water in between each biopsy  I then switched to the lower apertures and performed bilateral posterior lateral zone, posterior medial zone and base biopsies       These were all peripheral zone biopsies  Extra biopsies were taken in zones where the initial biopsy was suboptimal tissue    Care was taken to try to include the entire length of the prostate as much as possible for complete coverage  Excellent prostate tissue cores were obtained, and specimens were sent to pathology  I withdrew the guide needle and I held pressure on the perineum for 1 minutes using a folded towel  The perineum was then cleansed with sterile water     I was present for the entire procedure and A qualified resident physician was not available    Patient Disposition:  PACU  and hemodynamically stable        SIGNATURE: Felicitas Neves MD  DATE: November 29, 2022  TIME: 9:18 AM

## 2022-11-29 NOTE — ANESTHESIA PREPROCEDURE EVALUATION
Procedure:  TRANSPERINEAL MRI FUSION  BIOPSY PROSTATE (Perineum)    Relevant Problems   CARDIO   (+) CAD in native artery   (+) Essential hypertension   (+) Hyperlipidemia      GI/HEPATIC   (+) Fatty liver   (+) Gastroesophageal reflux disease without esophagitis      /RENAL   (+) Benign prostatic hyperplasia without lower urinary tract symptoms   (+) Prostate cancer (HCC)   (+) Prostate nodule without urinary obstruction      MUSCULOSKELETAL   (+) Chronic left-sided low back pain without sciatica      NEURO/PSYCH   (+) Chronic left-sided low back pain without sciatica   (+) History of acute inferior wall MI      "1  CAD, asymptomatic  A  Inferior MI 2011, BMS RCA   B  St echo 2017 no ischemia"      Physical Exam    Airway    Mallampati score: II         Dental       Cardiovascular  Rate: normal,     Pulmonary  Pulmonary exam normal     Other Findings        Anesthesia Plan  ASA Score- 3     Anesthesia Type- IV sedation with anesthesia with ASA Monitors  Additional Monitors:   Airway Plan:           Plan Factors-    Chart reviewed  EKG reviewed  Existing labs reviewed  Patient summary reviewed  Induction- intravenous  Postoperative Plan-     Informed Consent- Anesthetic plan and risks discussed with patient  I personally reviewed this patient with the CRNA  Discussed and agreed on the Anesthesia Plan with the CRNA  Yonny Wade

## 2022-12-19 ENCOUNTER — PROCEDURE VISIT (OUTPATIENT)
Dept: UROLOGY | Facility: MEDICAL CENTER | Age: 62
End: 2022-12-19

## 2022-12-19 ENCOUNTER — TELEPHONE (OUTPATIENT)
Dept: UROLOGY | Facility: CLINIC | Age: 62
End: 2022-12-19

## 2022-12-19 VITALS
BODY MASS INDEX: 30.52 KG/M2 | OXYGEN SATURATION: 98 % | DIASTOLIC BLOOD PRESSURE: 80 MMHG | HEIGHT: 71 IN | WEIGHT: 218 LBS | SYSTOLIC BLOOD PRESSURE: 140 MMHG | HEART RATE: 59 BPM

## 2022-12-19 DIAGNOSIS — N32.0 BLADDER OUTLET OBSTRUCTION: ICD-10-CM

## 2022-12-19 DIAGNOSIS — N32.9 LESION OF URINARY BLADDER: Primary | ICD-10-CM

## 2022-12-19 DIAGNOSIS — C61 PROSTATE CANCER (HCC): ICD-10-CM

## 2022-12-19 NOTE — PROGRESS NOTES
HISTORY:    #1  Prostate cancer and recent fusion biopsy  2 foci of Bora 6, see report for details    2  MRI showed bladder lesion    3  Significant bladder outlet obstruction symptoms of slower flow urgency nocturia x2  Cystoscopy     Date/Time 12/19/2022 11:15 AM     Performed by  Jessica Cortes MD     Authorized by Jessica Cortes MD          Procedure Details:  Procedure type: cystoscopy    Patient tolerance: Patient tolerated the procedure well with no immediate complications    Additional Procedure Details:      Patient presents for cystoscopy  I have discussed the reasons for doing the exam, and the potential risks and complications  Patient expressed understanding, and signed informed consent document  The patient was carefully  positioned supine on the examining table  Sterile preparation was performed on the urethra  Xylocaine jelly was instilled and left  Indwelling for the procedure  The 13 [employer] flexible cystoscope was passed with the following findings:      Urethra: No stricture    Prostate:  lateral lobes severely enlarged, and median lobe moderate  Bladder: Severe trabeculation, papillary tumor right lateral wall at 8:00 just inside bladder neck, no lesions, tumor, or stones  Residual urine: Minimal    Patient tolerated the procedure well and was escorted from the examining table  ASSESSMENT / PLAN:    #1  Bladder tumor very suspicious for bladder cancer  Needs TUR bladder tumor, retrogrades, gemcitabine  2   Minimal Bora 6 prostate cancer, he is quite comfortable with active surveillance    3  That will require PSAs every 6 months, rebiopsy when indicated  4   Prostate enlarged, significant bladder outlet obstruction, he would like to observe it for now    Tamsulosin will be an option    The following portions of the patient's history were reviewed and updated as appropriate: allergies, current medications, past family history, past medical history, past social history, past surgical history and problem list     Review of Systems      Objective:     Physical Exam      0   Lab Value Date/Time    PSA 9 8 (H) 09/07/2022 0635    PSA 6 5 (H) 04/21/2022 0636    PSA 6 2 (H) 05/14/2021 0857    PSA 6 5 (H) 12/03/2019 0709    PSA 5 4 (H) 11/07/2019 0904   ]  BUN   Date Value Ref Range Status   11/15/2022 21 5 - 25 mg/dL Final   03/30/2018 26 (H) 7 - 25 mg/dL Final     Creatinine   Date Value Ref Range Status   11/15/2022 1 18 0 60 - 1 30 mg/dL Final     Comment:     Standardized to IDMS reference method     No components found for: CBC      Patient Active Problem List   Diagnosis   • CAD in native artery   • History of acute inferior wall MI   • Presence of bare metal stent in right coronary artery   • Essential hypertension   • Hyperlipidemia   • Chronic left-sided low back pain without sciatica   • Class 1 obesity due to excess calories with serious comorbidity and body mass index (BMI) of 31 0 to 31 9 in adult   • Fatty liver   • Benign prostatic hyperplasia without lower urinary tract symptoms   • Gastroesophageal reflux disease without esophagitis   • Abnormal prostate specific antigen (PSA)   • Prostate nodule without urinary obstruction   • Bacteremia due to Escherichia coli   • Bursitis of shoulder   • Contusion of left shoulder   • Injury of tendon of rotator cuff   • UTI (urinary tract infection)   • Prostate cancer ([facility] )   • Encounter for well adult exam with abnormal findings   • Myalgia with higher doses statins   • Colon polyp   • COVID-19 virus infection   • Gold's esophagus   • Bloating   • Flank pain   • IFG (impaired fasting glucose)   • Left upper quadrant abdominal mass   • Abnormal urine findings        Diagnoses and all orders for this visit:    Lesion of urinary bladder  -     POCT urine dip auto non-scope  -     Case request operating room: TRANSURETHRAL RESECTION OF BLADDER TUMOR (TURBT), bilateral retrograde pyelograms, gemcitabine instillation; Standing  -     Case request operating room: TRANSURETHRAL RESECTION OF BLADDER TUMOR (TURBT), bilateral retrograde pyelograms, gemcitabine instillation    Prostate cancer ([facility] [address] [facility] )    Bladder outlet obstruction    Other orders  -     Cystoscopy  -     Diet NPO; Sips with meds; Standing  -     Place sequential compression device; Standing           Patient ID: Deepak Hernandez is a 57 y o  male        Current Outpatient Medications:   •  aspirin 81 mg chewable tablet, Chew 1 tablet daily, Disp: , Rfl:   •  atorvastatin (LIPITOR) 10 mg tablet, TAKE 1 TABLET BY MOUTH DAILY, Disp: 90 tablet, Rfl: 3  •  Evolocumab 140 MG/ML SOAJ, Inject 1 mL (140 mg total) under the skin every 14 (fourteen) days, Disp: 6 mL, Rfl: 3  •  famotidine (PEPCID) 40 MG tablet, Take 1 tablet (40 mg total) by mouth daily, Disp: 90 tablet, Rfl: 0  •  lisinopril (ZESTRIL) 10 mg tablet, Take 1 tablet (10 mg total) by mouth daily, Disp: 90 tablet, Rfl: 3  •  metoprolol succinate (TOPROL-XL) 25 mg 24 hr tablet, TAKE ONE-HALF TABLET BY MOUTH DAILY, Disp: 45 tablet, Rfl: 3    Past Medical History:   Diagnosis Date   • Abnormal WBC count 5/6/2021   • Adenocarcinoma of prostate ([facility] ) 3/27/2020   • Gold's esophagus 1/4/2021    EGD 01/04/21 recommend to repeat in 3 y   • Colon polyp    • Coronary artery disease    • Elevated PSA    • Fever 11/24/2020   • Gastroesophageal reflux disease without esophagitis 12/13/2019   • Hypercholesterolemia    • Hypertension    • Inferior MI ([facility] ) 4/7/2016   • LLQ pain 10/13/2020   • LUQ pain 11/5/2019   • Myocardial infarction [facility]) 2011   • Sepsis due to Escherichia coli ([facility] ) 3/19/2020   • Swelling 8/25/2021   • UTI (urinary tract infection) 3/18/2020       Past Surgical History:   Procedure Laterality Date   • COLONOSCOPY     • CORONARY STENT PLACEMENT      bare metal stent in Rt coronary artery   • KS BIOPSY OF PROSTATE,NEEDLE,TRANSPERINEAL N/A 11/29/2022    Procedure: TRANSPERINEAL MRI FUSION BIOPSY PROSTATE;  Surgeon: Blayne Robertson MD;  Location: [facility];  Service: Urology   • SHOULDER SURGERY Left        Social History

## 2022-12-20 ENCOUNTER — PREP FOR PROCEDURE (OUTPATIENT)
Dept: UROLOGY | Facility: CLINIC | Age: 62
End: 2022-12-20

## 2022-12-20 DIAGNOSIS — N32.9 LESION OF URINARY BLADDER: Primary | ICD-10-CM

## 2022-12-20 NOTE — TELEPHONE ENCOUNTER
Patient called stating he is returning call to Surgical Scheduler       Patient can be reached at [phone]

## 2022-12-20 NOTE — TELEPHONE ENCOUNTER
Spoke w patient and he is sched for 2/7 at the [facility] Dr Fred Camara  He has had prior procedure before and is aware of surgical protocol  He will go for CBC/BMP,UCX and EKG on 1/23 and we will mail him a surgical packet  He is aware to contact us w any questions or concerns

## 2023-01-06 ENCOUNTER — OFFICE VISIT (OUTPATIENT)
Dept: FAMILY MEDICINE CLINIC | Facility: CLINIC | Age: 63
End: 2023-01-06

## 2023-01-06 VITALS
RESPIRATION RATE: 16 BRPM | SYSTOLIC BLOOD PRESSURE: 146 MMHG | OXYGEN SATURATION: 99 % | BODY MASS INDEX: 31.26 KG/M2 | DIASTOLIC BLOOD PRESSURE: 80 MMHG | WEIGHT: 218.4 LBS | HEART RATE: 52 BPM | HEIGHT: 70 IN | TEMPERATURE: 97.2 F

## 2023-01-06 DIAGNOSIS — C61 MALIGNANT NEOPLASM OF PROSTATE (HCC): ICD-10-CM

## 2023-01-06 DIAGNOSIS — I10 ESSENTIAL HYPERTENSION: ICD-10-CM

## 2023-01-06 DIAGNOSIS — N32.9 LESION OF URINARY BLADDER: ICD-10-CM

## 2023-01-06 DIAGNOSIS — E78.2 MIXED HYPERLIPIDEMIA: ICD-10-CM

## 2023-01-06 DIAGNOSIS — R00.1 BRADYCARDIA: Primary | ICD-10-CM

## 2023-01-06 DIAGNOSIS — R73.01 IFG (IMPAIRED FASTING GLUCOSE): ICD-10-CM

## 2023-01-06 RX ORDER — LISINOPRIL 20 MG/1
20 TABLET ORAL DAILY
Qty: 90 TABLET | Refills: 0 | Status: SHIPPED | OUTPATIENT
Start: 2023-01-06

## 2023-01-06 RX ORDER — ASPIRIN 81 MG/1
81 TABLET ORAL DAILY
COMMUNITY

## 2023-01-06 NOTE — PROGRESS NOTES
Name: Paula Morales      : [id number]      MRN: [id number]  Encounter Provider: Aixa Hanson MD  Encounter Date: 2023   Encounter department: [place] [address]  Bradycardia  Assessment & Plan:  new finding on vital sign EKG in the office social heart rate of 48  Patient sometimes feels very dizzy and he is currently on metoprolol recommended to stop the metoprolol we will continue monitor heart rate we will reevaluate in 2 weeks in case short of breath or presyncope to call the office or go to ER    Orders:  -     POCT ECG  -     CBC and differential; Future  -     Comprehensive metabolic panel; Future  -     Lipid Panel with Direct LDL reflex; Future  -     TSH, 3rd generation with Free T4 reflex; Future  -     POCT ECG    2  Lesion of urinary bladder  Assessment & Plan:  Incidental finding on MRI ,patient f/up with Urology already schedule for procedure on 23      3  Malignant neoplasm of prostate [facility])  Assessment & Plan:  F/up with URology    Orders:  -     CBC and differential; Future  -     Comprehensive metabolic panel; Future  -     Lipid Panel with Direct LDL reflex; Future  -     TSH, 3rd generation with Free T4 reflex; Future    4  Essential hypertension  Assessment & Plan:  Chronic uncontrol secondary to bradycardia with the patient stopped metoprolol plan to increase lisinopril from 10 to 20 mg a day proper use and possible side effect discussed with patient low-salt diet reviewed    Orders:  -     lisinopril (ZESTRIL) 20 mg tablet; Take 1 tablet (20 mg total) by mouth daily  -     CBC and differential; Future  -     Comprehensive metabolic panel; Future  -     Lipid Panel with Direct LDL reflex; Future  -     TSH, 3rd generation with Free T4 reflex; Future    5  IFG (impaired fasting glucose)  -     CBC and differential; Future  -     Comprehensive metabolic panel; Future  -     Lipid Panel with Direct LDL reflex;  Future  -     TSH, 3rd generation with Free T4 reflex; Future    6  Mixed hyperlipidemia  -     CBC and differential; Future  -     Comprehensive metabolic panel; Future  -     Lipid Panel with Direct LDL reflex; Future  -     TSH, 3rd generation with Free T4 reflex; Future           Subjective      Patient here for follow-up recently seen last time patient follow-up with urology for prostate cancer he had MRI of the pelvis and there is incidental finding of nodule in the wall of the bladder he already had a cystoscopy and will be scheduling or proceed right-sided patient blood pressure uncontrolled he is asymptomatic denying chest pain shortness of palpitation or dyspnea on exertion vital signs show his heart rate is  Bradycardia mention to the patient has about the symptoms sometimes he will feel very dizzy and lightheaded and sometimes he feels fatigue    Review of Systems   Constitutional: Negative for chills and fever  HENT: Negative for ear pain and sore throat  Eyes: Negative for pain and visual disturbance  Respiratory: Negative for cough and shortness of breath  Cardiovascular: Negative for chest pain and palpitations  Gastrointestinal: Negative for abdominal pain and vomiting  Genitourinary: Negative for dysuria and hematuria  Musculoskeletal: Negative for arthralgias and back pain  Skin: Negative for color change and rash  Neurological: Negative for seizures and syncope  All other systems reviewed and are negative        Current Outpatient Medications on File Prior to Visit   Medication Sig   • aspirin (ECOTRIN LOW STRENGTH) 81 mg EC tablet Take 81 mg by mouth daily   • atorvastatin (LIPITOR) 10 mg tablet TAKE 1 TABLET BY MOUTH DAILY   • famotidine (PEPCID) 40 MG tablet Take 1 tablet (40 mg total) by mouth daily   • [DISCONTINUED] lisinopril (ZESTRIL) 10 mg tablet Take 1 tablet (10 mg total) by mouth daily   • [DISCONTINUED] metoprolol succinate (TOPROL-XL) 25 mg 24 hr tablet TAKE ONE-HALF TABLET BY MOUTH DAILY   • Evolocumab 140 MG/ML SOAJ Inject 1 mL (140 mg total) under the skin every 14 (fourteen) days (Patient not taking: Reported on 1/6/2023)   • [DISCONTINUED] aspirin 81 mg chewable tablet Chew 1 tablet daily (Patient not taking: Reported on 1/6/2023)       Objective     /80   Pulse (!) 52   Temp (!) 97 2 °F (36 2 °C) (Tympanic)   Resp 16   Ht 5' 10" (1 778 m)   Wt 99 1 kg (218 lb 6 4 oz)   SpO2 99%   BMI 31 34 kg/m²     Physical Exam  Constitutional:       General: He is not in acute distress  Appearance: He is well-developed  He is not diaphoretic  HENT:      Head: Normocephalic  Right Ear: External ear normal       Left Ear: External ear normal    Eyes:      General:         Right eye: No discharge  Left eye: No discharge  Conjunctiva/sclera: Conjunctivae normal    Neck:      Vascular: No JVD  Cardiovascular:      Rate and Rhythm: Regular rhythm  Bradycardia present  Heart sounds: Normal heart sounds  No murmur heard  No gallop  Pulmonary:      Effort: Pulmonary effort is normal  No respiratory distress  Breath sounds: Normal breath sounds  No stridor  No wheezing or rales  Chest:      Chest wall: No tenderness  Abdominal:      General: There is no distension  Palpations: Abdomen is soft  There is no mass  Tenderness: There is no abdominal tenderness  There is no rebound  Musculoskeletal:         General: No tenderness  Cervical back: Normal range of motion and neck supple  Lymphadenopathy:      Cervical: No cervical adenopathy  Skin:     General: Skin is warm  Findings: No erythema or rash  Neurological:      Mental Status: He is alert and oriented to person, place, and time         Hermelindo Jackson MD

## 2023-01-06 NOTE — ASSESSMENT & PLAN NOTE
new finding on vital sign EKG in the office social heart rate of 48    Patient sometimes feels very dizzy and he is currently on metoprolol recommended to stop the metoprolol we will continue monitor heart rate we will reevaluate in 2 weeks in case short of breath or presyncope to call the office or go to ER

## 2023-01-06 NOTE — ASSESSMENT & PLAN NOTE
Chronic uncontrol secondary to bradycardia with the patient stopped metoprolol plan to increase lisinopril from 10 to 20 mg a day proper use and possible side effect discussed with patient low-salt diet reviewed

## 2023-01-13 ENCOUNTER — RA CDI HCC (OUTPATIENT)
Dept: OTHER | Facility: HOSPITAL | Age: 63
End: 2023-01-13

## 2023-01-13 NOTE — PROGRESS NOTES
[facility]  coding opportunities       Chart reviewed, no opportunity found: CHART REVIEWED, NO OPPORTUNITY FOUND        Patients Insurance        Commercial Insurance: [address] [place] [address]

## 2023-01-16 ENCOUNTER — TELEPHONE (OUTPATIENT)
Dept: UROLOGY | Facility: MEDICAL CENTER | Age: 63
End: 2023-01-16

## 2023-01-17 ENCOUNTER — APPOINTMENT (OUTPATIENT)
Dept: LAB | Facility: MEDICAL CENTER | Age: 63
End: 2023-01-17

## 2023-01-17 DIAGNOSIS — R00.1 BRADYCARDIA: ICD-10-CM

## 2023-01-17 DIAGNOSIS — E78.2 MIXED HYPERLIPIDEMIA: ICD-10-CM

## 2023-01-17 DIAGNOSIS — I10 ESSENTIAL HYPERTENSION: ICD-10-CM

## 2023-01-17 DIAGNOSIS — C61 MALIGNANT NEOPLASM OF PROSTATE (HCC): ICD-10-CM

## 2023-01-17 DIAGNOSIS — R73.01 IFG (IMPAIRED FASTING GLUCOSE): ICD-10-CM

## 2023-01-17 LAB
ALBUMIN SERPL BCP-MCNC: 4.2 G/DL (ref 3.5–5)
ALP SERPL-CCNC: 78 U/L (ref 46–116)
ALT SERPL W P-5'-P-CCNC: 31 U/L (ref 12–78)
ANION GAP SERPL CALCULATED.3IONS-SCNC: 3 MMOL/L (ref 4–13)
AST SERPL W P-5'-P-CCNC: 18 U/L (ref 5–45)
BASOPHILS # BLD AUTO: 0.1 THOUSANDS/ÂΜL (ref 0–0.1)
BASOPHILS NFR BLD AUTO: 1 % (ref 0–1)
BILIRUB SERPL-MCNC: 0.65 MG/DL (ref 0.2–1)
BUN SERPL-MCNC: 21 MG/DL (ref 5–25)
CALCIUM SERPL-MCNC: 9 MG/DL (ref 8.3–10.1)
CHLORIDE SERPL-SCNC: 110 MMOL/L (ref 96–108)
CHOLEST SERPL-MCNC: 188 MG/DL
CO2 SERPL-SCNC: 28 MMOL/L (ref 21–32)
CREAT SERPL-MCNC: 1.15 MG/DL (ref 0.6–1.3)
EOSINOPHIL # BLD AUTO: 0.34 THOUSAND/ÂΜL (ref 0–0.61)
EOSINOPHIL NFR BLD AUTO: 5 % (ref 0–6)
ERYTHROCYTE [DISTWIDTH] IN BLOOD BY AUTOMATED COUNT: 12.9 % (ref 11.6–15.1)
GFR SERPL CREATININE-BSD FRML MDRD: 66 ML/MIN/1.73SQ M
GLUCOSE P FAST SERPL-MCNC: 98 MG/DL (ref 65–99)
HCT VFR BLD AUTO: 44 % (ref 36.5–49.3)
HDLC SERPL-MCNC: 40 MG/DL
HGB BLD-MCNC: 14.3 G/DL (ref 12–17)
IMM GRANULOCYTES # BLD AUTO: 0.02 THOUSAND/UL (ref 0–0.2)
IMM GRANULOCYTES NFR BLD AUTO: 0 % (ref 0–2)
LDLC SERPL CALC-MCNC: 113 MG/DL (ref 0–100)
LYMPHOCYTES # BLD AUTO: 1.99 THOUSANDS/ÂΜL (ref 0.6–4.47)
LYMPHOCYTES NFR BLD AUTO: 26 % (ref 14–44)
MCH RBC QN AUTO: 30.6 PG (ref 26.8–34.3)
MCHC RBC AUTO-ENTMCNC: 32.5 G/DL (ref 31.4–37.4)
MCV RBC AUTO: 94 FL (ref 82–98)
MONOCYTES # BLD AUTO: 0.63 THOUSAND/ÂΜL (ref 0.17–1.22)
MONOCYTES NFR BLD AUTO: 8 % (ref 4–12)
NEUTROPHILS # BLD AUTO: 4.52 THOUSANDS/ÂΜL (ref 1.85–7.62)
NEUTS SEG NFR BLD AUTO: 60 % (ref 43–75)
NRBC BLD AUTO-RTO: 0 /100 WBCS
PLATELET # BLD AUTO: 263 THOUSANDS/UL (ref 149–390)
PMV BLD AUTO: 10.2 FL (ref 8.9–12.7)
POTASSIUM SERPL-SCNC: 4.6 MMOL/L (ref 3.5–5.3)
PROT SERPL-MCNC: 7.4 G/DL (ref 6.4–8.4)
RBC # BLD AUTO: 4.67 MILLION/UL (ref 3.88–5.62)
SODIUM SERPL-SCNC: 141 MMOL/L (ref 135–147)
TRIGL SERPL-MCNC: 175 MG/DL
TSH SERPL DL<=0.05 MIU/L-ACNC: 0.74 UIU/ML (ref 0.45–4.5)
WBC # BLD AUTO: 7.6 THOUSAND/UL (ref 4.31–10.16)

## 2023-01-20 ENCOUNTER — OFFICE VISIT (OUTPATIENT)
Dept: FAMILY MEDICINE CLINIC | Facility: CLINIC | Age: 63
End: 2023-01-20

## 2023-01-20 VITALS
BODY MASS INDEX: 31.21 KG/M2 | WEIGHT: 218 LBS | TEMPERATURE: 96.8 F | HEIGHT: 70 IN | HEART RATE: 65 BPM | SYSTOLIC BLOOD PRESSURE: 148 MMHG | OXYGEN SATURATION: 97 % | DIASTOLIC BLOOD PRESSURE: 92 MMHG

## 2023-01-20 DIAGNOSIS — R00.1 BRADYCARDIA: ICD-10-CM

## 2023-01-20 DIAGNOSIS — I25.10 CAD IN NATIVE ARTERY: ICD-10-CM

## 2023-01-20 DIAGNOSIS — E78.2 MIXED HYPERLIPIDEMIA: ICD-10-CM

## 2023-01-20 DIAGNOSIS — I10 ESSENTIAL HYPERTENSION: Primary | ICD-10-CM

## 2023-01-20 RX ORDER — ATORVASTATIN CALCIUM 20 MG/1
20 TABLET, FILM COATED ORAL DAILY
Qty: 90 TABLET | Refills: 1 | Status: SHIPPED | OUTPATIENT
Start: 2023-01-20

## 2023-01-20 NOTE — PROGRESS NOTES
Name: Anayeli Bustamante      : [id number]      MRN: [id number]  Encounter Provider: Carmel Braxton MD  Encounter Date: 2023   Encounter department: [place] [address]  Essential hypertension  Assessment & Plan:  Chronic asymptomatic Blood pressurein office 148/92 ,I review with patient his log pressure #s running between 105 to 130 SBP ,and 65 to 80 DBP  Plan to continue current management  including lisinopril 20 mg once a day low-salt diet important to lose weight reviewed with the patient    Orders:  -     Comprehensive metabolic panel; Future  -     Lipid panel; Future    2  CAD in native artery  Assessment & Plan:  Chronic asymptomatic patient follow-up with cardiology periodically patient on statin aspirin and ACE inhibitor he is off of beta-blocker secondary to bradycardia      3  Mixed hyperlipidemia  Assessment & Plan:  Chronic asymptomatic LDL subtherapeutic and patient was coronary artery disease patient stopped the injectable method secondary to insurance coverage we will increase his atorvastatin to 20 mg a day low-fat diet reviewed    Orders:  -     atorvastatin (LIPITOR) 20 mg tablet; Take 1 tablet (20 mg total) by mouth daily  -     Comprehensive metabolic panel; Future  -     Lipid panel; Future    4  Bradycardia  Assessment & Plan:  Improve in heart rate and improve in symptom  ,patient will continue off Beta blocker             Subjective      Patient here follow-up with a chronic condition patient was recently in the office and heart rate was ranging in the 4-week confirmed by EKG patient was on beta-blocker which stopped the medication patient feels better no fatigue no dizziness   Recent blood work discuss with patient    Review of Systems   Constitutional: Negative for chills and fever  HENT: Negative for ear pain and sore throat  Eyes: Negative for pain and visual disturbance     Respiratory: Negative for cough and shortness of breath  Cardiovascular: Negative for chest pain and palpitations  Gastrointestinal: Negative for abdominal pain and vomiting  Genitourinary: Negative for dysuria and hematuria  Musculoskeletal: Negative for arthralgias and back pain  Skin: Negative for color change and rash  Neurological: Negative for seizures and syncope  All other systems reviewed and are negative  Current Outpatient Medications on File Prior to Visit   Medication Sig   • aspirin (ECOTRIN LOW STRENGTH) 81 mg EC tablet Take 81 mg by mouth daily   • lisinopril (ZESTRIL) 20 mg tablet Take 1 tablet (20 mg total) by mouth daily       Objective     /92   Pulse 65   Temp (!) 96 8 °F (36 °C) (Tympanic)   Ht 5' 10" (1 778 m)   Wt 98 9 kg (218 lb)   SpO2 97%   BMI 31 28 kg/m²     Physical Exam  Vitals and nursing note reviewed  Constitutional:       General: He is not in acute distress  Appearance: He is well-developed  He is not diaphoretic  HENT:      Head: Normocephalic  Right Ear: External ear normal       Left Ear: External ear normal       Nose: No congestion  Mouth/Throat:      Pharynx: No oropharyngeal exudate  Eyes:      General:         Right eye: No discharge  Left eye: No discharge  Conjunctiva/sclera: Conjunctivae normal    Neck:      Vascular: No JVD  Cardiovascular:      Rate and Rhythm: Normal rate and regular rhythm  Heart sounds: Normal heart sounds  No murmur heard  No gallop  Pulmonary:      Effort: Pulmonary effort is normal  No respiratory distress  Breath sounds: Normal breath sounds  No stridor  No wheezing or rales  Chest:      Chest wall: No tenderness  Abdominal:      General: There is no distension  Palpations: Abdomen is soft  There is no mass  Tenderness: There is no abdominal tenderness  There is no rebound  Musculoskeletal:         General: No tenderness  Cervical back: Normal range of motion and neck supple  Lymphadenopathy:      Cervical: No cervical adenopathy  Skin:     General: Skin is warm  Findings: No erythema or rash  Neurological:      Mental Status: He is alert and oriented to person, place, and time         Zara Cruz MD

## 2023-01-21 NOTE — ASSESSMENT & PLAN NOTE
Chronic asymptomatic patient follow-up with cardiology periodically patient on statin aspirin and ACE inhibitor he is off of beta-blocker secondary to bradycardia

## 2023-01-21 NOTE — ASSESSMENT & PLAN NOTE
Chronic asymptomatic LDL subtherapeutic and patient was coronary artery disease patient stopped the injectable method secondary to insurance coverage we will increase his atorvastatin to 20 mg a day low-fat diet reviewed

## 2023-01-21 NOTE — ASSESSMENT & PLAN NOTE
Chronic asymptomatic Blood pressurein office 148/92 ,I review with patient his log pressure #s running between 105 to 130 SBP ,and 65 to 80 DBP  Plan to continue current management  including lisinopril 20 mg once a day low-salt diet important to lose weight reviewed with the patient

## 2023-01-24 ENCOUNTER — APPOINTMENT (OUTPATIENT)
Dept: LAB | Facility: MEDICAL CENTER | Age: 63
End: 2023-01-24
Attending: UROLOGY

## 2023-01-24 ENCOUNTER — CLINICAL SUPPORT (OUTPATIENT)
Dept: URGENT CARE | Facility: MEDICAL CENTER | Age: 63
End: 2023-01-24
Attending: UROLOGY

## 2023-01-24 DIAGNOSIS — N32.9 LESION OF URINARY BLADDER: ICD-10-CM

## 2023-01-24 LAB
ANION GAP SERPL CALCULATED.3IONS-SCNC: 4 MMOL/L (ref 4–13)
ATRIAL RATE: 52 BPM
BASOPHILS # BLD AUTO: 0.12 THOUSANDS/ÂΜL (ref 0–0.1)
BASOPHILS NFR BLD AUTO: 2 % (ref 0–1)
BUN SERPL-MCNC: 24 MG/DL (ref 5–25)
CALCIUM SERPL-MCNC: 9.5 MG/DL (ref 8.3–10.1)
CHLORIDE SERPL-SCNC: 110 MMOL/L (ref 96–108)
CO2 SERPL-SCNC: 28 MMOL/L (ref 21–32)
CREAT SERPL-MCNC: 1.16 MG/DL (ref 0.6–1.3)
EOSINOPHIL # BLD AUTO: 0.34 THOUSAND/ÂΜL (ref 0–0.61)
EOSINOPHIL NFR BLD AUTO: 5 % (ref 0–6)
ERYTHROCYTE [DISTWIDTH] IN BLOOD BY AUTOMATED COUNT: 12.7 % (ref 11.6–15.1)
GFR SERPL CREATININE-BSD FRML MDRD: 66 ML/MIN/1.73SQ M
GLUCOSE P FAST SERPL-MCNC: 102 MG/DL (ref 65–99)
HCT VFR BLD AUTO: 44 % (ref 36.5–49.3)
HGB BLD-MCNC: 14.4 G/DL (ref 12–17)
IMM GRANULOCYTES # BLD AUTO: 0.02 THOUSAND/UL (ref 0–0.2)
IMM GRANULOCYTES NFR BLD AUTO: 0 % (ref 0–2)
LYMPHOCYTES # BLD AUTO: 2.24 THOUSANDS/ÂΜL (ref 0.6–4.47)
LYMPHOCYTES NFR BLD AUTO: 30 % (ref 14–44)
MCH RBC QN AUTO: 30.8 PG (ref 26.8–34.3)
MCHC RBC AUTO-ENTMCNC: 32.7 G/DL (ref 31.4–37.4)
MCV RBC AUTO: 94 FL (ref 82–98)
MONOCYTES # BLD AUTO: 0.62 THOUSAND/ÂΜL (ref 0.17–1.22)
MONOCYTES NFR BLD AUTO: 8 % (ref 4–12)
NEUTROPHILS # BLD AUTO: 4.18 THOUSANDS/ÂΜL (ref 1.85–7.62)
NEUTS SEG NFR BLD AUTO: 55 % (ref 43–75)
NRBC BLD AUTO-RTO: 0 /100 WBCS
P AXIS: 51 DEGREES
PLATELET # BLD AUTO: 262 THOUSANDS/UL (ref 149–390)
PMV BLD AUTO: 10.4 FL (ref 8.9–12.7)
POTASSIUM SERPL-SCNC: 4.6 MMOL/L (ref 3.5–5.3)
PR INTERVAL: 146 MS
QRS AXIS: 65 DEGREES
QRSD INTERVAL: 90 MS
QT INTERVAL: 410 MS
QTC INTERVAL: 381 MS
RBC # BLD AUTO: 4.67 MILLION/UL (ref 3.88–5.62)
SODIUM SERPL-SCNC: 142 MMOL/L (ref 135–147)
T WAVE AXIS: 41 DEGREES
VENTRICULAR RATE: 52 BPM
WBC # BLD AUTO: 7.52 THOUSAND/UL (ref 4.31–10.16)

## 2023-01-25 LAB — BACTERIA UR CULT: NORMAL

## 2023-01-31 RX ORDER — OMEPRAZOLE 20 MG/1
20 CAPSULE, DELAYED RELEASE ORAL EVERY EVENING
COMMUNITY

## 2023-01-31 NOTE — PRE-PROCEDURE INSTRUCTIONS
Pre-Surgery Instructions:   Medication Instructions   • aspirin (ECOTRIN LOW STRENGTH) 81 mg EC tablet Instructions provided by MD   • atorvastatin (LIPITOR) 20 mg tablet Take night before surgery   • lisinopril (ZESTRIL) 20 mg tablet Take night before surgery   • omeprazole (PriLOSEC) 20 mg delayed release capsule Take night before surgery   Covid screening negative as per patient  Reviewed pre op medicine and showering instructions with patient via phone call, verbalizes understanding  Advised patient to stop taking non prescribed vitamins, herbal meds and NSAID's 7 days pre op  Advised may take Tylenol products if needed  Advised to take DOS medicine with a small sip water  Advised NPO after MN prior to surgery and surgical services will call (2/6) with scheduled time of hospital arrival     Advised to notify surgeon's office of any change in health status from now until surgery  Pt verbalized understanding of all instructions  Patient not interested in referral to sleep medicine

## 2023-02-05 ENCOUNTER — ANESTHESIA EVENT (OUTPATIENT)
Dept: PERIOP | Facility: HOSPITAL | Age: 63
End: 2023-02-05

## 2023-02-06 NOTE — H&P
HISTORY AND PHYSICAL  ? ? Patient Name: Rustam Dave  Patient MRN: [id number]  Attending Provider: Shae Schaefer MD  Service: Urology  Chief Complaint    Bladder tumor  HPI   Rustam Dave is a 57 y o  male with hematuria, cystoscopy showing:  Bladder: Severe trabeculation, papillary tumor right lateral wall at 8:00 just inside bladder neck, no lesions, tumor, or stones  I plan cystoscopy, TUR bladder tumor, bilateral retrograde pyelograms, gemcitabine instillation  Potential risks and complications discussed, and informed consent was given by the patient  Medications  Meds/Allergies   No current facility-administered medications for this encounter  Cannot display prior to admission medications because the patient has not been admitted in this contact  No current facility-administered medications for this encounter      Current Outpatient Medications:   •  aspirin (ECOTRIN LOW STRENGTH) 81 mg EC tablet, Take 81 mg by mouth every evening, Disp: , Rfl:   •  atorvastatin (LIPITOR) 20 mg tablet, Take 1 tablet (20 mg total) by mouth daily (Patient taking differently: Take 20 mg by mouth every evening), Disp: 90 tablet, Rfl: 1  •  lisinopril (ZESTRIL) 20 mg tablet, Take 1 tablet (20 mg total) by mouth daily (Patient taking differently: Take 20 mg by mouth every evening), Disp: 90 tablet, Rfl: 0  •  omeprazole (PriLOSEC) 20 mg delayed release capsule, Take 20 mg by mouth every evening, Disp: , Rfl:   Review of Systems  10 point review of systems negative except as noted in HPI  Allergies  No Known Allergies  PMH  Past Medical History:   Diagnosis Date   • Abnormal WBC count 05/06/2021   • Adenocarcinoma of prostate ([facility] [place] [address] ) 03/27/2020   • Gold's esophagus 01/04/2021    EGD 01/04/21 recommend to repeat in 3 y   • Colon polyp    • Coronary artery disease    • COVID 11/24/2020   • Elevated PSA    • Fever 11/24/2020   • Gastroesophageal reflux disease without esophagitis 12/13/2019   • GERD (gastroesophageal reflux disease)    • Hypercholesterolemia    • Hypertension    • Inferior MI ([facility] [place] [address] ) 2016   • LLQ pain 10/13/2020   • LUQ pain 2019   • Myocardial infarction [facility])    • PONV (postoperative nausea and vomiting)    • Sepsis due to Escherichia coli ([facility] [place] [address] ) 2020   • Swelling 2021   • UTI (urinary tract infection) 2020     Past surgical history  Past Surgical History:   Procedure Laterality Date   • COLONOSCOPY     • CORONARY STENT PLACEMENT      bare metal stent in Rt coronary artery   • [place] BX PROSTATE STRTCTC SATURATION SAMPLING IMG GID N/A 2022    Procedure: TRANSPERINEAL MRI FUSION  BIOPSY PROSTATE;  Surgeon: Max Sanz MD;  [facility];  Service: Urology   • SHOULDER SURGERY Left      Social history  Social History     Tobacco Use   • Smoking status: Former     Packs/day: 0 25     Years: 37 00     Pack years: 9 25     Types: Cigarettes     Start date: 1974     Quit date:      Years since quittin    • Smokeless tobacco: Never   Vaping Use   • Vaping Use: Never used   Substance Use Topics   • Alcohol use: Yes     Comment: rarely    • Drug use: Never     ?   Physical Exam     vs  General appearance: alert and oriented, in no acute distress  Throat: lips, mucosa, and tongue normal; teeth and gums normal  Neck: no adenopathy, no carotid bruit, no JVD, supple, symmetrical, trachea midline and thyroid not enlarged, symmetric, no tenderness/mass/nodules  Lungs: clear to auscultation bilaterally  Heart: regular rate and rhythm, S1, S2 normal, no murmur, click, rub or gallop  Abdomen: soft, non-tender; bowel sounds normal; no masses,  no organomegaly  Extremities: extremities normal, warm and well-perfused; no cyanosis, clubbing, or edema  Neftali Lagunas MD

## 2023-02-07 ENCOUNTER — APPOINTMENT (OUTPATIENT)
Dept: RADIOLOGY | Facility: HOSPITAL | Age: 63
End: 2023-02-07

## 2023-02-07 ENCOUNTER — HOSPITAL ENCOUNTER (OUTPATIENT)
Facility: HOSPITAL | Age: 63
Setting detail: OUTPATIENT SURGERY
Discharge: HOME | End: 2023-02-07
Attending: UROLOGY | Admitting: UROLOGY

## 2023-02-07 ENCOUNTER — ANESTHESIA (OUTPATIENT)
Dept: PERIOP | Facility: HOSPITAL | Age: 63
End: 2023-02-07

## 2023-02-07 VITALS
HEIGHT: 70 IN | RESPIRATION RATE: 16 BRPM | OXYGEN SATURATION: 95 % | WEIGHT: 215.39 LBS | BODY MASS INDEX: 30.84 KG/M2 | SYSTOLIC BLOOD PRESSURE: 154 MMHG | TEMPERATURE: 97.6 F | HEART RATE: 63 BPM | DIASTOLIC BLOOD PRESSURE: 81 MMHG

## 2023-02-07 DIAGNOSIS — N32.9 LESION OF URINARY BLADDER: ICD-10-CM

## 2023-02-07 PROBLEM — E66.811 CLASS 1 OBESITY DUE TO EXCESS CALORIES WITH SERIOUS COMORBIDITY AND BODY MASS INDEX (BMI) OF 31.0 TO 31.9 IN ADULT: Status: RESOLVED | Noted: 2019-11-07 | Resolved: 2023-02-07

## 2023-02-07 PROBLEM — G47.33 OSA (OBSTRUCTIVE SLEEP APNEA): Status: ACTIVE | Noted: 2023-02-07

## 2023-02-07 PROBLEM — E66.09 CLASS 1 OBESITY DUE TO EXCESS CALORIES WITH SERIOUS COMORBIDITY AND BODY MASS INDEX (BMI) OF 31.0 TO 31.9 IN ADULT: Status: RESOLVED | Noted: 2019-11-07 | Resolved: 2023-02-07

## 2023-02-07 RX ORDER — FENTANYL CITRATE/PF 50 MCG/ML
25 SYRINGE (ML) INJECTION
Status: DISCONTINUED | OUTPATIENT
Start: 2023-02-07 | End: 2023-02-07 | Stop reason: HOSPADM

## 2023-02-07 RX ORDER — CEFUROXIME AXETIL 250 MG/1
250 TABLET ORAL EVERY 12 HOURS SCHEDULED
Qty: 14 TABLET | Refills: 0 | Status: SHIPPED | OUTPATIENT
Start: 2023-02-07 | End: 2023-02-14

## 2023-02-07 RX ORDER — SODIUM CHLORIDE, SODIUM LACTATE, POTASSIUM CHLORIDE, CALCIUM CHLORIDE 600; 310; 30; 20 MG/100ML; MG/100ML; MG/100ML; MG/100ML
125 INJECTION, SOLUTION INTRAVENOUS CONTINUOUS
Status: DISCONTINUED | OUTPATIENT
Start: 2023-02-07 | End: 2023-02-07 | Stop reason: HOSPADM

## 2023-02-07 RX ORDER — PROPOFOL 10 MG/ML
INJECTION, EMULSION INTRAVENOUS AS NEEDED
Status: DISCONTINUED | OUTPATIENT
Start: 2023-02-07 | End: 2023-02-07

## 2023-02-07 RX ORDER — ONDANSETRON 2 MG/ML
4 INJECTION INTRAMUSCULAR; INTRAVENOUS ONCE AS NEEDED
Status: DISCONTINUED | OUTPATIENT
Start: 2023-02-07 | End: 2023-02-07 | Stop reason: HOSPADM

## 2023-02-07 RX ORDER — LIDOCAINE HYDROCHLORIDE 20 MG/ML
INJECTION, SOLUTION EPIDURAL; INFILTRATION; INTRACAUDAL; PERINEURAL AS NEEDED
Status: DISCONTINUED | OUTPATIENT
Start: 2023-02-07 | End: 2023-02-07

## 2023-02-07 RX ORDER — NEOSTIGMINE METHYLSULFATE 1 MG/ML
INJECTION INTRAVENOUS AS NEEDED
Status: DISCONTINUED | OUTPATIENT
Start: 2023-02-07 | End: 2023-02-07

## 2023-02-07 RX ORDER — MEPERIDINE HYDROCHLORIDE 25 MG/ML
12.5 INJECTION INTRAMUSCULAR; INTRAVENOUS; SUBCUTANEOUS
Status: DISCONTINUED | OUTPATIENT
Start: 2023-02-07 | End: 2023-02-07 | Stop reason: HOSPADM

## 2023-02-07 RX ORDER — MAGNESIUM HYDROXIDE 1200 MG/15ML
LIQUID ORAL AS NEEDED
Status: DISCONTINUED | OUTPATIENT
Start: 2023-02-07 | End: 2023-02-07 | Stop reason: HOSPADM

## 2023-02-07 RX ORDER — GLYCOPYRROLATE 0.2 MG/ML
INJECTION INTRAMUSCULAR; INTRAVENOUS AS NEEDED
Status: DISCONTINUED | OUTPATIENT
Start: 2023-02-07 | End: 2023-02-07

## 2023-02-07 RX ORDER — OXYCODONE HYDROCHLORIDE AND ACETAMINOPHEN 5; 325 MG/1; MG/1
1 TABLET ORAL EVERY 4 HOURS PRN
Status: DISCONTINUED | OUTPATIENT
Start: 2023-02-07 | End: 2023-02-07 | Stop reason: HOSPADM

## 2023-02-07 RX ORDER — CEFAZOLIN SODIUM 2 G/50ML
2000 SOLUTION INTRAVENOUS ONCE
Status: COMPLETED | OUTPATIENT
Start: 2023-02-07 | End: 2023-02-07

## 2023-02-07 RX ORDER — ROCURONIUM BROMIDE 10 MG/ML
INJECTION, SOLUTION INTRAVENOUS AS NEEDED
Status: DISCONTINUED | OUTPATIENT
Start: 2023-02-07 | End: 2023-02-07

## 2023-02-07 RX ORDER — MIDAZOLAM HYDROCHLORIDE 2 MG/2ML
INJECTION, SOLUTION INTRAMUSCULAR; INTRAVENOUS AS NEEDED
Status: DISCONTINUED | OUTPATIENT
Start: 2023-02-07 | End: 2023-02-07

## 2023-02-07 RX ORDER — DEXAMETHASONE SODIUM PHOSPHATE 10 MG/ML
INJECTION, SOLUTION INTRAMUSCULAR; INTRAVENOUS AS NEEDED
Status: DISCONTINUED | OUTPATIENT
Start: 2023-02-07 | End: 2023-02-07

## 2023-02-07 RX ORDER — ONDANSETRON 2 MG/ML
INJECTION INTRAMUSCULAR; INTRAVENOUS AS NEEDED
Status: DISCONTINUED | OUTPATIENT
Start: 2023-02-07 | End: 2023-02-07

## 2023-02-07 RX ORDER — HYDROCODONE BITARTRATE AND ACETAMINOPHEN 5; 325 MG/1; MG/1
TABLET ORAL
Qty: 6 TABLET | Refills: 0 | Status: SHIPPED | OUTPATIENT
Start: 2023-02-07

## 2023-02-07 RX ORDER — FENTANYL CITRATE 50 UG/ML
INJECTION, SOLUTION INTRAMUSCULAR; INTRAVENOUS AS NEEDED
Status: DISCONTINUED | OUTPATIENT
Start: 2023-02-07 | End: 2023-02-07

## 2023-02-07 RX ADMIN — SODIUM CHLORIDE, SODIUM LACTATE, POTASSIUM CHLORIDE, AND CALCIUM CHLORIDE 125 ML/HR: .6; .31; .03; .02 INJECTION, SOLUTION INTRAVENOUS at 09:51

## 2023-02-07 RX ADMIN — CEFAZOLIN SODIUM 2000 MG: 2 SOLUTION INTRAVENOUS at 11:18

## 2023-02-07 RX ADMIN — PROPOFOL 200 MG: 10 INJECTION, EMULSION INTRAVENOUS at 11:26

## 2023-02-07 RX ADMIN — GEMCITABINE HYDROCHLORIDE 2000 MG: 1 INJECTION, SOLUTION INTRAVENOUS at 12:01

## 2023-02-07 RX ADMIN — GLYCOPYRROLATE 0.6 MG: 0.2 INJECTION INTRAMUSCULAR; INTRAVENOUS at 11:58

## 2023-02-07 RX ADMIN — MIDAZOLAM 2 MG: 1 INJECTION INTRAMUSCULAR; INTRAVENOUS at 11:18

## 2023-02-07 RX ADMIN — SODIUM CHLORIDE, SODIUM LACTATE, POTASSIUM CHLORIDE, AND CALCIUM CHLORIDE 125 ML/HR: .6; .31; .03; .02 INJECTION, SOLUTION INTRAVENOUS at 13:30

## 2023-02-07 RX ADMIN — FENTANYL CITRATE 50 MCG: 50 INJECTION INTRAMUSCULAR; INTRAVENOUS at 11:49

## 2023-02-07 RX ADMIN — ONDANSETRON 4 MG: 2 INJECTION INTRAMUSCULAR; INTRAVENOUS at 11:57

## 2023-02-07 RX ADMIN — LIDOCAINE HYDROCHLORIDE 60 MG: 20 INJECTION, SOLUTION EPIDURAL; INFILTRATION; INTRACAUDAL; PERINEURAL at 11:26

## 2023-02-07 RX ADMIN — ROCURONIUM BROMIDE 40 MG: 10 INJECTION, SOLUTION INTRAVENOUS at 11:26

## 2023-02-07 RX ADMIN — FENTANYL CITRATE 50 MCG: 50 INJECTION INTRAMUSCULAR; INTRAVENOUS at 11:26

## 2023-02-07 RX ADMIN — FENTANYL CITRATE 25 MCG: 50 INJECTION, SOLUTION INTRAMUSCULAR; INTRAVENOUS at 13:42

## 2023-02-07 RX ADMIN — NEOSTIGMINE METHYLSULFATE 3 MG: 1 INJECTION INTRAVENOUS at 11:58

## 2023-02-07 RX ADMIN — DEXAMETHASONE SODIUM PHOSPHATE 10 MG: 10 INJECTION INTRAMUSCULAR; INTRAVENOUS at 11:35

## 2023-02-07 NOTE — OP NOTE
OPERATIVE REPORT  PATIENT NAME: Jose Martin    :  [id number]  MRN: [id number]  Pt Location: [facility] OR ROOM 05    SURGERY DATE: 2023    Surgeon(s) and Role:     * Eunice Neves MD - Primary    Preop Diagnosis:  Lesion of urinary bladder [N32 9]    Post-Op Diagnosis Codes:     * Lesion of urinary bladder [N32 9]    Procedure(s):  (TURBT)  INSTILLATION Gemcitabine  Bilateral - CYSTO W/ RETROGRADE PYELOGRAM    Specimen(s):  ID Type Source Tests Collected by Time Destination   1 : Bladder tumor Left Trigon and lateral wall Tissue Urinary Bladder TISSUE EXAM Eunice Neves MD 2023 11:28 AM        Estimated Blood Loss:   5 mL    Drains:  Urethral Catheter Latex 20 Fr  (Active)   Reasons to continue Urinary Catheter  Post-operative urological requirements 23 1433   Goal for Removal N/A- discharging with wagner 23 1433   Site Assessment Clean;Skin intact 23 1433   Collection Container Standard drainage bag 23 1433   Output (mL) 150 mL 23 1433   Number of days: 0       Anesthesia Type:   General/LMA    Operative Indications:  Lesion of urinary bladder [N32 9]      Operative Findings:  5 cm tumor left trigone at the edge of the left ureter orifice, extending up to left lateral wall at 9:00  Complications:   None    Procedure and Technique:  After the patient was placed under adequate general anesthesia, he was prepped and draped using chlorhexidine solution in lithotomy position  The 25 Chinese scope was passed without difficulty in the urethra which had no stenosis  The prostate had moderate enlargement with a large median lobe, and definitely obstructing  The tumor was as described, coming to within about 1 cm of the left orifice  Bilateral retrograde pyelograms were performed in standard fashion showing no lesions filling defects or tumors of the upper urinary tract  The resectoscope was used to resect tumor into, but not through bladder  Care was taken not to perforate    The resection came up to the edge of the orifice, but not truly over it  I could see the orifice open and close, and I judged that it did not need a stent  Tumor fragment all taken for specimen  The edges of tumor were cauterized as were any potential bleeding sites  After ensuring no bleeding, scope was removed, Valenzuela catheter inserted  Gemcitabine instilled for 30 minutes  Patient taken to recovery in good condition     I was present for the entire procedure    Patient Disposition:  PACU         SIGNATURE: Hailee Santamaria MD  DATE: February 7, 2023  TIME: 3:27 PM

## 2023-02-07 NOTE — ANESTHESIA PREPROCEDURE EVALUATION
Procedure:  (TURBT) (Bladder)  INSTILLATION Gemcitabine (Bladder)  CYSTO W/ RETROGRADE PYELOGRAM (Bilateral: Ureter)    Relevant Problems   ANESTHESIA   (+) PONV (postoperative nausea and vomiting)      CARDIO  STRESS RESULTS: Duration of exercise was 9 min and 27 sec  The patient exercised to protocol stage 4  Maximal work rate was 10 8 METs  Maximal heart rate during stress was 130 bpm ( 80 % of maximal predicted heart rate)  Target heart rate  was not achieved  The heart rate response to stress was blunted  Maximal systolic blood pressure during stress was 190 mmHg  There was resting hypertension with an appropriate blood pressure response to stress  The rate-pressure product  for the peak heart rate and blood pressure was 06022  There was no chest pain during stress  The stress test was terminated due to protocol completion and mild fatigue  After the procedure, the patient was discharged to home      ECG CONCLUSIONS: The stress ECG was negative for ischemia  Arrhythmia during stress: isolated atrial premature beats      STRESS 2D ECHO RESULTS:     BASELINE: There were no regional wall motion abnormalities  Left ventricular size was normal  Overall left ventricular systolic function was normal  Estimated left ventricular ejection fraction was 65 %       PEAK STRESS: There were no regional wall motion abnormalities  There was an appropriate reduction in left ventricular size   There was an appropriate augmentation in LV function      ECHO CONCLUSIONS: There was no echocardiographic evidence for stress-induced ischemia      Prepared and electronically signed by  Shauna Alvarez DO  Signed 07-Mar-2017 13:48:33   (+) CAD in native artery   (+) Essential hypertension   (+) Hyperlipidemia   (+) Presence of bare metal stent in right coronary artery      GI/HEPATIC   (+) Fatty liver   (+) Gastroesophageal reflux disease without esophagitis      /RENAL   (+) Adenocarcinoma of prostate (HCC)   (+) Benign prostatic hyperplasia without lower urinary tract symptoms   (+) Prostate nodule without urinary obstruction      MUSCULOSKELETAL   (+) Chronic left-sided low back pain without sciatica      NEURO/PSYCH   (+) Chronic left-sided low back pain without sciatica   (+) History of acute inferior wall MI      PULMONARY   (+) MIR (obstructive sleep apnea)        Physical Exam    Airway    Mallampati score: II  TM Distance: >3 FB  Neck ROM: full     Dental       Cardiovascular  Rhythm: regular, Rate: normal, Cardiovascular exam normal    Pulmonary  Pulmonary exam normal Breath sounds clear to auscultation,     Other Findings        Anesthesia Plan  ASA Score- 2     Anesthesia Type- general with ASA Monitors  Additional Monitors:   Airway Plan:           Plan Factors-    Chart reviewed  Existing labs reviewed  Patient summary reviewed  Patient is not a current smoker  Patient not instructed to abstain from smoking on day of procedure  Patient did not smoke on day of surgery  There is medical exclusion for perioperative obstructive sleep apnea risk education  Induction- intravenous  Postoperative Plan-     Informed Consent- Anesthetic plan and risks discussed with patient and spouse Ana Maria Beard

## 2023-02-07 NOTE — INTERVAL H&P NOTE
H&P reviewed  After examining the patient I find no changes in the patients condition since the H&P had been written      Vitals:    02/07/23 0947   BP: 142/99   Pulse:    Resp:    Temp:    SpO2:

## 2023-02-07 NOTE — ANESTHESIA POSTPROCEDURE EVALUATION
Post-Op Assessment Note    CV Status:  Stable  Pain Score: 1    Pain management: adequate     Mental Status:  Alert and awake   Hydration Status:  Euvolemic   PONV Controlled:  Controlled   Airway Patency:  Patent      Post Op Vitals Reviewed: Yes      Staff: Anesthesiologist         No notable events documented      BP      Temp      Pulse     Resp      SpO2      /81   Pulse 63   Temp 97 6 °F (36 4 °C) (Temporal)   Resp 16   Ht 5' 10" (1 778 m)   Wt 97 7 kg (215 lb 6 2 oz)   SpO2 95%   BMI 30 91 kg/m²

## 2023-02-07 NOTE — DISCHARGE SUMMARY
Discharge Summary - Deepak Hernandez 57 y o  male MRN: [id number]    Admission Date: 2/7/2023     Admitting Diagnosis: Lesion of urinary bladder [N32 9]    Procedures Performed: TUR bladder tumor    Patient underwent planned outpt surgery and recovered without complication  Discharged in good condition  Medications were prescribed  Pt knows to have office follow-up at the appropriate time

## 2023-02-07 NOTE — DISCHARGE INSTR - AVS FIRST PAGE
ALL YOUR  PREVIOUS MEDS ARE THE SAME  NEW MEDS: pain meds, antibiotics    You can get in shower with catheter    EXPECT SOME BLOOD IN URINE, BURNING, FREQUENT URINATION  ACTIVITY:  RESUME FULL ACTIVITY one week

## 2023-02-08 ENCOUNTER — TELEPHONE (OUTPATIENT)
Dept: UROLOGY | Facility: HOSPITAL | Age: 63
End: 2023-02-08

## 2023-02-08 ENCOUNTER — HOSPITAL ENCOUNTER (OUTPATIENT)
Facility: HOSPITAL | Age: 63
Setting detail: OBSERVATION
Discharge: HOME | End: 2023-02-09
Attending: EMERGENCY MEDICINE | Admitting: UROLOGY

## 2023-02-08 ENCOUNTER — APPOINTMENT (EMERGENCY)
Dept: RADIOLOGY | Facility: HOSPITAL | Age: 63
End: 2023-02-08

## 2023-02-08 DIAGNOSIS — G89.18 ACUTE POST-OPERATIVE PAIN: Primary | ICD-10-CM

## 2023-02-08 DIAGNOSIS — R10.9 RIGHT FLANK PAIN: ICD-10-CM

## 2023-02-08 DIAGNOSIS — R11.2 NAUSEA & VOMITING: ICD-10-CM

## 2023-02-08 DIAGNOSIS — N32.9 LESION OF URINARY BLADDER: ICD-10-CM

## 2023-02-08 DIAGNOSIS — Z86.03 HISTORY OF NEOPLASM OF BLADDER: ICD-10-CM

## 2023-02-08 LAB
ALBUMIN SERPL BCP-MCNC: 4 G/DL (ref 3.5–5)
ALP SERPL-CCNC: 86 U/L (ref 46–116)
ALT SERPL W P-5'-P-CCNC: 24 U/L (ref 12–78)
ANION GAP SERPL CALCULATED.3IONS-SCNC: 9 MMOL/L (ref 4–13)
APTT PPP: 22 SECONDS (ref 23–37)
AST SERPL W P-5'-P-CCNC: 13 U/L (ref 5–45)
BACTERIA UR QL AUTO: ABNORMAL /HPF
BASOPHILS # BLD AUTO: 0.02 THOUSANDS/ÂΜL (ref 0–0.1)
BASOPHILS NFR BLD AUTO: 0 % (ref 0–1)
BILIRUB DIRECT SERPL-MCNC: 0.2 MG/DL (ref 0–0.2)
BILIRUB SERPL-MCNC: 0.99 MG/DL (ref 0.2–1)
BILIRUB UR QL STRIP: NEGATIVE
BUN SERPL-MCNC: 23 MG/DL (ref 5–25)
CALCIUM SERPL-MCNC: 9.4 MG/DL (ref 8.3–10.1)
CHLORIDE SERPL-SCNC: 105 MMOL/L (ref 96–108)
CLARITY UR: ABNORMAL
CO2 SERPL-SCNC: 25 MMOL/L (ref 21–32)
COLOR UR: ABNORMAL
CREAT SERPL-MCNC: 1.32 MG/DL (ref 0.6–1.3)
EOSINOPHIL # BLD AUTO: 0.01 THOUSAND/ÂΜL (ref 0–0.61)
EOSINOPHIL NFR BLD AUTO: 0 % (ref 0–6)
ERYTHROCYTE [DISTWIDTH] IN BLOOD BY AUTOMATED COUNT: 12.6 % (ref 11.6–15.1)
GFR SERPL CREATININE-BSD FRML MDRD: 56 ML/MIN/1.73SQ M
GLUCOSE SERPL-MCNC: 144 MG/DL (ref 65–140)
GLUCOSE UR STRIP-MCNC: NEGATIVE MG/DL
HCT VFR BLD AUTO: 44.5 % (ref 36.5–49.3)
HGB BLD-MCNC: 14.8 G/DL (ref 12–17)
HGB UR QL STRIP.AUTO: ABNORMAL
IMM GRANULOCYTES # BLD AUTO: 0.06 THOUSAND/UL (ref 0–0.2)
IMM GRANULOCYTES NFR BLD AUTO: 0 % (ref 0–2)
INR PPP: 0.92 (ref 0.84–1.19)
KETONES UR STRIP-MCNC: NEGATIVE MG/DL
LACTATE SERPL-SCNC: 1.2 MMOL/L (ref 0.5–2)
LEUKOCYTE ESTERASE UR QL STRIP: ABNORMAL
LYMPHOCYTES # BLD AUTO: 0.81 THOUSANDS/ÂΜL (ref 0.6–4.47)
LYMPHOCYTES NFR BLD AUTO: 6 % (ref 14–44)
MCH RBC QN AUTO: 30.5 PG (ref 26.8–34.3)
MCHC RBC AUTO-ENTMCNC: 33.3 G/DL (ref 31.4–37.4)
MCV RBC AUTO: 92 FL (ref 82–98)
MONOCYTES # BLD AUTO: 0.79 THOUSAND/ÂΜL (ref 0.17–1.22)
MONOCYTES NFR BLD AUTO: 6 % (ref 4–12)
MUCOUS THREADS UR QL AUTO: ABNORMAL
NEUTROPHILS # BLD AUTO: 12.02 THOUSANDS/ÂΜL (ref 1.85–7.62)
NEUTS SEG NFR BLD AUTO: 88 % (ref 43–75)
NITRITE UR QL STRIP: NEGATIVE
NON-SQ EPI CELLS URNS QL MICRO: ABNORMAL /HPF
NRBC BLD AUTO-RTO: 0 /100 WBCS
PH UR STRIP.AUTO: 5.5 [PH]
PLATELET # BLD AUTO: 302 THOUSANDS/UL (ref 149–390)
PMV BLD AUTO: 10.7 FL (ref 8.9–12.7)
POTASSIUM SERPL-SCNC: 4.1 MMOL/L (ref 3.5–5.3)
PROCALCITONIN SERPL-MCNC: <0.05 NG/ML
PROT SERPL-MCNC: 7.3 G/DL (ref 6.4–8.4)
PROT UR STRIP-MCNC: ABNORMAL MG/DL
PROTHROMBIN TIME: 12.6 SECONDS (ref 11.6–14.5)
RBC # BLD AUTO: 4.86 MILLION/UL (ref 3.88–5.62)
RBC #/AREA URNS AUTO: ABNORMAL /HPF
SODIUM SERPL-SCNC: 139 MMOL/L (ref 135–147)
SP GR UR STRIP.AUTO: 1.01 (ref 1–1.03)
UROBILINOGEN UR STRIP-ACNC: <2 MG/DL
WBC # BLD AUTO: 13.71 THOUSAND/UL (ref 4.31–10.16)
WBC #/AREA URNS AUTO: ABNORMAL /HPF

## 2023-02-08 RX ORDER — TAMSULOSIN HYDROCHLORIDE 0.4 MG/1
0.4 CAPSULE ORAL
Status: DISCONTINUED | OUTPATIENT
Start: 2023-02-08 | End: 2023-02-09 | Stop reason: HOSPADM

## 2023-02-08 RX ORDER — ONDANSETRON 2 MG/ML
4 INJECTION INTRAMUSCULAR; INTRAVENOUS EVERY 6 HOURS PRN
Status: DISCONTINUED | OUTPATIENT
Start: 2023-02-08 | End: 2023-02-09 | Stop reason: HOSPADM

## 2023-02-08 RX ORDER — ONDANSETRON 2 MG/ML
4 INJECTION INTRAMUSCULAR; INTRAVENOUS ONCE
Status: COMPLETED | OUTPATIENT
Start: 2023-02-08 | End: 2023-02-08

## 2023-02-08 RX ORDER — SODIUM CHLORIDE 9 MG/ML
100 INJECTION, SOLUTION INTRAVENOUS CONTINUOUS
Status: DISCONTINUED | OUTPATIENT
Start: 2023-02-08 | End: 2023-02-09 | Stop reason: HOSPADM

## 2023-02-08 RX ORDER — DOCUSATE SODIUM 100 MG/1
100 CAPSULE, LIQUID FILLED ORAL 2 TIMES DAILY
Status: DISCONTINUED | OUTPATIENT
Start: 2023-02-08 | End: 2023-02-09 | Stop reason: HOSPADM

## 2023-02-08 RX ORDER — HYDROMORPHONE HCL/PF 1 MG/ML
1 SYRINGE (ML) INJECTION ONCE
Status: COMPLETED | OUTPATIENT
Start: 2023-02-08 | End: 2023-02-08

## 2023-02-08 RX ORDER — HYDROMORPHONE HCL/PF 1 MG/ML
0.5 SYRINGE (ML) INJECTION EVERY 4 HOURS PRN
Status: DISCONTINUED | OUTPATIENT
Start: 2023-02-08 | End: 2023-02-09 | Stop reason: HOSPADM

## 2023-02-08 RX ORDER — ATORVASTATIN CALCIUM 20 MG/1
20 TABLET, FILM COATED ORAL EVERY EVENING
Status: DISCONTINUED | OUTPATIENT
Start: 2023-02-08 | End: 2023-02-09 | Stop reason: HOSPADM

## 2023-02-08 RX ORDER — PANTOPRAZOLE SODIUM 40 MG/1
40 TABLET, DELAYED RELEASE ORAL
Status: DISCONTINUED | OUTPATIENT
Start: 2023-02-08 | End: 2023-02-09 | Stop reason: HOSPADM

## 2023-02-08 RX ORDER — ASPIRIN 81 MG/1
81 TABLET ORAL EVERY EVENING
Status: DISCONTINUED | OUTPATIENT
Start: 2023-02-08 | End: 2023-02-09 | Stop reason: HOSPADM

## 2023-02-08 RX ORDER — FENTANYL CITRATE 50 UG/ML
50 INJECTION, SOLUTION INTRAMUSCULAR; INTRAVENOUS ONCE
Status: COMPLETED | OUTPATIENT
Start: 2023-02-08 | End: 2023-02-08

## 2023-02-08 RX ORDER — OXYBUTYNIN CHLORIDE 5 MG/1
5 TABLET ORAL 2 TIMES DAILY
Status: DISCONTINUED | OUTPATIENT
Start: 2023-02-08 | End: 2023-02-09 | Stop reason: HOSPADM

## 2023-02-08 RX ORDER — HYDROCODONE BITARTRATE AND ACETAMINOPHEN 5; 325 MG/1; MG/1
1 TABLET ORAL EVERY 4 HOURS PRN
Status: DISCONTINUED | OUTPATIENT
Start: 2023-02-08 | End: 2023-02-09 | Stop reason: HOSPADM

## 2023-02-08 RX ORDER — LISINOPRIL 20 MG/1
20 TABLET ORAL EVERY EVENING
Status: DISCONTINUED | OUTPATIENT
Start: 2023-02-08 | End: 2023-02-09 | Stop reason: HOSPADM

## 2023-02-08 RX ORDER — HYDROCODONE BITARTRATE AND ACETAMINOPHEN 5; 325 MG/1; MG/1
2 TABLET ORAL EVERY 6 HOURS PRN
Status: DISCONTINUED | OUTPATIENT
Start: 2023-02-08 | End: 2023-02-09 | Stop reason: HOSPADM

## 2023-02-08 RX ORDER — HYDROMORPHONE HCL/PF 1 MG/ML
0.5 SYRINGE (ML) INJECTION ONCE
Status: COMPLETED | OUTPATIENT
Start: 2023-02-08 | End: 2023-02-08

## 2023-02-08 RX ADMIN — ASPIRIN 81 MG: 81 TABLET, COATED ORAL at 17:28

## 2023-02-08 RX ADMIN — LISINOPRIL 20 MG: 20 TABLET ORAL at 17:29

## 2023-02-08 RX ADMIN — IOHEXOL 100 ML: 350 INJECTION, SOLUTION INTRAVENOUS at 05:23

## 2023-02-08 RX ADMIN — DOCUSATE SODIUM 100 MG: 100 CAPSULE, LIQUID FILLED ORAL at 13:05

## 2023-02-08 RX ADMIN — FENTANYL CITRATE 50 MCG: 50 INJECTION INTRAMUSCULAR; INTRAVENOUS at 03:58

## 2023-02-08 RX ADMIN — TAMSULOSIN HYDROCHLORIDE 0.4 MG: 0.4 CAPSULE ORAL at 17:29

## 2023-02-08 RX ADMIN — PANTOPRAZOLE SODIUM 40 MG: 40 TABLET, DELAYED RELEASE ORAL at 13:04

## 2023-02-08 RX ADMIN — SODIUM CHLORIDE 100 ML/HR: 0.9 INJECTION, SOLUTION INTRAVENOUS at 13:04

## 2023-02-08 RX ADMIN — ATORVASTATIN CALCIUM 20 MG: 20 TABLET, FILM COATED ORAL at 17:29

## 2023-02-08 RX ADMIN — HYDROMORPHONE HYDROCHLORIDE 0.5 MG: 1 INJECTION, SOLUTION INTRAMUSCULAR; INTRAVENOUS; SUBCUTANEOUS at 11:47

## 2023-02-08 RX ADMIN — ONDANSETRON HYDROCHLORIDE 4 MG: 2 INJECTION, SOLUTION INTRAMUSCULAR; INTRAVENOUS at 03:58

## 2023-02-08 RX ADMIN — OXYBUTYNIN CHLORIDE 5 MG: 5 TABLET ORAL at 13:04

## 2023-02-08 RX ADMIN — CEFTRIAXONE SODIUM 2000 MG: 10 INJECTION, POWDER, FOR SOLUTION INTRAVENOUS at 09:11

## 2023-02-08 RX ADMIN — ONDANSETRON HYDROCHLORIDE 4 MG: 2 INJECTION, SOLUTION INTRAMUSCULAR; INTRAVENOUS at 11:47

## 2023-02-08 RX ADMIN — HYDROMORPHONE HYDROCHLORIDE 1 MG: 1 INJECTION, SOLUTION INTRAMUSCULAR; INTRAVENOUS; SUBCUTANEOUS at 07:19

## 2023-02-08 NOTE — H&P
H&P: Ifeoma Marquez 57 y o  male [id number]    Unit/Bed #: -01  Encounter: [id number]        Assessment  Plan  :  25-year-old male with bladder mass now postop day 1 TURBT retrograde pyelogram with gemcitabine instillation presenting with right-sided flank pain:    -CT scan on admission reveals decompressed urinary bladder with new right-sided hydronephrosis mild on the right   -Mild increase in renal function  -Mild reactive leukocytosis of 13 71  -UA positive for leukocytes and RBCs  To be expected postoperatively status post TURBT with instillation of gemcitabine   -Patient continues to experience severe pain uncontrolled with Dilaudid and fentanyl  Will admit for pain control, Flomax, IV fluids  Expect to improve on its own  Do not anticipate need for ureteral stent placement  Patient currently on Ceftin 250 mg twice daily for 7 days  Should continue this on discharge  Received a dose of ceftriaxone this admission, will continue additional dose 1 g every 24 hours  And restart Ceftin on outpatient   -Operative note performed bilateral retrograde pyelograms, no obstruction noted with no hydronephrosis  Hydronephrosis most likely inflammatory and reactive  Will improve on its own   -Patient has close outpatient follow-up with Dr Michael Stout on 2/20/2023  will reevaluate tomorrow with possible discharge if pain well controlled  Subjective :    Patient is a 25-year-old male with history of hematuria who underwent outpatient cystoscopy which revealed bladder mass now postop day 1 cystoscopy TURBT with retrograde pyelogram bilaterally and instillation of gemcitabine  Presenting to the emergency room after awaking from sleep at 2 AM this morning with severe right-sided flank pain nausea and vomiting  He reports his pain was initially in the right flank radiating downward  Currently reporting that his pain is in his right lower quadrant  He reports nausea as well as vomiting    Denies any fevers or chills  Ports that he begins to shake when his pain is severe  He was provided fentanyl and Dilaudid IV in the emergency room and continues to experience pain on his right side  Discussed with patient and wife admission to urology service for pain control and observation  Patient and family agreeable      Anayeli Bustamante  is a 57 y o  male   Past Medical History:   Diagnosis Date   • Abnormal WBC count 05/06/2021   • Adenocarcinoma of prostate ([facility] ) 03/27/2020   • Gold's esophagus 01/04/2021    EGD 01/04/21 recommend to repeat in 3 y   • Colon polyp    • Coronary artery disease    • COVID 11/24/2020   • Elevated PSA    • Fever 11/24/2020   • Gastroesophageal reflux disease without esophagitis 12/13/2019   • GERD (gastroesophageal reflux disease)    • Hypercholesterolemia    • Hypertension    • Inferior MI ([facility] ) 04/07/2016   • LLQ pain 10/13/2020   • LUQ pain 11/05/2019   • Myocardial infarction [facility]) 2011   • Papillary growth of urinary bladder     TURBT  today   2/7/2023   • PONV (postoperative nausea and vomiting)     only  w/ prostate bx   • Sepsis due to Escherichia coli ([facility] ) 03/19/2020   • Swelling 08/25/2021   • UTI (urinary tract infection) 03/18/2020   • Wears glasses      Current Outpatient Medications   Medication Instructions   • aspirin (ECOTRIN LOW STRENGTH) 81 mg, Oral, Every evening   • atorvastatin (LIPITOR) 20 mg, Oral, Daily   • cefuroxime (CEFTIN) 250 mg, Oral, Every 12 hours scheduled   • HYDROcodone-acetaminophen (NORCO) 5-325 mg per tablet 1-2 tab every 6 hr if needed for pain   • lisinopril (ZESTRIL) 20 mg, Oral, Daily   • omeprazole (PRILOSEC) 20 mg, Oral, Every evening      No Known Allergies  Past Surgical History:   Procedure Laterality Date   • COLONOSCOPY     • CORONARY STENT PLACEMENT      bare metal stent in Rt coronary artery   • CYSTOSCOPY W/ RETROGRADES Bilateral 2/7/2023    Procedure: CYSTO W/ RETROGRADE PYELOGRAM;  Surgeon: Mary Gutierrez MD;  Location: [facility] OR;  Service: Urology   • ND BLADDER INSTILLATION ANTICARCINOGENIC AGENT N/A 2/7/2023    Procedure: INSTILLATION Gemcitabine;  Surgeon: Mimi Robins MD;  [facility];  Service: Urology   • ND BX PROSTATE STRTCTC SATURATION SAMPLING IMG GID N/A 11/29/2022    Procedure: TRANSPERINEAL MRI FUSION  BIOPSY PROSTATE;  Surgeon: Alex Bansal MD;  Location[facility];  Service: Urology   • ND CYSTO W/REMOVAL OF LESIONS SMALL N/A 2/7/2023    Procedure: (TURBT); Surgeon: Mimi Robins MD;  [facility];  Service: Urology   • SHOULDER SURGERY Left      Social History     Social History Narrative   • Not on file        Review of Systems   Review of Systems   Constitutional: Negative for chills and fever  HENT: Negative  Eyes: Negative  Respiratory: Negative  Cardiovascular: Negative  Gastrointestinal: Positive for abdominal pain, nausea and vomiting  Negative for diarrhea  Endocrine: Negative  Genitourinary: Positive for flank pain and hematuria  Negative for difficulty urinating and dysuria  Skin: Negative  Allergic/Immunologic: Negative  Neurological: Negative  Hematological: Negative  Psychiatric/Behavioral: Negative  Objective     Physical Exam  Constitutional:       General: He is not in acute distress  Appearance: He is normal weight  He is not ill-appearing, toxic-appearing or diaphoretic  HENT:      Head: Normocephalic and atraumatic  Right Ear: External ear normal       Left Ear: External ear normal       Nose: Nose normal       Mouth/Throat:      Pharynx: Oropharynx is clear  Eyes:      Conjunctiva/sclera: Conjunctivae normal    Cardiovascular:      Rate and Rhythm: Normal rate and regular rhythm  Pulses: Normal pulses  Heart sounds: No murmur heard  No friction rub  No gallop  Pulmonary:      Effort: Pulmonary effort is normal  No respiratory distress  Breath sounds: No wheezing, rhonchi or rales     Abdominal:      General: Bowel sounds are normal  There is no distension  Palpations: Abdomen is soft  Tenderness: There is abdominal tenderness  There is right CVA tenderness  Genitourinary:     Comments: Urethral Valenzuela catheter in place draining clear yellow urine, kiley in the bag  Musculoskeletal:         General: Normal range of motion  Cervical back: Normal range of motion  Skin:     General: Skin is warm and dry  Neurological:      General: No focal deficit present  Mental Status: He is alert and oriented to person, place, and time  Psychiatric:         Mood and Affect: Mood normal          Behavior: Behavior normal          Thought Content: Thought content normal          Judgment: Judgment normal           Imaging:  CT ABDOMEN AND PELVIS WITH IV CONTRAST     INDICATION:   Flank pain, kidney stone suspected  R flank pain, 1 day s/p bladder tumor resection, r/o bladder trauma, R ureteral stone      COMPARISON:  October 21, 2020     TECHNIQUE:  CT examination of the abdomen and pelvis was performed  Axial, sagittal, and coronal 2D reformatted images were created from the source data and submitted for interpretation      Radiation dose length product (DLP) for this visit:  969 91 mGy-cm   This examination, like all CT scans performed in the [facility], was performed utilizing techniques to minimize radiation dose exposure, including the use of iterative   reconstruction and automated exposure control      IV Contrast:  100 mL of iohexol (OMNIPAQUE)  Enteric Contrast:  Enteric contrast was not administered      FINDINGS:     ABDOMEN     LOWER CHEST:  No clinically significant abnormality identified in the visualized lower chest      LIVER/BILIARY TREE:  Unremarkable      GALLBLADDER:  No calcified gallstones   No pericholecystic inflammatory change      SPLEEN:  Unremarkable      PANCREAS:  Unremarkable      ADRENAL GLANDS:  Unremarkable      KIDNEYS/URETERS:  Mild new right-sided hydronephrosis without discrete obstructing abnormality identified  No nephrolithiasis  No perinephric collections  A few small cortical cysts on the left      STOMACH AND BOWEL:  Unremarkable      APPENDIX:  No findings to suggest appendicitis      ABDOMINOPELVIC CAVITY:  No ascites  No pneumoperitoneum  No lymphadenopathy      VESSELS:  Unremarkable for patient's age      PELVIS     REPRODUCTIVE ORGANS:  The prostate is enlarged      URINARY BLADDER:  Valenzuela catheter present, appropriate in position  Urinary bladder nondistended      ABDOMINAL WALL/INGUINAL REGIONS:  Unremarkable      OSSEOUS STRUCTURES:  No acute fracture or destructive osseous lesion      IMPRESSION:     New mild right-sided hydronephrosis without obstructing abnormality identified  No nephroureterolithiasis      Urinary bladder nondistended around Valenzuela catheter balloon  No calculi    No perivesical fluid collections      Labs:  Lab Results   Component Value Date    SODIUM 139 02/08/2023    K 4 1 02/08/2023     02/08/2023    CO2 25 02/08/2023    BUN 23 02/08/2023    CREATININE 1 32 (H) 02/08/2023    GLUC 144 (H) 02/08/2023    CALCIUM 9 4 02/08/2023       Lab Results   Component Value Date    WBC 13 71 (H) 02/08/2023    HGB 14 8 02/08/2023    HCT 44 5 02/08/2023    MCV 92 02/08/2023     02/08/2023           VTE Pharmacologic Prophylaxis: Reason for no pharmacologic prophylaxis Status post TURBT  VTE Mechanical Prophylaxis: sequential compression device      Carmita Shirley PA-C

## 2023-02-08 NOTE — ED ATTENDING ATTESTATION
2/8/2023  IIsaias MD, saw and evaluated the patient  I have discussed the patient with the resident/non-physician practitioner and agree with the resident's/non-physician practitioner's findings, Plan of Care, and MDM as documented in the resident's/non-physician practitioner's note, except where noted  All available labs and Radiology studies were reviewed  I was present for key portions of any procedure(s) performed by the resident/non-physician practitioner and I was immediately available to provide assistance  At this point I agree with the current assessment done in the Emergency Department  I have conducted an independent evaluation of this patient a history and physical is as follows:    ED Course  ED Course as of 02/08/23 0450   Wed Feb 08, 2023   0403 Per resident h&p 58 YO M presents for suprapubic discomfort; urological procedure yesterda O: M in nad; US no hydronephrosis I/P UA     Emergency Department Note- Chey Navas 57 y o  male MRN: [id number]    Unit/Bed#: ED 24 Encounter: [id number]    Chey Navas is a 57 y o  male who presents with   Chief Complaint   Patient presents with   • Post-op Problem     Pt had surgery to remove tumor from bladder around 11am  Started with severe R sided abdominal/back pain around 230am  C/o N/V, chills  History of Present Illness   HPI:  Chey Navas is a 57 y o  male who presents for evaluation of:  Severe R lower abdominal pain on arrival in the ED  Patient had a tumor removed by Dr Jai Hallman during cystoscopy yesterday  Pain began abruptly at 0230 am; associated with nausea and vomiting; vomitus primarily food particles  Pain was sharp and severe; pain radiating from R flank to RLQ  Movement provoked the discomfort  Review of Systems   Constitutional: Positive for chills  Negative for fatigue and fever  HENT: Positive for sore throat  Negative for congestion  Respiratory: Negative for cough and shortness of breath  Cardiovascular: Negative for chest pain and palpitations  Gastrointestinal: Negative for abdominal pain and nausea  Genitourinary: Positive for flank pain (xR sided)  Negative for frequency  Neurological: Negative for light-headedness and headaches  Psychiatric/Behavioral: Negative for dysphoric mood and hallucinations  All other systems reviewed and are negative        Historical Information   Past Medical History:   Diagnosis Date   • Abnormal WBC count 05/06/2021   • Adenocarcinoma of prostate ([facility] ) 03/27/2020   • Gold's esophagus 01/04/2021    EGD 01/04/21 recommend to repeat in 3 y   • Colon polyp    • Coronary artery disease    • COVID 11/24/2020   • Elevated PSA    • Fever 11/24/2020   • Gastroesophageal reflux disease without esophagitis 12/13/2019   • GERD (gastroesophageal reflux disease)    • Hypercholesterolemia    • Hypertension    • Inferior MI ([place] [address] ) 04/07/2016   • LLQ pain 10/13/2020   • LUQ pain 11/05/2019   • Myocardial infarction [facility]) 2011   • Papillary growth of urinary bladder     TURBT  today   2/7/2023   • PONV (postoperative nausea and vomiting)     only  w/ prostate bx   • Sepsis due to Escherichia coli ([facility] ) 03/19/2020   • Swelling 08/25/2021   • UTI (urinary tract infection) 03/18/2020   • Wears glasses      Past Surgical History:   Procedure Laterality Date   • COLONOSCOPY     • CORONARY STENT PLACEMENT      bare metal stent in Rt coronary artery   • MD BX PROSTATE STRTCTC SATURATION SAMPLING IMG GID N/A 11/29/2022    Procedure: TRANSPERINEAL MRI FUSION  BIOPSY PROSTATE;  Surgeon: Alysa Olivia MD;  Location: [facility];  Service: Urology   • SHOULDER SURGERY Left      Social History   Social History     Substance and Sexual Activity   Alcohol Use Not Currently    Comment: 6 x  yearly     Social History     Substance and Sexual Activity   Drug Use Never     Social History     Tobacco Use   Smoking Status Former   • Packs/day: 0 25   • Years: 37 00   • Pack years: 9 25   • Types: Cigarettes   • Start date: 1974   • Quit date:    • Years since quittin 1   Smokeless Tobacco Never     Family History:   Family History   Problem Relation Age of Onset   • Sleep apnea Mother    • Heart attack Father        Meds/Allergies   PTA meds:   Prior to Admission Medications   Prescriptions Last Dose Informant Patient Reported? Taking?    HYDROcodone-acetaminophen (NORCO) 5-325 mg per tablet   No No   Si-2 tab every 6 hr if needed for pain   aspirin (ECOTRIN LOW STRENGTH) 81 mg EC tablet   Yes No   Sig: Take 81 mg by mouth every evening   atorvastatin (LIPITOR) 20 mg tablet   No No   Sig: Take 1 tablet (20 mg total) by mouth daily   Patient taking differently: Take 20 mg by mouth every evening   cefuroxime (CEFTIN) 250 mg tablet   No No   Sig: Take 1 tablet (250 mg total) by mouth every 12 (twelve) hours for 7 days   lisinopril (ZESTRIL) 20 mg tablet   No No   Sig: Take 1 tablet (20 mg total) by mouth daily   Patient taking differently: Take 20 mg by mouth every evening   omeprazole (PriLOSEC) 20 mg delayed release capsule   Yes No   Sig: Take 20 mg by mouth every evening      Facility-Administered Medications: None     No Known Allergies    Objective   First Vitals:   Blood Pressure: 153/89 (23)  Pulse: 71 (23)  Temperature: 97 6 °F (36 4 °C) (23)  Temp Source: Oral (23)  Respirations: 20 (23)  SpO2: 95 % (23)    Current Vitals:   Blood Pressure: 153/89 (23)  Pulse: 71 (23)  Temperature: 97 6 °F (36 4 °C) (23)  Temp Source: Oral (23)  Respirations: 20 (23)  SpO2: 95 % (23)    No intake or output data in the 24 hours ending 23 0450    Invasive Devices     Peripheral Intravenous Line  Duration           Peripheral IV 23 Right Antecubital <1 day          Drain  Duration           Urethral Catheter Latex 20 Fr  <1 day                Physical Exam  Vitals and nursing note reviewed  Constitutional:       General: He is not in acute distress  Appearance: Normal appearance  He is well-developed  HENT:      Head: Normocephalic and atraumatic  Right Ear: External ear normal       Left Ear: External ear normal       Nose: Nose normal       Mouth/Throat:      Pharynx: No oropharyngeal exudate  Eyes:      Conjunctiva/sclera: Conjunctivae normal       Pupils: Pupils are equal, round, and reactive to light  Cardiovascular:      Rate and Rhythm: Normal rate and regular rhythm  Pulmonary:      Effort: Pulmonary effort is normal  No respiratory distress  Abdominal:      General: Abdomen is flat  There is no distension  Palpations: Abdomen is soft  Tenderness: There is abdominal tenderness (xR sided)  Musculoskeletal:         General: No deformity  Normal range of motion  Cervical back: Normal range of motion and neck supple  Skin:     General: Skin is warm and dry  Capillary Refill: Capillary refill takes less than 2 seconds  Neurological:      General: No focal deficit present  Mental Status: He is alert and oriented to person, place, and time  Mental status is at baseline  Coordination: Coordination normal    Psychiatric:         Mood and Affect: Mood normal          Behavior: Behavior normal          Thought Content: Thought content normal          Judgment: Judgment normal            Medical Decision Makin   Acute  R flank and RLQ abdominal pain: CBC r/o leukocytosis; r/o anemia; cmp r/o renal dysfunction and hypoglycemia; CTAP r/o diverticulitis, appendicitis; post operative complication; UA; pain control    Recent Results (from the past 36 hour(s))   CBC and differential    Collection Time: 23  4:29 AM   Result Value Ref Range    WBC 13 71 (H) 4 31 - 10 16 Thousand/uL    RBC 4 86 3 88 - 5 62 Million/uL    Hemoglobin 14 8 12 0 - 17 0 g/dL    Hematocrit 44 5 36 5 - 49 3 %    MCV 92 82 - 98 fL    MCH 30 5 26 8 - 34 3 pg    MCHC 33 3 31 4 - 37 4 g/dL    RDW 12 6 11 6 - 15 1 %    MPV 10 7 8 9 - 12 7 fL    Platelets 099 672 - 193 Thousands/uL    nRBC 0 /100 WBCs    Neutrophils Relative 88 (H) 43 - 75 %    Immat GRANS % 0 0 - 2 %    Lymphocytes Relative 6 (L) 14 - 44 %    Monocytes Relative 6 4 - 12 %    Eosinophils Relative 0 0 - 6 %    Basophils Relative 0 0 - 1 %    Neutrophils Absolute 12 02 (H) 1 85 - 7 62 Thousands/µL    Immature Grans Absolute 0 06 0 00 - 0 20 Thousand/uL    Lymphocytes Absolute 0 81 0 60 - 4 47 Thousands/µL    Monocytes Absolute 0 79 0 17 - 1 22 Thousand/µL    Eosinophils Absolute 0 01 0 00 - 0 61 Thousand/µL    Basophils Absolute 0 02 0 00 - 0 10 Thousands/µL     CT abdomen pelvis with contrast    (Results Pending)         Portions of the record may have been created with voice recognition software  Occasional wrong word or "sound a like" substitutions may have occurred due to the inherent limitations of voice recognition software  Read the chart carefully and recognize, using context, where substitutions have occurred          Critical Care Time  CriticalCare Time  Performed by: Roxy Sol MD  Authorized by: Roxy Sol MD     Critical care provider statement:     Critical care time (minutes):  32    Critical care time was exclusive of:  Separately billable procedures and treating other patients and teaching time    Critical care was necessary to treat or prevent imminent or life-threatening deterioration of the following conditions:  Sepsis    Critical care was time spent personally by me on the following activities:  Obtaining history from patient or surrogate, development of treatment plan with patient or surrogate, discussions with consultants, evaluation of patient's response to treatment, examination of patient, ordering and performing treatments and interventions, ordering and review of laboratory studies, ordering and review of radiographic studies, re-evaluation of patient's condition and review of old charts    I assumed direction of critical care for this patient from another provider in my specialty: no    Comments:      58 YO M presents with flank and abdominal pain post operative from cystoscopy with biopsy; TNTC WBC noted in UA; septic w/u initiated; ceftriaxone 1 g IV; urology consulted to see patient

## 2023-02-08 NOTE — PLAN OF CARE
Problem: PAIN - ADULT  Goal: Verbalizes/displays adequate comfort level or baseline comfort level  Description: Interventions:  - Encourage patient to monitor pain and request assistance  - Assess pain using appropriate pain scale  - Administer analgesics based on type and severity of pain and evaluate response  - Implement non-pharmacological measures as appropriate and evaluate response  - Consider cultural and social influences on pain and pain management  - Notify physician/advanced practitioner if interventions unsuccessful or patient reports new pain  2/8/2023 1513 by Annalisa Pantoja RN  Outcome: Not Progressing  2/8/2023 1513 by Annalisa Pantoja RN  Outcome: Not Progressing     Problem: INFECTION - ADULT  Goal: Absence or prevention of progression during hospitalization  Description: INTERVENTIONS:  - Assess and monitor for signs and symptoms of infection  - Monitor lab/diagnostic results  - Monitor all insertion sites, i e  indwelling lines, tubes, and drains  - Monitor endotracheal if appropriate and nasal secretions for changes in amount and color  - [place] appropriate cooling/warming therapies per order  - Administer medications as ordered  - Instruct and encourage patient and family to use good hand hygiene technique  - Identify and instruct in appropriate isolation precautions for identified infection/condition  2/8/2023 1513 by Annalisa Pantoja RN  Outcome: Not Progressing  2/8/2023 1513 by Annalisa Pantoja RN  Outcome: Not Progressing  Goal: Absence of fever/infection during neutropenic period  Description: INTERVENTIONS:  - Monitor WBC    2/8/2023 1513 by Annalisa Pantoja RN  Outcome: Not Progressing  2/8/2023 1513 by Annalisa Pantoja RN  Outcome: Not Progressing     Problem: SAFETY ADULT  Goal: Patient will remain free of falls  Description: INTERVENTIONS:  - Educate patient/family on patient safety including physical limitations  - Instruct patient to call for assistance with activity   - Consult OT/PT to assist with strengthening/mobility   - Keep Call bell within reach  - Keep bed low and locked with side rails adjusted as appropriate  - Keep care items and personal belongings within reach  - Initiate and maintain comfort rounds  - Make Fall Risk Sign visible to staff  - Offer Toileting every  Hours, in advance of need  - Initiate/Maintain alarm  - Obtain necessary fall risk management equipment:   - Apply yellow socks and bracelet for high fall risk patients  - Consider moving patient to room near nurses station  2/8/2023 1513 by Deanna Caro RN  Outcome: Not Progressing  2/8/2023 1513 by Deanna Caro RN  Outcome: Not Progressing  Goal: Maintain or return to baseline ADL function  Description: INTERVENTIONS:  -  Assess patient's ability to carry out ADLs; assess patient's baseline for ADL function and identify physical deficits which impact ability to perform ADLs (bathing, care of mouth/teeth, toileting, grooming, dressing, etc )  - Assess/evaluate cause of self-care deficits   - Assess range of motion  - Assess patient's mobility; develop plan if impaired  - Assess patient's need for assistive devices and provide as appropriate  - Encourage maximum independence but intervene and supervise when necessary  - Involve family in performance of ADLs  - Assess for home care needs following discharge   - Consider OT consult to assist with ADL evaluation and planning for discharge  - Provide patient education as appropriate  2/8/2023 1513 by Deanna Caro RN  Outcome: Not Progressing  2/8/2023 1513 by Deanna Caro RN  Outcome: Not Progressing  Goal: Maintains/Returns to pre admission functional level  Description: INTERVENTIONS:  - Perform BMAT or MOVE assessment daily    - Set and communicate daily mobility goal to care team and patient/family/caregiver  - Collaborate with rehabilitation services on mobility goals if consulted  - Perform Range of Motion  times a day    - Reposition patient every  hours  - Dangle patient  times a day  - Stand patient  times a day  - Ambulate patient  times a day  - Out of bed to chair  times a day   - Out of bed for meal times a day  - Out of bed for toileting  - Record patient progress and toleration of activity level   2/8/2023 1513 by Etta Aceves RN  Outcome: Not Progressing  2/8/2023 1513 by Etta Aceves RN  Outcome: Not Progressing     Problem: DISCHARGE PLANNING  Goal: Discharge to home or other facility with appropriate resources  Description: INTERVENTIONS:  - Identify barriers to discharge w/patient and caregiver  - Arrange for needed discharge resources and transportation as appropriate  - Identify discharge learning needs (meds, wound care, etc )  - Arrange for interpretive services to assist at discharge as needed  - Refer to Case Management Department for coordinating discharge planning if the patient needs post-hospital services based on physician/advanced practitioner order or complex needs related to functional status, cognitive ability, or social support system  2/8/2023 1513 by Etta Aceves RN  Outcome: Not Progressing  2/8/2023 1513 by Etta Aceves RN  Outcome: Not Progressing     Problem: Knowledge Deficit  Goal: Patient/family/caregiver demonstrates understanding of disease process, treatment plan, medications, and discharge instructions  Description: Complete learning assessment and assess knowledge base    Interventions:  - Provide teaching at level of understanding  - Provide teaching via preferred learning methods  2/8/2023 1513 by Etta Aceves RN  Outcome: Not Progressing  2/8/2023 1513 by Etta Aceves RN  Outcome: Not Progressing     Problem: GENITOURINARY - ADULT  Goal: Maintains or returns to baseline urinary function  Description: INTERVENTIONS:  - Assess urinary function  - Encourage oral fluids to ensure adequate hydration if ordered  - Administer IV fluids as ordered to ensure adequate hydration  - Administer ordered medications as needed  - Offer frequent toileting  - Follow urinary retention protocol if ordered  2/8/2023 1513 by Arron Edgar RN  Outcome: Not Progressing  2/8/2023 1513 by Arron Edgar RN  Outcome: Not Progressing  Goal: Urinary catheter remains patent  Description: INTERVENTIONS:  - Assess patency of urinary catheter  - If patient has a chronic wagner, consider changing catheter if non-functioning  - Follow guidelines for intermittent irrigation of non-functioning urinary catheter  2/8/2023 1513 by Arron Edgar RN  Outcome: Not Progressing  2/8/2023 1513 by Arron Edgar RN  Outcome: Not Progressing     Problem: METABOLIC, FLUID AND ELECTROLYTES - ADULT  Goal: Electrolytes maintained within normal limits  Description: INTERVENTIONS:  - Monitor labs and assess patient for signs and symptoms of electrolyte imbalances  - Administer electrolyte replacement as ordered  - Monitor response to electrolyte replacements, including repeat lab results as appropriate  - Instruct patient on fluid and nutrition as appropriate  2/8/2023 1513 by Arron Edgar RN  Outcome: Not Progressing  2/8/2023 1513 by Arron Edgar RN  Outcome: Adequate for Discharge  Goal: Fluid balance maintained  Description: INTERVENTIONS:  - Monitor labs   - Monitor I/O and WT  - Instruct patient on fluid and nutrition as appropriate  - Assess for signs & symptoms of volume excess or deficit  2/8/2023 1513 by Arron Edgar RN  Outcome: Not Progressing  2/8/2023 1513 by Arron Edgar RN  Outcome: Adequate for Discharge     Problem: HEMATOLOGIC - ADULT  Goal: Maintains hematologic stability  Description: INTERVENTIONS  - Assess for signs and symptoms of bleeding or hemorrhage  - Monitor labs  - Administer supportive blood products/factors as ordered and appropriate  2/8/2023 1513 by Arron Edgar RN  Outcome: Not Progressing  2/8/2023 1513 by Arron Edgar RN  Outcome: Adequate for Discharge

## 2023-02-08 NOTE — TELEPHONE ENCOUNTER
Patient is in the ER  He is tentattvely to have wagner removed on Fri and stent in 3 weeks    He is scheduled of 3/2 @ 11:30 for stent removal

## 2023-02-08 NOTE — ED PROVIDER NOTES
History  Chief Complaint   Patient presents with   • Post-op Problem     Pt had surgery to remove tumor from bladder around 11am  Started with severe R sided abdominal/back pain around 230am  C/o N/V, chills  25-year-old male presents with right flank pain and chills starting acutely at 230 this morning  Patient underwent outpatient transurethral bladder tumor resection yesterday  Right flank pain described as acute in onset, severe, radiating to the right lower quadrant, constant, with no alleviating or exacerbating factors  Associated nausea with no vomiting  Denies trauma, dyspnea, fevers, or history of ureteral stones  Patient has a Valenzuela in place since his procedure  Patient has not noticed any changes in the color of his urine  Prior to Admission Medications   Prescriptions Last Dose Informant Patient Reported? Taking?    HYDROcodone-acetaminophen (NORCO) 5-325 mg per tablet Unknown  No No   Si-2 tab every 6 hr if needed for pain   aspirin (ECOTRIN LOW STRENGTH) 81 mg EC tablet Past Month  Yes Yes   Sig: Take 81 mg by mouth every evening   atorvastatin (LIPITOR) 20 mg tablet 2023  No Yes   Sig: Take 1 tablet (20 mg total) by mouth daily   Patient taking differently: Take 20 mg by mouth every evening   cefuroxime (CEFTIN) 250 mg tablet Unknown  No No   Sig: Take 1 tablet (250 mg total) by mouth every 12 (twelve) hours for 7 days   lisinopril (ZESTRIL) 20 mg tablet 2023  No Yes   Sig: Take 1 tablet (20 mg total) by mouth daily   Patient taking differently: Take 20 mg by mouth every evening   omeprazole (PriLOSEC) 20 mg delayed release capsule 2023  Yes Yes   Sig: Take 20 mg by mouth every evening      Facility-Administered Medications: None       Past Medical History:   Diagnosis Date   • Abnormal WBC count 2021   • Adenocarcinoma of prostate ([facility] [address] ) 2020   • Gold's esophagus 2021    EGD 21 recommend to repeat in 3 y   • Colon polyp    • Coronary artery disease    • COVID 11/24/2020   • Elevated PSA    • Fever 11/24/2020   • Gastroesophageal reflux disease without esophagitis 12/13/2019   • GERD (gastroesophageal reflux disease)    • Hypercholesterolemia    • Hypertension    • Inferior MI ([facility] [address] ) 04/07/2016   • LLQ pain 10/13/2020   • LUQ pain 11/05/2019   • Myocardial infarction [facility]) 2011   • Papillary growth of urinary bladder     TURBT  today   2/7/2023   • PONV (postoperative nausea and vomiting)     only  w/ prostate bx   • Sepsis due to Escherichia coli ([facility] [address] ) 03/19/2020   • Swelling 08/25/2021   • UTI (urinary tract infection) 03/18/2020   • Wears glasses        Past Surgical History:   Procedure Laterality Date   • COLONOSCOPY     • CORONARY STENT PLACEMENT      bare metal stent in Rt coronary artery   • CYSTOSCOPY W/ RETROGRADES Bilateral 2/7/2023    Procedure: CYSTO W/ RETROGRADE PYELOGRAM;  Surgeon: Leti Vieira MD;  Location: [facility];  Service: Urology   • PA BLADDER INSTILLATION ANTICARCINOGENIC AGENT N/A 2/7/2023    Procedure: INSTILLATION Gemcitabine;  Surgeon: Leti Vieira MD;  [facility];  Service: Urology   • PA BX PROSTATE STRTCTC SATURATION SAMPLING IMG GID N/A 11/29/2022    Procedure: TRANSPERINEAL MRI FUSION  BIOPSY PROSTATE;  Surgeon: Renetta Moctezuma MD;  Location: [facility];  Service: Urology   • PA CYSTO W/REMOVAL OF LESIONS SMALL N/A 2/7/2023    Procedure: (TURBT); Surgeon: Leti Vieira MD;  Location: [facility];  Service: Urology   • SHOULDER SURGERY Left        Family History   Problem Relation Age of Onset   • Sleep apnea Mother    • Heart attack Father      I have reviewed and agree with the history as documented      E-Cigarette/Vaping   • E-Cigarette Use Never User      E-Cigarette/Vaping Substances   • Nicotine No    • THC No    • CBD No    • Flavoring No    • Other No    • Unknown No      Social History     Tobacco Use   • Smoking status: Former     Packs/day: 0 25     Years: 37 00     Pack years: 9 25     Types: Cigarettes     Start date: 1974     Quit date:      Years since quittin 1   • Smokeless tobacco: Never   Vaping Use   • Vaping Use: Never used   Substance Use Topics   • Alcohol use: Not Currently     Comment: 6 x  yearly   • Drug use: Never        Review of Systems   Constitutional: Positive for chills  Negative for fever  HENT: Negative for ear pain and sore throat  Eyes: Negative for pain and visual disturbance  Respiratory: Negative for cough and shortness of breath  Cardiovascular: Negative for chest pain and palpitations  Gastrointestinal: Negative for abdominal pain and vomiting  Genitourinary: Negative for dysuria and hematuria  Musculoskeletal: Positive for back pain  Negative for arthralgias  Skin: Negative for color change and rash  Neurological: Negative for seizures and syncope  All other systems reviewed and are negative  Physical Exam  ED Triage Vitals   Temperature Pulse Respirations Blood Pressure SpO2   23 0341 23 0341 23 0341 23 0341 23 0341   97 6 °F (36 4 °C) 71 20 153/89 95 %      Temp Source Heart Rate Source Patient Position - Orthostatic VS BP Location FiO2 (%)   23 0341 23 0900 23 1100 23 0900 --   Oral Monitor Lying Right arm       Pain Score       23 0341       9             Orthostatic Vital Signs  Vitals:    23 1303 23 1503 23 1730 23 2205   BP: 128/79 123/76 138/88 128/71   Pulse: 58 60 60 59   Patient Position - Orthostatic VS:           Physical Exam  Vitals and nursing note reviewed  Constitutional:       General: He is in acute distress  Appearance: He is well-developed and normal weight  He is not ill-appearing, toxic-appearing or diaphoretic  HENT:      Head: Normocephalic and atraumatic        Right Ear: External ear normal       Left Ear: External ear normal       Nose: Nose normal       Mouth/Throat:      Mouth: Mucous membranes are moist    Eyes: General:         Right eye: No discharge  Left eye: No discharge  Conjunctiva/sclera: Conjunctivae normal    Cardiovascular:      Rate and Rhythm: Normal rate and regular rhythm  Pulses: Normal pulses  Heart sounds: No murmur heard  Pulmonary:      Effort: Pulmonary effort is normal  No respiratory distress  Breath sounds: Normal breath sounds  Abdominal:      General: Abdomen is flat  There is no distension  Palpations: Abdomen is soft  Tenderness: There is no abdominal tenderness  There is no right CVA tenderness, left CVA tenderness or guarding  Genitourinary:     Comments: Blood-tinged urine in Valenzuela bag  Musculoskeletal:         General: No swelling  Cervical back: Normal range of motion  Skin:     General: Skin is warm and dry  Capillary Refill: Capillary refill takes less than 2 seconds  Neurological:      Mental Status: He is alert and oriented to person, place, and time     Psychiatric:         Mood and Affect: Mood normal          Behavior: Behavior normal          ED Medications  Medications   sodium chloride 0 9 % infusion (100 mL/hr Intravenous New Bag 2/8/23 1304)   docusate sodium (COLACE) capsule 100 mg (100 mg Oral Not Given 2/8/23 1728)   ondansetron (ZOFRAN) injection 4 mg (has no administration in time range)   cefTRIAXone (ROCEPHIN) 1,000 mg in dextrose 5 % 50 mL IVPB (has no administration in time range)   aspirin (ECOTRIN LOW STRENGTH) EC tablet 81 mg (81 mg Oral Given 2/8/23 1728)   atorvastatin (LIPITOR) tablet 20 mg (20 mg Oral Given 2/8/23 1729)   HYDROcodone-acetaminophen (NORCO) 5-325 mg per tablet 1 tablet (has no administration in time range)   lisinopril (ZESTRIL) tablet 20 mg (20 mg Oral Given 2/8/23 1729)   pantoprazole (PROTONIX) EC tablet 40 mg (40 mg Oral Given 2/8/23 1304)   HYDROcodone-acetaminophen (NORCO) 5-325 mg per tablet 2 tablet (has no administration in time range)   HYDROmorphone (DILAUDID) injection 0 5 mg (has no administration in time range)   tamsulosin (FLOMAX) capsule 0 4 mg (0 4 mg Oral Given 2/8/23 1729)   oxybutynin (DITROPAN) tablet 5 mg (0 mg Oral Hold 2/8/23 1729)   ondansetron (ZOFRAN) injection 4 mg (4 mg Intravenous Given 2/8/23 0358)   fentanyl citrate (PF) 100 MCG/2ML 50 mcg (50 mcg Intravenous Given 2/8/23 0358)   iohexol (OMNIPAQUE) 350 MG/ML injection (SINGLE-DOSE) 100 mL (100 mL Intravenous Given 2/8/23 0523)   HYDROmorphone (DILAUDID) injection 1 mg (1 mg Intravenous Given 2/8/23 0719)   cefTRIAXone (ROCEPHIN) 2,000 mg in dextrose 5 % 50 mL IVPB (0 mg Intravenous Stopped 2/8/23 1128)   ondansetron (ZOFRAN) injection 4 mg (4 mg Intravenous Given 2/8/23 1147)   HYDROmorphone (DILAUDID) injection 0 5 mg (0 5 mg Intravenous Given 2/8/23 1147)       Diagnostic Studies  Results Reviewed     Procedure Component Value Units Date/Time    Procalcitonin [[id number]]  (Normal) Collected: 02/08/23 0855    Lab Status: Final result Specimen: Blood from Arm, Left Updated: 02/08/23 1233     Procalcitonin <0 05 ng/ml     Blood culture #1 [[id number]] Collected: 02/08/23 0913    Lab Status: Preliminary result Specimen: Blood from Arm, Right Updated: 02/08/23 1201     Blood Culture Received in Microbiology Lab  Culture in Progress  Blood culture #2 [[id number]] Collected: 02/08/23 0913    Lab Status: Preliminary result Specimen: Blood from Arm, Left Updated: 02/08/23 1201     Blood Culture Received in Microbiology Lab  Culture in Progress      Jenifer Sanon [[id number]]  (Normal) Collected: 02/08/23 0855    Lab Status: Final result Specimen: Blood from Arm, Left Updated: 02/08/23 1007     Protime 12 6 seconds      INR 0 92    APTT [[id number]]  (Abnormal) Collected: 02/08/23 0855    Lab Status: Final result Specimen: Blood from Arm, Left Updated: 02/08/23 1007     PTT 22 seconds     Lactic acid [[id number]]  (Normal) Collected: 02/08/23 0911    Lab Status: Final result Specimen: Blood from Arm, Right Updated: 02/08/23 0326 LACTIC ACID 1 2 mmol/L     Narrative:      Result may be elevated if tourniquet was used during collection      Hepatic function panel [[id number]]  (Normal) Collected: 02/08/23 0855    Lab Status: Final result Specimen: Blood from Arm, Left Updated: 02/08/23 0952     Total Bilirubin 0 99 mg/dL      Bilirubin, Direct 0 20 mg/dL      Alkaline Phosphatase 86 U/L      AST 13 U/L      ALT 24 U/L      Total Protein 7 3 g/dL      Albumin 4 0 g/dL     Urinalysis with microscopic [[id number]]  (Abnormal) Collected: 02/08/23 0429    Lab Status: Final result Specimen: Urine, Indwelling Valenzuela Catheter Updated: 02/08/23 0528     Color, UA Brown     Clarity, UA Turbid     Specific Logan, UA 1 012     pH, UA 5 5     Leukocytes, UA Moderate     Nitrite, UA Negative     Protein,  (2+) mg/dl      Glucose, UA Negative mg/dl      Ketones, UA Negative mg/dl      Urobilinogen, UA <2 0 mg/dl      Bilirubin, UA Negative     Occult Blood, UA Large     RBC, UA Innumerable /hpf      WBC, UA Innumerable /hpf      Epithelial Cells None Seen /hpf      Bacteria, UA None Seen /hpf      MUCUS THREADS Occasional    Basic metabolic panel [[id number]]  (Abnormal) Collected: 02/08/23 0429    Lab Status: Final result Specimen: Blood from Arm, Right Updated: 02/08/23 0458     Sodium 139 mmol/L      Potassium 4 1 mmol/L      Chloride 105 mmol/L      CO2 25 mmol/L      ANION GAP 9 mmol/L      BUN 23 mg/dL      Creatinine 1 32 mg/dL      Glucose 144 mg/dL      Calcium 9 4 mg/dL      eGFR 56 ml/min/1 73sq m     Narrative:      [place] guidelines for Chronic Kidney Disease (CKD):   •  Stage 1 with normal or high GFR (GFR > 90 mL/min/1 73 square meters)  •  Stage 2 Mild CKD (GFR = 60-89 mL/min/1 73 square meters)  •  Stage 3A Moderate CKD (GFR = 45-59 mL/min/1 73 square meters)  •  Stage 3B Moderate CKD (GFR = 30-44 mL/min/1 73 square meters)  •  Stage 4 Severe CKD (GFR = 15-29 mL/min/1 73 square meters)  •  Stage 5 End Stage CKD (GFR <15 mL/min/1 73 square meters)  Note: GFR calculation is accurate only with a steady state creatinine    CBC and differential [[id number]]  (Abnormal) Collected: 02/08/23 0429    Lab Status: Final result Specimen: Blood from Arm, Right Updated: 02/08/23 0443     WBC 13 71 Thousand/uL      RBC 4 86 Million/uL      Hemoglobin 14 8 g/dL      Hematocrit 44 5 %      MCV 92 fL      MCH 30 5 pg      MCHC 33 3 g/dL      RDW 12 6 %      MPV 10 7 fL      Platelets 598 Thousands/uL      nRBC 0 /100 WBCs      Neutrophils Relative 88 %      Immat GRANS % 0 %      Lymphocytes Relative 6 %      Monocytes Relative 6 %      Eosinophils Relative 0 %      Basophils Relative 0 %      Neutrophils Absolute 12 02 Thousands/µL      Immature Grans Absolute 0 06 Thousand/uL      Lymphocytes Absolute 0 81 Thousands/µL      Monocytes Absolute 0 79 Thousand/µL      Eosinophils Absolute 0 01 Thousand/µL      Basophils Absolute 0 02 Thousands/µL     Urine culture [[id number]] Collected: 02/08/23 0429    Lab Status: In process Specimen: Urine, Indwelling Valenzuela Catheter Updated: 02/08/23 0436                 CT abdomen pelvis with contrast   Final Result by Waldemar Jackson MD (02/08 0544)      New mild right-sided hydronephrosis without obstructing abnormality identified  No nephroureterolithiasis  Urinary bladder nondistended around Valenzuela catheter balloon  No calculi  No perivesical fluid collections              Workstation performed: [id number]               Procedures  POC Renal US    Date/Time: 2/8/2023 5:00 AM  Performed by: Hannah Ruelas MD  Authorized by: Hannah Ruelas MD     Patient location:  ED  Performed by:  Resident  Other Assisting Provider: No    Procedure details:     Exam Type:  Diagnostic    Indications: flank/back pain      Assessment for:  Bladder volume, suspected hydronephrosis and other (comment) (Pelvic free fluid)    Views obtained: bladder (transverse and sagittal) and right kidney      Image quality: limited diagnostic      Image availability:  Images available in PACS and still images obtained  Findings:     RIGHT kidney findings: unremarkable      RIGHT hydronephrosis: mild (grade 1)      Bladder:  Visualized  Interpretation:     Renal ultrasound impressions: normal exam            ED Course                                       Medical Decision Making  Impression: 26-year-old male presents with right flank pain less than 24 hours after transurethral bladder tumor resection  Differential diagnosis includes acute ureteral stone, pyelonephritis, postop complication  Plan: Urine to rule out UTI and pyelonephritis, CT scan to assess for ureteral stone, pelvic fluid collections, BNP to assess renal function, CBC to rule out anemia    Urine positive for innumerable white blood cells could be due to acute infection versus expected result of intra bladder chemo and procedure  Dose of ceftriaxone ordered for suspected pyelonephritis  Sepsis labs ordered  Urology consulted    Acute post-operative pain: acute illness or injury  History of neoplasm of bladder: chronic illness or injury  Nausea & vomiting: acute illness or injury  Right flank pain: acute illness or injury  Amount and/or Complexity of Data Reviewed  Labs: ordered  Radiology: ordered  Risk  Prescription drug management  Decision regarding hospitalization              Disposition  Final diagnoses:   Acute post-operative pain   Right flank pain   Nausea & vomiting   History of neoplasm of bladder     Time reflects when diagnosis was documented in both MDM as applicable and the Disposition within this note     Time User Action Codes Description Comment    2/8/2023  6:04 AM Caresse Lever Add [G89 18] Acute post-operative pain     2/8/2023  6:04 AM Caresse Lever Add [R10 9] Right flank pain     2/8/2023  6:52 AM Caresse Lever Add [R11 2] Nausea & vomiting     2/8/2023  6:52 AM Caresse Lever Add [Z86 03] History of neoplasm of bladder       ED Disposition     ED Disposition   Admit    Condition   Stable    Date/Time   Wed Feb 8, 2023 11:53 AM    Comment   Case was discussed with Usman Mendez and the patient's admission status was agreed to be Admission Status: observation status to the service of Dr Cheryl Oneal   Follow-up Information    None         Current Discharge Medication List      CONTINUE these medications which have NOT CHANGED    Details   aspirin (ECOTRIN LOW STRENGTH) 81 mg EC tablet Take 81 mg by mouth every evening      atorvastatin (LIPITOR) 20 mg tablet Take 1 tablet (20 mg total) by mouth daily  Qty: 90 tablet, Refills: 1    Associated Diagnoses: Mixed hyperlipidemia      lisinopril (ZESTRIL) 20 mg tablet Take 1 tablet (20 mg total) by mouth daily  Qty: 90 tablet, Refills: 0    Associated Diagnoses: Essential hypertension      omeprazole (PriLOSEC) 20 mg delayed release capsule Take 20 mg by mouth every evening      cefuroxime (CEFTIN) 250 mg tablet Take 1 tablet (250 mg total) by mouth every 12 (twelve) hours for 7 days  Qty: 14 tablet, Refills: 0    Associated Diagnoses: Lesion of urinary bladder      HYDROcodone-acetaminophen (NORCO) 5-325 mg per tablet 1-2 tab every 6 hr if needed for pain  Qty: 6 tablet, Refills: 0    Associated Diagnoses: Lesion of urinary bladder           No discharge procedures on file  PDMP Review     None           ED Provider  Attending physically available and evaluated Jose Parnell I managed the patient along with the ED Attending      Electronically Signed by         Zamzam Cruz MD  02/08/23 8475

## 2023-02-09 VITALS
TEMPERATURE: 97.5 F | OXYGEN SATURATION: 93 % | SYSTOLIC BLOOD PRESSURE: 128 MMHG | DIASTOLIC BLOOD PRESSURE: 71 MMHG | HEART RATE: 77 BPM | RESPIRATION RATE: 20 BRPM

## 2023-02-09 LAB
ANION GAP SERPL CALCULATED.3IONS-SCNC: 3 MMOL/L (ref 4–13)
BACTERIA UR CULT: NORMAL
BUN SERPL-MCNC: 24 MG/DL (ref 5–25)
CALCIUM SERPL-MCNC: 8.6 MG/DL (ref 8.3–10.1)
CHLORIDE SERPL-SCNC: 112 MMOL/L (ref 96–108)
CO2 SERPL-SCNC: 25 MMOL/L (ref 21–32)
CREAT SERPL-MCNC: 1.06 MG/DL (ref 0.6–1.3)
ERYTHROCYTE [DISTWIDTH] IN BLOOD BY AUTOMATED COUNT: 12.8 % (ref 11.6–15.1)
GFR SERPL CREATININE-BSD FRML MDRD: 73 ML/MIN/1.73SQ M
GLUCOSE SERPL-MCNC: 103 MG/DL (ref 65–140)
HCT VFR BLD AUTO: 39.9 % (ref 36.5–49.3)
HGB BLD-MCNC: 13.2 G/DL (ref 12–17)
MCH RBC QN AUTO: 31.1 PG (ref 26.8–34.3)
MCHC RBC AUTO-ENTMCNC: 33.1 G/DL (ref 31.4–37.4)
MCV RBC AUTO: 94 FL (ref 82–98)
PLATELET # BLD AUTO: 227 THOUSANDS/UL (ref 149–390)
PMV BLD AUTO: 10.3 FL (ref 8.9–12.7)
POTASSIUM SERPL-SCNC: 4 MMOL/L (ref 3.5–5.3)
RBC # BLD AUTO: 4.24 MILLION/UL (ref 3.88–5.62)
SODIUM SERPL-SCNC: 140 MMOL/L (ref 135–147)
WBC # BLD AUTO: 10.58 THOUSAND/UL (ref 4.31–10.16)

## 2023-02-09 RX ORDER — DOCUSATE SODIUM 100 MG/1
100 CAPSULE, LIQUID FILLED ORAL 2 TIMES DAILY
Qty: 14 CAPSULE | Refills: 0 | Status: SHIPPED | OUTPATIENT
Start: 2023-02-09 | End: 2023-02-16

## 2023-02-09 RX ADMIN — PANTOPRAZOLE SODIUM 40 MG: 40 TABLET, DELAYED RELEASE ORAL at 05:20

## 2023-02-09 RX ADMIN — CEFTRIAXONE SODIUM 1000 MG: 10 INJECTION, POWDER, FOR SOLUTION INTRAVENOUS at 09:11

## 2023-02-09 RX ADMIN — OXYBUTYNIN CHLORIDE 5 MG: 5 TABLET ORAL at 09:10

## 2023-02-09 RX ADMIN — DOCUSATE SODIUM 100 MG: 100 CAPSULE, LIQUID FILLED ORAL at 09:10

## 2023-02-09 NOTE — TELEPHONE ENCOUNTER
Patient admitted to hospital Patient is in the ER  He is tentatively to have wagner removed on Fri and stent in 3 weeks    He is scheduled of 3/2 @ 11:30 for stent removal

## 2023-02-09 NOTE — UTILIZATION REVIEW
Initial Clinical Review    Admission: Date/Time/Statement:   Admission Orders (From admission, onward)     Ordered        02/08/23 1153  Place in Observation  Once                      Orders Placed This Encounter   Procedures   • Place in Observation     Standing Status:   Standing     Number of Occurrences:   1     Order Specific Question:   Level of Care     Answer:   Med Surg [16]     ED Arrival Information     Expected   -    Arrival   2/8/2023 03:20    Acuity   Urgent            Means of arrival   Walk-In    Escorted by   [facility]    Service   Urology    Admission type   Emergency            Arrival complaint   Post Surgery Issue           Chief Complaint   Patient presents with   • Post-op Problem     Pt had surgery to remove tumor from bladder around 11am  Started with severe R sided abdominal/back pain around 230am  C/o N/V, chills  Initial Presentation: 57 y o  male to ED for severe right-sided flank pain, nausea and vomiting, awaken by pain at 2 am  Pain initially started in right radiating downward  POD #1 TURBT with retrograde pyelogram bilaterally and instillation of gemcitabine, done outpatient  Pt reports he started shaking when pain is severe  Given Iv Fentanyl and Iv Dilaudid in ED but continues to experience pain on his right side  Currently on Ceftin 250 mg Bid for 7 days  Admit Observation level of care for Right-sided flank pain  POD #1 TURBT retrograde pyelogram with gemcitabine instillation  IVFs  Flomax  Iv antibiotics Pain control       ED Triage Vitals   Temperature Pulse Respirations Blood Pressure SpO2   02/08/23 0341 02/08/23 0341 02/08/23 0341 02/08/23 0341 02/08/23 0341   97 6 °F (36 4 °C) 71 20 153/89 95 %      Temp Source Heart Rate Source Patient Position - Orthostatic VS BP Location FiO2 (%)   02/08/23 0341 02/08/23 0900 02/08/23 1100 02/08/23 0900 --   Oral Monitor Lying Right arm       Pain Score       02/08/23 0341       9          Wt Readings from Last 1 Encounters: 02/07/23 97 7 kg (215 lb 6 2 oz)     Additional Vital Signs:   02/09/23 0747 -- 77 20 -- -- -- -- Lying   02/08/23 22:05:52 97 5 °F (36 4 °C) 59 -- 128/71 90 93 % -- --   02/08/23 17:30:05 -- 60 -- 138/88 105 96 % -- --   02/08/23 15:03:35 97 8 °F (36 6 °C) 60 -- 123/76 92 100 % -- --   02/08/23 13:03:50 97 6 °F (36 4 °C) 58 18 128/79 95 91 % -- --   02/08/23 1200 -- 58 15 99/50 71 96 % None (Room air)      Pertinent Labs/Diagnostic Test Results:   CT abdomen pelvis with contrast   Final Result by Venkat Felipe MD (02/08 0544)      New mild right-sided hydronephrosis without obstructing abnormality identified  No nephroureterolithiasis  Urinary bladder nondistended around Valenzuela catheter balloon  No calculi  No perivesical fluid collections              Workstation performed: [id number]               Results from last 7 days   Lab Units 02/09/23  0510 02/08/23  0429   WBC Thousand/uL 10 58* 13 71*   HEMOGLOBIN g/dL 13 2 14 8   HEMATOCRIT % 39 9 44 5   PLATELETS Thousands/uL 227 302   NEUTROS ABS Thousands/µL  --  12 02*         Results from last 7 days   Lab Units 02/09/23  0510 02/08/23  0429   SODIUM mmol/L 140 139   POTASSIUM mmol/L 4 0 4 1   CHLORIDE mmol/L 112* 105   CO2 mmol/L 25 25   ANION GAP mmol/L 3* 9   BUN mg/dL 24 23   CREATININE mg/dL 1 06 1 32*   EGFR ml/min/1 73sq m 73 56   CALCIUM mg/dL 8 6 9 4     Results from last 7 days   Lab Units 02/08/23  0855   AST U/L 13   ALT U/L 24   ALK PHOS U/L 86   TOTAL PROTEIN g/dL 7 3   ALBUMIN g/dL 4 0   TOTAL BILIRUBIN mg/dL 0 99   BILIRUBIN DIRECT mg/dL 0 20         Results from last 7 days   Lab Units 02/09/23  0510 02/08/23  0429   GLUCOSE RANDOM mg/dL 103 144*       Results from last 7 days   Lab Units 02/08/23  0855   PROTIME seconds 12 6   INR  0 92   PTT seconds 22*         Results from last 7 days   Lab Units 02/08/23  0855   PROCALCITONIN ng/ml <0 05     Results from last 7 days   Lab Units 02/08/23  0911   LACTIC ACID mmol/L 1 2 Results from last 7 days   Lab Units 02/08/23  0429   CLARITY UA  Turbid   COLOR UA  Brown   SPEC GRAV UA  1 012   PH UA  5 5   GLUCOSE UA mg/dl Negative   KETONES UA mg/dl Negative   BLOOD UA  Large*   PROTEIN UA mg/dl 100 (2+)*   NITRITE UA  Negative   BILIRUBIN UA  Negative   UROBILINOGEN UA (BE) mg/dl <2 0   LEUKOCYTES UA  Moderate*   WBC UA /hpf Innumerable*   RBC UA /hpf Innumerable*   BACTERIA UA /hpf None Seen   EPITHELIAL CELLS WET PREP /hpf None Seen   MUCUS THREADS  Occasional*       Results from last 7 days   Lab Units 02/08/23  0913 02/08/23  0429   BLOOD CULTURE  Received in Microbiology Lab  Culture in Progress  Received in Microbiology Lab  Culture in Progress    --    URINE CULTURE   --  No Growth <1000 cfu/mL               ED Treatment:   Medication Administration from 02/08/2023 0320 to 02/08/2023 1252       Date/Time Order Dose Route Action Action by Comments     02/08/2023 0358 EST ondansetron (ZOFRAN) injection 4 mg 4 mg Intravenous Given Aidee Judd RN --     02/08/2023 0358 EST fentanyl citrate (PF) 100 MCG/2ML 50 mcg 50 mcg Intravenous Given Aidee Judd RN --     02/08/2023 0523 EST iohexol (OMNIPAQUE) 350 MG/ML injection (SINGLE-DOSE) 100 mL 100 mL Intravenous Given Cheryl Alexandra --     02/08/2023 0719 EST HYDROmorphone (DILAUDID) injection 1 mg 1 mg Intravenous Given Chandrakant Toussaint, RN --     02/08/2023 0911 EST cefTRIAXone (ROCEPHIN) 2,000 mg in dextrose 5 % 50 mL IVPB 2,000 mg Intravenous New Bag Jennifer Moore, RN --     02/08/2023 1147 EST ondansetron (ZOFRAN) injection 4 mg 4 mg Intravenous Given Hyun Field RN --     02/08/2023 1147 EST HYDROmorphone (DILAUDID) injection 0 5 mg 0 5 mg Intravenous Given Hyun Field RN --        Past Medical History:   Diagnosis Date   • Abnormal WBC count 05/06/2021   • Adenocarcinoma of prostate ([facility] [address] ) 03/27/2020   • Gold's esophagus 01/04/2021    EGD 01/04/21 recommend to repeat in 3 y   • Colon polyp    • Coronary artery disease    • COVID 11/24/2020   • Elevated PSA    • Fever 11/24/2020   • Gastroesophageal reflux disease without esophagitis 12/13/2019   • GERD (gastroesophageal reflux disease)    • Hypercholesterolemia    • Hypertension    • [facility] 04/07/2016   • LLQ pain 10/13/2020   • LUQ pain 11/05/2019   • Myocardial infarction [facility]) 2011   • Papillary growth of urinary bladder     TURBT  today   2/7/2023   • PONV (postoperative nausea and vomiting)     only  w/ prostate bx   • Sepsis due to Escherichia coli ([facility] ) 03/19/2020   • Swelling 08/25/2021   • UTI (urinary tract infection) 03/18/2020   • Wears glasses      Present on Admission:  • Flank pain      Admitting Diagnosis: Nausea & vomiting [R11 2]  Post-op pain [G89 18]  Right flank pain [R10 9]  Acute post-operative pain [G89 18]  History of neoplasm of bladder [Z86 03]  Age/Sex: 57 y o  male     Admission Orders:  Scheduled Medications:  aspirin, 81 mg, Oral, QPM  atorvastatin, 20 mg, Oral, QPM  cefTRIAXone, 1,000 mg, Intravenous, Q24H  docusate sodium, 100 mg, Oral, BID  lisinopril, 20 mg, Oral, QPM  oxybutynin, 5 mg, Oral, BID  pantoprazole, 40 mg, Oral, Early Morning  tamsulosin, 0 4 mg, Oral, Daily With Dinner      Continuous IV Infusions:  sodium chloride, 100 mL/hr, Intravenous, Continuous      PRN Meds:  HYDROcodone-acetaminophen, 1 tablet, Oral, Q4H PRN  HYDROcodone-acetaminophen, 2 tablet, Oral, Q6H PRN  HYDROmorphone, 0 5 mg, Intravenous, Q4H PRN  ondansetron, 4 mg, Intravenous, Q6H PRN        None    Network Utilization Review Department  ATTENTION: Please call with any questions or concerns to [phone] and carefully listen to the prompts so that you are directed to the right person  All voicemails are confidential   Zettie Dienes all requests for admission clinical reviews, approved or denied determinations and any other requests to dedicated fax number below belonging to the campus where the patient is receiving treatment   List of dedicated fax numbers for the Facilities:  [address] [place] [address] DENIALS (Administrative/Medical Necessity) [phone]   [address]  (Maternity/NICU/Pediatrics) [phone]   [address] [phone]   [address] [phone]   [address] [facility] [phone] [address] Sasha Ruiz [facility] 134 815 [facility] [phone]

## 2023-02-09 NOTE — DISCHARGE INSTR - AVS FIRST PAGE
Discussed with Dr Naida Waterman okay to remove your Valenzuela catheter today  You do not need to go to your appointment scheduled for tomorrow for Valenzuela catheter removal     Have a scheduled appointment on 2/20/2023 with Dr Naida Waterman  Call with any additional questions or concerns  Continue home medications that Dr Naida Waterman had initially prescribed including Norco tablets for pain and antibiotic  I have added Colace to help with any constipation from narcotic medications received in the hospital or at home  And take as needed  Please call with any additional questions or concerns

## 2023-02-09 NOTE — PROGRESS NOTES
Progress Note - Urology   Jackie Mcfarland 57 y o  male MRN: [id number]  Unit/Bed#: -01 Encounter: [id number]    Assessment & Plan:    73-year-old male with bladder mass now postop day 2 TURBT retrograde pyelogram with gemcitabine instillation presenting with right-sided flank pain on POD1:     -CT scan on admission reveals decompressed urinary bladder with new right-sided hydronephrosis mild on the right   -Mild increase in renal function-now back down to baseline   -Mild reactive leukocytosis of 13 71--> 10 58 today   -Hemoglobin continues to be stable at 13 2  -UA positive for leukocytes and RBCs  To be expected postoperatively status post TURBT with instillation of gemcitabine ,  Urine culture negative for growth and blood cultures   -Patient's pain now well controlled on oral medication  We will plan for discharge home today  -Remove Valenzuela catheter today as patient was due for removal tomorrow AM   -Operative note performed bilateral retrograde pyelograms, no obstruction noted with no hydronephrosis  Hydronephrosis most likely inflammatory and reactive  Will improve on its own   -Patient has close outpatient follow-up with Dr Bee Gallegos on 2/20/2023      Plan for discharge today    Subjective/Objective   Chief Complaint: Pain improved  Subjective:   Currently sitting comfortably in bed in no acute distress  Denies any nausea any vomiting, fevers, chills, flank pain or abdominal pain  He reports that his catheter is leaking and questioning if catheter can be removed as it supposed to be removed tomorrow  Objective:     Blood pressure 128/71, pulse 77, temperature 97 5 °F (36 4 °C), resp  rate 20, SpO2 93 %  ,There is no height or weight on file to calculate BMI        Intake/Output Summary (Last 24 hours) at 2/9/2023 1245  Last data filed at 2/9/2023 0511  Gross per 24 hour   Intake --   Output 950 ml   Net -950 ml       Invasive Devices     Peripheral Intravenous Line  Duration           Peripheral IV 02/08/23 Right Antecubital 1 day          Drain  Duration           Urethral Catheter Latex 20 Fr  2 days              Physical Exam  Constitutional:       General: He is not in acute distress  Appearance: He is normal weight  He is not ill-appearing, toxic-appearing or diaphoretic  HENT:      Head: Normocephalic and atraumatic  Right Ear: External ear normal       Left Ear: External ear normal       Nose: Nose normal       Mouth/Throat:      Pharynx: Oropharynx is clear  Eyes:      General: No scleral icterus  Conjunctiva/sclera: Conjunctivae normal    Cardiovascular:      Rate and Rhythm: Normal rate and regular rhythm  Pulses: Normal pulses  Heart sounds: No murmur heard  No friction rub  No gallop  Pulmonary:      Effort: Pulmonary effort is normal  No respiratory distress  Breath sounds: No wheezing, rhonchi or rales  Abdominal:      General: Bowel sounds are normal  There is no distension  Tenderness: There is no abdominal tenderness  Genitourinary:     Comments: Yellow urine in Valenzuela catheter  Musculoskeletal:         General: Normal range of motion  Cervical back: Normal range of motion  Skin:     General: Skin is warm and dry  Neurological:      General: No focal deficit present  Mental Status: He is alert and oriented to person, place, and time  Psychiatric:         Mood and Affect: Mood normal          Behavior: Behavior normal          Thought Content: Thought content normal          Judgment: Judgment normal            Lab, Imaging and other studies:I have personally reviewed pertinent lab results    Lab Results   Component Value Date    WBC 10 58 (H) 02/09/2023    HGB 13 2 02/09/2023    HCT 39 9 02/09/2023    MCV 94 02/09/2023     02/09/2023     Lab Results   Component Value Date    SODIUM 140 02/09/2023    K 4 0 02/09/2023     (H) 02/09/2023    CO2 25 02/09/2023    BUN 24 02/09/2023    CREATININE 1 06 02/09/2023    GLUC 103 02/09/2023    CALCIUM 8 6 02/09/2023         VTE Pharmacologic Prophylaxis: Reason for no pharmacologic prophylaxis Status post TURBT  VTE Mechanical Prophylaxis: sequential compression device      Helen Tran PA-C

## 2023-02-09 NOTE — CASE MANAGEMENT
Case Management Assessment    Patient name Philly Hayes  Location /-84 MRN [id number]  : 1960 Date 2023       Current Admission Date: 2023  Current Admission Diagnosis:Flank pain   Patient Active Problem List    Diagnosis Date Noted   • MIR (obstructive sleep apnea) 2023   • PONV (postoperative nausea and vomiting)    • Bradycardia 2023   • Lesion of urinary bladder 2023   • Left upper quadrant abdominal mass 10/04/2022   • Abnormal urine findings 10/04/2022   • IFG (impaired fasting glucose) 2022   • Bloating 2021   • Flank pain 2021   • Gold's esophagus 2021   • COVID-19 virus infection 2020   • Colon polyp 2020   • Myalgia with higher doses statins 2020   • Encounter for well adult exam with abnormal findings 2020   • Adenocarcinoma of prostate ([facility] [address] ) 2020   • Bacteremia due to Escherichia coli 2020   • UTI (urinary tract infection) 2020   • Prostate nodule without urinary obstruction 2020   • Gastroesophageal reflux disease without esophagitis 2019   • Abnormal prostate specific antigen (PSA) 2019   • Benign prostatic hyperplasia without lower urinary tract symptoms 2019   • Chronic left-sided low back pain without sciatica 2019   • Fatty liver 2019   • Contusion of left shoulder 04/10/2018   • CAD in native artery 2018   • History of acute inferior wall MI 2018   • Presence of bare metal stent in right coronary artery 2018   • Essential hypertension 2018   • Hyperlipidemia 2018   • Bursitis of shoulder 2017   • Injury of tendon of rotator cuff 2017      LOS (days): 0  Geometric Mean LOS (GMLOS) (days):   Days to GMLOS:     OBJECTIVE:              Current admission status: Observation       Preferred Pharmacy:   [id number] [place] - [address] [facility]  Phone: [phone] Fax: [phone]    Mylene Rabago, [place] [facility] [address]  Phone: [phone] Fax: [phone] [address] [place] [address]  Phone: [phone] Fax: [phone]    Primary Care Provider: Naomy Chicas MD    Primary Insurance: BLUE CROSS  Secondary Insurance:     ASSESSMENT:  Active Health Care Proxies    There are no active Health Care Proxies on file              Obs Notice Signed: 02/09/23         Patient Information  Mental Status: Alert

## 2023-02-09 NOTE — DISCHARGE SUMMARY
Discharge Summary - Kylha Vergara 57 y o  male MRN: [id number]    Unit/Bed#: -01 Encounter: [id number]    Admission Date:   Admission Orders (From admission, onward)     Ordered        02/08/23 1153  Place in Observation  Once                        Admitting Diagnosis: Nausea & vomiting [R11 2]  Post-op pain [G89 18]  Right flank pain [R10 9]  Acute post-operative pain [G89 18]  History of neoplasm of bladder [Z86 03]    HPI:   Patient is a 20-year-old male with history of hematuria who underwent outpatient cystoscopy which revealed bladder mass now postop day 1 cystoscopy TURBT with retrograde pyelogram bilaterally and instillation of gemcitabine  Presenting to the emergency room after awaking from sleep at 2 AM this morning with severe right-sided flank pain nausea and vomiting  He reports his pain was initially in the right flank radiating downward  Currently reporting that his pain is in his right lower quadrant  He reports nausea as well as vomiting  Denies any fevers or chills  Ports that he begins to shake when his pain is severe  He was provided fentanyl and Dilaudid IV in the emergency room and continues to experience pain on his right side  Discussed with patient and wife admission to urology service for pain control and observation  Patient and family agreeable  Procedures Performed:   Orders Placed This Encounter   Procedures   • Critical Care   • POC Renal US       Summary of Hospital Course:   Patient presented to the hospital on 2/8/2023 for further evaluation given continued pain postoperatively now postop day 1 cystoscopy TURBT with retrograde pyelogram bilaterally instillation of gemcitabine that woke him from sleep at 2 AM on 2/8/2023  On evaluation in the emergency room patient was noted to have severe uncontrolled pain with both fentanyl and Dilaudid and CT scan on admission revealed mild hydro that was new on the right side  Most likely reactionary due to recent TURBT    He was evaluated by urology at bedside who then admitted him to our service overnight for observation and pain control  Postop day 2 patient sitting comfortably in bed in no acute distress  Denies any additional nausea, vomiting, fevers or chills complaining of abdominal pain  Ureter Valenzuela catheter with clear yellow urine, labs have stabilized JENNIFER resolved, white blood cell count trending downward, urine culture was negative for infection, vital signs are stable and patient was deemed stable for discharge after Valenzuela catheter removal and patient able to void after Valenzuela catheter removal   Patient scheduled outpatient follow-up on 2/20/2023 which you attending    Significant Findings, Care, Treatment and Services Provided: Right-sided hydronephrosis  Complications: None    Discharge Diagnosis: Postoperative pain, right-sided hydronephrosis    Medical problems resolved: Postoperative pain, JENNIFER      Condition at Discharge: stable         Discharge instructions/Information to patient and family:   See after visit summary for information provided to patient and family  Provisions for Follow-Up Care:  See after visit summary for information related to follow-up care and any pertinent home health orders  PCP: Jose Luis Weston MD    Disposition: Home    Planned Readmission: No      Discharge Statement   I spent 20 minutes discharging the patient  This time was spent on the day of discharge  I had direct contact with the patient on the day of discharge  Additional documentation is required if more than 30 minutes were spent on discharge  Discharge Medications:  See after visit summary for reconciled discharge medications provided to patient and family         Dougie Martinez PA-C

## 2023-02-13 ENCOUNTER — TELEPHONE (OUTPATIENT)
Dept: EMERGENCY DEPT | Facility: HOSPITAL | Age: 63
End: 2023-02-13

## 2023-02-13 LAB
BACTERIA BLD CULT: NORMAL
BACTERIA BLD CULT: NORMAL

## 2023-02-15 ENCOUNTER — TELEPHONE (OUTPATIENT)
Dept: UROLOGY | Facility: MEDICAL CENTER | Age: 63
End: 2023-02-15

## 2023-02-15 NOTE — TELEPHONE ENCOUNTER
Called pt-left message advising him appt for next wk with dr Darwyn Buerger on 2-20-23 has been cancelled per dr Abdoul Alex note  Asked pt to call back to schedule cysto appt for early June

## 2023-02-15 NOTE — TELEPHONE ENCOUNTER
Telephone call regarding recent bladder cancer surgery  Pathology showed high-grade but noninvasive cancer  He does not need repeat biopsy anytime soon  He had hospital admission for flank pain on the right side, although the tumor was on the left trigone and did not involve the right side  He recovered quickly and says he feels fine now  We have appointment for next week, which patient can keep if he feels symptomatic    Otherwise, next cystoscopy will be in 4 months

## 2023-02-15 NOTE — TELEPHONE ENCOUNTER
----- Message from Mariya Bright MD sent at 2/15/2023  3:07 PM EST -----  Cancel appointment with me next week, next appointment should be cystoscopy early Daria

## 2023-02-16 ENCOUNTER — TELEPHONE (OUTPATIENT)
Dept: UROLOGY | Facility: MEDICAL CENTER | Age: 63
End: 2023-02-16

## 2023-05-08 ENCOUNTER — OFFICE VISIT (OUTPATIENT)
Dept: FAMILY MEDICINE CLINIC | Facility: CLINIC | Age: 63
End: 2023-05-08

## 2023-05-08 VITALS
TEMPERATURE: 99 F | DIASTOLIC BLOOD PRESSURE: 80 MMHG | WEIGHT: 215 LBS | HEIGHT: 70 IN | SYSTOLIC BLOOD PRESSURE: 130 MMHG | BODY MASS INDEX: 30.78 KG/M2 | OXYGEN SATURATION: 98 % | HEART RATE: 98 BPM

## 2023-05-08 DIAGNOSIS — E66.9 CLASS 1 OBESITY WITH SERIOUS COMORBIDITY AND BODY MASS INDEX (BMI) OF 31.0 TO 31.9 IN ADULT, UNSPECIFIED OBESITY TYPE: ICD-10-CM

## 2023-05-08 DIAGNOSIS — K21.9 GASTROESOPHAGEAL REFLUX DISEASE WITHOUT ESOPHAGITIS: ICD-10-CM

## 2023-05-08 DIAGNOSIS — C61 ADENOCARCINOMA OF PROSTATE (HCC): ICD-10-CM

## 2023-05-08 DIAGNOSIS — Z13.29 SCREENING FOR THYROID DISORDER: ICD-10-CM

## 2023-05-08 DIAGNOSIS — Z00.01 ENCOUNTER FOR WELL ADULT EXAM WITH ABNORMAL FINDINGS: Primary | ICD-10-CM

## 2023-05-08 DIAGNOSIS — R73.01 IFG (IMPAIRED FASTING GLUCOSE): ICD-10-CM

## 2023-05-08 DIAGNOSIS — Z13.220 SCREENING FOR LIPID DISORDERS: ICD-10-CM

## 2023-05-08 DIAGNOSIS — I10 ESSENTIAL HYPERTENSION: ICD-10-CM

## 2023-05-08 RX ORDER — OMEPRAZOLE 20 MG/1
20 CAPSULE, DELAYED RELEASE ORAL EVERY EVENING
Qty: 30 CAPSULE | Refills: 2 | Status: SHIPPED | OUTPATIENT
Start: 2023-05-08

## 2023-05-08 NOTE — PROGRESS NOTES
[address]  PRIMARY CARE [facility]    NAME: Promise Ballard  AGE: 57 y o  SEX: male  : 1960     DATE: 2023     Assessment and Plan:     Problem List Items Addressed This Visit        Digestive    Gastroesophageal reflux disease without esophagitis     Chronic asymptomatic fair control continue omeprazole 20 mg once a day avoid provoke food eat and lie down         Relevant Medications    omeprazole (PriLOSEC) 20 mg delayed release capsule    Other Relevant Orders    CBC and differential    Comprehensive metabolic panel       Endocrine    IFG (impaired fasting glucose)     Chronic antibiotic patient is due for checking fasting blood sugar low-carb diet important lose weight reviewed            Cardiovascular and Mediastinum    Essential hypertension    Relevant Orders    CBC and differential    Comprehensive metabolic panel       Genitourinary    Adenocarcinoma of prostate [facility])     Patient follow-up with urology periodically            Other    Encounter for well adult exam with abnormal findings - Primary     Advice and education were given regarding nutrition, aerobic exercises, weight-bearing exercises, cardiovascular risk reduction, fall risk reduction, and age-appropriate supplements  The patient was counseled regarding instructions for management, risk factor reductions, prognosis, risks and benefits of treatment options, patient and family education, and importance of compliance with treatment            Class 1 obesity with serious comorbidity and body mass index (BMI) of 31 0 to 31 9 in adult     BMI today  31 29 discussed portion control low-carb low-fat diet increase physical activity         Relevant Orders    CBC and differential    Comprehensive metabolic panel   Other Visit Diagnoses     Screening for lipid disorders        Relevant Orders    Lipid Panel with Direct LDL reflex    Screening for thyroid disorder Relevant Orders    TSH, 3rd generation with Free T4 reflex          Immunizations and preventive care screenings were discussed with patient today  Appropriate education was printed on patient's after visit summary  Discussed risks and benefits of prostate cancer screening  We discussed the controversial history of PSA screening for prostate cancer in the [place] as well as the risk of over detection and over treatment of prostate cancer by way of PSA screening  The patient understands that PSA blood testing is an imperfect way to screen for prostate cancer and that elevated PSA levels in the blood may also be caused by infection, inflammation, prostatic trauma or manipulation, urological procedures, or by benign prostatic enlargement  The role of the digital rectal examination in prostate cancer screening was also discussed and I discussed with him that there is large interobserver variability in the findings of digital rectal examination  Counseling:  Alcohol/drug use: discussed moderation in alcohol intake, the recommendations for healthy alcohol use, and avoidance of illicit drug use  Dental Health: discussed importance of regular tooth brushing, flossing, and dental visits  Injury prevention: discussed safety/seat belts, safety helmets, smoke detectors, carbon dioxide detectors, and smoking near bedding or upholstery  Sexual health: discussed sexually transmitted diseases, partner selection, use of condoms, avoidance of unintended pregnancy, and contraceptive alternatives  Exercise: the importance of regular exercise/physical activity was discussed  Recommend exercise 3-5 times per week for at least 30 minutes  BMI Counseling: Body mass index is 31 29 kg/m²  The BMI is above normal  Nutrition recommendations include decreasing portion sizes, decreasing fast food intake and limiting drinks that contain sugar  Exercise recommendations include exercising 3-5 times per week   No pharmacotherapy was ordered  Rationale for BMI follow-up plan is due to patient being overweight or obese  Depression Screening and Follow-up Plan: Patient was screened for depression during today's encounter  They screened negative with a PHQ-2 score of 0  Return in about 1 year (around 5/8/2024)  Chief Complaint:     Chief Complaint   Patient presents with   • Physical Exam      History of Present Illness:     Adult Annual Physical   Patient here for a comprehensive physical exam  The patient reports problems - Patient here follow-up chronic condition patient compliant with the medication tolerated well without side effect no new concerns he forgot to do his blood work  Diet and Physical Activity  Diet/Nutrition: low fat diet and low carb diet  Exercise: no formal exercise  Depression Screening  PHQ-2/9 Depression Screening    Little interest or pleasure in doing things: 0 - not at all  Feeling down, depressed, or hopeless: 0 - not at all  PHQ-2 Score: 0  PHQ-2 Interpretation: Negative depression screen       General Health  Sleep: sleeps well  Hearing: normal - bilateral   Vision: wears glasses  dental: brushes teeth twice daily   Health  Patient follow-up with the urology periodically he hasa prostate cancer     Review of Systems:     Review of Systems   Constitutional: Negative for chills and fever  HENT: Negative for ear pain and sore throat  Eyes: Negative for pain and visual disturbance  Respiratory: Negative for cough and shortness of breath  Cardiovascular: Negative for chest pain and palpitations  Gastrointestinal: Negative for abdominal pain, anal bleeding, blood in stool, constipation, diarrhea and vomiting  Genitourinary: Negative for dysuria and hematuria  Musculoskeletal: Negative for arthralgias and back pain  Skin: Negative for color change and rash  Neurological: Negative for seizures and syncope     Hematological: Does not bruise/bleed easily  Psychiatric/Behavioral: Negative for behavioral problems  All other systems reviewed and are negative  Past Medical History:     Past Medical History:   Diagnosis Date   • Abnormal WBC count 05/06/2021   • Adenocarcinoma of prostate ([facility] ) 03/27/2020   • Gold's esophagus 01/04/2021    EGD 01/04/21 recommend to repeat in 3 y   • Colon polyp    • Coronary artery disease    • COVID 11/24/2020   • Elevated PSA    • Fever 11/24/2020   • Gastroesophageal reflux disease without esophagitis 12/13/2019   • GERD (gastroesophageal reflux disease)    • Hypercholesterolemia    • Hypertension    • Inferior MI ([facility] ) 04/07/2016   • LLQ pain 10/13/2020   • LUQ pain 11/05/2019   • Myocardial infarction [facility]) 2011   • Papillary growth of urinary bladder     TURBT  today   2/7/2023   • PONV (postoperative nausea and vomiting)     only  w/ prostate bx   • Sepsis due to Escherichia coli ([place] [id number] ) 03/19/2020   • Swelling 08/25/2021   • UTI (urinary tract infection) 03/18/2020   • Wears glasses       Past Surgical History:     Past Surgical History:   Procedure Laterality Date   • COLONOSCOPY     • CORONARY STENT PLACEMENT      bare metal stent in Rt coronary artery   • CYSTOSCOPY W/ RETROGRADES Bilateral 2/7/2023    Procedure: CYSTO W/ RETROGRADE PYELOGRAM;  Surgeon: Reid Sue MD;  [facility];  Service: Urology   • FL RETROGRADE PYELOGRAM  2/7/2023   • [place] BLADDER INSTILLATION ANTICARCINOGENIC AGENT N/A 2/7/2023    Procedure: INSTILLATION Gemcitabine;  Surgeon: Reid Sue MD;  [facility];  Service: Urology   • MD BX PROSTATE STRTCTC SATURATION SAMPLING IMG GID N/A 11/29/2022    Procedure: TRANSPERINEAL MRI FUSION  BIOPSY PROSTATE;  Surgeon: Aurelia Mercer MD;  [facility];  Service: Urology   • MD CYSTO W/REMOVAL OF LESIONS SMALL N/A 2/7/2023    Procedure: (TURBT);   Surgeon: Reid Sue MD;  [facility];  Service: Urology   • SHOULDER SURGERY Left       Family History:     Family "History   Problem Relation Age of Onset   • Sleep apnea Mother    • Heart attack Father       Social History:     Social History     Socioeconomic History   • Marital status: /Civil Union     Spouse name: None   • Number of children: None   • Years of education: None   • Highest education level: None   Occupational History   • None   Tobacco Use   • Smoking status: Former     Packs/day: 0 25     Years: 37 00     Pack years: 9 25     Types: Cigarettes     Start date: 1974     Quit date:      Years since quittin 3   • Smokeless tobacco: Never   Vaping Use   • Vaping Use: Never used   Substance and Sexual Activity   • Alcohol use: Not Currently     Comment: 6 x  yearly   • Drug use: Never   • Sexual activity: Yes     Partners: Female   Other Topics Concern   • None   Social History Narrative   • None     Social Determinants of Health     Financial Resource Strain: Not on file   Food Insecurity: Not on file   Transportation Needs: Not on file   Physical Activity: Not on file   Stress: Not on file   Social Connections: Not on file   Intimate Partner Violence: Not on file   Housing Stability: Not on file      Current Medications:     Current Outpatient Medications   Medication Sig Dispense Refill   • omeprazole (PriLOSEC) 20 mg delayed release capsule Take 1 capsule (20 mg total) by mouth every evening 30 capsule 2   • aspirin (ECOTRIN LOW STRENGTH) 81 mg EC tablet Take 81 mg by mouth every evening     • atorvastatin (LIPITOR) 20 mg tablet Take 1 tablet (20 mg total) by mouth daily 90 tablet 1   • lisinopril (ZESTRIL) 20 mg tablet Take 1 tablet (20 mg total) by mouth daily 90 tablet 0     No current facility-administered medications for this visit        Allergies:     No Known Allergies   Physical Exam:     /80 (BP Location: Left leg, Patient Position: Sitting)   Pulse 98   Temp 99 °F (37 2 °C) (Tympanic)   Ht 5' 9 5\" (1 765 m)   Wt 97 5 kg (215 lb)   SpO2 98%   BMI 31 29 kg/m²     Physical " Exam  Vitals and nursing note reviewed  Constitutional:       General: He is not in acute distress  Appearance: He is well-developed  HENT:      Head: Normocephalic and atraumatic  Right Ear: Tympanic membrane normal  There is no impacted cerumen  Left Ear: Tympanic membrane normal  There is no impacted cerumen  Mouth/Throat:      Pharynx: No oropharyngeal exudate  Eyes:      Conjunctiva/sclera: Conjunctivae normal    Cardiovascular:      Rate and Rhythm: Normal rate and regular rhythm  Heart sounds: No murmur heard  Pulmonary:      Effort: Pulmonary effort is normal  No respiratory distress  Breath sounds: Normal breath sounds  Abdominal:      Palpations: Abdomen is soft  Tenderness: There is no abdominal tenderness  Musculoskeletal:         General: No swelling  Cervical back: Neck supple  Skin:     General: Skin is warm and dry  Capillary Refill: Capillary refill takes less than 2 seconds  Findings: No erythema or rash  Neurological:      Mental Status: He is alert and oriented to person, place, and time     Psychiatric:         Mood and Affect: Mood normal           Mati Arceo MD  [address] [place] [address]

## 2023-05-09 PROBLEM — E66.811 CLASS 1 OBESITY WITH SERIOUS COMORBIDITY AND BODY MASS INDEX (BMI) OF 31.0 TO 31.9 IN ADULT: Status: ACTIVE | Noted: 2023-05-09

## 2023-05-09 PROBLEM — E66.9 CLASS 1 OBESITY WITH SERIOUS COMORBIDITY AND BODY MASS INDEX (BMI) OF 31.0 TO 31.9 IN ADULT: Status: ACTIVE | Noted: 2023-05-09

## 2023-05-09 NOTE — ASSESSMENT & PLAN NOTE
Chronic antibiotic patient is due for checking fasting blood sugar low-carb diet important lose weight reviewed

## 2023-05-09 NOTE — ASSESSMENT & PLAN NOTE
Chronic asymptomatic fair control continue omeprazole 20 mg once a day avoid provoke food eat and lie down

## 2023-05-30 DIAGNOSIS — K21.9 GASTROESOPHAGEAL REFLUX DISEASE WITHOUT ESOPHAGITIS: ICD-10-CM

## 2023-05-30 RX ORDER — OMEPRAZOLE 20 MG/1
CAPSULE, DELAYED RELEASE ORAL
Qty: 90 CAPSULE | Refills: 0 | Status: SHIPPED | OUTPATIENT
Start: 2023-05-30

## 2023-06-15 ENCOUNTER — APPOINTMENT (OUTPATIENT)
Dept: LAB | Facility: MEDICAL CENTER | Age: 63
End: 2023-06-15
Payer: COMMERCIAL

## 2023-06-15 DIAGNOSIS — Z13.220 SCREENING FOR LIPID DISORDERS: ICD-10-CM

## 2023-06-15 DIAGNOSIS — K21.9 GASTROESOPHAGEAL REFLUX DISEASE WITHOUT ESOPHAGITIS: ICD-10-CM

## 2023-06-15 DIAGNOSIS — I10 ESSENTIAL HYPERTENSION: ICD-10-CM

## 2023-06-15 DIAGNOSIS — Z13.29 SCREENING FOR THYROID DISORDER: ICD-10-CM

## 2023-06-15 DIAGNOSIS — E78.2 MIXED HYPERLIPIDEMIA: ICD-10-CM

## 2023-06-15 DIAGNOSIS — E66.9 CLASS 1 OBESITY WITH SERIOUS COMORBIDITY AND BODY MASS INDEX (BMI) OF 31.0 TO 31.9 IN ADULT, UNSPECIFIED OBESITY TYPE: ICD-10-CM

## 2023-06-15 LAB
ALBUMIN SERPL BCP-MCNC: 4.1 G/DL (ref 3.5–5)
ALP SERPL-CCNC: 78 U/L (ref 46–116)
ALT SERPL W P-5'-P-CCNC: 32 U/L (ref 12–78)
ANION GAP SERPL CALCULATED.3IONS-SCNC: 1 MMOL/L (ref 4–13)
AST SERPL W P-5'-P-CCNC: 19 U/L (ref 5–45)
BASOPHILS # BLD AUTO: 0.1 THOUSANDS/ÂΜL (ref 0–0.1)
BASOPHILS NFR BLD AUTO: 1 % (ref 0–1)
BILIRUB SERPL-MCNC: 0.64 MG/DL (ref 0.2–1)
BUN SERPL-MCNC: 22 MG/DL (ref 5–25)
CALCIUM SERPL-MCNC: 9 MG/DL (ref 8.3–10.1)
CHLORIDE SERPL-SCNC: 113 MMOL/L (ref 96–108)
CHOLEST SERPL-MCNC: 163 MG/DL
CO2 SERPL-SCNC: 28 MMOL/L (ref 21–32)
CREAT SERPL-MCNC: 1 MG/DL (ref 0.6–1.3)
EOSINOPHIL # BLD AUTO: 0.27 THOUSAND/ÂΜL (ref 0–0.61)
EOSINOPHIL NFR BLD AUTO: 4 % (ref 0–6)
ERYTHROCYTE [DISTWIDTH] IN BLOOD BY AUTOMATED COUNT: 13 % (ref 11.6–15.1)
GFR SERPL CREATININE-BSD FRML MDRD: 78 ML/MIN/1.73SQ M
GLUCOSE P FAST SERPL-MCNC: 91 MG/DL (ref 65–99)
HCT VFR BLD AUTO: 43.2 % (ref 36.5–49.3)
HDLC SERPL-MCNC: 43 MG/DL
HGB BLD-MCNC: 14.8 G/DL (ref 12–17)
IMM GRANULOCYTES # BLD AUTO: 0.05 THOUSAND/UL (ref 0–0.2)
IMM GRANULOCYTES NFR BLD AUTO: 1 % (ref 0–2)
LDLC SERPL CALC-MCNC: 101 MG/DL (ref 0–100)
LYMPHOCYTES # BLD AUTO: 2.21 THOUSANDS/ÂΜL (ref 0.6–4.47)
LYMPHOCYTES NFR BLD AUTO: 28 % (ref 14–44)
MCH RBC QN AUTO: 31.8 PG (ref 26.8–34.3)
MCHC RBC AUTO-ENTMCNC: 34.3 G/DL (ref 31.4–37.4)
MCV RBC AUTO: 93 FL (ref 82–98)
MONOCYTES # BLD AUTO: 0.48 THOUSAND/ÂΜL (ref 0.17–1.22)
MONOCYTES NFR BLD AUTO: 6 % (ref 4–12)
NEUTROPHILS # BLD AUTO: 4.68 THOUSANDS/ÂΜL (ref 1.85–7.62)
NEUTS SEG NFR BLD AUTO: 60 % (ref 43–75)
NRBC BLD AUTO-RTO: 0 /100 WBCS
PLATELET # BLD AUTO: 261 THOUSANDS/UL (ref 149–390)
PMV BLD AUTO: 10.9 FL (ref 8.9–12.7)
POTASSIUM SERPL-SCNC: 4.4 MMOL/L (ref 3.5–5.3)
PROT SERPL-MCNC: 7.3 G/DL (ref 6.4–8.4)
RBC # BLD AUTO: 4.66 MILLION/UL (ref 3.88–5.62)
SODIUM SERPL-SCNC: 142 MMOL/L (ref 135–147)
TRIGL SERPL-MCNC: 94 MG/DL
TSH SERPL DL<=0.05 MIU/L-ACNC: 0.82 UIU/ML (ref 0.45–4.5)
WBC # BLD AUTO: 7.79 THOUSAND/UL (ref 4.31–10.16)

## 2023-06-15 PROCEDURE — 36415 COLL VENOUS BLD VENIPUNCTURE: CPT

## 2023-06-15 PROCEDURE — 80053 COMPREHEN METABOLIC PANEL: CPT

## 2023-06-15 PROCEDURE — 84443 ASSAY THYROID STIM HORMONE: CPT

## 2023-06-15 PROCEDURE — 80061 LIPID PANEL: CPT

## 2023-06-15 PROCEDURE — 85025 COMPLETE CBC W/AUTO DIFF WBC: CPT

## 2023-06-20 ENCOUNTER — RA CDI HCC (OUTPATIENT)
Dept: OTHER | Facility: HOSPITAL | Age: 63
End: 2023-06-20

## 2023-06-20 ENCOUNTER — OFFICE VISIT (OUTPATIENT)
Dept: CARDIAC SURGERY | Facility: CLINIC | Age: 63
End: 2023-06-20
Payer: COMMERCIAL

## 2023-06-20 ENCOUNTER — TELEPHONE (OUTPATIENT)
Dept: FAMILY MEDICINE CLINIC | Facility: CLINIC | Age: 63
End: 2023-06-20

## 2023-06-20 VITALS
BODY MASS INDEX: 30.64 KG/M2 | OXYGEN SATURATION: 98 % | WEIGHT: 214 LBS | HEIGHT: 70 IN | SYSTOLIC BLOOD PRESSURE: 144 MMHG | DIASTOLIC BLOOD PRESSURE: 78 MMHG | HEART RATE: 56 BPM

## 2023-06-20 DIAGNOSIS — I25.10 CAD IN NATIVE ARTERY: Primary | ICD-10-CM

## 2023-06-20 DIAGNOSIS — I10 ESSENTIAL HYPERTENSION: ICD-10-CM

## 2023-06-20 DIAGNOSIS — Z95.5 PRESENCE OF BARE METAL STENT IN RIGHT CORONARY ARTERY: ICD-10-CM

## 2023-06-20 DIAGNOSIS — I25.2 HISTORY OF ACUTE INFERIOR WALL MI: ICD-10-CM

## 2023-06-20 PROCEDURE — 99214 OFFICE O/P EST MOD 30 MIN: CPT | Performed by: INTERNAL MEDICINE

## 2023-06-20 RX ORDER — EZETIMIBE 10 MG/1
10 TABLET ORAL DAILY
Qty: 90 TABLET | Refills: 3 | Status: SHIPPED | OUTPATIENT
Start: 2023-06-20

## 2023-06-20 NOTE — PROGRESS NOTES
Cardiology Follow Up Visit    Stepan Graham  1960  [id number]  [place]  [address]  [facility]  [phone]    1  CAD in native artery  ezetimibe (ZETIA) 10 mg tablet    Lipid Panel with Direct LDL reflex      2  Essential hypertension  ezetimibe (ZETIA) 10 mg tablet    Lipid Panel with Direct LDL reflex      3  History of acute inferior wall MI 2011,s/p BMS RCA  Lipid Panel with Direct LDL reflex      4  Presence of bare metal stent in right coronary artery  ezetimibe (ZETIA) 10 mg tablet            Discussion/Summary:       1  CAD, asymptomatic  A  Inferior MI 2011, BMS RCA   B  St echo 2017 no ischemia     2  Hyperlipidemia, LDL   around 100, off repatha due to insurance coverage,   3  Essential htn,   Elevated on intake today, home blood pressure checks within normal limits per his report  Plan:   Patient doing very well   will add ezetimibe 10mg daily, lipids 2-3 months   Continue sensible diet and   Daily activity/exercise if blood pressure should  BP elevated would add amlodipine 5mg daily  Follow-up home blood pressure checks, goal consistently less than 140/90 with ideal 120/80       Interval History:    70year-old  Here for follow-up known coronary artery disease     Index inferior MI 2011 remote stent right coronary artery  Has done well since  No recurrent vascular events     Unable to tolerate higher dose statin/high potency statin  Off Repatha now due to insurance issues  LDL now around 100   States home blood pressure checks all within normal limits          Patient Active Problem List   Diagnosis    CAD in native artery    History of acute inferior wall MI 2011,s/p BMS RCA    Presence of bare metal stent in right coronary artery    Essential hypertension    Hyperlipidemia    Chronic left-sided low back pain without sciatica    Fatty liver    Benign prostatic hyperplasia without lower urinary tract symptoms    Gastroesophageal reflux disease without esophagitis    Abnormal prostate specific antigen (PSA)    Prostate nodule without urinary obstruction    Bacteremia due to Escherichia coli    Bursitis of shoulder    Contusion of left shoulder    Injury of tendon of rotator cuff    UTI (urinary tract infection)    Adenocarcinoma of prostate ([facility] )    Encounter for well adult exam with abnormal findings    Myalgia with higher doses statins    Colon polyp    COVID-19 virus infection    Gold's esophagus    Bloating    Flank pain    IFG (impaired fasting glucose)    Left upper quadrant abdominal mass    Abnormal urine findings    Bradycardia    Lesion of urinary bladder    PONV (postoperative nausea and vomiting)    MIR (obstructive sleep apnea)    Class 1 obesity with serious comorbidity and body mass index (BMI) of 31 0 to 31 9 in adult     Past Medical History:   Diagnosis Date    Abnormal WBC count 05/06/2021    Adenocarcinoma of prostate ([facility] ) 03/27/2020    Gold's esophagus 01/04/2021    EGD 01/04/21 recommend to repeat in 3 y    Colon polyp     Coronary artery disease     COVID 11/24/2020    Elevated PSA     Fever 11/24/2020    Gastroesophageal reflux disease without esophagitis 12/13/2019    GERD (gastroesophageal reflux disease)     Hypercholesterolemia     Hypertension     Inferior MI ([facility] ) 04/07/2016    LLQ pain 10/13/2020    LUQ pain 11/05/2019    Myocardial infarction ([facility] ) 2011    Papillary growth of urinary bladder     TURBT  today   2/7/2023    PONV (postoperative nausea and vomiting)     only  w/ prostate bx    Sepsis due to Escherichia coli ([facility] ) 03/19/2020    Swelling 08/25/2021    UTI (urinary tract infection) 03/18/2020    Wears glasses      Social History     Socioeconomic History    Marital status: /Civil Union     Spouse name: Not on file    Number of children: Not on file    Years of education: Not on file    Highest education level: Not on file   Occupational History    Not on file   Tobacco Use    Smoking status: Former     Packs/day: 0 25     Years: 37 00     Total pack years: 9 25     Types: Cigarettes     Start date: 1974     Quit date: 2011     Years since quittin 4    Smokeless tobacco: Never   Vaping Use    Vaping Use: Never used   Substance and Sexual Activity    Alcohol use: Not Currently     Comment: 6 x  yearly    Drug use: Never    Sexual activity: Yes     Partners: Female   Other Topics Concern    Not on file   Social History Narrative    Not on file     Social Determinants of Health     Financial Resource Strain: Low Risk  (3/8/2022)    Overall Financial Resource Strain (CARDIA)     Difficulty of Paying Living Expenses: Not hard at all   Food Insecurity: No Food Insecurity (3/8/2022)    Hunger Vital Sign     Worried About Running Out of Food in the Last Year: Never true     [address] in the Last Year: Never true   Transportation Needs: No Transportation Needs (3/8/2022)    PRAPARE - Transportation     Lack of Transportation (Medical): No     Lack of Transportation (Non-Medical): No   Physical Activity: Sufficiently Active (2021)    Exercise Vital Sign     Days of Exercise per Week: 5 days     Minutes of Exercise per Session: 30 min   Stress: No Stress Concern Present (3/8/2022)    [address]     Feeling of Stress : Not at all   Social Connections:  Moderately Isolated (3/8/2022)    Social Connection and Isolation Panel [NHANES]     Frequency of Communication with Friends and Family: More than three times a week     Frequency of Social Gatherings with Friends and Family: Twice a week     Attends Spiritism Services: Never     Active Member of Clubs or Organizations: No     Attends Club or Organization Meetings: Never     Marital Status:    Intimate Partner Violence: Not At Risk (3/8/2022)    Humiliation, Afraid, Rape, and Kick questionnaire     Fear of Current or Ex-Partner: No     Emotionally Abused: No     Physically Abused: No     Sexually Abused: No   Housing Stability: Unknown (3/8/2022)    Housing Stability Vital Sign     Unable to Pay for Housing in the Last Year: No     Number of Places Lived in the Last Year: Not on file     Unstable Housing in the Last Year: No      Family History   Problem Relation Age of Onset    Sleep apnea Mother     Heart attack Father      Past Surgical History:   Procedure Laterality Date    COLONOSCOPY      CORONARY STENT PLACEMENT      bare metal stent in Rt coronary artery    CYSTOSCOPY W/ RETROGRADES Bilateral 2/7/2023    Procedure: CYSTO W/ RETROGRADE PYELOGRAM;  Surgeon: Liza Feng MD;  [facility];  Service: Urology    FL RETROGRADE PYELOGRAM  2/7/2023    [place] BLADDER INSTILLATION ANTICARCINOGENIC AGENT N/A 2/7/2023    Procedure: INSTILLATION Gemcitabine;  Surgeon: Liza Feng MD;  [facility];  Service: Urology    [address] GID N/A 11/29/2022    Procedure: TRANSPERINEAL MRI FUSION  BIOPSY PROSTATE;  Surgeon: Jerri Varma MD;  [facility];  Service: Urology    [place] CYSTO W/REMOVAL OF LESIONS SMALL N/A 2/7/2023    Procedure: (TURBT);   Surgeon: Liza Feng MD;  [facility];  Service: Urology    SHOULDER SURGERY Left        Current Outpatient Medications:     aspirin (ECOTRIN LOW STRENGTH) 81 mg EC tablet, Take 81 mg by mouth every evening, Disp: , Rfl:     atorvastatin (LIPITOR) 20 mg tablet, Take 1 tablet (20 mg total) by mouth daily, Disp: 90 tablet, Rfl: 1    ezetimibe (ZETIA) 10 mg tablet, Take 1 tablet (10 mg total) by mouth daily, Disp: 90 tablet, Rfl: 3    lisinopril (ZESTRIL) 20 mg tablet, Take 1 tablet (20 mg total) by mouth daily, Disp: 90 tablet, Rfl: 0    omeprazole (PriLOSEC) 20 mg delayed release capsule, TAKE 1 CAPSULE BY MOUTH EVERY EVENING , Disp: 90 capsule, Rfl: 0  No Known Allergies      Social, Family, Medication history reviewed "and updated as necessary      Labs:     Lab Results   Component Value Date    K 4 4 06/15/2023     (H) 06/15/2023    CO2 28 06/15/2023    BUN 22 06/15/2023    CREATININE 1 00 06/15/2023    CREATININE 1 06 02/09/2023    CALCIUM 9 0 06/15/2023       Lab Results   Component Value Date    WBC 7 79 06/15/2023    HGB 14 8 06/15/2023    HGB 13 2 02/09/2023    HCT 43 2 06/15/2023    HCT 39 9 02/09/2023     06/15/2023     02/09/2023       Lab Results   Component Value Date    CHOL 162 06/24/2014     Lab Results   Component Value Date    HDL 43 06/15/2023    HDL 40 01/17/2023     Lab Results   Component Value Date    LDLCALC 101 (H) 06/15/2023    LDLCALC 113 (H) 01/17/2023     No results found for: \"LDLDIRECT\"          Lab Results   Component Value Date    TRIG 94 06/15/2023    TRIG 175 (H) 01/17/2023       Lab Results   Component Value Date    ALT 32 06/15/2023    ALT 24 02/08/2023    AST 19 06/15/2023    AST 13 02/08/2023    ALKPHOS 78 06/15/2023    ALKPHOS 86 02/08/2023       Lab Results   Component Value Date    INR 0 92 02/08/2023       No results found for: \"NTBNP\"    No results found for: \"HGBA1C\"        Imaging: Reviewed in epic        Review of Systems:  14 systems reviewed and negative with exception of the above        PHYSICAL EXAM:      Vitals:    06/20/23 1122   BP: 144/78   Pulse: 56   SpO2: 98%     Body mass index is 31 15 kg/m²     Weight (last 2 days)       Date/Time Weight    06/20/23 1122 97 1 (214)    06/20/23 1118 97 1 (214)              Gen: No acute distress  HEENT: anicteric, mucous membranes moist  Neck: supple, no jugular venous distention, or carotid bruit  Heart: regular, normal s1 and s2, no murmur/rub or gallop  Lungs :clear to auscultation bilaterally, no rales/rhonchi or wheeze  Abdomen: soft nontender, normoactive bowel sounds, no organomegaly  Ext: warm and perfused, normal femoral pulses, no edema, or clubbing  Skin: warm, no rashes  Neuro: AAO x 3, no focal " findings  Psychiatric: normal affect  Musculoskeletal: no obvious joint deformities

## 2023-06-20 NOTE — TELEPHONE ENCOUNTER
----- Message from Yinka Floyd MD sent at 6/20/2023  7:13 AM EDT -----  His blood work came back showing triglyceride and LDL improving  All the rest of the blood work came back stable

## 2023-06-20 NOTE — PROGRESS NOTES
[facility]  coding opportunities       Chart reviewed, no opportunity found: CHART REVIEWED, NO OPPORTUNITY FOUND        Patients Insurance        Commercial Insurance: Baltazar Pastor

## 2023-07-10 ENCOUNTER — OFFICE VISIT (OUTPATIENT)
Dept: FAMILY MEDICINE CLINIC | Facility: CLINIC | Age: 63
End: 2023-07-10
Payer: COMMERCIAL

## 2023-07-10 VITALS
HEART RATE: 58 BPM | DIASTOLIC BLOOD PRESSURE: 80 MMHG | SYSTOLIC BLOOD PRESSURE: 134 MMHG | BODY MASS INDEX: 31.21 KG/M2 | WEIGHT: 218 LBS | OXYGEN SATURATION: 98 % | HEIGHT: 70 IN | TEMPERATURE: 98 F

## 2023-07-10 DIAGNOSIS — R73.01 IFG (IMPAIRED FASTING GLUCOSE): ICD-10-CM

## 2023-07-10 DIAGNOSIS — E78.2 MIXED HYPERLIPIDEMIA: ICD-10-CM

## 2023-07-10 DIAGNOSIS — I25.10 CAD IN NATIVE ARTERY: ICD-10-CM

## 2023-07-10 DIAGNOSIS — I10 ESSENTIAL HYPERTENSION: Primary | ICD-10-CM

## 2023-07-10 PROCEDURE — 99214 OFFICE O/P EST MOD 30 MIN: CPT | Performed by: FAMILY MEDICINE

## 2023-07-10 NOTE — ASSESSMENT & PLAN NOTE
Chronic asymptomatic fair control continue with lisinopril 20 mg once a day low-salt diet important lose weight discussed with patient

## 2023-07-10 NOTE — ASSESSMENT & PLAN NOTE
Chronic asymptomatic continue current management patient already on statin and ACE inhibitor follow-up with the cardiology periodically

## 2023-07-10 NOTE — ASSESSMENT & PLAN NOTE
Chronic asymptomatic patient on atorvastatin 20 mg recently started by cardiology on Zetia 10 mg tolerated well continue current management low-fat diet reviewed

## 2023-07-10 NOTE — PROGRESS NOTES
Name: Cara Rehman      : [birth year]      MRN: [id number]  Encounter Provider: Felipe Wong MD  Encounter Date: 7/10/2023   Encounter department: [address] [place] [address] 270     1. Essential hypertension  Assessment & Plan:  Chronic asymptomatic fair control continue with lisinopril 20 mg once a day low-salt diet important lose weight discussed with patient    Orders:  -     CBC and differential; Future; Expected date: 10/14/2023  -     Comprehensive metabolic panel; Future; Expected date: 10/14/2023  -     Lipid panel; Future; Expected date: 10/14/2023  -     TSH, 3rd generation with Free T4 reflex; Future; Expected date: 10/14/2023    2. Mixed hyperlipidemia  Assessment & Plan:  Chronic asymptomatic patient on atorvastatin 20 mg recently started by cardiology on Zetia 10 mg tolerated well continue current management low-fat diet reviewed    Orders:  -     CBC and differential; Future; Expected date: 10/14/2023  -     Comprehensive metabolic panel; Future; Expected date: 10/14/2023  -     Lipid panel; Future; Expected date: 10/14/2023  -     TSH, 3rd generation with Free T4 reflex; Future; Expected date: 10/14/2023    3. IFG (impaired fasting glucose)  -     CBC and differential; Future; Expected date: 10/14/2023  -     Comprehensive metabolic panel; Future; Expected date: 10/14/2023  -     Lipid panel; Future; Expected date: 10/14/2023  -     TSH, 3rd generation with Free T4 reflex; Future; Expected date: 10/14/2023    4. CAD in native artery  Assessment & Plan:  Chronic asymptomatic continue current management patient already on statin and ACE inhibitor follow-up with the cardiology periodically             Subjective      Patient here follow-up with a chronic condition compliant with the medication recent blood work reviewed with the patient      Review of Systems   Constitutional: Negative for chills and fever. HENT: Negative for ear pain and sore throat.     Eyes: Negative for pain and visual disturbance. Respiratory: Negative for cough and shortness of breath. Cardiovascular: Negative for chest pain and palpitations. Gastrointestinal: Negative for abdominal pain, blood in stool, constipation, diarrhea and vomiting. Genitourinary: Negative for dysuria and hematuria. Musculoskeletal: Negative for arthralgias and back pain. Skin: Negative for color change and rash. Neurological: Negative for seizures and syncope. All other systems reviewed and are negative. Current Outpatient Medications on File Prior to Visit   Medication Sig   • aspirin (ECOTRIN LOW STRENGTH) 81 mg EC tablet Take 81 mg by mouth every evening   • atorvastatin (LIPITOR) 20 mg tablet Take 1 tablet (20 mg total) by mouth daily   • ezetimibe (ZETIA) 10 mg tablet Take 1 tablet (10 mg total) by mouth daily   • lisinopril (ZESTRIL) 20 mg tablet Take 1 tablet (20 mg total) by mouth daily   • omeprazole (PriLOSEC) 20 mg delayed release capsule TAKE 1 CAPSULE BY MOUTH EVERY EVENING. Objective     /80   Pulse 58   Temp 98 °F (36.7 °C) (Tympanic)   Ht 5' 10" (1.778 m)   Wt 98.9 kg (218 lb)   SpO2 98%   BMI 31.28 kg/m²     Physical Exam  Constitutional:       General: He is not in acute distress. Appearance: He is well-developed. He is not diaphoretic. HENT:      Head: Normocephalic. Right Ear: External ear normal.      Left Ear: External ear normal.      Nose: No congestion or rhinorrhea. Mouth/Throat:      Pharynx: No oropharyngeal exudate or posterior oropharyngeal erythema. Eyes:      General:         Right eye: No discharge. Left eye: No discharge. Conjunctiva/sclera: Conjunctivae normal.   Neck:      Vascular: No JVD. Cardiovascular:      Rate and Rhythm: Normal rate and regular rhythm. Heart sounds: Normal heart sounds. No murmur heard. No gallop. Pulmonary:      Effort: Pulmonary effort is normal. No respiratory distress. Breath sounds: Normal breath sounds. No stridor. No wheezing or rales. Chest:      Chest wall: No tenderness. Abdominal:      General: There is no distension. Palpations: Abdomen is soft. There is no mass. Tenderness: There is no abdominal tenderness. There is no rebound. Musculoskeletal:         General: No tenderness. Cervical back: Normal range of motion and neck supple. Lymphadenopathy:      Cervical: No cervical adenopathy. Skin:     General: Skin is warm. Findings: No erythema or rash. Neurological:      Mental Status: He is alert and oriented to person, place, and time.        Bella Ruvalcaba MD

## 2023-07-13 DIAGNOSIS — I10 ESSENTIAL HYPERTENSION: ICD-10-CM

## 2023-07-13 RX ORDER — LISINOPRIL 20 MG/1
TABLET ORAL
Qty: 30 TABLET | Refills: 2 | Status: SHIPPED | OUTPATIENT
Start: 2023-07-13

## 2023-07-19 ENCOUNTER — PROCEDURE VISIT (OUTPATIENT)
Dept: UROLOGY | Facility: MEDICAL CENTER | Age: 63
End: 2023-07-19
Payer: COMMERCIAL

## 2023-07-19 VITALS
DIASTOLIC BLOOD PRESSURE: 88 MMHG | OXYGEN SATURATION: 100 % | BODY MASS INDEX: 31.07 KG/M2 | WEIGHT: 217 LBS | SYSTOLIC BLOOD PRESSURE: 148 MMHG | HEART RATE: 73 BPM | HEIGHT: 70 IN

## 2023-07-19 DIAGNOSIS — Z85.51 HISTORY OF BLADDER CANCER: Primary | ICD-10-CM

## 2023-07-19 DIAGNOSIS — N13.8 BPH WITH URINARY OBSTRUCTION: ICD-10-CM

## 2023-07-19 DIAGNOSIS — N40.1 BPH WITH URINARY OBSTRUCTION: ICD-10-CM

## 2023-07-19 PROCEDURE — 99213 OFFICE O/P EST LOW 20 MIN: CPT | Performed by: UROLOGY

## 2023-07-19 PROCEDURE — 52000 CYSTOURETHROSCOPY: CPT | Performed by: UROLOGY

## 2023-07-19 NOTE — PROGRESS NOTES
HISTORY:    Follow-up bladder cancer    February 2023, underwent resection of 5 cm tumor left trigone adjacent to orifice, pathology showed noninvasive high-grade TCC. Tolerates mild BPH well without meds     Cystoscopy     Date/Time 7/19/2023 11:00 AM     Performed by  Ny Reynolds MD   Authorized by Ny Reynolds MD         Procedure Details:  Procedure type: cystoscopy    Patient tolerance: Patient tolerated the procedure well with no immediate complications    Additional Procedure Details:      Patient presents for cystoscopy. I have discussed the reasons for doing the exam, and the potential risks and complications. Patient expressed understanding, and signed informed consent document. The patient was carefully  positioned supine on the examining table. Sterile preparation was performed on the urethra. Xylocaine jelly was instilled and left  Indwelling for the procedure. The 13 Bengali flexible cystoscope was passed with the following findings:      Urethra: No stricture    Prostate:  lateral lobes significant large, and median lobe                  Bladder: Moderate trabeculation, no lesions, tumor, or stones. Residual urine: 100 mL    Patient tolerated the procedure well and was escorted from the examining table.              ASSESSMENT / PLAN:    Doing well    Cystoscopy 4 months    The following portions of the patient's history were reviewed and updated as appropriate: allergies, current medications, past family history, past medical history, past social history, past surgical history and problem list.    Review of Systems      Objective:     Physical Exam      0   Lab Value Date/Time    PSA 9.8 (H) 09/07/2022 0635    PSA 6.5 (H) 04/21/2022 0636    PSA 6.2 (H) 05/14/2021 0857    PSA 6.5 (H) 12/03/2019 0709    PSA 5.4 (H) 11/07/2019 0904   ]  BUN   Date Value Ref Range Status   06/15/2023 22 5 - 25 mg/dL Final   03/30/2018 26 (H) 7 - 25 mg/dL Final     Creatinine   Date Value Ref Range Status   06/15/2023 1.00 0.60 - 1.30 mg/dL Final     Comment:     Standardized to IDMS reference method     No components found for: "CBC"      Patient Active Problem List   Diagnosis   • CAD in native artery   • History of acute inferior wall MI 2011,s/p BMS RCA   • Presence of bare metal stent in right coronary artery   • Essential hypertension   • Hyperlipidemia   • Chronic left-sided low back pain without sciatica   • Fatty liver   • Benign prostatic hyperplasia without lower urinary tract symptoms   • Gastroesophageal reflux disease without esophagitis   • Abnormal prostate specific antigen (PSA)   • Prostate nodule without urinary obstruction   • Bacteremia due to Escherichia coli   • Bursitis of shoulder   • Contusion of left shoulder   • Injury of tendon of rotator cuff   • UTI (urinary tract infection)   • Adenocarcinoma of prostate ([address])   • Encounter for well adult exam with abnormal findings   • Myalgia with higher doses statins   • Colon polyp   • COVID-19 virus infection   • Gold's esophagus   • Bloating   • Flank pain   • IFG (impaired fasting glucose)   • Left upper quadrant abdominal mass   • Abnormal urine findings   • Bradycardia   • Lesion of urinary bladder   • PONV (postoperative nausea and vomiting)   • MIR (obstructive sleep apnea)   • Class 1 obesity with serious comorbidity and body mass index (BMI) of 31.0 to 31.9 in adult        Diagnoses and all orders for this visit:    History of bladder cancer  -     Cystoscopy    BPH with urinary obstruction           Patient ID: Alda Hawkins is a 70 y.o. male.       Current Outpatient Medications:   •  aspirin (ECOTRIN LOW STRENGTH) 81 mg EC tablet, Take 81 mg by mouth every evening, Disp: , Rfl:   •  atorvastatin (LIPITOR) 20 mg tablet, Take 1 tablet (20 mg total) by mouth daily, Disp: 90 tablet, Rfl: 1  •  ezetimibe (ZETIA) 10 mg tablet, Take 1 tablet (10 mg total) by mouth daily, Disp: 90 tablet, Rfl: 3  •  lisinopril (ZESTRIL) 20 mg tablet, TAKE 1 TABLET BY MOUTH EVERY DAY, Disp: 30 tablet, Rfl: 2  •  omeprazole (PriLOSEC) 20 mg delayed release capsule, TAKE 1 CAPSULE BY MOUTH EVERY EVENING., Disp: 90 capsule, Rfl: 0    Past Medical History:   Diagnosis Date   • Abnormal WBC count 05/06/2021   • Adenocarcinoma of prostate ([address]) 03/27/2020   • Gold's esophagus 01/04/2021    EGD 01/04/21 recommend to repeat in 3 y   • Colon polyp    • Coronary artery disease    • COVID 11/24/2020   • Elevated PSA    • Fever 11/24/2020   • Gastroesophageal reflux disease without esophagitis 12/13/2019   • GERD (gastroesophageal reflux disease)    • Hypercholesterolemia    • Hypertension    • Inferior MI ([address]) 04/07/2016   • LLQ pain 10/13/2020   • LUQ pain 11/05/2019   • Myocardial infarction [facility]) 2011   • Papillary growth of urinary bladder     TURBT  today   2/7/2023   • PONV (postoperative nausea and vomiting)     only  w/ prostate bx   • Sepsis due to Escherichia coli ([address]) 03/19/2020   • Swelling 08/25/2021   • UTI (urinary tract infection) 03/18/2020   • Wears glasses        Past Surgical History:   Procedure Laterality Date   • COLONOSCOPY     • CORONARY STENT PLACEMENT      bare metal stent in Rt coronary artery   • CYSTOSCOPY W/ RETROGRADES Bilateral 2/7/2023    Procedure: CYSTO W/ RETROGRADE PYELOGRAM;  Surgeon: Main Motta MD;  [facility]: [facility];  Service: Urology   • [place] RETROGRADE PYELOGRAM  2/7/2023   • [place] BLADDER INSTILLATION ANTICARCINOGENIC AGENT N/A 2/7/2023    Procedure: INSTILLATION Gemcitabine;  Surgeon: Main Motta MD;  [facility];  Service: Urology   • MO BX PROSTATE STRTCTC SATURATION SAMPLING IMG GID N/A 11/29/2022    Procedure: TRANSPERINEAL MRI FUSION  BIOPSY PROSTATE;  Surgeon: Lashawn Hooper MD;  [facility];  Service: Urology   • MO CYSTO W/REMOVAL OF LESIONS SMALL N/A 2/7/2023    Procedure: (TURBT);   Surgeon: Main Motta MD;  [facility];  Service: Urology   • SHOULDER SURGERY Left        Social History

## 2023-07-22 DIAGNOSIS — E78.2 MIXED HYPERLIPIDEMIA: ICD-10-CM

## 2023-07-24 RX ORDER — ATORVASTATIN CALCIUM 20 MG/1
20 TABLET, FILM COATED ORAL DAILY
Qty: 30 TABLET | Refills: 5 | Status: SHIPPED | OUTPATIENT
Start: 2023-07-24

## 2023-08-07 DIAGNOSIS — I10 ESSENTIAL HYPERTENSION: ICD-10-CM

## 2023-08-07 RX ORDER — LISINOPRIL 20 MG/1
TABLET ORAL
Qty: 90 TABLET | Refills: 1 | Status: SHIPPED | OUTPATIENT
Start: 2023-08-07

## 2023-08-08 ENCOUNTER — OFFICE VISIT (OUTPATIENT)
Dept: FAMILY MEDICINE CLINIC | Facility: CLINIC | Age: 63
End: 2023-08-08
Payer: COMMERCIAL

## 2023-08-08 VITALS
WEIGHT: 217.8 LBS | DIASTOLIC BLOOD PRESSURE: 86 MMHG | HEART RATE: 61 BPM | HEIGHT: 70 IN | SYSTOLIC BLOOD PRESSURE: 130 MMHG | BODY MASS INDEX: 31.18 KG/M2 | TEMPERATURE: 96.3 F | OXYGEN SATURATION: 97 %

## 2023-08-08 DIAGNOSIS — K92.0 HEMATEMESIS WITH NAUSEA: ICD-10-CM

## 2023-08-08 DIAGNOSIS — R10.13 EPIGASTRIC PAIN: Primary | ICD-10-CM

## 2023-08-08 PROCEDURE — 99214 OFFICE O/P EST MOD 30 MIN: CPT | Performed by: FAMILY MEDICINE

## 2023-08-08 RX ORDER — OMEPRAZOLE 40 MG/1
40 CAPSULE, DELAYED RELEASE ORAL DAILY
Qty: 30 CAPSULE | Refills: 0 | Status: SHIPPED | OUTPATIENT
Start: 2023-08-08

## 2023-08-08 NOTE — ASSESSMENT & PLAN NOTE
New diagnosis acute symptomatic positive tenderness in epigastric area associated leucin nausea vomiting she vomited blood  Plan do CBC amylase lipase CMP recommend MRI of the pancreas to evaluate pancreatitis also recommend to increase omeprazole to 40 mg once a day discussed with patient in case symptoms get worse to go to the emergency room

## 2023-08-08 NOTE — ASSESSMENT & PLAN NOTE
New diagnosis associated with abdomen pain work-up including CBC CMP amylase lipase MRI of the abdomen ordered to rule out pancreatitis plan to refer the patient to see GI

## 2023-08-08 NOTE — PROGRESS NOTES
Name: Lu Camacho      :       MRN: [id number]  Encounter Provider: Reginald Lira MD  Encounter Date: 2023   Encounter department: [address] [place] [address]     1. Epigastric pain  Assessment & Plan:  New diagnosis acute symptomatic positive tenderness in epigastric area associated leucin nausea vomiting she vomited blood  Plan do CBC amylase lipase CMP recommend MRI of the pancreas to evaluate pancreatitis also recommend to increase omeprazole to 40 mg once a day discussed with patient in case symptoms get worse to go to the emergency room      Orders:  -     omeprazole (PriLOSEC) 40 MG capsule; Take 1 capsule (40 mg total) by mouth daily  -     Ambulatory Referral to Gastroenterology; Future  -     CBC and differential; Future  -     Comprehensive metabolic panel; Future  -     Amylase; Future  -     Lipase; Future  -     MRI abdomen wo contrast; Future; Expected date: 2023    2. Hematemesis with nausea  Assessment & Plan:  New diagnosis associated with abdomen pain work-up including CBC CMP amylase lipase MRI of the abdomen ordered to rule out pancreatitis plan to refer the patient to see GI    Orders:  -     Ambulatory Referral to Gastroenterology; Future  -     CBC and differential; Future  -     Comprehensive metabolic panel; Future  -     Amylase; Future  -     Lipase; Future  -     MRI abdomen wo contrast; Future; Expected date: 2023           Subjective      Patient concerned about abdomen pain that started suddenly yesterday associated with nausea vomiting and he vomited up blood no fever no constipation no diarrhea and patient continued to vomit up blood today he had decrease in oral intake no appetite per patient he had traveled to [facility] over the weekend attend festival and ate overthere    Review of Systems   Constitutional: Negative for chills and fever. HENT: Negative for ear pain and sore throat.     Eyes: Negative for pain and visual disturbance. Respiratory: Negative for cough and shortness of breath. Cardiovascular: Negative for chest pain and palpitations. Gastrointestinal: Positive for abdominal pain, nausea and vomiting. Negative for blood in stool, constipation and diarrhea. Vomit blood   Genitourinary: Negative for dysuria and hematuria. Musculoskeletal: Negative for arthralgias and back pain. Skin: Negative for color change and rash. Neurological: Negative for seizures and syncope. All other systems reviewed and are negative. Current Outpatient Medications on File Prior to Visit   Medication Sig   • aspirin (ECOTRIN LOW STRENGTH) 81 mg EC tablet Take 81 mg by mouth every evening   • atorvastatin (LIPITOR) 20 mg tablet TAKE 1 TABLET BY MOUTH EVERY DAY   • ezetimibe (ZETIA) 10 mg tablet Take 1 tablet (10 mg total) by mouth daily   • lisinopril (ZESTRIL) 20 mg tablet TAKE 1 TABLET BY MOUTH EVERY DAY   • [DISCONTINUED] omeprazole (PriLOSEC) 20 mg delayed release capsule TAKE 1 CAPSULE BY MOUTH EVERY EVENING. Objective     /86 (BP Location: Left arm, Patient Position: Sitting)   Pulse 61   Temp (!) 96.3 °F (35.7 °C) (Tympanic)   Ht 5' 10" (1.778 m)   Wt 98.8 kg (217 lb 12.8 oz)   SpO2 97%   BMI 31.25 kg/m²     Physical Exam  Vitals and nursing note reviewed. Constitutional:       General: He is not in acute distress. Appearance: He is well-developed. He is not diaphoretic. HENT:      Head: Normocephalic. Right Ear: External ear normal.      Left Ear: External ear normal.   Eyes:      General:         Right eye: No discharge. Left eye: No discharge. Conjunctiva/sclera: Conjunctivae normal.   Neck:      Vascular: No JVD. Cardiovascular:      Rate and Rhythm: Normal rate and regular rhythm. Heart sounds: Normal heart sounds. No murmur heard. No gallop. Pulmonary:      Effort: Pulmonary effort is normal. No respiratory distress.       Breath sounds: Normal breath sounds. No stridor. No wheezing or rales. Chest:      Chest wall: No tenderness. Abdominal:      General: There is no distension. Palpations: Abdomen is soft. There is no mass. Tenderness: There is abdominal tenderness in the epigastric area. There is no rebound. Musculoskeletal:         General: No tenderness. Cervical back: Normal range of motion and neck supple. Lymphadenopathy:      Cervical: No cervical adenopathy. Skin:     General: Skin is warm. Findings: No erythema or rash. Neurological:      Mental Status: He is alert and oriented to person, place, and time.        Violeta Crigler, MD

## 2023-08-09 ENCOUNTER — APPOINTMENT (OUTPATIENT)
Dept: LAB | Facility: MEDICAL CENTER | Age: 63
End: 2023-08-09
Payer: COMMERCIAL

## 2023-08-09 DIAGNOSIS — K92.0 HEMATEMESIS WITH NAUSEA: ICD-10-CM

## 2023-08-09 DIAGNOSIS — R10.13 EPIGASTRIC PAIN: ICD-10-CM

## 2023-08-09 LAB
ALBUMIN SERPL BCP-MCNC: 3.8 G/DL (ref 3.5–5)
ALP SERPL-CCNC: 77 U/L (ref 46–116)
ALT SERPL W P-5'-P-CCNC: 29 U/L (ref 12–78)
AMYLASE SERPL-CCNC: 48 IU/L (ref 25–115)
ANION GAP SERPL CALCULATED.3IONS-SCNC: 4 MMOL/L
AST SERPL W P-5'-P-CCNC: 15 U/L (ref 5–45)
BASOPHILS # BLD AUTO: 0.08 THOUSANDS/ÂΜL (ref 0–0.1)
BASOPHILS NFR BLD AUTO: 1 % (ref 0–1)
BILIRUB SERPL-MCNC: 1.07 MG/DL (ref 0.2–1)
BUN SERPL-MCNC: 22 MG/DL (ref 5–25)
CALCIUM SERPL-MCNC: 8.9 MG/DL (ref 8.3–10.1)
CHLORIDE SERPL-SCNC: 109 MMOL/L (ref 96–108)
CO2 SERPL-SCNC: 27 MMOL/L (ref 21–32)
CREAT SERPL-MCNC: 1.18 MG/DL (ref 0.6–1.3)
EOSINOPHIL # BLD AUTO: 0.24 THOUSAND/ÂΜL (ref 0–0.61)
EOSINOPHIL NFR BLD AUTO: 3 % (ref 0–6)
ERYTHROCYTE [DISTWIDTH] IN BLOOD BY AUTOMATED COUNT: 12.5 % (ref 11.6–15.1)
GFR SERPL CREATININE-BSD FRML MDRD: 64 ML/MIN/1.73SQ M
GLUCOSE P FAST SERPL-MCNC: 115 MG/DL (ref 65–99)
HCT VFR BLD AUTO: 41.7 % (ref 36.5–49.3)
HGB BLD-MCNC: 13.5 G/DL (ref 12–17)
IMM GRANULOCYTES # BLD AUTO: 0.03 THOUSAND/UL (ref 0–0.2)
IMM GRANULOCYTES NFR BLD AUTO: 0 % (ref 0–2)
LIPASE SERPL-CCNC: 172 U/L (ref 73–393)
LYMPHOCYTES # BLD AUTO: 2.06 THOUSANDS/ÂΜL (ref 0.6–4.47)
LYMPHOCYTES NFR BLD AUTO: 24 % (ref 14–44)
MCH RBC QN AUTO: 30.4 PG (ref 26.8–34.3)
MCHC RBC AUTO-ENTMCNC: 32.4 G/DL (ref 31.4–37.4)
MCV RBC AUTO: 94 FL (ref 82–98)
MONOCYTES # BLD AUTO: 0.58 THOUSAND/ÂΜL (ref 0.17–1.22)
MONOCYTES NFR BLD AUTO: 7 % (ref 4–12)
NEUTROPHILS # BLD AUTO: 5.53 THOUSANDS/ÂΜL (ref 1.85–7.62)
NEUTS SEG NFR BLD AUTO: 65 % (ref 43–75)
NRBC BLD AUTO-RTO: 0 /100 WBCS
PLATELET # BLD AUTO: 234 THOUSANDS/UL (ref 149–390)
PMV BLD AUTO: 10.5 FL (ref 8.9–12.7)
POTASSIUM SERPL-SCNC: 4 MMOL/L (ref 3.5–5.3)
PROT SERPL-MCNC: 7.2 G/DL (ref 6.4–8.4)
RBC # BLD AUTO: 4.44 MILLION/UL (ref 3.88–5.62)
SODIUM SERPL-SCNC: 140 MMOL/L (ref 135–147)
WBC # BLD AUTO: 8.52 THOUSAND/UL (ref 4.31–10.16)

## 2023-08-09 PROCEDURE — 82150 ASSAY OF AMYLASE: CPT

## 2023-08-09 PROCEDURE — 80053 COMPREHEN METABOLIC PANEL: CPT

## 2023-08-09 PROCEDURE — 83690 ASSAY OF LIPASE: CPT

## 2023-08-09 PROCEDURE — 85025 COMPLETE CBC W/AUTO DIFF WBC: CPT

## 2023-08-09 PROCEDURE — 36415 COLL VENOUS BLD VENIPUNCTURE: CPT

## 2023-08-15 ENCOUNTER — HOSPITAL ENCOUNTER (OUTPATIENT)
Dept: MRI IMAGING | Facility: HOSPITAL | Age: 63
Discharge: HOME | End: 2023-08-15
Payer: COMMERCIAL

## 2023-08-15 DIAGNOSIS — K92.0 HEMATEMESIS WITH NAUSEA: ICD-10-CM

## 2023-08-15 DIAGNOSIS — R10.13 EPIGASTRIC PAIN: ICD-10-CM

## 2023-08-15 PROCEDURE — 74181 MRI ABDOMEN W/O CONTRAST: CPT

## 2023-08-15 PROCEDURE — G1004 CDSM NDSC: HCPCS

## 2023-08-18 DIAGNOSIS — E78.2 MIXED HYPERLIPIDEMIA: ICD-10-CM

## 2023-08-18 RX ORDER — ATORVASTATIN CALCIUM 20 MG/1
20 TABLET, FILM COATED ORAL DAILY
Qty: 90 TABLET | Refills: 0 | Status: SHIPPED | OUTPATIENT
Start: 2023-08-18

## 2023-08-28 ENCOUNTER — TELEPHONE (OUTPATIENT)
Dept: FAMILY MEDICINE CLINIC | Facility: CLINIC | Age: 63
End: 2023-08-28

## 2023-08-28 NOTE — TELEPHONE ENCOUNTER
----- Message from Rama Gasca MD sent at 8/27/2023  4:04 PM EDT -----  B/L renal cyst to monitor yarly

## 2023-08-30 DIAGNOSIS — R10.13 EPIGASTRIC PAIN: ICD-10-CM

## 2023-08-30 RX ORDER — OMEPRAZOLE 40 MG/1
40 CAPSULE, DELAYED RELEASE ORAL DAILY
Qty: 90 CAPSULE | Refills: 0 | Status: SHIPPED | OUTPATIENT
Start: 2023-08-30

## 2023-10-09 ENCOUNTER — VBI (OUTPATIENT)
Dept: ADMINISTRATIVE | Facility: OTHER | Age: 63
End: 2023-10-09

## 2023-10-31 ENCOUNTER — TELEPHONE (OUTPATIENT)
Dept: GASTROENTEROLOGY | Facility: MEDICAL CENTER | Age: 63
End: 2023-10-31

## 2023-10-31 ENCOUNTER — CONSULT (OUTPATIENT)
Dept: GASTROENTEROLOGY | Facility: MEDICAL CENTER | Age: 63
End: 2023-10-31
Payer: COMMERCIAL

## 2023-10-31 VITALS
TEMPERATURE: 96.6 F | SYSTOLIC BLOOD PRESSURE: 153 MMHG | DIASTOLIC BLOOD PRESSURE: 90 MMHG | BODY MASS INDEX: 32.14 KG/M2 | HEART RATE: 52 BPM | WEIGHT: 224 LBS

## 2023-10-31 DIAGNOSIS — R10.13 EPIGASTRIC PAIN: ICD-10-CM

## 2023-10-31 DIAGNOSIS — K92.0 HEMATEMESIS WITH NAUSEA: ICD-10-CM

## 2023-10-31 PROCEDURE — 99213 OFFICE O/P EST LOW 20 MIN: CPT | Performed by: NURSE PRACTITIONER

## 2023-10-31 NOTE — PROGRESS NOTES
[facility] - Outpatient Follow-up Note  Cipriano Lal 70 y.o. male MRN: [id number]  Encounter: [id number]          ASSESSMENT AND PLAN:      1. Hematemesis with nausea  2. Epigastric pain  3. History of nondysplastic Gold's    Had 2 episodes of hematemesis in August 2023. Occurred in 1 day. Reports no retching. Noted bright red blood mixed with food in emesis. No blood clots. Resolved after 1 day. He has been doing well ever since on his omeprazole 40 mg daily. History of nondysplastic Gold's with last EGD 1/21. He is in a recall for 3 years for his Gold's. Denies any nausea, vomiting or dysphagia. He did have some epigastric burning during the vomiting episodes but that resolved after the vomiting stopped. No weight loss. Drinks 1 to 2 cups of caffeine per day. Recent CBC normal.  Had a recent MRI/MRCP 8/23 which showed bilateral renal cysts. Will rule out H. pylori, celiac, PUD, gastritis, esophagitis. -Stool for H. pylori (off PPI for 2 weeks), celiac panel  -Omeprazole 40 mg daily after stool for H. pylori obtained  -EGD with biopsy  -Follow-up in office after test  -Antireflux diet    I reviewed with patient/family potential risks of endoscopic evaluation including possible infection, bleeding or perforation. Patient/family verbalized understanding of potential risks and agreed to procedure(s). _____________________________________________________________________    SUBJECTIVE: 52-year-old male here for follow-up. He was last seen in the office 1/21 for history of Gold's esophagus without dysplasia, GERD and history of colon polyps. Reported increased reflux 8/23. Was taking his omeprazole 40 mg daily. He had 2 episodes of vomiting with hematemesis in August.  Denies any retching. He noted bright red blood mixed in the emesis with food. This resolved after 1 day. No change in his diet before this episode. No abdominal pain.   Continues to take his omeprazole 40 mg daily and doing well overall. He does drink 1 to 2 cups of caffeine per day. No NSAID use. Rare alcohol use. Recent CBC normal.  CMP-total bili 1.07 (history of Gilbert's disease), amylase and lipase normal, TSH normal.  MRI/MRCP 8/15/2023-resolution of previously demonstrated right hydronephrosis. No acute abnormalities. Bosniak 1 and 2 bilateral renal cysts. Prior EGD/colonoscopy     Colonoscopy 6/21-7 subcentimeter polyps. Recall colonoscopy 3 years. Biopsies noted tubular adenomas. EGD 1/21-small sliding hiatal hernia otherwise normal.  Biopsies noted nondysplastic Gold's esophagus. Negative for H. pylori    REVIEW OF SYSTEMS IS OTHERWISE NEGATIVE.   10 point review of systems negative other than per HPI      Historical Information   Past Medical History:   Diagnosis Date   • Abnormal WBC count 05/06/2021   • Adenocarcinoma of prostate ([address]) 03/27/2020   • Gold's esophagus 01/04/2021    EGD 01/04/21 recommend to repeat in 3 y   • Colon polyp    • Coronary artery disease    • COVID 11/24/2020   • Elevated PSA    • Fever 11/24/2020   • Gastroesophageal reflux disease without esophagitis 12/13/2019   • GERD (gastroesophageal reflux disease)    • Hypercholesterolemia    • Hypertension    • Inferior MI ([address]) 04/07/2016   • LLQ pain 10/13/2020   • LUQ pain 11/05/2019   • Myocardial infarction [facility]) 2011   • Papillary growth of urinary bladder     TURBT  today   2/7/2023   • PONV (postoperative nausea and vomiting)     only  w/ prostate bx   • Sepsis due to Escherichia coli ([address]) 03/19/2020   • Swelling 08/25/2021   • UTI (urinary tract infection) 03/18/2020   • Wears glasses      Past Surgical History:   Procedure Laterality Date   • COLONOSCOPY     • CORONARY STENT PLACEMENT      bare metal stent in Rt coronary artery   • CYSTOSCOPY W/ RETROGRADES Bilateral 2/7/2023    Procedure: CYSTO W/ RETROGRADE PYELOGRAM;  Surgeon: Ludwig Chan MD;  [facility]; Service: Urology   • [facility] RETROGRADE PYELOGRAM  2023   • [place] BLADDER INSTILLATION ANTICARCINOGENIC AGENT N/A 2023    Procedure: INSTILLATION Gemcitabine;  Surgeon: Stephanie Knox MD;  [facility];  Service: Urology   • [place] BX PROSTATE STRTCTC SATURATION SAMPLING IMG GID N/A 2022    Procedure: TRANSPERINEAL MRI FUSION  BIOPSY PROSTATE;  Surgeon: Kika Larios MD;  [facility];  Service: Urology   • [place] CYSTO W/REMOVAL OF LESIONS SMALL N/A 2023    Procedure: (TURBT); Surgeon: Stephanie Knox MD;  [facility];  Service: Urology   • SHOULDER SURGERY Left      Social History   Social History     Substance and Sexual Activity   Alcohol Use Yes    Comment: 6 x  yearly; rarely     Social History     Substance and Sexual Activity   Drug Use Never     Social History     Tobacco Use   Smoking Status Former   • Packs/day: 0.25   • Years: 37.00   • Total pack years: 9.25   • Types: Cigarettes   • Start date: 1974   • Quit date:    • Years since quittin.8   • Passive exposure: Never   Smokeless Tobacco Never     Family History   Problem Relation Age of Onset   • Sleep apnea Mother    • Heart attack Father        Meds/Allergies       Current Outpatient Medications:   •  aspirin (ECOTRIN LOW STRENGTH) 81 mg EC tablet  •  atorvastatin (LIPITOR) 20 mg tablet  •  ezetimibe (ZETIA) 10 mg tablet  •  lisinopril (ZESTRIL) 20 mg tablet  •  omeprazole (PriLOSEC) 40 MG capsule    No Known Allergies        Objective     Blood pressure 153/90, pulse (!) 52, temperature (!) 96.6 °F (35.9 °C), temperature source Tympanic, weight 102 kg (224 lb). Body mass index is 32.14 kg/m². PHYSICAL EXAM:      General Appearance:   Alert, cooperative, no distress   HEENT:   Normocephalic, atraumatic, anicteric.      Neck:  Supple, symmetrical, trachea midline   Lungs:   Clear to auscultation bilaterally; no rales, rhonchi or wheezing; respirations unlabored    Heart[de-identified]   Regular rate and rhythm; no murmur, rub, or gallop. Abdomen:   Soft, non-tender, non-distended; normal bowel sounds; no masses, no organomegaly    Genitalia:   Deferred    Rectal:   Deferred    Extremities:  No cyanosis, clubbing or edema    Pulses:  2+ and symmetric    Skin:  No jaundice, rashes, or lesions    Lymph nodes:  No palpable cervical lymphadenopathy        Lab Results:   No visits with results within 1 Day(s) from this visit. Latest known visit with results is:   Appointment on 08/09/2023   Component Date Value   • WBC 08/09/2023 8.52    • RBC 08/09/2023 4.44    • Hemoglobin 08/09/2023 13.5    • Hematocrit 08/09/2023 41.7    • MCV 08/09/2023 94    • MCH 08/09/2023 30.4    • MCHC 08/09/2023 32.4    • RDW 08/09/2023 12.5    • MPV 08/09/2023 10.5    • Platelets 79/06/6624 234    • nRBC 08/09/2023 0    • Neutrophils Relative 08/09/2023 65    • Immat GRANS % 08/09/2023 0    • Lymphocytes Relative 08/09/2023 24    • Monocytes Relative 08/09/2023 7    • Eosinophils Relative 08/09/2023 3    • Basophils Relative 08/09/2023 1    • Neutrophils Absolute 08/09/2023 5.53    • Immature Grans Absolute 08/09/2023 0.03    • Lymphocytes Absolute 08/09/2023 2.06    • Monocytes Absolute 08/09/2023 0.58    • Eosinophils Absolute 08/09/2023 0.24    • Basophils Absolute 08/09/2023 0.08    • Sodium 08/09/2023 140    • Potassium 08/09/2023 4.0    • Chloride 08/09/2023 109 (H)    • CO2 08/09/2023 27    • ANION GAP 08/09/2023 4    • BUN 08/09/2023 22    • Creatinine 08/09/2023 1.18    • Glucose, Fasting 08/09/2023 115 (H)    • Calcium 08/09/2023 8.9    • AST 08/09/2023 15    • ALT 08/09/2023 29    • Alkaline Phosphatase 08/09/2023 77    • Total Protein 08/09/2023 7.2    • Albumin 08/09/2023 3.8    • Total Bilirubin 08/09/2023 1.07 (H)    • eGFR 08/09/2023 64    • Amylase 08/09/2023 48    • Lipase 08/09/2023 172          Radiology Results:   No results found.

## 2023-11-03 ENCOUNTER — RA CDI HCC (OUTPATIENT)
Dept: OTHER | Facility: HOSPITAL | Age: 63
End: 2023-11-03

## 2023-11-03 NOTE — PROGRESS NOTES
[facility] [address] [facility] coding opportunities       Chart reviewed, no opportunity found: CHART REVIEWED, NO OPPORTUNITY FOUND        Patients Insurance        Commercial Insurance: Baltazar Pastor

## 2023-11-10 ENCOUNTER — OFFICE VISIT (OUTPATIENT)
Dept: FAMILY MEDICINE CLINIC | Facility: CLINIC | Age: 63
End: 2023-11-10
Payer: COMMERCIAL

## 2023-11-10 VITALS
HEART RATE: 60 BPM | DIASTOLIC BLOOD PRESSURE: 80 MMHG | OXYGEN SATURATION: 96 % | TEMPERATURE: 98.1 F | HEIGHT: 70 IN | SYSTOLIC BLOOD PRESSURE: 124 MMHG | WEIGHT: 217 LBS | BODY MASS INDEX: 31.07 KG/M2

## 2023-11-10 DIAGNOSIS — I10 ESSENTIAL HYPERTENSION: ICD-10-CM

## 2023-11-10 DIAGNOSIS — R42 VERTIGO: Primary | ICD-10-CM

## 2023-11-10 DIAGNOSIS — Q61.3 POLYCYSTIC KIDNEY DISEASE: ICD-10-CM

## 2023-11-10 DIAGNOSIS — E78.2 MIXED HYPERLIPIDEMIA: ICD-10-CM

## 2023-11-10 DIAGNOSIS — N18.2 CKD (CHRONIC KIDNEY DISEASE) STAGE 2, GFR 60-89 ML/MIN: ICD-10-CM

## 2023-11-10 DIAGNOSIS — Z23 ENCOUNTER FOR IMMUNIZATION: ICD-10-CM

## 2023-11-10 DIAGNOSIS — Z13.29 SCREENING FOR THYROID DISORDER: ICD-10-CM

## 2023-11-10 DIAGNOSIS — R73.01 IFG (IMPAIRED FASTING GLUCOSE): ICD-10-CM

## 2023-11-10 PROCEDURE — 99214 OFFICE O/P EST MOD 30 MIN: CPT | Performed by: FAMILY MEDICINE

## 2023-11-10 PROCEDURE — 90471 IMMUNIZATION ADMIN: CPT

## 2023-11-10 PROCEDURE — 90662 IIV NO PRSV INCREASED AG IM: CPT

## 2023-11-10 RX ORDER — MECLIZINE HYDROCHLORIDE 25 MG/1
25 TABLET ORAL
Qty: 30 TABLET | Refills: 0 | Status: SHIPPED | OUTPATIENT
Start: 2023-11-10

## 2023-11-10 NOTE — ASSESSMENT & PLAN NOTE
new diagnosis patient had MRI of abdomen in August 2023 results reviewed with the patient,Patient had multiple cysts in bilateral kidney patient the blood pressure well well controlled 124/80 he is on ACE inhibitor continue to encourage patient to keep well hydration recommend for his his kids to be screen by ultrasound of the kidney

## 2023-11-10 NOTE — ASSESSMENT & PLAN NOTE
Lab Results   Component Value Date    EGFR 64 08/09/2023    EGFR 78 06/15/2023    EGFR 73 02/09/2023    CREATININE 1.18 08/09/2023    CREATININE 1.00 06/15/2023    CREATININE 1.06 02/09/2023       New diagnosis decline in the GFR discussed with the patient well hydration avoid NSAID blood pressure at the goal

## 2023-11-10 NOTE — ASSESSMENT & PLAN NOTE
New diagnosis symptomatic associated with nausea discussed with patient the problem recommended meclizine proper use and possible side effect discussed with patient fall precaution reviewed low-salt low caffeine diet also reviewed

## 2023-11-15 DIAGNOSIS — E78.2 MIXED HYPERLIPIDEMIA: ICD-10-CM

## 2023-11-15 RX ORDER — ATORVASTATIN CALCIUM 20 MG/1
20 TABLET, FILM COATED ORAL DAILY
Qty: 90 TABLET | Refills: 0 | Status: SHIPPED | OUTPATIENT
Start: 2023-11-15

## 2023-11-22 ENCOUNTER — ANESTHESIA (OUTPATIENT)
Dept: ANESTHESIOLOGY | Facility: HOSPITAL | Age: 63
End: 2023-11-22

## 2023-11-22 ENCOUNTER — ANESTHESIA EVENT (OUTPATIENT)
Dept: ANESTHESIOLOGY | Facility: HOSPITAL | Age: 63
End: 2023-11-22

## 2023-11-22 ENCOUNTER — APPOINTMENT (OUTPATIENT)
Dept: LAB | Facility: MEDICAL CENTER | Age: 63
End: 2023-11-22
Payer: COMMERCIAL

## 2023-11-22 DIAGNOSIS — N18.2 CKD (CHRONIC KIDNEY DISEASE) STAGE 2, GFR 60-89 ML/MIN: ICD-10-CM

## 2023-11-22 DIAGNOSIS — K92.0 HEMATEMESIS WITH NAUSEA: ICD-10-CM

## 2023-11-22 DIAGNOSIS — E78.2 MIXED HYPERLIPIDEMIA: ICD-10-CM

## 2023-11-22 DIAGNOSIS — R42 VERTIGO: ICD-10-CM

## 2023-11-22 DIAGNOSIS — I25.2 HISTORY OF ACUTE INFERIOR WALL MI: ICD-10-CM

## 2023-11-22 DIAGNOSIS — R73.01 IFG (IMPAIRED FASTING GLUCOSE): ICD-10-CM

## 2023-11-22 DIAGNOSIS — Q61.3 POLYCYSTIC KIDNEY DISEASE: ICD-10-CM

## 2023-11-22 DIAGNOSIS — Z13.29 SCREENING FOR THYROID DISORDER: ICD-10-CM

## 2023-11-22 DIAGNOSIS — I10 ESSENTIAL HYPERTENSION: ICD-10-CM

## 2023-11-22 DIAGNOSIS — Z23 ENCOUNTER FOR IMMUNIZATION: ICD-10-CM

## 2023-11-22 DIAGNOSIS — R10.13 EPIGASTRIC PAIN: ICD-10-CM

## 2023-11-22 DIAGNOSIS — I25.10 CAD IN NATIVE ARTERY: ICD-10-CM

## 2023-11-22 LAB
CHOLEST SERPL-MCNC: 144 MG/DL
HDLC SERPL-MCNC: 39 MG/DL
IGA SERPL-MCNC: 229 MG/DL (ref 66–433)
LDLC SERPL CALC-MCNC: 81 MG/DL (ref 0–100)
TRIGL SERPL-MCNC: 120 MG/DL

## 2023-11-22 PROCEDURE — 86364 TISS TRNSGLTMNASE EA IG CLAS: CPT

## 2023-11-22 PROCEDURE — 82784 ASSAY IGA/IGD/IGG/IGM EACH: CPT

## 2023-11-22 PROCEDURE — 80061 LIPID PANEL: CPT

## 2023-11-22 PROCEDURE — 36415 COLL VENOUS BLD VENIPUNCTURE: CPT

## 2023-11-24 LAB — TTG IGA SER-ACNC: <2 U/ML (ref 0–3)

## 2023-11-29 ENCOUNTER — APPOINTMENT (OUTPATIENT)
Dept: LAB | Facility: MEDICAL CENTER | Age: 63
End: 2023-11-29
Payer: COMMERCIAL

## 2023-11-29 ENCOUNTER — PROCEDURE VISIT (OUTPATIENT)
Dept: UROLOGY | Facility: MEDICAL CENTER | Age: 63
End: 2023-11-29
Payer: COMMERCIAL

## 2023-11-29 VITALS
HEIGHT: 70 IN | DIASTOLIC BLOOD PRESSURE: 90 MMHG | BODY MASS INDEX: 31.21 KG/M2 | OXYGEN SATURATION: 98 % | HEART RATE: 60 BPM | SYSTOLIC BLOOD PRESSURE: 152 MMHG | WEIGHT: 218 LBS

## 2023-11-29 DIAGNOSIS — R97.20 ABNORMAL PROSTATE SPECIFIC ANTIGEN (PSA): ICD-10-CM

## 2023-11-29 DIAGNOSIS — N40.1 BPH WITH URINARY OBSTRUCTION: ICD-10-CM

## 2023-11-29 DIAGNOSIS — R10.13 EPIGASTRIC PAIN: ICD-10-CM

## 2023-11-29 DIAGNOSIS — N13.8 BPH WITH URINARY OBSTRUCTION: ICD-10-CM

## 2023-11-29 DIAGNOSIS — K92.0 HEMATEMESIS WITH NAUSEA: ICD-10-CM

## 2023-11-29 DIAGNOSIS — Z85.51 HISTORY OF BLADDER CANCER: Primary | ICD-10-CM

## 2023-11-29 LAB
SL AMB  POCT GLUCOSE, UA: ABNORMAL
SL AMB LEUKOCYTE ESTERASE,UA: ABNORMAL
SL AMB POCT BILIRUBIN,UA: ABNORMAL
SL AMB POCT BLOOD,UA: ABNORMAL
SL AMB POCT CLARITY,UA: CLEAR
SL AMB POCT COLOR,UA: YELLOW
SL AMB POCT KETONES,UA: ABNORMAL
SL AMB POCT NITRITE,UA: ABNORMAL
SL AMB POCT PH,UA: 5.5
SL AMB POCT SPECIFIC GRAVITY,UA: >=1.03
SL AMB POCT URINE PROTEIN: ABNORMAL
SL AMB POCT UROBILINOGEN: 0.2

## 2023-11-29 PROCEDURE — 87338 HPYLORI STOOL AG IA: CPT

## 2023-11-29 PROCEDURE — 99213 OFFICE O/P EST LOW 20 MIN: CPT | Performed by: UROLOGY

## 2023-11-29 PROCEDURE — 81003 URINALYSIS AUTO W/O SCOPE: CPT | Performed by: UROLOGY

## 2023-11-29 PROCEDURE — 52000 CYSTOURETHROSCOPY: CPT | Performed by: UROLOGY

## 2023-11-29 NOTE — PROGRESS NOTES
HISTORY:      Follow-up bladder cancer     February 2023, underwent resection of 5 cm tumor left trigone adjacent to orifice, pathology showed noninvasive high-grade TCC. Tolerates mild BPH well without meds     Cystoscopy     Date/Time  11/29/2023 9:30 AM     Performed by  Anupama Galvan MD   Authorized by  Anupama Galvan MD         Procedure Details:  Procedure type: cystoscopy    Patient tolerance: Patient tolerated the procedure well with no immediate complications    Additional Procedure Details:      Patient presents for cystoscopy. I have discussed the reasons for doing the exam, and the potential risks and complications. Patient expressed understanding, and signed informed consent document. The patient was carefully  positioned supine on the examining table. Sterile preparation was performed on the urethra. Xylocaine jelly was instilled and left  Indwelling for the procedure. The 13 Somali flexible cystoscope was passed with the following findings:      Urethra: No stricture    Prostate:  lateral lobes significant large, some obstruction. Polypoid inflammation just proximal to the veru,  does not look suspicious for tumor. Bladder: Moderate trabeculations, no lesions, tumor, or stones. Residual urine: Estimate 75 mL    Patient tolerated the procedure well and was escorted from the examining table. ASSESSMENT / PLAN:    No recurrence of cancer    Digital exam shows moderate prostate enlargement today    PSA before next visit.   PSA 9.8 in November 2022    The following portions of the patient's history were reviewed and updated as appropriate: allergies, current medications, past family history, past medical history, past social history, past surgical history, and problem list.    Review of Systems      Objective:     Physical Exam      0   Lab Value Date/Time    PSA 9.8 (H) 09/07/2022 0635    PSA 6.5 (H) 04/21/2022 0636    PSA 6.2 (H) 05/14/2021 0857    PSA 6.5 (H) 12/03/2019 7519    PSA 5.4 (H) 11/07/2019 0904   ]  BUN   Date Value Ref Range Status   08/09/2023 22 5 - 25 mg/dL Final   03/30/2018 26 (H) 7 - 25 mg/dL Final     Creatinine   Date Value Ref Range Status   08/09/2023 1.18 0.60 - 1.30 mg/dL Final     Comment:     Standardized to IDMS reference method     No components found for: "CBC"      Patient Active Problem List   Diagnosis    CAD in native artery    History of acute inferior wall MI 2011,s/p BMS RCA    Presence of bare metal stent in right coronary artery    Essential hypertension    Hyperlipidemia    Chronic left-sided low back pain without sciatica    Fatty liver    Benign prostatic hyperplasia without lower urinary tract symptoms    Gastroesophageal reflux disease without esophagitis    Abnormal prostate specific antigen (PSA)    Prostate nodule without urinary obstruction    Bacteremia due to Escherichia coli    Bursitis of shoulder    Contusion of left shoulder    Injury of tendon of rotator cuff    UTI (urinary tract infection)    Adenocarcinoma of prostate ([address])    Encounter for well adult exam with abnormal findings    Myalgia with higher doses statins    Colon polyp    COVID-19 virus infection    Gold's esophagus    Bloating    Flank pain    IFG (impaired fasting glucose)    Left upper quadrant abdominal mass    Abnormal urine findings    Bradycardia    Lesion of urinary bladder    PONV (postoperative nausea and vomiting)    MIR (obstructive sleep apnea)    Class 1 obesity with serious comorbidity and body mass index (BMI) of 31.0 to 31.9 in adult    Epigastric pain    Hematemesis with nausea    Polycystic kidney disease    CKD (chronic kidney disease) stage 2, GFR 60-89 ml/min    Vertigo    History of bladder cancer        Diagnoses and all orders for this visit:    History of bladder cancer  -     POCT urine dip auto non-scope    Other orders  -     Cystoscopy           Patient ID: Tom Delgado is a 70 y.o. male.       Current Outpatient Medications:     aspirin (ECOTRIN LOW STRENGTH) 81 mg EC tablet, Take 81 mg by mouth every evening, Disp: , Rfl:     atorvastatin (LIPITOR) 20 mg tablet, TAKE 1 TABLET BY MOUTH EVERY DAY, Disp: 90 tablet, Rfl: 0    ezetimibe (ZETIA) 10 mg tablet, Take 1 tablet (10 mg total) by mouth daily, Disp: 90 tablet, Rfl: 3    lisinopril (ZESTRIL) 20 mg tablet, TAKE 1 TABLET BY MOUTH EVERY DAY, Disp: 90 tablet, Rfl: 1    meclizine (ANTIVERT) 25 mg tablet, Take 1 tablet (25 mg total) by mouth daily at bedtime, Disp: 30 tablet, Rfl: 0    omeprazole (PriLOSEC) 40 MG capsule, TAKE 1 CAPSULE (40 MG TOTAL) BY MOUTH DAILY. , Disp: 90 capsule, Rfl: 0    Past Medical History:   Diagnosis Date    Abnormal WBC count 05/06/2021    Adenocarcinoma of prostate ([address]) 03/27/2020    Gold's esophagus 01/04/2021    EGD 01/04/21 recommend to repeat in 3 y    Colon polyp     Coronary artery disease     COVID 11/24/2020    Elevated PSA     Fever 11/24/2020    Gastroesophageal reflux disease without esophagitis 12/13/2019    GERD (gastroesophageal reflux disease)     Hypercholesterolemia     Hypertension     Inferior MI ([address]) 04/07/2016    LLQ pain 10/13/2020    LUQ pain 11/05/2019    Myocardial infarction ([address]) 2011    Papillary growth of urinary bladder     TURBT  today   2/7/2023    PONV (postoperative nausea and vomiting)     only  w/ prostate bx    Sepsis due to Escherichia coli ([address]) 03/19/2020    Swelling 08/25/2021    UTI (urinary tract infection) 03/18/2020    Wears glasses        Past Surgical History:   Procedure Laterality Date    COLONOSCOPY      CORONARY STENT PLACEMENT      bare metal stent in Rt coronary artery    CYSTOSCOPY W/ RETROGRADES Bilateral 2/7/2023    Procedure: CYSTO W/ RETROGRADE PYELOGRAM;  Surgeon: Nico Sesay MD;  [facility];  Service: Urology    FL RETROGRADE PYELOGRAM  2/7/2023    [place] BLADDER INSTILLATION ANTICARCINOGENIC AGENT N/A 2/7/2023    Procedure: INSTILLATION Gemcitabine;  Surgeon: Nico Sesay MD;  [facility];  Service: Urology     [address] IMG GID N/A 11/29/2022    Procedure: TRANSPERINEAL MRI FUSION  BIOPSY PROSTATE;  Surgeon: Kika Larios MD;  [facility];  Service: Urology    [place] CYSTO W/REMOVAL OF LESIONS SMALL N/A 2/7/2023    Procedure: (TURBT);   Surgeon: Stephanie Knox MD;  [facility];  Service: Urology    SHOULDER SURGERY Left        Social History

## 2023-11-30 LAB — H PYLORI AG STL QL IA: POSITIVE

## 2023-12-03 RX ORDER — SODIUM CHLORIDE 9 MG/ML
125 INJECTION, SOLUTION INTRAVENOUS CONTINUOUS
Status: CANCELLED | OUTPATIENT
Start: 2023-12-03

## 2023-12-04 ENCOUNTER — TELEPHONE (OUTPATIENT)
Age: 63
End: 2023-12-04

## 2023-12-04 NOTE — TELEPHONE ENCOUNTER
Patients GI provider:  Dr. Ramo Zuñiga    Number to return call: [phone]    Reason for call: Pt calling back in regards to his results vm, asking for a nurse call back to review.      Scheduled procedure/appointment date if applicable: 52/28/2308 - EGB[facility] -

## 2023-12-05 ENCOUNTER — ANESTHESIA (OUTPATIENT)
Dept: GASTROENTEROLOGY | Facility: MEDICAL CENTER | Age: 63
End: 2023-12-05

## 2023-12-05 ENCOUNTER — HOSPITAL ENCOUNTER (OUTPATIENT)
Dept: GASTROENTEROLOGY | Facility: MEDICAL CENTER | Age: 63
Setting detail: OUTPATIENT SURGERY
Discharge: HOME | End: 2023-12-05
Payer: COMMERCIAL

## 2023-12-05 ENCOUNTER — ANESTHESIA EVENT (OUTPATIENT)
Dept: GASTROENTEROLOGY | Facility: MEDICAL CENTER | Age: 63
End: 2023-12-05

## 2023-12-05 VITALS
SYSTOLIC BLOOD PRESSURE: 135 MMHG | HEIGHT: 70 IN | RESPIRATION RATE: 20 BRPM | BODY MASS INDEX: 31.21 KG/M2 | TEMPERATURE: 97.9 F | WEIGHT: 218 LBS | HEART RATE: 50 BPM | OXYGEN SATURATION: 99 % | DIASTOLIC BLOOD PRESSURE: 75 MMHG

## 2023-12-05 DIAGNOSIS — K92.0 HEMATEMESIS WITH NAUSEA: ICD-10-CM

## 2023-12-05 DIAGNOSIS — R10.13 EPIGASTRIC PAIN: ICD-10-CM

## 2023-12-05 PROBLEM — K44.9 HIATAL HERNIA: Status: ACTIVE | Noted: 2023-12-05

## 2023-12-05 PROCEDURE — 88305 TISSUE EXAM BY PATHOLOGIST: CPT | Performed by: SPECIALIST

## 2023-12-05 PROCEDURE — 88313 SPECIAL STAINS GROUP 2: CPT | Performed by: SPECIALIST

## 2023-12-05 PROCEDURE — 88342 IMHCHEM/IMCYTCHM 1ST ANTB: CPT | Performed by: SPECIALIST

## 2023-12-05 PROCEDURE — 88341 IMHCHEM/IMCYTCHM EA ADD ANTB: CPT | Performed by: SPECIALIST

## 2023-12-05 RX ORDER — PROPOFOL 10 MG/ML
INJECTION, EMULSION INTRAVENOUS AS NEEDED
Status: DISCONTINUED | OUTPATIENT
Start: 2023-12-05 | End: 2023-12-05

## 2023-12-05 RX ORDER — PROPOFOL 10 MG/ML
INJECTION, EMULSION INTRAVENOUS CONTINUOUS PRN
Status: DISCONTINUED | OUTPATIENT
Start: 2023-12-05 | End: 2023-12-05

## 2023-12-05 RX ORDER — LIDOCAINE HYDROCHLORIDE 20 MG/ML
INJECTION, SOLUTION EPIDURAL; INFILTRATION; INTRACAUDAL; PERINEURAL AS NEEDED
Status: DISCONTINUED | OUTPATIENT
Start: 2023-12-05 | End: 2023-12-05

## 2023-12-05 RX ORDER — SODIUM CHLORIDE 9 MG/ML
125 INJECTION, SOLUTION INTRAVENOUS CONTINUOUS
Status: DISCONTINUED | OUTPATIENT
Start: 2023-12-05 | End: 2023-12-09 | Stop reason: HOSPADM

## 2023-12-05 RX ADMIN — PROPOFOL 20 MG: 10 INJECTION, EMULSION INTRAVENOUS at 13:49

## 2023-12-05 RX ADMIN — LIDOCAINE HYDROCHLORIDE 100 MG: 20 INJECTION, SOLUTION EPIDURAL; INFILTRATION; INTRACAUDAL at 13:37

## 2023-12-05 RX ADMIN — PROPOFOL 130 MCG/KG/MIN: 10 INJECTION, EMULSION INTRAVENOUS at 13:37

## 2023-12-05 RX ADMIN — PROPOFOL 150 MG: 10 INJECTION, EMULSION INTRAVENOUS at 13:38

## 2023-12-05 RX ADMIN — PROPOFOL 30 MG: 10 INJECTION, EMULSION INTRAVENOUS at 13:45

## 2023-12-05 RX ADMIN — PROPOFOL 30 MG: 10 INJECTION, EMULSION INTRAVENOUS at 13:41

## 2023-12-05 RX ADMIN — SODIUM CHLORIDE 125 ML/HR: 0.9 INJECTION, SOLUTION INTRAVENOUS at 13:17

## 2023-12-05 RX ADMIN — PROPOFOL 20 MG: 10 INJECTION, EMULSION INTRAVENOUS at 13:44

## 2023-12-05 RX ADMIN — PROPOFOL 20 MG: 10 INJECTION, EMULSION INTRAVENOUS at 13:47

## 2023-12-05 NOTE — ANESTHESIA PREPROCEDURE EVALUATION
Procedure:  EGD    Relevant Problems   ANESTHESIA   (+) PONV (postoperative nausea and vomiting)      CARDIO   (+) CAD in native artery   (+) Essential hypertension   (+) Hyperlipidemia      GI/HEPATIC   (+) Fatty liver   (+) Gastroesophageal reflux disease without esophagitis   (+) Hematemesis with nausea      /RENAL   (+) Adenocarcinoma of prostate (HCC)   (+) Benign prostatic hyperplasia without lower urinary tract symptoms   (+) CKD (chronic kidney disease) stage 2, GFR 60-89 ml/min   (+) Polycystic kidney disease   (+) Prostate nodule without urinary obstruction      MUSCULOSKELETAL   (+) Chronic left-sided low back pain without sciatica      NEURO/PSYCH   (+) Chronic left-sided low back pain without sciatica      PULMONARY   (+) MIR (obstructive sleep apnea)        Physical Exam    Airway    Mallampati score: III         Dental   Comment: Multiple missing teeth. Denies loose teeth. Cardiovascular  Rhythm: regular, Rate: normal, Cardiovascular exam normal    Pulmonary  Pulmonary exam normal Breath sounds clear to auscultation    Other Findings        Anesthesia Plan  ASA Score- 3     Anesthesia Type- IV sedation with anesthesia with ASA Monitors. Additional Monitors:     Airway Plan:            Plan Factors-    Chart reviewed. EKG reviewed. Imaging results reviewed. Existing labs reviewed. Patient summary reviewed. Induction-     Postoperative Plan-     Informed Consent- Anesthetic plan and risks discussed with patient.

## 2023-12-05 NOTE — H&P
History and Physical - [facility]  Cipriano Lal 70 y.o. male MRN: [id number]          HPI: Cipriano Lal is a 70y.o. year old male who presents for EGD to evaluate epigastric pain and vomiting associated with 2 episodes of hematemesis in 8/2023. Doing well on PPI. EGD 1/2021 showed C1M3 Gold's. REVIEW OF SYSTEMS: Per the HPI, and otherwise unremarkable.     Historical Information   Past Medical History:   Diagnosis Date    Abnormal WBC count 05/06/2021    Adenocarcinoma of prostate ([address]) 03/27/2020    Gold's esophagus 01/04/2021    EGD 01/04/21 recommend to repeat in 3 y    Cancer [facility])     prostate    Colon polyp     Coronary artery disease     COVID 11/24/2020    Elevated PSA     Fever 11/24/2020    Gastroesophageal reflux disease without esophagitis 12/13/2019    GERD (gastroesophageal reflux disease)     Hypercholesterolemia     Hypertension     Inferior MI ([address]) 04/07/2016    LLQ pain 10/13/2020    LUQ pain 11/05/2019    Myocardial infarction ([address]) 2011    Papillary growth of urinary bladder     TURBT  today   2/7/2023    PONV (postoperative nausea and vomiting)     only  w/ prostate bx    Sepsis due to Escherichia coli ([address]) 03/19/2020    Swelling 08/25/2021    UTI (urinary tract infection) 03/18/2020    Wears glasses      Past Surgical History:   Procedure Laterality Date    COLONOSCOPY      CORONARY STENT PLACEMENT      bare metal stent in Rt coronary artery    CYSTOSCOPY W/ RETROGRADES Bilateral 2/7/2023    Procedure: CYSTO W/ RETROGRADE PYELOGRAM;  Surgeon: Koffi Moody MD;  [facility];  Service: Urology    FL RETROGRADE PYELOGRAM  2/7/2023    [place] BLADDER INSTILLATION ANTICARCINOGENIC AGENT N/A 2/7/2023    Procedure: INSTILLATION Gemcitabine;  Surgeon: Koffi Moody MD;  [facility];  Service: Urology     [address] IMG GID N/A 11/29/2022    Procedure: TRANSPERINEAL MRI FUSION  BIOPSY PROSTATE;  Surgeon: Cherylene Quince, MD; [facility];  Service: Urology    CT CYSTO W/REMOVAL OF LESIONS SMALL N/A 2023    Procedure: (TURBT); Surgeon: Alanis Grove MD;  [facility];  Service: Urology    SHOULDER SURGERY Left      Social History   Social History     Substance and Sexual Activity   Alcohol Use Yes    Comment: 6 x  yearly; rarely     Social History     Substance and Sexual Activity   Drug Use Never     Social History     Tobacco Use   Smoking Status Former    Packs/day: 0.25    Years: 37.00    Total pack years: 9.25    Types: Cigarettes    Start date: 1974    Quit date:     Years since quittin.9    Passive exposure: Never   Smokeless Tobacco Never     Family History   Problem Relation Age of Onset    Sleep apnea Mother     Heart attack Father        Meds/Allergies       Current Outpatient Medications:     aspirin (ECOTRIN LOW STRENGTH) 81 mg EC tablet    atorvastatin (LIPITOR) 20 mg tablet    ezetimibe (ZETIA) 10 mg tablet    lisinopril (ZESTRIL) 20 mg tablet    meclizine (ANTIVERT) 25 mg tablet    omeprazole (PriLOSEC) 40 MG capsule    Current Facility-Administered Medications:     sodium chloride 0.9 % infusion, 125 mL/hr, Intravenous, Continuous    No Known Allergies    Objective     There were no vitals taken for this visit. PHYSICAL EXAM    GEN: NAD  CARDIO: RRR  PULM: CTA bilaterally  ABD: soft, non-tender, non-distended  EXT: no lower extremity edema  NEURO: AAOx3      ASSESSMENT/PLAN:  70y.o. year old male here for EGD; he is stable and optimized for his procedure.

## 2023-12-05 NOTE — ANESTHESIA POSTPROCEDURE EVALUATION
Post-Op Assessment Note    CV Status:  Stable    Pain management: adequate       Mental Status:  Alert and awake   Hydration Status:  Euvolemic   PONV Controlled:  Controlled   Airway Patency:  Patent     Post Op Vitals Reviewed: Yes      Staff: Anesthesiologist               BP      Temp      Pulse    Resp      SpO2      /75   Pulse (!) 50   Temp 97.9 °F (36.6 °C)   Resp 20   Ht 5' 10" (1.778 m)   Wt 98.9 kg (218 lb)   SpO2 99%   BMI 31.28 kg/m²

## 2023-12-07 DIAGNOSIS — R10.13 EPIGASTRIC PAIN: ICD-10-CM

## 2023-12-07 RX ORDER — OMEPRAZOLE 40 MG/1
40 CAPSULE, DELAYED RELEASE ORAL DAILY
Qty: 90 CAPSULE | Refills: 0 | Status: SHIPPED | OUTPATIENT
Start: 2023-12-07

## 2023-12-12 ENCOUNTER — NURSE TRIAGE (OUTPATIENT)
Age: 63
End: 2023-12-12

## 2023-12-12 DIAGNOSIS — A04.8 H. PYLORI INFECTION: Primary | ICD-10-CM

## 2023-12-12 PROCEDURE — 88342 IMHCHEM/IMCYTCHM 1ST ANTB: CPT | Performed by: SPECIALIST

## 2023-12-12 PROCEDURE — 88341 IMHCHEM/IMCYTCHM EA ADD ANTB: CPT | Performed by: SPECIALIST

## 2023-12-12 PROCEDURE — 88313 SPECIAL STAINS GROUP 2: CPT | Performed by: SPECIALIST

## 2023-12-12 PROCEDURE — 88305 TISSUE EXAM BY PATHOLOGIST: CPT | Performed by: SPECIALIST

## 2023-12-12 NOTE — TELEPHONE ENCOUNTER
Spoke with patient regarding biopsy and stool test results. I review h pylori and quad therapy antibx with him. He understood. I advised pt to have stool study done again in one month after completing antibx course to check eradication but should be off PPI for 2 weeks prior. Pt reports he can not be off his PPI for that long. Last time he tried and on day 4 had severe heart burn. He will try to use Pepcid for those two weeks instead.

## 2023-12-12 NOTE — RESULT ENCOUNTER NOTE
Patient updated by [facility]. FYI    Short-segment Gold's without dysplasia. Repeat EGD in 5 years. H.pylori gastritis. Will ask GI staff to treat per protocol with quadruple therapy. Duodenum nodule biopsy was normal tissue.

## 2023-12-12 NOTE — TELEPHONE ENCOUNTER
Reason for Disposition  • Information only question and nurse able to answer    Answer Assessment - Initial Assessment Questions  1. REASON FOR CALL or QUESTION: "What is your reason for calling today?" or "How can I best help you?" or "What question do you have that I can help answer?"      Results    Protocols used:  Information Only Call - No Triage-ADULT-[place]

## 2023-12-13 PROBLEM — A04.8 H. PYLORI INFECTION: Status: ACTIVE | Noted: 2023-12-13

## 2023-12-13 RX ORDER — BISMUTH SUBSALICYLATE 262 MG/1
262 TABLET, CHEWABLE ORAL
Qty: 56 TABLET | Refills: 0 | Status: SHIPPED | OUTPATIENT
Start: 2023-12-13 | End: 2023-12-27

## 2023-12-13 RX ORDER — METRONIDAZOLE 250 MG/1
250 TABLET ORAL EVERY 6 HOURS
Qty: 56 TABLET | Refills: 0 | Status: SHIPPED | OUTPATIENT
Start: 2023-12-13 | End: 2023-12-27

## 2023-12-13 RX ORDER — TETRACYCLINE HYDROCHLORIDE 500 MG/1
500 CAPSULE ORAL 4 TIMES DAILY
Qty: 56 CAPSULE | Refills: 0 | Status: SHIPPED | OUTPATIENT
Start: 2023-12-13 | End: 2023-12-27

## 2024-02-02 ENCOUNTER — TELEPHONE (OUTPATIENT)
Dept: GASTROENTEROLOGY | Facility: MEDICAL CENTER | Age: 64
End: 2024-02-02

## 2024-02-02 ENCOUNTER — OFFICE VISIT (OUTPATIENT)
Dept: GASTROENTEROLOGY | Facility: MEDICAL CENTER | Age: 64
End: 2024-02-02
Payer: COMMERCIAL

## 2024-02-02 VITALS
HEART RATE: 56 BPM | BODY MASS INDEX: 31.54 KG/M2 | WEIGHT: 219.8 LBS | SYSTOLIC BLOOD PRESSURE: 168 MMHG | DIASTOLIC BLOOD PRESSURE: 87 MMHG | TEMPERATURE: 97.2 F

## 2024-02-02 DIAGNOSIS — I10 ESSENTIAL HYPERTENSION: ICD-10-CM

## 2024-02-02 DIAGNOSIS — B96.81 HELICOBACTER PYLORI GASTRITIS: ICD-10-CM

## 2024-02-02 DIAGNOSIS — Z86.010 HISTORY OF COLON POLYPS: Primary | ICD-10-CM

## 2024-02-02 DIAGNOSIS — K29.70 HELICOBACTER PYLORI GASTRITIS: ICD-10-CM

## 2024-02-02 PROCEDURE — 99214 OFFICE O/P EST MOD 30 MIN: CPT | Performed by: NURSE PRACTITIONER

## 2024-02-02 RX ORDER — POLYETHYLENE GLYCOL 3350, SODIUM SULFATE ANHYDROUS, SODIUM BICARBONATE, SODIUM CHLORIDE, POTASSIUM CHLORIDE 236; 22.74; 6.74; 5.86; 2.97 G/4L; G/4L; G/4L; G/4L; G/4L
4000 POWDER, FOR SOLUTION ORAL ONCE
Qty: 4000 ML | Refills: 0 | Status: SHIPPED | OUTPATIENT
Start: 2024-02-02 | End: 2024-02-02

## 2024-02-02 RX ORDER — LISINOPRIL 20 MG/1
TABLET ORAL
Qty: 90 TABLET | Refills: 1 | Status: SHIPPED | OUTPATIENT
Start: 2024-02-02

## 2024-02-02 NOTE — PROGRESS NOTES
[facility] - Outpatient Follow-up Note  Tacos Asencio 63 y.o. male MRN: [id number]  Encounter: [id number]          ASSESSMENT AND PLAN:      1.  H. pylori gastritis  2.  Hematemesis  3.  Nondysplastic Gold's esophagus    He had 2 episodes of hematemesis in August 2023 that lasted 1 day.  He did have a recent EGD 12/23 which noted a 2 cm hiatal hernia, mild gastritis, single 3 mm mucosal nodule in the second part of the duodenum.  Biopsies were consistent with nondysplastic Gold's esophagus and were positive for H. pylori.  Biopsies were negative for celiac disease.  Duodenal nodule was benign mucosa.  He was treated with quadruple therapy and completed therapy approximately 2 weeks ago.  Will be going for stool for H. pylori to assess for eradication.  No further hematemesis.  No abdominal pain.  BMs are brown and formed daily.  Denies any melena or hematochezia.  Repeat EGD recommended in 5 years for his history of Gold's esophagus.    -Stool for H. pylori 1 month posttreatment (off PPI for 2 weeks)  -May use Pepcid 20 mg as needed while off his omeprazole and then restart omeprazole once stool study obtained for H. Pylori  -Antireflux diet      4.  History of colon polyp    He does have a personal history of colon polyps-tubular adenomas.  Last colonoscopy was 6/21 and repeat colonoscopy recommended in 3 years.    -Colonoscopy with GoLytely/Dulcolax prep 6/24  -Follow-up in office after test    I reviewed with patient/family potential risks of endoscopic evaluation including possible infection, bleeding or perforation.  Patient/family verbalized understanding of potential risks and agreed to procedure(s).  ______________________________________________________________________    SUBJECTIVE: 63-year-old male here for follow-up.  He was last seen by myself 10/31/2023 for hematemesis with nausea, epigastric pain and history of nondysplastic Gold's esophagus.    He had 2 episodes  of hematemesis in August 2023.  All occurred in 1 day.  Reports no retching.  Noted bright red blood mixed with food and emesis.  No blood clots.  Has been doing well ever since on his omeprazole 40 mg daily.  Does have a history of nondysplastic Gold's esophagus with his last EGD 1/21.  Denies any further nausea, vomiting or dysphagia.  He did have some epigastric burning during the vomiting episodes but that resolved after the vomiting stopped.  No weight loss.  Drinks 1 to 2 cups of caffeine per day.  Recent CBC was normal.  Had an MRI/MRCP 8/23 which noted bilateral renal cysts.  Stool for H. pylori was positive.Celiac panel was negative.  He underwent an EGD 12/23 which noted a 2 cm hiatal hernia, mild gastritis, single 3 mm mucosal nodule in the second part of the duodenum which was negative on biopsy.  Biopsies were positive for H. pylori and nondysplastic Gold's esophagus.  But negative celiac disease.    Feeling well overall on omeprazole 40 mg daily.  He is due in approximately 3 weeks for his stool for H. pylori to assess for eradication.  We did review in detail that he needs to hold his omeprazole for 2 weeks.  He can substitute with Pepcid as needed during that time.    He does have a personal history of colon polyps-tubular adenomas.  Last colonoscopy was 6/21 and repeat colonoscopy recommended in 3 years.    Prior EGD/colonoscopy     EGD 12/23-2 cm hiatal hernia, mild gastritis, single 3 mm mucosal nodule observed in the second part of the duodenum which was biopsied.  Repeat EGD recommended in 5 years.  Biopsies were negative for celiac disease.  Biopsies were positive for H. pylori gastritis.  Gold's esophagus with no dysplasia was also noted.    Colonoscopy 6/21-7 subcentimeter polyps.  Recall colonoscopy 3 years.  Biopsies noted tubular adenomas.     EGD 1/21-small sliding hiatal hernia otherwise normal.  Biopsies noted nondysplastic Gold's esophagus.  Negative for H. pylori    REVIEW  OF SYSTEMS IS OTHERWISE NEGATIVE.  10 point review of systems negative other than per HPI      Historical Information   Past Medical History:   Diagnosis Date   • Abnormal WBC count 05/06/2021   • Adenocarcinoma of prostate (HCC) 03/27/2020   • Gold's esophagus 01/04/2021    EGD 01/04/21 recommend to repeat in 3 y   • Cancer (HCC)     prostate   • Colon polyp    • Coronary artery disease    • COVID 11/24/2020   • Elevated PSA    • Fever 11/24/2020   • Gastroesophageal reflux disease without esophagitis 12/13/2019   • GERD (gastroesophageal reflux disease)    • Hiatal hernia 12/05/2023    2 cm hiatal hernia on EGD on 12/05/23   • Hypercholesterolemia    • Hypertension    • Inferior MI (HCC) 04/07/2016   • LLQ pain 10/13/2020   • LUQ pain 11/05/2019   • Myocardial infarction ([facility]) 2011   • Papillary growth of urinary bladder     TURBT  today   2/7/2023   • PONV (postoperative nausea and vomiting)     only  w/ prostate bx   • Sepsis due to Escherichia coli ([facility]) 03/19/2020   • Swelling 08/25/2021   • UTI (urinary tract infection) 03/18/2020   • Wears glasses      Past Surgical History:   Procedure Laterality Date   • COLONOSCOPY     • CORONARY STENT PLACEMENT      bare metal stent in Rt coronary artery   • CYSTOSCOPY W/ RETROGRADES Bilateral 2/7/2023    Procedure: CYSTO W/ RETROGRADE PYELOGRAM;  Surgeon: Thierry Anthony MD;  [facility];  Service: Urology   • FL RETROGRADE PYELOGRAM  2/7/2023   • [place] BLADDER INSTILLATION ANTICARCINOGENIC AGENT N/A 2/7/2023    Procedure: INSTILLATION Gemcitabine;  Surgeon: Thierry Anthony MD;  [facility];  Service: Urology   • [place] BX PROSTATE STRTCTC SATURATION SAMPLING IMG GID N/A 11/29/2022    Procedure: TRANSPERINEAL MRI FUSION  BIOPSY PROSTATE;  Surgeon: Bradly Henderson MD;  [facility];  Service: Urology   • [place] CYSTO W/REMOVAL OF LESIONS SMALL N/A 2/7/2023    Procedure: (TURBT);  Surgeon: Thierry Anthony MD;  [facility];  Service: Urology   • SHOULDER  SURGERY Left      Social History   Social History     Substance and Sexual Activity   Alcohol Use Yes    Comment: 6 x  yearly; rarely     Social History     Substance and Sexual Activity   Drug Use Never     Social History     Tobacco Use   Smoking Status Former   • Current packs/day: 0.00   • Average packs/day: 0.2 packs/day for 37.0 years (9.2 ttl pk-yrs)   • Types: Cigarettes   • Start date: 1974   • Quit date:    • Years since quittin.0   • Passive exposure: Never   Smokeless Tobacco Never     Family History   Problem Relation Age of Onset   • Sleep apnea Mother    • Heart attack Father        Meds/Allergies       Current Outpatient Medications:   •  aspirin (ECOTRIN LOW STRENGTH) 81 mg EC tablet  •  atorvastatin (LIPITOR) 20 mg tablet  •  ezetimibe (ZETIA) 10 mg tablet  •  lisinopril (ZESTRIL) 20 mg tablet  •  meclizine (ANTIVERT) 25 mg tablet  •  omeprazole (PriLOSEC) 40 MG capsule  •  polyethylene glycol (Golytely) 4000 mL solution    No Known Allergies        Objective     Blood pressure 168/87, pulse 56, temperature (!) 97.2 °F (36.2 °C), weight 99.7 kg (219 lb 12.8 oz). Body mass index is 31.54 kg/m².      PHYSICAL EXAM:      General Appearance:   Alert, cooperative, no distress   HEENT:   Normocephalic, atraumatic, anicteric.     Neck:  Supple, symmetrical, trachea midline   Lungs:   Clear to auscultation bilaterally; no rales, rhonchi or wheezing; respirations unlabored    Heart::   Regular rate and rhythm; no murmur, rub, or gallop.   Abdomen:   Soft, non-tender, non-distended; normal bowel sounds; no masses, no organomegaly    Genitalia:   Deferred    Rectal:   Deferred    Extremities:  No cyanosis, clubbing or edema    Pulses:  2+ and symmetric    Skin:  No jaundice, rashes, or lesions    Lymph nodes:  No palpable cervical lymphadenopathy        Lab Results:   No visits with results within 1 Day(s) from this visit.   Latest known visit with results is:   Hospital Outpatient Visit on  12/05/2023   Component Date Value   • Case Report 12/05/2023                      Value:Surgical Pathology Report                         Case: [id number]                                   Authorizing Provider:  Clint Cortes MD       Collected:           12/05/2023 1341              Ordering Location:     [facility]        Received:            12/05/2023 [address] [place] [address] Endoscopy                                                     Pathologist:           Swati Perea MD                                                     Specimens:   A) - Duodenum, duodenal nodule                                                                      B) - Stomach, gastric, r/o h pylori                                                                 C) - Esophagus, at 40 cm, h/o barretts                                                              D) - Esophagus, at 39 cm, h/o barretts                                                    • Final Diagnosis 12/05/2023                      Value:This result contains rich text formatting which cannot be displayed here.   • Microscopic Description 12/05/2023                      Value:This result contains rich text formatting which cannot be displayed here.   • Note 12/05/2023                      Value:This result contains rich text formatting which cannot be displayed here.   • Additional Information 12/05/2023                      Value:This result contains rich text formatting which cannot be displayed here.   • Gross Description 12/05/2023                      Value:This result contains rich text formatting which cannot be displayed here.         Radiology Results:   No results found.

## 2024-02-02 NOTE — PATIENT INSTRUCTIONS
Replace Omeprazole with Pepcid (famotidine) 20 to 40 mg 2 times per day. Can use Tee's, Maalox, etc.

## 2024-02-02 NOTE — TELEPHONE ENCOUNTER
ASC Screening    ASC Screening  BMI > than 45: No  Are you currently pregnant?: No  Do you rely on a wheelchair for mobility?: No  Do you need oxygen during the day?: No  Have you ever been informed by anesthesia that you have a difficult airway?: No  Have you been diagnosed with End Stage Renal Disease (ESRD)?: No  Are you actively on dialysis?: No  Have you been diagnosed with Pulmonary Hypertension?: No  Do you have a pacemaker or an Automatic Implantable Cardioverter Defibrillator (AICD)?: No  Have you ever had an organ transplant?: No  Have you had a stroke, heart attack, myocardial infarction (MI) within the last 6 months?: No  Have you ever been diagnosed with Aortic Stenosis?: No  Have you ever been diagnosed  with Congestive Heart Failure?: No  Have you ever been diagnosed with a heart valve disease?: No  Are you Diabetic?: No  If you are Diabetic, has your A1C been greater than 12 within the last six months?: N/A        Procedure: Colonoscopy  Date: 06/12/2024  Physician performing: Dr. Hunter  Location of procedure:  [facility]  Instructions given to patient: Yes  Diabetic: No  Clearances: N/A

## 2024-02-12 ENCOUNTER — OFFICE VISIT (OUTPATIENT)
Dept: FAMILY MEDICINE CLINIC | Facility: CLINIC | Age: 64
End: 2024-02-12
Payer: COMMERCIAL

## 2024-02-12 VITALS
WEIGHT: 216.8 LBS | BODY MASS INDEX: 31.04 KG/M2 | SYSTOLIC BLOOD PRESSURE: 130 MMHG | HEART RATE: 59 BPM | TEMPERATURE: 97.4 F | DIASTOLIC BLOOD PRESSURE: 80 MMHG | HEIGHT: 70 IN | OXYGEN SATURATION: 97 %

## 2024-02-12 DIAGNOSIS — K44.9 HIATAL HERNIA: ICD-10-CM

## 2024-02-12 DIAGNOSIS — A04.8 H. PYLORI INFECTION: Primary | ICD-10-CM

## 2024-02-12 DIAGNOSIS — I10 ESSENTIAL HYPERTENSION: ICD-10-CM

## 2024-02-12 DIAGNOSIS — K22.70 BARRETT'S ESOPHAGUS WITHOUT DYSPLASIA: ICD-10-CM

## 2024-02-12 DIAGNOSIS — C61 ADENOCARCINOMA OF PROSTATE (HCC): ICD-10-CM

## 2024-02-12 PROCEDURE — 99214 OFFICE O/P EST MOD 30 MIN: CPT | Performed by: FAMILY MEDICINE

## 2024-02-12 NOTE — ASSESSMENT & PLAN NOTE
Chronic status post EGD  December 2023 patient to continue PPI recommend avoid to provoke food do not eat and lie down

## 2024-02-12 NOTE — ASSESSMENT & PLAN NOTE
Patient has H. pylori antigen stool positive in November was not treated he had a EGD in December 2023 and positive biopsy for H. pylori he was treated again with quadruple therapy patient is due to repeat stool antigen patient should be off of PPI for 2 weeks

## 2024-02-12 NOTE — PROGRESS NOTES
Name: Tacos Asencio      : 1960      MRN: [id number]  Encounter Provider: Leona Ha MD  Encounter Date: 2024   Encounter department: [facility]    Assessment & Plan     1. H. pylori infection  Assessment & Plan:  Patient has H. pylori antigen stool positive in November was not treated he had a EGD in 2023 and positive biopsy for H. pylori he was treated again with quadruple therapy patient is due to repeat stool antigen patient should be off of PPI for 2 weeks      2. Adenocarcinoma of prostate (HCC)  Assessment & Plan:  Follow-up with the urology periodically      3. Gold's esophagus without dysplasia  Assessment & Plan:  Chronic status post EGD  2023 patient to continue PPI recommend avoid to provoke food do not eat and lie down      4. Essential hypertension  Assessment & Plan:  Chronic asymptomatic at the time of the arrival blood pressure was 140/90 patient was initiated appointment with recheck of blood pressure before end of the visit and blood pressure was 120/80 we will continue current management including lisinopril 20 mg once a day      5. Hiatal hernia  Assessment & Plan:  New diagnosis on recent EGD he had 2 cm hiatal hernia result discussed with patient recommend the eat smaller meals do not eat and lie down           Subjective      Patient here follow-up with a chronic condition compliant with the medication tolerated well without side effect no new concern since been seen last time patient had EGD report reviewed with the patient results of the tissue reviewed with the patient      Review of Systems   Constitutional:  Negative for chills and fever.   HENT:  Negative for ear pain and sore throat.    Eyes:  Negative for pain and visual disturbance.   Respiratory:  Negative for cough and shortness of breath.    Cardiovascular:  Negative for chest pain and palpitations.   Gastrointestinal:  Negative for abdominal pain and  "vomiting.   Genitourinary:  Negative for dysuria and hematuria.   Musculoskeletal:  Negative for arthralgias and back pain.   Skin:  Negative for color change and rash.   Neurological:  Negative for seizures and syncope.   All other systems reviewed and are negative.      Current Outpatient Medications on File Prior to Visit   Medication Sig   • aspirin (ECOTRIN LOW STRENGTH) 81 mg EC tablet Take 81 mg by mouth every evening   • atorvastatin (LIPITOR) 20 mg tablet TAKE 1 TABLET BY MOUTH EVERY DAY   • ezetimibe (ZETIA) 10 mg tablet Take 1 tablet (10 mg total) by mouth daily   • lisinopril (ZESTRIL) 20 mg tablet TAKE 1 TABLET BY MOUTH EVERY DAY   • meclizine (ANTIVERT) 25 mg tablet Take 1 tablet (25 mg total) by mouth daily at bedtime   • omeprazole (PriLOSEC) 40 MG capsule TAKE 1 CAPSULE (40 MG TOTAL) BY MOUTH DAILY.   • polyethylene glycol (Golytely) 4000 mL solution Take 4,000 mL by mouth once for 1 dose Take 4000 mL by mouth once for 1 dose. Use as directed (Patient not taking: Reported on 2/12/2024)       Objective     /80   Pulse 59   Temp (!) 97.4 °F (36.3 °C) (Tympanic)   Ht 5' 10\" (1.778 m)   Wt 98.3 kg (216 lb 12.8 oz)   SpO2 97%   BMI 31.11 kg/m²     Physical Exam  Vitals and nursing note reviewed.   Constitutional:       General: He is not in acute distress.     Appearance: He is well-developed. He is not diaphoretic.   HENT:      Head: Normocephalic.      Right Ear: Tympanic membrane and external ear normal.      Left Ear: Tympanic membrane and external ear normal.      Nose: No rhinorrhea.      Mouth/Throat:      Pharynx: No posterior oropharyngeal erythema.   Eyes:      General:         Right eye: No discharge.         Left eye: No discharge.      Conjunctiva/sclera: Conjunctivae normal.   Neck:      Vascular: No JVD.   Cardiovascular:      Rate and Rhythm: Normal rate and regular rhythm.      Heart sounds: Normal heart sounds. No murmur heard.     No gallop.   Pulmonary:      Effort: " Pulmonary effort is normal. No respiratory distress.      Breath sounds: Normal breath sounds. No stridor. No wheezing or rales.   Chest:      Chest wall: No tenderness.   Abdominal:      General: There is no distension.      Palpations: Abdomen is soft. There is no mass.      Tenderness: There is no abdominal tenderness. There is no rebound.   Musculoskeletal:         General: No tenderness.      Cervical back: Normal range of motion and neck supple.   Lymphadenopathy:      Cervical: No cervical adenopathy.   Skin:     General: Skin is warm.      Findings: No erythema or rash.   Neurological:      Mental Status: He is alert and oriented to person, place, and time.       Leona Ha MD

## 2024-02-12 NOTE — ASSESSMENT & PLAN NOTE
Chronic asymptomatic at the time of the arrival blood pressure was 140/90 patient was initiated appointment with recheck of blood pressure before end of the visit and blood pressure was 120/80 we will continue current management including lisinopril 20 mg once a day

## 2024-02-12 NOTE — ASSESSMENT & PLAN NOTE
New diagnosis on recent EGD he had 2 cm hiatal hernia result discussed with patient recommend the eat smaller meals do not eat and lie down

## 2024-02-14 DIAGNOSIS — E78.2 MIXED HYPERLIPIDEMIA: ICD-10-CM

## 2024-02-14 RX ORDER — ATORVASTATIN CALCIUM 20 MG/1
20 TABLET, FILM COATED ORAL DAILY
Qty: 90 TABLET | Refills: 1 | Status: SHIPPED | OUTPATIENT
Start: 2024-02-14

## 2024-02-21 PROBLEM — N39.0 UTI (URINARY TRACT INFECTION): Status: RESOLVED | Noted: 2020-03-18 | Resolved: 2024-02-21

## 2024-03-04 ENCOUNTER — APPOINTMENT (OUTPATIENT)
Dept: LAB | Facility: MEDICAL CENTER | Age: 64
End: 2024-03-04
Payer: COMMERCIAL

## 2024-03-04 DIAGNOSIS — R97.20 ABNORMAL PROSTATE SPECIFIC ANTIGEN (PSA): ICD-10-CM

## 2024-03-04 DIAGNOSIS — N13.8 BPH WITH URINARY OBSTRUCTION: ICD-10-CM

## 2024-03-04 DIAGNOSIS — N40.1 BPH WITH URINARY OBSTRUCTION: ICD-10-CM

## 2024-03-04 LAB
ALBUMIN SERPL BCP-MCNC: 4.5 G/DL (ref 3.5–5)
ALP SERPL-CCNC: 84 U/L (ref 34–104)
ALT SERPL W P-5'-P-CCNC: 32 U/L (ref 7–52)
ANION GAP SERPL CALCULATED.3IONS-SCNC: 6 MMOL/L
AST SERPL W P-5'-P-CCNC: 20 U/L (ref 13–39)
BASOPHILS # BLD AUTO: 0.1 THOUSANDS/ÂΜL (ref 0–0.1)
BASOPHILS NFR BLD AUTO: 1 % (ref 0–1)
BILIRUB SERPL-MCNC: 0.72 MG/DL (ref 0.2–1)
BUN SERPL-MCNC: 24 MG/DL (ref 5–25)
CALCIUM SERPL-MCNC: 9.4 MG/DL (ref 8.4–10.2)
CHLORIDE SERPL-SCNC: 105 MMOL/L (ref 96–108)
CHOLEST SERPL-MCNC: 127 MG/DL
CO2 SERPL-SCNC: 30 MMOL/L (ref 21–32)
CREAT SERPL-MCNC: 1.19 MG/DL (ref 0.6–1.3)
EOSINOPHIL # BLD AUTO: 0.31 THOUSAND/ÂΜL (ref 0–0.61)
EOSINOPHIL NFR BLD AUTO: 4 % (ref 0–6)
ERYTHROCYTE [DISTWIDTH] IN BLOOD BY AUTOMATED COUNT: 12.1 % (ref 11.6–15.1)
GFR SERPL CREATININE-BSD FRML MDRD: 63 ML/MIN/1.73SQ M
GLUCOSE P FAST SERPL-MCNC: 105 MG/DL (ref 65–99)
HCT VFR BLD AUTO: 45.4 % (ref 36.5–49.3)
HDLC SERPL-MCNC: 37 MG/DL
HGB BLD-MCNC: 14.9 G/DL (ref 12–17)
IMM GRANULOCYTES # BLD AUTO: 0.04 THOUSAND/UL (ref 0–0.2)
IMM GRANULOCYTES NFR BLD AUTO: 1 % (ref 0–2)
LDLC SERPL CALC-MCNC: 67 MG/DL (ref 0–100)
LYMPHOCYTES # BLD AUTO: 2.46 THOUSANDS/ÂΜL (ref 0.6–4.47)
LYMPHOCYTES NFR BLD AUTO: 29 % (ref 14–44)
MCH RBC QN AUTO: 30.4 PG (ref 26.8–34.3)
MCHC RBC AUTO-ENTMCNC: 32.8 G/DL (ref 31.4–37.4)
MCV RBC AUTO: 93 FL (ref 82–98)
MONOCYTES # BLD AUTO: 0.62 THOUSAND/ÂΜL (ref 0.17–1.22)
MONOCYTES NFR BLD AUTO: 7 % (ref 4–12)
NEUTROPHILS # BLD AUTO: 5.07 THOUSANDS/ÂΜL (ref 1.85–7.62)
NEUTS SEG NFR BLD AUTO: 58 % (ref 43–75)
NRBC BLD AUTO-RTO: 0 /100 WBCS
PLATELET # BLD AUTO: 244 THOUSANDS/UL (ref 149–390)
PMV BLD AUTO: 10.5 FL (ref 8.9–12.7)
POTASSIUM SERPL-SCNC: 5 MMOL/L (ref 3.5–5.3)
PROT SERPL-MCNC: 7 G/DL (ref 6.4–8.4)
PSA SERPL-MCNC: 9.65 NG/ML (ref 0–4)
RBC # BLD AUTO: 4.9 MILLION/UL (ref 3.88–5.62)
SODIUM SERPL-SCNC: 141 MMOL/L (ref 135–147)
TRIGL SERPL-MCNC: 117 MG/DL
TSH SERPL DL<=0.05 MIU/L-ACNC: 0.8 UIU/ML (ref 0.45–4.5)
WBC # BLD AUTO: 8.6 THOUSAND/UL (ref 4.31–10.16)

## 2024-03-04 PROCEDURE — 36415 COLL VENOUS BLD VENIPUNCTURE: CPT

## 2024-03-04 PROCEDURE — 84153 ASSAY OF PSA TOTAL: CPT

## 2024-03-04 NOTE — PROGRESS NOTES
PSA 9.5, on active surveillance for prostate cancer diagnosed in 2020  It was 9.8 in September 2022.  MRI at that time showed nothing suspicious.  Will discuss further at upcoming cystoscopy appointment

## 2024-03-13 ENCOUNTER — OFFICE VISIT (OUTPATIENT)
Dept: FAMILY MEDICINE CLINIC | Facility: CLINIC | Age: 64
End: 2024-03-13
Payer: COMMERCIAL

## 2024-03-13 VITALS
BODY MASS INDEX: 30.12 KG/M2 | HEART RATE: 87 BPM | HEIGHT: 70 IN | TEMPERATURE: 99.8 F | DIASTOLIC BLOOD PRESSURE: 78 MMHG | SYSTOLIC BLOOD PRESSURE: 128 MMHG | WEIGHT: 210.4 LBS | OXYGEN SATURATION: 95 %

## 2024-03-13 DIAGNOSIS — E78.2 MIXED HYPERLIPIDEMIA: ICD-10-CM

## 2024-03-13 DIAGNOSIS — R73.01 IFG (IMPAIRED FASTING GLUCOSE): ICD-10-CM

## 2024-03-13 DIAGNOSIS — K52.9 ACUTE GASTROENTERITIS: Primary | ICD-10-CM

## 2024-03-13 DIAGNOSIS — R35.0 FREQUENT URINATION: ICD-10-CM

## 2024-03-13 DIAGNOSIS — R97.20 PSA ELEVATION: ICD-10-CM

## 2024-03-13 DIAGNOSIS — I10 ESSENTIAL HYPERTENSION: ICD-10-CM

## 2024-03-13 DIAGNOSIS — J06.9 VIRAL UPPER RESPIRATORY TRACT INFECTION: ICD-10-CM

## 2024-03-13 LAB
BACTERIA UR QL AUTO: ABNORMAL /HPF
BILIRUB UR QL STRIP: NEGATIVE
CLARITY UR: ABNORMAL
COLOR UR: YELLOW
GLUCOSE UR STRIP-MCNC: NEGATIVE MG/DL
HGB UR QL STRIP.AUTO: ABNORMAL
KETONES UR STRIP-MCNC: ABNORMAL MG/DL
LEUKOCYTE ESTERASE UR QL STRIP: ABNORMAL
MUCOUS THREADS UR QL AUTO: ABNORMAL
NITRITE UR QL STRIP: NEGATIVE
NON-SQ EPI CELLS URNS QL MICRO: ABNORMAL /HPF
PH UR STRIP.AUTO: 6 [PH]
PROT UR STRIP-MCNC: ABNORMAL MG/DL
RBC #/AREA URNS AUTO: ABNORMAL /HPF
SL AMB  POCT GLUCOSE, UA: ABNORMAL
SL AMB LEUKOCYTE ESTERASE,UA: ABNORMAL
SL AMB POCT BILIRUBIN,UA: ABNORMAL
SL AMB POCT BLOOD,UA: ABNORMAL
SL AMB POCT CLARITY,UA: CLEAR
SL AMB POCT COLOR,UA: YELLOW
SL AMB POCT KETONES,UA: 40
SL AMB POCT NITRITE,UA: NEGATIVE
SL AMB POCT PH,UA: 5.5
SL AMB POCT SPECIFIC GRAVITY,UA: 1.03
SL AMB POCT URINE PROTEIN: 300
SL AMB POCT UROBILINOGEN: 1
SP GR UR STRIP.AUTO: 1.03 (ref 1–1.03)
UROBILINOGEN UR STRIP-ACNC: <2 MG/DL
WBC #/AREA URNS AUTO: ABNORMAL /HPF

## 2024-03-13 PROCEDURE — 87086 URINE CULTURE/COLONY COUNT: CPT | Performed by: FAMILY MEDICINE

## 2024-03-13 PROCEDURE — 87186 SC STD MICRODIL/AGAR DIL: CPT | Performed by: FAMILY MEDICINE

## 2024-03-13 PROCEDURE — 87147 CULTURE TYPE IMMUNOLOGIC: CPT | Performed by: FAMILY MEDICINE

## 2024-03-13 PROCEDURE — 81002 URINALYSIS NONAUTO W/O SCOPE: CPT | Performed by: FAMILY MEDICINE

## 2024-03-13 PROCEDURE — 81001 URINALYSIS AUTO W/SCOPE: CPT | Performed by: FAMILY MEDICINE

## 2024-03-13 PROCEDURE — 99214 OFFICE O/P EST MOD 30 MIN: CPT | Performed by: FAMILY MEDICINE

## 2024-03-13 PROCEDURE — 87077 CULTURE AEROBIC IDENTIFY: CPT | Performed by: FAMILY MEDICINE

## 2024-03-13 RX ORDER — ONDANSETRON 4 MG/1
4 TABLET, FILM COATED ORAL EVERY 8 HOURS PRN
Qty: 20 TABLET | Refills: 0 | Status: SHIPPED | OUTPATIENT
Start: 2024-03-13

## 2024-03-14 ENCOUNTER — HOSPITAL ENCOUNTER (EMERGENCY)
Facility: HOSPITAL | Age: 64
Discharge: HOME | End: 2024-03-14
Attending: EMERGENCY MEDICINE
Payer: COMMERCIAL

## 2024-03-14 VITALS
RESPIRATION RATE: 20 BRPM | TEMPERATURE: 98.9 F | DIASTOLIC BLOOD PRESSURE: 70 MMHG | OXYGEN SATURATION: 97 % | HEART RATE: 87 BPM | SYSTOLIC BLOOD PRESSURE: 148 MMHG

## 2024-03-14 DIAGNOSIS — N39.0 UTI (URINARY TRACT INFECTION): Primary | ICD-10-CM

## 2024-03-14 DIAGNOSIS — C61 ADENOCARCINOMA OF PROSTATE (HCC): ICD-10-CM

## 2024-03-14 PROBLEM — R35.0 FREQUENT URINATION: Status: ACTIVE | Noted: 2024-03-14

## 2024-03-14 PROBLEM — J06.9 VIRAL UPPER RESPIRATORY TRACT INFECTION: Status: ACTIVE | Noted: 2024-03-14

## 2024-03-14 PROBLEM — K52.9 ACUTE GASTROENTERITIS: Status: ACTIVE | Noted: 2024-03-14

## 2024-03-14 LAB
ALBUMIN SERPL BCP-MCNC: 4.4 G/DL (ref 3.5–5)
ALP SERPL-CCNC: 91 U/L (ref 34–104)
ALT SERPL W P-5'-P-CCNC: 13 U/L (ref 7–52)
ANION GAP SERPL CALCULATED.3IONS-SCNC: 11 MMOL/L (ref 4–13)
APTT PPP: 25 SECONDS (ref 23–37)
AST SERPL W P-5'-P-CCNC: 13 U/L (ref 13–39)
ATRIAL RATE: 86 BPM
BACTERIA UR QL AUTO: ABNORMAL /HPF
BASOPHILS # BLD AUTO: 0.08 THOUSANDS/ÂΜL (ref 0–0.1)
BASOPHILS NFR BLD AUTO: 1 % (ref 0–1)
BILIRUB SERPL-MCNC: 1.56 MG/DL (ref 0.2–1)
BILIRUB UR QL STRIP: NEGATIVE
BUN SERPL-MCNC: 17 MG/DL (ref 5–25)
CALCIUM SERPL-MCNC: 9.1 MG/DL (ref 8.4–10.2)
CHLORIDE SERPL-SCNC: 96 MMOL/L (ref 96–108)
CLARITY UR: ABNORMAL
CO2 SERPL-SCNC: 25 MMOL/L (ref 21–32)
COLOR UR: YELLOW
CREAT SERPL-MCNC: 1.1 MG/DL (ref 0.6–1.3)
EOSINOPHIL # BLD AUTO: 0.05 THOUSAND/ÂΜL (ref 0–0.61)
EOSINOPHIL NFR BLD AUTO: 0 % (ref 0–6)
ERYTHROCYTE [DISTWIDTH] IN BLOOD BY AUTOMATED COUNT: 12.2 % (ref 11.6–15.1)
FLUAV RNA RESP QL NAA+PROBE: NEGATIVE
FLUBV RNA RESP QL NAA+PROBE: NEGATIVE
GFR SERPL CREATININE-BSD FRML MDRD: 70 ML/MIN/1.73SQ M
GLUCOSE SERPL-MCNC: 92 MG/DL (ref 65–140)
GLUCOSE UR STRIP-MCNC: NEGATIVE MG/DL
HCT VFR BLD AUTO: 41.9 % (ref 36.5–49.3)
HGB BLD-MCNC: 14.1 G/DL (ref 12–17)
HGB UR QL STRIP.AUTO: ABNORMAL
IMM GRANULOCYTES # BLD AUTO: 0.09 THOUSAND/UL (ref 0–0.2)
IMM GRANULOCYTES NFR BLD AUTO: 1 % (ref 0–2)
INR PPP: 1.08 (ref 0.84–1.19)
KETONES UR STRIP-MCNC: ABNORMAL MG/DL
LACTATE SERPL-SCNC: 0.9 MMOL/L (ref 0.5–2)
LEUKOCYTE ESTERASE UR QL STRIP: ABNORMAL
LYMPHOCYTES # BLD AUTO: 1.23 THOUSANDS/ÂΜL (ref 0.6–4.47)
LYMPHOCYTES NFR BLD AUTO: 7 % (ref 14–44)
MCH RBC QN AUTO: 30.7 PG (ref 26.8–34.3)
MCHC RBC AUTO-ENTMCNC: 33.7 G/DL (ref 31.4–37.4)
MCV RBC AUTO: 91 FL (ref 82–98)
MONOCYTES # BLD AUTO: 1.28 THOUSAND/ÂΜL (ref 0.17–1.22)
MONOCYTES NFR BLD AUTO: 8 % (ref 4–12)
MUCOUS THREADS UR QL AUTO: ABNORMAL
NEUTROPHILS # BLD AUTO: 14.15 THOUSANDS/ÂΜL (ref 1.85–7.62)
NEUTS SEG NFR BLD AUTO: 83 % (ref 43–75)
NITRITE UR QL STRIP: NEGATIVE
NON-SQ EPI CELLS URNS QL MICRO: ABNORMAL /HPF
NRBC BLD AUTO-RTO: 0 /100 WBCS
P AXIS: 70 DEGREES
PH UR STRIP.AUTO: 6 [PH]
PLATELET # BLD AUTO: 247 THOUSANDS/UL (ref 149–390)
PMV BLD AUTO: 9.8 FL (ref 8.9–12.7)
POTASSIUM SERPL-SCNC: 4 MMOL/L (ref 3.5–5.3)
PR INTERVAL: 136 MS
PROCALCITONIN SERPL-MCNC: 0.07 NG/ML
PROT SERPL-MCNC: 7.7 G/DL (ref 6.4–8.4)
PROT UR STRIP-MCNC: ABNORMAL MG/DL
PROTHROMBIN TIME: 13.9 SECONDS (ref 11.6–14.5)
QRS AXIS: 91 DEGREES
QRSD INTERVAL: 142 MS
QT INTERVAL: 356 MS
QTC INTERVAL: 428 MS
RBC # BLD AUTO: 4.6 MILLION/UL (ref 3.88–5.62)
RBC #/AREA URNS AUTO: ABNORMAL /HPF
RSV RNA RESP QL NAA+PROBE: NEGATIVE
SARS-COV-2 RNA RESP QL NAA+PROBE: NEGATIVE
SODIUM SERPL-SCNC: 132 MMOL/L (ref 135–147)
SP GR UR STRIP.AUTO: 1.03 (ref 1–1.03)
T WAVE AXIS: 24 DEGREES
UROBILINOGEN UR STRIP-ACNC: 2 MG/DL
VENTRICULAR RATE: 87 BPM
WBC # BLD AUTO: 16.88 THOUSAND/UL (ref 4.31–10.16)
WBC #/AREA URNS AUTO: ABNORMAL /HPF

## 2024-03-14 PROCEDURE — 96365 THER/PROPH/DIAG IV INF INIT: CPT

## 2024-03-14 PROCEDURE — 84145 PROCALCITONIN (PCT): CPT

## 2024-03-14 PROCEDURE — 36415 COLL VENOUS BLD VENIPUNCTURE: CPT

## 2024-03-14 PROCEDURE — 81001 URINALYSIS AUTO W/SCOPE: CPT

## 2024-03-14 PROCEDURE — 83605 ASSAY OF LACTIC ACID: CPT

## 2024-03-14 PROCEDURE — 99285 EMERGENCY DEPT VISIT HI MDM: CPT | Performed by: EMERGENCY MEDICINE

## 2024-03-14 PROCEDURE — 85610 PROTHROMBIN TIME: CPT

## 2024-03-14 PROCEDURE — 87077 CULTURE AEROBIC IDENTIFY: CPT

## 2024-03-14 PROCEDURE — 93005 ELECTROCARDIOGRAM TRACING: CPT

## 2024-03-14 PROCEDURE — 80053 COMPREHEN METABOLIC PANEL: CPT

## 2024-03-14 PROCEDURE — 87086 URINE CULTURE/COLONY COUNT: CPT

## 2024-03-14 PROCEDURE — 0241U HB NFCT DS VIR RESP RNA 4 TRGT: CPT

## 2024-03-14 PROCEDURE — 96375 TX/PRO/DX INJ NEW DRUG ADDON: CPT

## 2024-03-14 PROCEDURE — 85025 COMPLETE CBC W/AUTO DIFF WBC: CPT

## 2024-03-14 PROCEDURE — 85730 THROMBOPLASTIN TIME PARTIAL: CPT

## 2024-03-14 PROCEDURE — 93010 ELECTROCARDIOGRAM REPORT: CPT | Performed by: INTERNAL MEDICINE

## 2024-03-14 PROCEDURE — 99283 EMERGENCY DEPT VISIT LOW MDM: CPT

## 2024-03-14 PROCEDURE — 87186 SC STD MICRODIL/AGAR DIL: CPT

## 2024-03-14 PROCEDURE — 87040 BLOOD CULTURE FOR BACTERIA: CPT

## 2024-03-14 RX ORDER — CEFPODOXIME PROXETIL 200 MG/1
200 TABLET, FILM COATED ORAL 2 TIMES DAILY
Qty: 20 TABLET | Refills: 0 | Status: SHIPPED | OUTPATIENT
Start: 2024-03-14 | End: 2024-03-24

## 2024-03-14 RX ORDER — METOCLOPRAMIDE HYDROCHLORIDE 5 MG/ML
10 INJECTION INTRAMUSCULAR; INTRAVENOUS ONCE
Status: COMPLETED | OUTPATIENT
Start: 2024-03-14 | End: 2024-03-14

## 2024-03-14 RX ORDER — KETOROLAC TROMETHAMINE 30 MG/ML
15 INJECTION, SOLUTION INTRAMUSCULAR; INTRAVENOUS ONCE
Status: COMPLETED | OUTPATIENT
Start: 2024-03-14 | End: 2024-03-14

## 2024-03-14 RX ADMIN — SODIUM CHLORIDE 1000 ML: 0.9 INJECTION, SOLUTION INTRAVENOUS at 19:04

## 2024-03-14 RX ADMIN — KETOROLAC TROMETHAMINE 15 MG: 30 INJECTION, SOLUTION INTRAMUSCULAR; INTRAVENOUS at 19:12

## 2024-03-14 RX ADMIN — METOCLOPRAMIDE 10 MG: 5 INJECTION, SOLUTION INTRAMUSCULAR; INTRAVENOUS at 19:12

## 2024-03-14 RX ADMIN — CEFTRIAXONE 2000 MG: 1 INJECTION, POWDER, FOR SOLUTION INTRAMUSCULAR; INTRAVENOUS at 19:12

## 2024-03-14 NOTE — ASSESSMENT & PLAN NOTE
Acute symptomatic discussed with patient most probably viral recommend supportive treatment with hydration and brat diet recommend Zofran 4 mg every 8 hours as needed nausea if symptom persistent to call the office

## 2024-03-14 NOTE — ASSESSMENT & PLAN NOTE
Finding on recent blood work elevated PSA patient already following up with the urology will call for appointment

## 2024-03-14 NOTE — ASSESSMENT & PLAN NOTE
Acute symptomatic patient already had COVID test at home negative rapid flu in the office is negative recommend supportive treatment

## 2024-03-14 NOTE — DISCHARGE INSTRUCTIONS
Take antibiotics as prescribed for UTI. Follow up with your urology team. Referral placed for [facility] urology if you want to switch your care to this system.     If you develop new or worsening symptoms, please return to the Emergency Department for further evaluation.

## 2024-03-14 NOTE — PROGRESS NOTES
Name: Tacos Asencio      : 1960      MRN: [id number]  Encounter Provider: Leona Ha MD  Encounter Date: 3/13/2024   Encounter department: [facility]    Assessment & Plan     1. Acute gastroenteritis  Assessment & Plan:  Acute symptomatic discussed with patient most probably viral recommend supportive treatment with hydration and brat diet recommend Zofran 4 mg every 8 hours as needed nausea if symptom persistent to call the office    Orders:  -     ondansetron (ZOFRAN) 4 mg tablet; Take 1 tablet (4 mg total) by mouth every 8 (eight) hours as needed for nausea or vomiting    2. Frequent urination  Assessment & Plan:  Symptomatic recommend to send the urine for UA and CS and will follow-up with the result accordingly patient will follow-up with the urology he has history of bladder cancer he had history of elevated PSA    Orders:  -     POCT urine dip  -     Urinalysis with microscopic; Future  -     Urine culture; Future  -     Urinalysis with microscopic  -     Urine culture    3. Viral upper respiratory tract infection  Assessment & Plan:  Acute symptomatic patient already had COVID test at home negative rapid flu in the office is negative recommend supportive treatment      4. Mixed hyperlipidemia  Assessment & Plan:  Chronic asymptomatic fair control continue current management patient on Zetia 10 mg once a day and atorvastatin 20 mg once a day low-fat diet review      5. PSA elevation  Assessment & Plan:  Finding on recent blood work elevated PSA patient already following up with the urology will call for appointment      6. IFG (impaired fasting glucose)    7. Essential hypertension           Subjective      Patient here follow-up with a chronic condition and he been having sinus symptoms including nausea vomiting chills body ache diarrhea decreased oral intake cramps in his abdomen that has been going on for 2 days no change in the diet no recent travel and no  "blood in the stool he been having increased frequency urination no flank pain no burning sensation urination recent blood work reviewed with the patient      Review of Systems   Constitutional:  Negative for chills and fever.   HENT:  Positive for congestion. Negative for ear pain and sore throat.    Eyes:  Negative for pain and visual disturbance.   Respiratory:  Negative for cough and shortness of breath.    Cardiovascular:  Negative for chest pain and palpitations.   Gastrointestinal:  Positive for abdominal pain, diarrhea and nausea. Negative for blood in stool, constipation and vomiting.   Genitourinary:  Positive for frequency. Negative for dysuria, flank pain and hematuria.   Musculoskeletal:  Negative for gait problem.   Skin:  Negative for color change and rash.   Neurological:  Positive for headaches. Negative for seizures and syncope.   All other systems reviewed and are negative.      Current Outpatient Medications on File Prior to Visit   Medication Sig   • aspirin (ECOTRIN LOW STRENGTH) 81 mg EC tablet Take 81 mg by mouth every evening   • atorvastatin (LIPITOR) 20 mg tablet TAKE 1 TABLET BY MOUTH EVERY DAY   • ezetimibe (ZETIA) 10 mg tablet Take 1 tablet (10 mg total) by mouth daily   • lisinopril (ZESTRIL) 20 mg tablet TAKE 1 TABLET BY MOUTH EVERY DAY   • meclizine (ANTIVERT) 25 mg tablet Take 1 tablet (25 mg total) by mouth daily at bedtime   • omeprazole (PriLOSEC) 40 MG capsule TAKE 1 CAPSULE (40 MG TOTAL) BY MOUTH DAILY.   • polyethylene glycol (Golytely) 4000 mL solution Take 4,000 mL by mouth once for 1 dose Take 4000 mL by mouth once for 1 dose. Use as directed (Patient not taking: Reported on 2/12/2024)       Objective     /78 (BP Location: Left arm, Patient Position: Sitting, Cuff Size: Standard)   Pulse 87   Temp 99.8 °F (37.7 °C) (Tympanic)   Ht 5' 10\" (1.778 m)   Wt 95.4 kg (210 lb 6.4 oz)   SpO2 95%   BMI 30.19 kg/m²     Physical Exam  Vitals and nursing note reviewed. "   Constitutional:       General: He is not in acute distress.     Appearance: He is well-developed. He is not diaphoretic.   HENT:      Head: Normocephalic.      Right Ear: Tympanic membrane and external ear normal.      Left Ear: Tympanic membrane and external ear normal.      Nose: No rhinorrhea.      Mouth/Throat:      Pharynx: No posterior oropharyngeal erythema.   Eyes:      General:         Right eye: No discharge.         Left eye: No discharge.      Conjunctiva/sclera: Conjunctivae normal.   Neck:      Vascular: No JVD.   Cardiovascular:      Rate and Rhythm: Normal rate and regular rhythm.      Heart sounds: Normal heart sounds. No murmur heard.     No gallop.   Pulmonary:      Effort: Pulmonary effort is normal. No respiratory distress.      Breath sounds: Normal breath sounds. No stridor. No wheezing or rales.   Chest:      Chest wall: No tenderness.   Abdominal:      General: There is no distension.      Palpations: Abdomen is soft. There is no mass.      Tenderness: There is no abdominal tenderness. There is no rebound.   Musculoskeletal:         General: No tenderness.      Cervical back: Normal range of motion and neck supple.   Lymphadenopathy:      Cervical: No cervical adenopathy.   Skin:     General: Skin is warm.      Findings: No erythema or rash.   Neurological:      Mental Status: He is alert and oriented to person, place, and time.       Leona Ha MD

## 2024-03-14 NOTE — ASSESSMENT & PLAN NOTE
Chronic asymptomatic fair control continue current management patient on Zetia 10 mg once a day and atorvastatin 20 mg once a day low-fat diet review

## 2024-03-14 NOTE — ED PROVIDER NOTES
History  Chief Complaint   Patient presents with    Painful Urination     Pt has been having painful urination for 4 days.  Seen by PCP and was told it was a viral stomach infection.  Having frequent urination, which is causing issues sleeping.       HPI    Patient is a 63-year-old male with past medical history of prostate cancer, bladder cancer presenting for evaluation of dysuria.  Patient states for the past 4 days he has been having dysuria, increased urinary frequency, suprapubic pain.  He is associated chills, sweats, nausea.  Denies any abdominal pain, back pain.  Denies chest pain or shortness of breath.  Saw PCP yesterday, had a UA done but was not started on antibiotics.  Patient trouble sleeping due to frequent urination.    Prior to Admission Medications   Prescriptions Last Dose Informant Patient Reported? Taking?   aspirin (ECOTRIN LOW STRENGTH) 81 mg EC tablet  Self Yes No   Sig: Take 81 mg by mouth every evening   atorvastatin (LIPITOR) 20 mg tablet  Self No No   Sig: TAKE 1 TABLET BY MOUTH EVERY DAY   ezetimibe (ZETIA) 10 mg tablet  Self No No   Sig: Take 1 tablet (10 mg total) by mouth daily   lisinopril (ZESTRIL) 20 mg tablet  Self No No   Sig: TAKE 1 TABLET BY MOUTH EVERY DAY   meclizine (ANTIVERT) 25 mg tablet  Self No No   Sig: Take 1 tablet (25 mg total) by mouth daily at bedtime   omeprazole (PriLOSEC) 40 MG capsule  Self No No   Sig: TAKE 1 CAPSULE (40 MG TOTAL) BY MOUTH DAILY.   ondansetron (ZOFRAN) 4 mg tablet   No No   Sig: Take 1 tablet (4 mg total) by mouth every 8 (eight) hours as needed for nausea or vomiting   polyethylene glycol (Golytely) 4000 mL solution   No No   Sig: Take 4,000 mL by mouth once for 1 dose Take 4000 mL by mouth once for 1 dose. Use as directed   Patient not taking: Reported on 2/12/2024      Facility-Administered Medications: None       Past Medical History:   Diagnosis Date    Abnormal WBC count 05/06/2021    Adenocarcinoma of prostate (HCC) 03/27/2020     Gold's esophagus 01/04/2021    EGD 01/04/21 recommend to repeat in 3 y    Cancer (HCC)     prostate    Colon polyp     Coronary artery disease     COVID 11/24/2020    Elevated PSA     Fever 11/24/2020    Gastroesophageal reflux disease without esophagitis 12/13/2019    GERD (gastroesophageal reflux disease)     Hiatal hernia 12/05/2023    2 cm hiatal hernia on EGD on 12/05/23    Hypercholesterolemia     Hypertension     Inferior MI (HCC) 04/07/2016    LLQ pain 10/13/2020    LUQ pain 11/05/2019    Myocardial infarction (HCC) 2011    Papillary growth of urinary bladder     TURBT  today   2/7/2023    PONV (postoperative nausea and vomiting)     only  w/ prostate bx    Sepsis due to Escherichia coli (HCC) 03/19/2020    Swelling 08/25/2021    UTI (urinary tract infection) 03/18/2020    Wears glasses        Past Surgical History:   Procedure Laterality Date    COLONOSCOPY      CORONARY STENT PLACEMENT      bare metal stent in Rt coronary artery    CYSTOSCOPY W/ RETROGRADES Bilateral 2/7/2023    Procedure: CYSTO W/ RETROGRADE PYELOGRAM;  Surgeon: Thierry Anthony MD;  Location: [facility];  Service: Urology    FL RETROGRADE PYELOGRAM  2/7/2023    GA BLADDER INSTILLATION ANTICARCINOGENIC AGENT N/A 2/7/2023    Procedure: INSTILLATION Gemcitabine;  Surgeon: Thierry Anthony MD;  Location: [facility];  Service: Urology    GA BX PROSTATE STRTCTC SATURATION SAMPLING IMG GID N/A 11/29/2022    Procedure: TRANSPERINEAL MRI FUSION  BIOPSY PROSTATE;  Surgeon: Bradly Henderson MD;  Location: [facility];  Service: Urology    GA CYSTO W/REMOVAL OF LESIONS SMALL N/A 2/7/2023    Procedure: (TURBT);  Surgeon: Thierry Anthony MD;  Location: [facility];  Service: Urology    SHOULDER SURGERY Left        Family History   Problem Relation Age of Onset    Sleep apnea Mother     Heart attack Father      I have reviewed and agree with the history as documented.    E-Cigarette/Vaping    E-Cigarette Use Never User      E-Cigarette/Vaping Substances    Nicotine  No     THC No     CBD No     Flavoring No     Other No     Unknown No      Social History     Tobacco Use    Smoking status: Former     Current packs/day: 0.00     Average packs/day: 0.2 packs/day for 37.0 years (9.2 ttl pk-yrs)     Types: Cigarettes     Start date: 1974     Quit date:      Years since quittin.2     Passive exposure: Never    Smokeless tobacco: Never   Vaping Use    Vaping status: Never Used   Substance Use Topics    Alcohol use: Yes     Comment: 6 x  yearly; rarely    Drug use: Never        Review of Systems   Constitutional:  Positive for chills. Negative for fever.   HENT:  Negative for ear pain and sore throat.    Eyes:  Negative for pain and visual disturbance.   Respiratory:  Negative for cough and shortness of breath.    Cardiovascular:  Negative for chest pain and palpitations.   Gastrointestinal:  Positive for nausea. Negative for abdominal pain and vomiting.   Genitourinary:  Positive for dysuria and frequency. Negative for hematuria.   Musculoskeletal:  Negative for arthralgias and back pain.   Skin:  Negative for color change and rash.   Neurological:  Negative for seizures and syncope.   All other systems reviewed and are negative.      Physical Exam  ED Triage Vitals   Temperature Pulse Respirations Blood Pressure SpO2   24 1813 24 1813 24 1813 24 1813 03/14/24 1813   98.9 °F (37.2 °C) (!) 108 20 148/70 97 %      Temp Source Heart Rate Source Patient Position - Orthostatic VS BP Location FiO2 (%)   24 1813 -- -- -- --   Oral          Pain Score       24 1912       4             Orthostatic Vital Signs  Vitals:    24   BP: 148/70   Pulse: (!) 108       Physical Exam  Vitals and nursing note reviewed.   Constitutional:       General: He is not in acute distress.     Appearance: He is well-developed. He is not toxic-appearing.   HENT:      Head: Normocephalic and atraumatic.      Right Ear: External ear normal.      Left Ear:  External ear normal.      Nose: Nose normal.      Mouth/Throat:      Pharynx: Oropharynx is clear. No oropharyngeal exudate or posterior oropharyngeal erythema.   Eyes:      Extraocular Movements: Extraocular movements intact.      Conjunctiva/sclera: Conjunctivae normal.      Pupils: Pupils are equal, round, and reactive to light.   Cardiovascular:      Rate and Rhythm: Regular rhythm. Tachycardia present.      Pulses: Normal pulses.      Heart sounds: Normal heart sounds. No murmur heard.     No friction rub. No gallop.   Pulmonary:      Effort: Pulmonary effort is normal. No respiratory distress.      Breath sounds: Normal breath sounds. No wheezing, rhonchi or rales.   Abdominal:      General: Abdomen is flat.      Palpations: Abdomen is soft.      Tenderness: There is abdominal tenderness. There is no right CVA tenderness, left CVA tenderness, guarding or rebound.      Comments: Suprapubic   Musculoskeletal:         General: Normal range of motion.      Cervical back: Normal range of motion. No rigidity.      Right lower leg: No edema.      Left lower leg: No edema.   Skin:     General: Skin is warm and dry.      Capillary Refill: Capillary refill takes less than 2 seconds.   Neurological:      General: No focal deficit present.      Mental Status: He is alert.   Psychiatric:         Mood and Affect: Mood normal.         ED Medications  Medications   sodium chloride 0.9 % bolus 1,000 mL (1,000 mL Intravenous New Bag 3/14/24 1904)   ceftriaxone (ROCEPHIN) 2 g/50 mL in dextrose IVPB (2,000 mg Intravenous New Bag 3/14/24 1912)   ketorolac (TORADOL) injection 15 mg (15 mg Intravenous Given 3/14/24 1912)   metoclopramide (REGLAN) injection 10 mg (10 mg Intravenous Given 3/14/24 1912)       Diagnostic Studies  Results Reviewed       Procedure Component Value Units Date/Time    Procalcitonin [[id number]]  (Normal) Collected: 03/14/24 1900    Lab Status: Final result Specimen: Blood from Arm, Left Updated: 03/14/24 1943      Procalcitonin 0.07 ng/ml     Lactic acid [[id number]]  (Normal) Collected: 03/14/24 1900    Lab Status: Final result Specimen: Blood from Arm, Left Updated: 03/14/24 1939     LACTIC ACID 0.9 mmol/L     Narrative:      Result may be elevated if tourniquet was used during collection.    Comprehensive metabolic panel [[id number]]  (Abnormal) Collected: 03/14/24 1900    Lab Status: Final result Specimen: Blood from Arm, Left Updated: 03/14/24 1938     Sodium 132 mmol/L      Potassium 4.0 mmol/L      Chloride 96 mmol/L      CO2 25 mmol/L      ANION GAP 11 mmol/L      BUN 17 mg/dL      Creatinine 1.10 mg/dL      Glucose 92 mg/dL      Calcium 9.1 mg/dL      AST 13 U/L      ALT 13 U/L      Alkaline Phosphatase 91 U/L      Total Protein 7.7 g/dL      Albumin 4.4 g/dL      Total Bilirubin 1.56 mg/dL      eGFR 70 ml/min/1.73sq m     Narrative:      National Kid[employer] guidelines for Chronic Kidney Disease (CKD):     Stage 1 with normal or high GFR (GFR > 90 mL/min/1.73 square meters)    Stage 2 Mild CKD (GFR = 60-89 mL/min/1.73 square meters)    Stage 3A Moderate CKD (GFR = 45-59 mL/min/1.73 square meters)    Stage 3B Moderate CKD (GFR = 30-44 mL/min/1.73 square meters)    Stage 4 Severe CKD (GFR = 15-29 mL/min/1.73 square meters)    Stage 5 End Stage CKD (GFR <15 mL/min/1.73 square meters)  Note: GFR calculation is accurate only with a steady state creatinine    Urine Microscopic [[id number]]  (Abnormal) Collected: 03/14/24 1904    Lab Status: Final result Specimen: Urine, Clean Catch Updated: 03/14/24 1937     RBC, UA 20-30 /hpf      WBC, UA Innumerable /hpf      Epithelial Cells None Seen /hpf      Bacteria, UA None Seen /hpf      MUCUS THREADS Moderate    Urine culture [[id number]] Collected: 03/14/24 1904    Lab Status: In process Specimen: Urine, Clean Catch Updated: 03/14/24 1937    Protime-INR [[id number]]  (Normal) Collected: 03/14/24 1900    Lab Status: Final result Specimen: Blood from Arm, Left  Updated: 03/14/24 1925     Protime 13.9 seconds      INR 1.08    APTT [[id number]]  (Normal) Collected: 03/14/24 1900    Lab Status: Final result Specimen: Blood from Arm, Left Updated: 03/14/24 1925     PTT 25 seconds     UA w Reflex to Microscopic w Reflex to Culture [[id number]]  (Abnormal) Collected: 03/14/24 1904    Lab Status: Final result Specimen: Urine, Clean Catch Updated: 03/14/24 1919     Color, UA Yellow     Clarity, UA Turbid     Specific Gravity, UA 1.031     pH, UA 6.0     Leukocytes, UA Large     Nitrite, UA Negative     Protein,  (3+) mg/dl      Glucose, UA Negative mg/dl      Ketones,  (4+) mg/dl      Urobilinogen, UA 2.0 mg/dl      Bilirubin, UA Negative     Occult Blood, UA Moderate    CBC and differential [[id number]]  (Abnormal) Collected: 03/14/24 1900    Lab Status: Final result Specimen: Blood from Arm, Left Updated: 03/14/24 1912     WBC 16.88 Thousand/uL      RBC 4.60 Million/uL      Hemoglobin 14.1 g/dL      Hematocrit 41.9 %      MCV 91 fL      MCH 30.7 pg      MCHC 33.7 g/dL      RDW 12.2 %      MPV 9.8 fL      Platelets 247 Thousands/uL      nRBC 0 /100 WBCs      Neutrophils Relative 83 %      Immature Grans % 1 %      Lymphocytes Relative 7 %      Monocytes Relative 8 %      Eosinophils Relative 0 %      Basophils Relative 1 %      Neutrophils Absolute 14.15 Thousands/µL      Absolute Immature Grans 0.09 Thousand/uL      Absolute Lymphocytes 1.23 Thousands/µL      Absolute Monocytes 1.28 Thousand/µL      Eosinophils Absolute 0.05 Thousand/µL      Basophils Absolute 0.08 Thousands/µL     Blood culture #1 [[id number]] Collected: 03/14/24 1900    Lab Status: In process Specimen: Blood from Arm, Left Updated: 03/14/24 1910    Blood culture #2 [[id number]] Collected: 03/14/24 1903    Lab Status: In process Specimen: Blood from Arm, Left Updated: 03/14/24 1910    FLU/RSV/COVID - if FLU/RSV clinically relevant [[id number]] Collected: 03/14/24 1900    Lab Status: In process Specimen:  Nares from Nose Updated: 03/14/24 1908                   No orders to display         Procedures  Procedures      ED Course  ED Course as of 03/14/24 1955   Thu Mar 14, 2024   1901 ECG 12 lead  Procedure Note: EKG  Date/Time: 03/14/24 7:01 PM   Interpreted by: Tremayne Coleman MD  Indications / Diagnosis: tachycardia  ECG reviewed by me, the ED Provider: yes   The EKG demonstrates:  Rhythm: normal sinus  Intervals: normal intervals  Axis: right normal axis  QRS/Blocks: Right bundle branch block  ST Changes: No acute ST Changes, no STD/MARCOS.     1914 WBC(!): 16.88   1920 Leukocytes, UA(!): Large   1944 LACTIC ACID: 0.9                          Initial Sepsis Screening       Row Name 03/14/24 1914                Is the patient's history suggestive of a new or worsening infection? Yes (Proceed)  -NA        Suspected source of infection urinary tract infection  -NA        Indicate SIRS criteria Tachycardia > 90 bpm;Leukocytosis (WBC > 84276 IJL) OR Leukopenia (WBC <4000 IJL) OR Bandemia (WBC >10% bands)  -NA        Are two or more of the above signs & symptoms of infection both present and new to the patient? Yes (Proceed)  -NA        Assess for evidence of organ dysfunction: Are any of the below criteria present within 6 hours of suspected infection and SIRS criteria that are NOT considered to be chronic conditions? --                  User Key  (r) = Recorded By, (t) = Taken By, (c) = Cosigned By      Initials Name Provider Type    NA Tremayne Coleman MD Physician                              Medical Decision Making  63-year-old male present with multiple days of dysuria.  UA from yesterday concern for possible UTI that has so far been untreated.  Given the tachycardia and systemic symptoms, will obtain septic workup and treat with a one-time dose of ceftriaxone. Sepsis w/u overall unremarkable, pt is SIRS+ without sepsis. Recommend prolonged abx course given complicated UTI. Recommend close urology f/u. Discharge  instructions understood by pt and wife and pt dc'ed to home.     Amount and/or Complexity of Data Reviewed  Labs: ordered. Decision-making details documented in ED Course.  ECG/medicine tests:  Decision-making details documented in ED Course.    Risk  Prescription drug management.          Disposition  Final diagnoses:   UTI (urinary tract infection)   Adenocarcinoma of prostate (HCC)     Time reflects when diagnosis was documented in both MDM as applicable and the Disposition within this note       Time User Action Codes Description Comment    3/14/2024  7:00 PM Tremayne Coleman [N39.0] UTI (urinary tract infection)     3/14/2024  7:48 PM Tremayne Coleman [C61] Adenocarcinoma of prostate (HCC)           ED Disposition       ED Disposition   Discharge    Condition   Stable    Date/Time   Thu Mar 14, 2024  7:45 PM    Comment   Tacos Asencio discharge to home/self care.                   Follow-up Information       Follow up With Specialties Details Why Contact Info Additional Information    [facility]   [address]  [address]  [facility] [address]  [phone] [facility], [address], [place], [place], [address]   [phone]            Patient's Medications   Discharge Prescriptions    CEFPODOXIME (VANTIN) 200 MG TABLET    Take 1 tablet (200 mg total) by mouth 2 (two) times a day for 10 days       Start Date: 3/14/2024 End Date: 3/24/2024       Order Dose: 200 mg       Quantity: 20 tablet    Refills: 0         PDMP Review       None             ED Provider  Attending physically available and evaluated Tacos Asencio. I managed the patient along with the ED Attending.    Electronically Signed by           Tremayne Coleman MD  03/14/24 1956

## 2024-03-14 NOTE — ED ATTENDING ATTESTATION
3/14/2024  I, Ortiz William DO, saw and evaluated the patient. I have discussed the patient with the resident/non-physician practitioner and agree with the resident's/non-physician practitioner's findings, Plan of Care, and MDM as documented in the resident's/non-physician practitioner's note, except where noted. All available labs and Radiology studies were reviewed.  I was present for key portions of any procedure(s) performed by the resident/non-physician practitioner and I was immediately available to provide assistance.       At this point I agree with the current assessment done in the Emergency Department.  I have conducted an independent evaluation of this patient a history and physical is as follows:    Patient is a 63-year-old male with a history of coronary artery disease, hypertension, hyperlipidemia, bladder cancer status post surgical removal January 2023, accompanied by his wife.  For the past 4 days he has had some increased urinary frequency, dysuria, feeling somewhat fatigued and rundown.  Some slight cough occasionally but no prolonged coughing episodes, no abdominal pain.  No hematuria, no fever that he has noticed.  No syncope or incontinence.  No recent hospitalizations or antibiotics.  No recent urethral instrumentation.  No back pain.  No known sick contacts and the wife is acting well.  Yesterday the patient was seen at the primary care physician's office, reportedly was diagnosed with a viral illness, had a urinalysis performed, urine culture has not resulted yet but the urinalysis is suggestive of infection with moderate leukocyte esterase, innumerable WBCs.  He was not started on antibiotics.  Presents today because symptoms have not improved and his wife was concerned that he may have an underlying infection.    General:  Patient is well-appearing  Head:  Atraumatic  Eyes:  Conjunctiva pink  ENT:  Mucous membranes are moist  Neck:  Supple  Cardiac:  S1-S2, without murmurs  Lungs:   Clear to auscultation bilaterally  Abdomen:  Soft, nontender, normal bowel sounds, no CVA tenderness, no tympany, no rigidity, no guarding  Extremities:  Normal range of motion  Neurologic:  Awake, fluent speech, normal comprehension. AAOx3.   Skin:  Pink warm and dry  Psychiatric:  Alert, pleasant, cooperative          ED Course  ED Course as of 03/14/24 2015   Thu Mar 14, 2024   2014 ECG interpreted by me, sinus rhythm, rate of 87, right bundle branch block, no acute ischemic or infarctive changes     Labs Reviewed   CBC AND DIFFERENTIAL - Abnormal       Result Value Ref Range Status    WBC 16.88 (*) 4.31 - 10.16 Thousand/uL Final    RBC 4.60  3.88 - 5.62 Million/uL Final    Hemoglobin 14.1  12.0 - 17.0 g/dL Final    Hematocrit 41.9  36.5 - 49.3 % Final    MCV 91  82 - 98 fL Final    MCH 30.7  26.8 - 34.3 pg Final    MCHC 33.7  31.4 - 37.4 g/dL Final    RDW 12.2  11.6 - 15.1 % Final    MPV 9.8  8.9 - 12.7 fL Final    Platelets 247  149 - 390 Thousands/uL Final    nRBC 0  /100 WBCs Final    Neutrophils Relative 83 (*) 43 - 75 % Final    Immature Grans % 1  0 - 2 % Final    Lymphocytes Relative 7 (*) 14 - 44 % Final    Monocytes Relative 8  4 - 12 % Final    Eosinophils Relative 0  0 - 6 % Final    Basophils Relative 1  0 - 1 % Final    Neutrophils Absolute 14.15 (*) 1.85 - 7.62 Thousands/µL Final    Absolute Immature Grans 0.09  0.00 - 0.20 Thousand/uL Final    Absolute Lymphocytes 1.23  0.60 - 4.47 Thousands/µL Final    Absolute Monocytes 1.28 (*) 0.17 - 1.22 Thousand/µL Final    Eosinophils Absolute 0.05  0.00 - 0.61 Thousand/µL Final    Basophils Absolute 0.08  0.00 - 0.10 Thousands/µL Final   COMPREHENSIVE METABOLIC PANEL - Abnormal    Sodium 132 (*) 135 - 147 mmol/L Final    Potassium 4.0  3.5 - 5.3 mmol/L Final    Chloride 96  96 - 108 mmol/L Final    CO2 25  21 - 32 mmol/L Final    ANION GAP 11  4 - 13 mmol/L Final    BUN 17  5 - 25 mg/dL Final    Creatinine 1.10  0.60 - 1.30 mg/dL Final    Comment:  Standardized to IDMS reference method    Glucose 92  65 - 140 mg/dL Final    Comment: If the patient is fasting, the ADA then defines impaired fasting glucose as > 100 mg/dL and diabetes as > or equal to 123 mg/dL.    Calcium 9.1  8.4 - 10.2 mg/dL Final    AST 13  13 - 39 U/L Final    ALT 13  7 - 52 U/L Final    Comment: Specimen collection should occur prior to Sulfasalazine administration due to the potential for falsely depressed results.     Alkaline Phosphatase 91  34 - 104 U/L Final    Total Protein 7.7  6.4 - 8.4 g/dL Final    Albumin 4.4  3.5 - 5.0 g/dL Final    Total Bilirubin 1.56 (*) 0.20 - 1.00 mg/dL Final    Comment: Use of this assay is not recommended for patients undergoing treatment with eltrombopag due to the potential for falsely elevated results.  N-acetyl-p-benzoquinone imine (metabolite of Acetaminophen) will generate erroneously low results in samples for patients that have taken an overdose of Acetaminophen.    eGFR 70  ml/min/1.73sq m Final    Narrative:     National Kidney Disease Foundation guidelines for Chronic Kidney Disease (CKD):     Stage 1 with normal or high GFR (GFR > 90 mL/min/1.73 square meters)    Stage 2 Mild CKD (GFR = 60-89 mL/min/1.73 square meters)    Stage 3A Moderate CKD (GFR = 45-59 mL/min/1.73 square meters)    Stage 3B Moderate CKD (GFR = 30-44 mL/min/1.73 square meters)    Stage 4 Severe CKD (GFR = 15-29 mL/min/1.73 square meters)    Stage 5 End Stage CKD (GFR <15 mL/min/1.73 square meters)  Note: GFR calculation is accurate only with a steady state creatinine   UA W REFLEX TO MICROSCOPIC WITH REFLEX TO CULTURE - Abnormal    Color, UA Yellow   Final    Clarity, UA Turbid   Final    Specific Gravity, UA 1.031 (*) 1.003 - 1.030 Final    pH, UA 6.0  4.5, 5.0, 5.5, 6.0, 6.5, 7.0, 7.5, 8.0 Final    Leukocytes, UA Large (*) Negative Final    Nitrite, UA Negative  Negative Final    Protein,  (3+) (*) Negative mg/dl Final    Glucose, UA Negative  Negative mg/dl Final     Ketones,  (4+) (*) Negative mg/dl Final    Urobilinogen, UA 2.0 (*) <2.0 mg/dl mg/dl Final    Bilirubin, UA Negative  Negative Final    Occult Blood, UA Moderate (*) Negative Final   URINE MICROSCOPIC - Abnormal    RBC, UA 20-30 (*) None Seen, 1-2 /hpf Final    WBC, UA Innumerable (*) None Seen, 1-2 /hpf Final    Epithelial Cells None Seen  None Seen, Occasional /hpf Final    Bacteria, UA None Seen  None Seen, Occasional /hpf Final    MUCUS THREADS Moderate (*) None Seen Final   COVID19, INFLUENZA A/B, RSV PCR, [facility] - Normal    SARS-CoV-2 Negative  Negative Final    Comment:      INFLUENZA A PCR Negative  Negative Final    Comment:      INFLUENZA B PCR Negative  Negative Final    Comment:      RSV PCR Negative  Negative Final    Comment:      Narrative:     FOR PEDIATRIC PATIENTS - copy/paste COVID Guidelines URL to browser: [email or web address]    SARS-CoV-2 assay is a Nucleic Acid Amplification assay intended for the  qualitative detection of nucleic acid from SARS-CoV-2 in nasopharyngeal  swabs. Results are for the presumptive identification of SARS-CoV-2 RNA.    Positive results are indicative of infection with SARS-CoV-2, the virus  causing COVID-19, but do not rule out bacterial infection or co-infection  with other viruses. Laboratories within the [place] and its  territories are required to report all positive results to the appropriate  public health authorities. Negative results do not preclude SARS-CoV-2  infection and should not be used as the sole basis for treatment or other  patient management decisions. Negative results must be combined with  clinical observations, patient history, and epidemiological information.  This test has not been FDA cleared or approved.    This test has been authorized by FDA under an Emergency Use Authorization  (EUA). This test is only authorized for the duration of time the  declaration that  circumstances exist justifying the authorization of the  emergency use of an in vitro diagnostic tests for detection of SARS-CoV-2  virus and/or diagnosis of COVID-19 infection under section 564(b)(1) of  the Act, 21 U.S.C. 360bbb-3(b)(1), unless the authorization is terminated  or revoked sooner. The test has been validated but independent review by FDA  and CLIA is pending.    Test performed using [employer]: This RT-PCR assay targets N2,  a region unique to SARS-CoV-2. A conserved region in the E-gene was chosen  for pan-Sarbecovirus detection which includes SARS-CoV-2.    According to CMS-2020-01-R, this platform meets the definition of high-throughput technology.   LACTIC ACID, PLASMA (W/REFLEX IF RESULT > 2.0) - Normal    LACTIC ACID 0.9  0.5 - 2.0 mmol/L Final    Narrative:     Result may be elevated if tourniquet was used during collection.   PROCALCITONIN TEST - Normal    Procalcitonin 0.07  <=0.25 ng/ml Final    Comment: Suspected Lower Respiratory Tract Infection (LRTI):  - LESS than or EQUAL to 0.25 ng/mL:   low likelihood for bacterial LRTI; antibiotics DISCOURAGED.  - GREATER than 0.25 ng/mL:   increased likelihood for bacterial LRTI; antibiotics ENCOURAGED.    Suspected Sepsis:  - Strongly consider initiating antibiotics in ALL UNSTABLE patients.  - LESS than or EQUAL to 0.5 ng/mL:   low likelihood for bacterial sepsis; antibiotics DISCOURAGED.  - GREATER than 0.5 ng/mL:   increased likelihood for bacterial sepsis; antibiotics ENCOURAGED.  - GREATER than 2 ng/mL:   high risk for severe sepsis / septic shock; antibiotics strongly ENCOURAGED.    Decisions on antibiotic use should not be based solely on Procalcitonin (PCT) levels. If PCT is low but uncertainty exists with stopping antibiotics, repeat PCT in 6-24 hours to confirm the low level. If antibiotics are administered (regardless if initial PCT was high or low), repeat PCT every 1-2 days to consider early antibiotic cessation (when GREATER  than 80% decrease from the peak OR when PCT drops below designated cutoffs, whichever comes first), so long as the infection is NOT one that typically requires prolonged treatment durations (e.g., bone/joint infections, endocarditis, Staph. aureus bacteremia).    Situations of FALSE-POSITIVE Procalcitonin values:  1) Newborns < 72 hours old  2) Massive stress from severe trauma / burns, major surgery, acute pancreatitis, cardiogenic / hemorrhagic shock, sickle cell crisis, or other organ perfusion abnormalities  3) Malaria and some Candidal infections  4) Treatment with agents that stimulate cytokines (e.g., OKT3, anti-lymphocyte globulins, alemtuzumab, IL-2, granulocyte transfusion [NOT GCSFs])  5) Chronic renal disease causes elevated baseline levels (consider GREATER than 0.75 ng/mL as an abnormal cut-off); initiating HD/CRRT may cause transient decreases  6) Paraneoplastic syndromes from medullary thyroid or SCLC, some forms of vasculitis, and acute [id number] disease    Situations of FALSE-NEGATIVE Procalcitonin values:  1) Too early in clinical course for PCT to have reached its peak (may repeat in 6-24 hours to confirm low level)  2) Localized infection WITHOUT systemic (SIRS / sepsis) response (e.g., an abscess, osteomyelitis, cystitis)  3) Mycobacteria (e.g., Tuberculosis, MAC)  4) Cystic fibrosis exacerbations     PROTIME-INR - Normal    Protime 13.9  11.6 - 14.5 seconds Final    INR 1.08  0.84 - 1.19 Final   APTT - Normal    PTT 25  23 - 37 seconds Final    Comment: Therapeutic Heparin Range =  60-90 seconds   BLOOD CULTURE   BLOOD CULTURE   URINE CULTURE     Patient presents at risk for sepsis versus urinary tract infection.  Laboratory studies showed no evidence of sepsis, urine culture from yesterday has already been obtained and currently pending.  Urine and blood cultures obtained today which are currently pending.  Labs show no evidence of life-threatening metabolic abnormality.  No evidence of  sepsis.  Both patient and wife feel comfortable discharge and outpatient follow-up.  He was given a dose of IV antibiotics in the emergency department.Supportive care, importance of follow-up and return precautions were discussed with the patient, who expressed understanding.        The patient was evaluated for sepsis in the emergency department. After that assessment, at the time of admission, no sepsis, severe sepsis, or septic shock was found.    DIAGNOSIS:  Acute urinary tract infection    MEDICAL DECISION MAKING CODING    Patient presents with acute new problem with:  Threat to life or bodily function    Chronic conditions affecting care: As per HPI    COLLECTION AND INTERPRETATION OF DATA  Additional history obtained from: Wife  I reviewed prior external notes, including office visit and urinalysis from yesterday    I ordered each unique test  Tests reviewed personally by me:  ECG: See my ED course  Labs: See above    Tests considered but not ordered: Do not believe CT indicated    RISK  Drugs (OTC, Rx, Controlled substances): Prescription management  All of the patient's current prescription medications should be continued.    Treatment:  Consideration of admission: Admission considered however patient feels comfortable going home    Social Determinants of Health:  Presentation to ED outside of business hours or on night shift        Critical Care Time  Procedures

## 2024-03-14 NOTE — ASSESSMENT & PLAN NOTE
Symptomatic recommend to send the urine for UA and CS and will follow-up with the result accordingly patient will follow-up with the urology he has history of bladder cancer he had history of elevated PSA

## 2024-03-15 LAB — BACTERIA UR CULT: ABNORMAL

## 2024-03-16 LAB — BACTERIA UR CULT: ABNORMAL

## 2024-03-17 LAB
BACTERIA BLD CULT: NORMAL
BACTERIA BLD CULT: NORMAL

## 2024-03-18 ENCOUNTER — HOSPITAL ENCOUNTER (EMERGENCY)
Facility: HOSPITAL | Age: 64
Discharge: HOME | End: 2024-03-18
Attending: EMERGENCY MEDICINE
Payer: COMMERCIAL

## 2024-03-18 ENCOUNTER — TELEPHONE (OUTPATIENT)
Dept: FAMILY MEDICINE CLINIC | Facility: CLINIC | Age: 64
End: 2024-03-18

## 2024-03-18 ENCOUNTER — APPOINTMENT (EMERGENCY)
Dept: RADIOLOGY | Facility: HOSPITAL | Age: 64
End: 2024-03-18
Payer: COMMERCIAL

## 2024-03-18 VITALS
RESPIRATION RATE: 17 BRPM | SYSTOLIC BLOOD PRESSURE: 134 MMHG | HEART RATE: 66 BPM | TEMPERATURE: 97 F | OXYGEN SATURATION: 97 % | DIASTOLIC BLOOD PRESSURE: 78 MMHG

## 2024-03-18 DIAGNOSIS — N39.0 UTI (URINARY TRACT INFECTION): Primary | ICD-10-CM

## 2024-03-18 DIAGNOSIS — N39.0 URINARY TRACT INFECTION WITHOUT HEMATURIA, SITE UNSPECIFIED: Primary | ICD-10-CM

## 2024-03-18 LAB
ANION GAP SERPL CALCULATED.3IONS-SCNC: 10 MMOL/L (ref 4–13)
BACTERIA UR QL AUTO: ABNORMAL /HPF
BASOPHILS # BLD AUTO: 0.06 THOUSANDS/ÂΜL (ref 0–0.1)
BASOPHILS NFR BLD AUTO: 1 % (ref 0–1)
BILIRUB UR QL STRIP: ABNORMAL
BUN SERPL-MCNC: 15 MG/DL (ref 5–25)
CALCIUM SERPL-MCNC: 9.1 MG/DL (ref 8.4–10.2)
CHLORIDE SERPL-SCNC: 99 MMOL/L (ref 96–108)
CLARITY UR: ABNORMAL
CO2 SERPL-SCNC: 27 MMOL/L (ref 21–32)
COLOR UR: YELLOW
CREAT SERPL-MCNC: 0.96 MG/DL (ref 0.6–1.3)
EOSINOPHIL # BLD AUTO: 0.2 THOUSAND/ÂΜL (ref 0–0.61)
EOSINOPHIL NFR BLD AUTO: 2 % (ref 0–6)
ERYTHROCYTE [DISTWIDTH] IN BLOOD BY AUTOMATED COUNT: 12.4 % (ref 11.6–15.1)
GFR SERPL CREATININE-BSD FRML MDRD: 82 ML/MIN/1.73SQ M
GLUCOSE SERPL-MCNC: 92 MG/DL (ref 65–140)
GLUCOSE UR STRIP-MCNC: NEGATIVE MG/DL
GRAN CASTS #/AREA URNS LPF: ABNORMAL /[LPF]
HCT VFR BLD AUTO: 38.9 % (ref 36.5–49.3)
HGB BLD-MCNC: 13 G/DL (ref 12–17)
HGB UR QL STRIP.AUTO: ABNORMAL
HYALINE CASTS #/AREA URNS LPF: ABNORMAL /LPF
IMM GRANULOCYTES # BLD AUTO: 0.08 THOUSAND/UL (ref 0–0.2)
IMM GRANULOCYTES NFR BLD AUTO: 1 % (ref 0–2)
KETONES UR STRIP-MCNC: ABNORMAL MG/DL
LEUKOCYTE ESTERASE UR QL STRIP: ABNORMAL
LYMPHOCYTES # BLD AUTO: 1.41 THOUSANDS/ÂΜL (ref 0.6–4.47)
LYMPHOCYTES NFR BLD AUTO: 13 % (ref 14–44)
MCH RBC QN AUTO: 30.4 PG (ref 26.8–34.3)
MCHC RBC AUTO-ENTMCNC: 33.4 G/DL (ref 31.4–37.4)
MCV RBC AUTO: 91 FL (ref 82–98)
MONOCYTES # BLD AUTO: 0.93 THOUSAND/ÂΜL (ref 0.17–1.22)
MONOCYTES NFR BLD AUTO: 9 % (ref 4–12)
MUCOUS THREADS UR QL AUTO: ABNORMAL
NEUTROPHILS # BLD AUTO: 8.25 THOUSANDS/ÂΜL (ref 1.85–7.62)
NEUTS SEG NFR BLD AUTO: 74 % (ref 43–75)
NITRITE UR QL STRIP: NEGATIVE
NON-SQ EPI CELLS URNS QL MICRO: ABNORMAL /HPF
NRBC BLD AUTO-RTO: 0 /100 WBCS
PH UR STRIP.AUTO: 6 [PH]
PLATELET # BLD AUTO: 309 THOUSANDS/UL (ref 149–390)
PMV BLD AUTO: 9.6 FL (ref 8.9–12.7)
POTASSIUM SERPL-SCNC: 3.6 MMOL/L (ref 3.5–5.3)
PROT UR STRIP-MCNC: ABNORMAL MG/DL
RBC # BLD AUTO: 4.27 MILLION/UL (ref 3.88–5.62)
RBC #/AREA URNS AUTO: ABNORMAL /HPF
SODIUM SERPL-SCNC: 136 MMOL/L (ref 135–147)
SP GR UR STRIP.AUTO: 1.03 (ref 1–1.03)
UROBILINOGEN UR STRIP-ACNC: 3 MG/DL
WBC # BLD AUTO: 10.93 THOUSAND/UL (ref 4.31–10.16)
WBC #/AREA URNS AUTO: ABNORMAL /HPF

## 2024-03-18 PROCEDURE — 99284 EMERGENCY DEPT VISIT MOD MDM: CPT | Performed by: EMERGENCY MEDICINE

## 2024-03-18 PROCEDURE — 74177 CT ABD & PELVIS W/CONTRAST: CPT

## 2024-03-18 PROCEDURE — 99284 EMERGENCY DEPT VISIT MOD MDM: CPT

## 2024-03-18 PROCEDURE — 85025 COMPLETE CBC W/AUTO DIFF WBC: CPT

## 2024-03-18 PROCEDURE — 80048 BASIC METABOLIC PNL TOTAL CA: CPT

## 2024-03-18 PROCEDURE — 81001 URINALYSIS AUTO W/SCOPE: CPT

## 2024-03-18 PROCEDURE — 36415 COLL VENOUS BLD VENIPUNCTURE: CPT

## 2024-03-18 PROCEDURE — 87086 URINE CULTURE/COLONY COUNT: CPT

## 2024-03-18 RX ORDER — LEVOFLOXACIN 250 MG/1
750 TABLET, FILM COATED ORAL DAILY
Qty: 15 TABLET | Refills: 0 | Status: SHIPPED | OUTPATIENT
Start: 2024-03-18 | End: 2024-03-23

## 2024-03-18 RX ORDER — NITROFURANTOIN 25; 75 MG/1; MG/1
100 CAPSULE ORAL 2 TIMES DAILY
Qty: 14 CAPSULE | Refills: 0 | Status: SHIPPED | OUTPATIENT
Start: 2024-03-18 | End: 2024-03-25

## 2024-03-18 RX ORDER — LEVOFLOXACIN 250 MG/1
250 TABLET, FILM COATED ORAL DAILY
Qty: 10 TABLET | Refills: 0 | Status: SHIPPED | OUTPATIENT
Start: 2024-03-18 | End: 2024-03-18

## 2024-03-18 RX ORDER — LEVOFLOXACIN 750 MG/1
750 TABLET, FILM COATED ORAL ONCE
Qty: 1 TABLET | Refills: 0 | Status: COMPLETED | OUTPATIENT
Start: 2024-03-18 | End: 2024-03-18

## 2024-03-18 RX ADMIN — IOHEXOL 100 ML: 350 INJECTION, SOLUTION INTRAVENOUS at 12:32

## 2024-03-18 RX ADMIN — LEVOFLOXACIN 750 MG: 750 TABLET, FILM COATED ORAL at 14:50

## 2024-03-18 NOTE — TELEPHONE ENCOUNTER
----- Message from Leona Ha MD sent at 3/18/2024 10:38 AM EDT -----  Urine culture is positive   Start Macrobid 100 mg po BID for 7 days #14

## 2024-03-18 NOTE — TELEPHONE ENCOUNTER
Called and left message for patient to return call to the office to advise results.     Medication send to pharmacy for patient.

## 2024-03-18 NOTE — ED ATTENDING ATTESTATION
3/18/2024  I, Clint Navarro DO, saw and evaluated the patient. I have discussed the patient with the resident/non-physician practitioner and agree with the resident's/non-physician practitioner's findings, Plan of Care, and MDM as documented in the resident's/non-physician practitioner's note, except where noted. All available labs and Radiology studies were reviewed.  I was present for key portions of any procedure(s) performed by the resident/non-physician practitioner and I was immediately available to provide assistance.       At this point I agree with the current assessment done in the Emergency Department.  I have conducted an independent evaluation of this patient a history and physical is as follows:    63-year-old male complains of abdominal pain, decreased appetite, suprapubic pressure, dysuria and testicular tenderness without pain.  Patient has been on cefpodoxime after urinalysis with culture which eventually grew out Enterococcus faecalis which is obviously resistant to cephalosporins.  Patient's primary care physician called in a prescription for nitrofurantoin today however I think that this is not been useful as the symptoms are probably not limited to the bladder itself.  I suspect the patient could have prostatitis given his symptoms and testicular tenderness.  Fortunately the organism which grew out is sensitive to levofloxacin which could be used to treat upper tract UTI, lower tract UTI, prostatitis, and epididymitis anyway.    Today I would like patient to undergo CT imaging of his abdomen and pelvis to evaluate for possible other cause of patient's symptoms, repeat urinalysis, abdominal labs if everything is fairly unremarkable would recommend changing patient to levofloxacin will perform prostate exam to evaluate for possibility of prostatitis in which case we will just treat patient with levofloxacin for 3 to 4 weeks.    Patient should follow-up with his urologist upon  discharge.      ED Course         Critical Care Time  Procedures

## 2024-03-19 LAB
BACTERIA BLD CULT: NORMAL
BACTERIA BLD CULT: NORMAL
BACTERIA UR CULT: NORMAL

## 2024-03-20 ENCOUNTER — TELEPHONE (OUTPATIENT)
Age: 64
End: 2024-03-20

## 2024-03-22 NOTE — ED PROVIDER NOTES
History  Chief Complaint   Patient presents with    Abdominal Pain     Reports frequent urination, unable to sleep, taking antibiotics but not feeling any better. Also having abd pain.      HPI  Patient is 63-year-old male presenting for complaints of abdominal pain, decreased appetite, suprapubic pressure, dysuria and testicular tenderness.  Past medical history significant for urinary retention secondary to prostate disease, adenocarcinoma prostate, hypertension, CAD, hypercholesterolemia.  Patient was placed on cefpodoxime after prior visit to cover for concerns of UTI on recent urinalysis.  However culture eventually grew Enterococcus faecalis which is resistant to cephalosporins.  Patient presents to the ER today due to continued symptoms.  Prior to Admission Medications   Prescriptions Last Dose Informant Patient Reported? Taking?   aspirin (ECOTRIN LOW STRENGTH) 81 mg EC tablet  Self Yes No   Sig: Take 81 mg by mouth every evening   atorvastatin (LIPITOR) 20 mg tablet  Self No No   Sig: TAKE 1 TABLET BY MOUTH EVERY DAY   cefpodoxime (VANTIN) 200 mg tablet   No No   Sig: Take 1 tablet (200 mg total) by mouth 2 (two) times a day for 10 days   ezetimibe (ZETIA) 10 mg tablet  Self No No   Sig: Take 1 tablet (10 mg total) by mouth daily   lisinopril (ZESTRIL) 20 mg tablet  Self No No   Sig: TAKE 1 TABLET BY MOUTH EVERY DAY   meclizine (ANTIVERT) 25 mg tablet  Self No No   Sig: Take 1 tablet (25 mg total) by mouth daily at bedtime   omeprazole (PriLOSEC) 40 MG capsule  Self No No   Sig: TAKE 1 CAPSULE (40 MG TOTAL) BY MOUTH DAILY.   ondansetron (ZOFRAN) 4 mg tablet   No No   Sig: Take 1 tablet (4 mg total) by mouth every 8 (eight) hours as needed for nausea or vomiting   polyethylene glycol (Golytely) 4000 mL solution   No No   Sig: Take 4,000 mL by mouth once for 1 dose Take 4000 mL by mouth once for 1 dose. Use as directed   Patient not taking: Reported on 2/12/2024      Facility-Administered Medications: None        Past Medical History:   Diagnosis Date    Abnormal WBC count 05/06/2021    Adenocarcinoma of prostate (HCC) 03/27/2020    Gold's esophagus 01/04/2021    EGD 01/04/21 recommend to repeat in 3 y    Cancer (HCC)     prostate    Colon polyp     Coronary artery disease     COVID 11/24/2020    Elevated PSA     Fever 11/24/2020    Gastroesophageal reflux disease without esophagitis 12/13/2019    GERD (gastroesophageal reflux disease)     Hiatal hernia 12/05/2023    2 cm hiatal hernia on EGD on 12/05/23    Hypercholesterolemia     Hypertension     Inferior MI (HCC) 04/07/2016    LLQ pain 10/13/2020    LUQ pain 11/05/2019    Myocardial infarction ([facility]) 2011    Papillary growth of urinary bladder     TURBT  today   2/7/2023    PONV (postoperative nausea and vomiting)     only  w/ prostate bx    Sepsis due to Escherichia coli (HCC) 03/19/2020    Swelling 08/25/2021    UTI (urinary tract infection) 03/18/2020    Wears glasses        Past Surgical History:   Procedure Laterality Date    COLONOSCOPY      CORONARY STENT PLACEMENT      bare metal stent in Rt coronary artery    CYSTOSCOPY W/ RETROGRADES Bilateral 2/7/2023    Procedure: CYSTO W/ RETROGRADE PYELOGRAM;  Surgeon: Thierry Anthony MD;  Location: [facility];  Service: Urology    FL RETROGRADE PYELOGRAM  2/7/2023    LA BLADDER INSTILLATION ANTICARCINOGENIC AGENT N/A 2/7/2023    Procedure: INSTILLATION Gemcitabine;  Surgeon: Thierry Anthony MD;  Location: [facility];  Service: Urology    LA BX PROSTATE STRTCTC SATURATION SAMPLING IMG GID N/A 11/29/2022    Procedure: TRANSPERINEAL MRI FUSION  BIOPSY PROSTATE;  Surgeon: Bradly Henderson MD;  Location: [facility];  Service: Urology    LA CYSTO W/REMOVAL OF LESIONS SMALL N/A 2/7/2023    Procedure: (TURBT);  Surgeon: Thierry Anthony MD;  Location: [facility];  Service: Urology    SHOULDER SURGERY Left        Family History   Problem Relation Age of Onset    Sleep apnea Mother     Heart attack Father      I have reviewed and agree  with the history as documented.    E-Cigarette/Vaping    E-Cigarette Use Never User      E-Cigarette/Vaping Substances    Nicotine No     THC No     CBD No     Flavoring No     Other No     Unknown No      Social History     Tobacco Use    Smoking status: Former     Current packs/day: 0.00     Average packs/day: 0.2 packs/day for 37.0 years (9.2 ttl pk-yrs)     Types: Cigarettes     Start date: 1974     Quit date:      Years since quittin.2     Passive exposure: Never    Smokeless tobacco: Never   Vaping Use    Vaping status: Never Used   Substance Use Topics    Alcohol use: Yes     Comment: 6 x  yearly; rarely    Drug use: Never        Review of Systems   Constitutional:  Positive for appetite change.   HENT: Negative.     Eyes: Negative.    Respiratory: Negative.     Cardiovascular: Negative.    Gastrointestinal:  Negative for diarrhea, nausea and vomiting.        Suprapubic pain   Endocrine: Negative.    Genitourinary:  Positive for difficulty urinating, dysuria and testicular pain.   Musculoskeletal: Negative.    Allergic/Immunologic: Negative.    Neurological: Negative.    Hematological: Negative.    Psychiatric/Behavioral: Negative.         Physical Exam  ED Triage Vitals [24 0952]   Temperature Pulse Respirations Blood Pressure SpO2   (!) 97 °F (36.1 °C) 86 18 159/67 99 %      Temp Source Heart Rate Source Patient Position - Orthostatic VS BP Location FiO2 (%)   Temporal Monitor -- -- --      Pain Score       10 - Worst Possible Pain             Orthostatic Vital Signs  Vitals:    24 0952 24 1145 24 1200 24 1300   BP: 159/67 155/92 134/78    Pulse: 86 74 74 66       Physical Exam  Vitals and nursing note reviewed.   Constitutional:       Appearance: Normal appearance. He is normal weight.   HENT:      Head: Normocephalic and atraumatic.      Right Ear: Tympanic membrane, ear canal and external ear normal.      Left Ear: Tympanic membrane, ear canal and external ear  normal.      Nose: Nose normal.      Mouth/Throat:      Mouth: Mucous membranes are moist.      Pharynx: Oropharynx is clear.   Eyes:      Extraocular Movements: Extraocular movements intact.      Conjunctiva/sclera: Conjunctivae normal.      Pupils: Pupils are equal, round, and reactive to light.   Cardiovascular:      Rate and Rhythm: Normal rate and regular rhythm.      Pulses: Normal pulses.      Heart sounds: Normal heart sounds.   Pulmonary:      Effort: Pulmonary effort is normal.      Breath sounds: Normal breath sounds.   Abdominal:      General: Abdomen is flat. Bowel sounds are normal.      Palpations: Abdomen is soft.      Comments: Patient is suprapubic tenderness palpation, otherwise no rebound no guarding.  Normoactive bowel sounds.   Genitourinary:     Penis: Normal.       Testes: Normal. Cremasteric reflex is present.      Comments: Patient prostate exam negative for any tenderness, prostate is firm, not soft or boggy.  Musculoskeletal:         General: Normal range of motion.      Cervical back: Normal range of motion and neck supple.   Skin:     General: Skin is warm and dry.      Capillary Refill: Capillary refill takes less than 2 seconds.   Neurological:      General: No focal deficit present.      Mental Status: He is alert and oriented to person, place, and time.   Psychiatric:         Mood and Affect: Mood normal.         Behavior: Behavior normal.         Thought Content: Thought content normal.         Judgment: Judgment normal.         ED Medications  Medications   iohexol (OMNIPAQUE) 350 MG/ML injection (MULTI-DOSE) 100 mL (100 mL Intravenous Given 3/18/24 1232)   levofloxacin (LEVAQUIN) tablet 750 mg (750 mg Oral Given 3/18/24 1450)       Diagnostic Studies  Results Reviewed       Procedure Component Value Units Date/Time    Urine culture [[id number]] Collected: 03/18/24 1136    Lab Status: Final result Specimen: Urine, Clean Catch Updated: 03/19/24 0910     Urine Culture No Growth <1000  cfu/mL    Urine Microscopic [[id number]]  (Abnormal) Collected: 03/18/24 1136    Lab Status: Final result Specimen: Urine, Clean Catch Updated: 03/18/24 1237     RBC, UA Innumerable /hpf      WBC, UA Innumerable /hpf      Epithelial Cells None Seen /hpf      Bacteria, UA None Seen /hpf      MUCUS THREADS Innumerable     Hyaline Casts, UA 10-25 /lpf      Granular Casts, UA 5-10    UA w Reflex to Microscopic w Reflex to Culture [[id number]]  (Abnormal) Collected: 03/18/24 1136    Lab Status: Final result Specimen: Urine, Clean Catch Updated: 03/18/24 1225     Color, UA Yellow     Clarity, UA Turbid     Specific Gravity, UA 1.030     pH, UA 6.0     Leukocytes, UA Large     Nitrite, UA Negative     Protein,  (3+) mg/dl      Glucose, UA Negative mg/dl      Ketones, UA 40 (2+) mg/dl      Urobilinogen, UA 3.0 mg/dl      Bilirubin, UA Small     Occult Blood, UA Moderate    Basic metabolic panel [[id number]] Collected: 03/18/24 1136    Lab Status: Final result Specimen: Blood from Arm, Left Updated: 03/18/24 1217     Sodium 136 mmol/L      Potassium 3.6 mmol/L      Chloride 99 mmol/L      CO2 27 mmol/L      ANION GAP 10 mmol/L      BUN 15 mg/dL      Creatinine 0.96 mg/dL      Glucose 92 mg/dL      Calcium 9.1 mg/dL      eGFR 82 ml/min/1.73sq m     Narrative:      National Kid[employer] guidelines for Chronic Kidney Disease (CKD):     Stage 1 with normal or high GFR (GFR > 90 mL/min/1.73 square meters)    Stage 2 Mild CKD (GFR = 60-89 mL/min/1.73 square meters)    Stage 3A Moderate CKD (GFR = 45-59 mL/min/1.73 square meters)    Stage 3B Moderate CKD (GFR = 30-44 mL/min/1.73 square meters)    Stage 4 Severe CKD (GFR = 15-29 mL/min/1.73 square meters)    Stage 5 End Stage CKD (GFR <15 mL/min/1.73 square meters)  Note: GFR calculation is accurate only with a steady state creatinine    CBC and differential [[id number]]  (Abnormal) Collected: 03/18/24 1136    Lab Status: Final result Specimen: Blood from Arm, Left  Updated: 03/18/24 1150     WBC 10.93 Thousand/uL      RBC 4.27 Million/uL      Hemoglobin 13.0 g/dL      Hematocrit 38.9 %      MCV 91 fL      MCH 30.4 pg      MCHC 33.4 g/dL      RDW 12.4 %      MPV 9.6 fL      Platelets 309 Thousands/uL      nRBC 0 /100 WBCs      Neutrophils Relative 74 %      Immature Grans % 1 %      Lymphocytes Relative 13 %      Monocytes Relative 9 %      Eosinophils Relative 2 %      Basophils Relative 1 %      Neutrophils Absolute 8.25 Thousands/µL      Absolute Immature Grans 0.08 Thousand/uL      Absolute Lymphocytes 1.41 Thousands/µL      Absolute Monocytes 0.93 Thousand/µL      Eosinophils Absolute 0.20 Thousand/µL      Basophils Absolute 0.06 Thousands/µL                    CT abdomen pelvis with contrast   Final Result by Olivia Acosta MD (03/18 1348)      Diffuse perivesical fat stranding with mild mucosal hyperenhancement, suggestive of cystitis.      Otherwise, no acute findings in the abdomen or pelvis.         Resident: PETER MARTINES I, the attending radiologist, have reviewed the images and agree with the final report above.      Workstation performed: [id number]               Procedures  Procedures      ED Course                                       Medical Decision Making  Patient is 63-year-old male presenting for suprapubic pain, recent diagnosis of UTI on antibiotic treatment that does not cover his according to cultures.  DDx: UTI versus prostatitis  Based on patient presentation and physical exam findings, primary concern is for UTI versus prostatitis.  Based on physical examination and CT scan and lab workup, primary concern is for UTI.  Plan to change antibiotic to levofloxacin, first dose given here.  Prescription sent to pharmacy.  Return precautions given.  Patient understands and agrees with plan.  Ready for discharge.    Problems Addressed:  UTI (urinary tract infection): acute illness or injury    Amount and/or Complexity of Data Reviewed  Labs:  ordered.  Radiology: ordered.    Risk  Prescription drug management.          Disposition  Final diagnoses:   UTI (urinary tract infection)     Time reflects when diagnosis was documented in both MDM as applicable and the Disposition within this note       Time User Action Codes Description Comment    3/18/2024  2:09 PM Lon Hugo [N39.0] UTI (urinary tract infection)           ED Disposition       ED Disposition   Discharge    Condition   Stable    Date/Time   Mon Mar 18, 2024  2:09 PM    Comment   Tacos Asencio discharge to home/self care.                   Follow-up Information    None         Discharge Medication List as of 3/18/2024  2:15 PM        CONTINUE these medications which have CHANGED    Details   levofloxacin (LEVAQUIN) 250 mg tablet Take 3 tablets (750 mg total) by mouth daily for 5 days, Starting Mon 3/18/2024, Until Sat 3/23/2024, Normal           CONTINUE these medications which have NOT CHANGED    Details   aspirin (ECOTRIN LOW STRENGTH) 81 mg EC tablet Take 81 mg by mouth every evening, Historical Med      atorvastatin (LIPITOR) 20 mg tablet TAKE 1 TABLET BY MOUTH EVERY DAY, Starting Wed 2/14/2024, Normal      cefpodoxime (VANTIN) 200 mg tablet Take 1 tablet (200 mg total) by mouth 2 (two) times a day for 10 days, Starting Thu 3/14/2024, Until Sun 3/24/2024, Normal      ezetimibe (ZETIA) 10 mg tablet Take 1 tablet (10 mg total) by mouth daily, Starting Tue 6/20/2023, Normal      lisinopril (ZESTRIL) 20 mg tablet TAKE 1 TABLET BY MOUTH EVERY DAY, Normal      meclizine (ANTIVERT) 25 mg tablet Take 1 tablet (25 mg total) by mouth daily at bedtime, Starting Fri 11/10/2023, Normal      omeprazole (PriLOSEC) 40 MG capsule TAKE 1 CAPSULE (40 MG TOTAL) BY MOUTH DAILY., Starting Thu 12/7/2023, Normal      ondansetron (ZOFRAN) 4 mg tablet Take 1 tablet (4 mg total) by mouth every 8 (eight) hours as needed for nausea or vomiting, Starting Wed 3/13/2024, Normal      polyethylene glycol (Golytely) 4000  mL solution Take 4,000 mL by mouth once for 1 dose Take 4000 mL by mouth once for 1 dose. Use as directed, Starting Fri 2/2/2024, Normal           No discharge procedures on file.    PDMP Review       None             ED Provider  Attending physically available and evaluated Tacos Asencio. I managed the patient along with the ED Attending.    Electronically Signed by           Lon Hugo MD  03/21/24 9099

## 2024-03-25 ENCOUNTER — TELEPHONE (OUTPATIENT)
Age: 64
End: 2024-03-25

## 2024-03-25 DIAGNOSIS — N40.1 BPH WITH URINARY OBSTRUCTION: Primary | ICD-10-CM

## 2024-03-25 DIAGNOSIS — N30.00 ACUTE CYSTITIS WITHOUT HEMATURIA: ICD-10-CM

## 2024-03-25 DIAGNOSIS — R52 PAIN: Primary | ICD-10-CM

## 2024-03-25 DIAGNOSIS — N13.8 BPH WITH URINARY OBSTRUCTION: Primary | ICD-10-CM

## 2024-03-25 RX ORDER — PHENAZOPYRIDINE HYDROCHLORIDE 100 MG/1
100 TABLET, FILM COATED ORAL 3 TIMES DAILY PRN
Qty: 20 TABLET | Refills: 0 | Status: SHIPPED | OUTPATIENT
Start: 2024-03-25 | End: 2024-03-26

## 2024-03-25 RX ORDER — NAPROXEN 250 MG/1
250 TABLET ORAL 2 TIMES DAILY WITH MEALS
Qty: 30 TABLET | Refills: 0 | Status: SHIPPED | OUTPATIENT
Start: 2024-03-25 | End: 2024-04-09

## 2024-03-25 NOTE — TELEPHONE ENCOUNTER
"Call placed to patient and spoke with him. Informed him of the AP recommendations. Pt states that he is still having symptoms (burning with urination, increased urgency and frequency). Pt is currently on Bactrim and has one more day of this medication.   Advised patient that he should wait 2-3 days to retest to determine if there is a true infection. Pt states that he did this the last time and wonders if that's why his testing showed no infection.     Pt is asking for recommendations and if course of treatment can just be extended at this point. He is not getting any sleep at night and has \"miserable for the past 2 weeks\".    Orders were placed in his chart for urine testing, but patient is waiting for call back on how to proceed and if he should wait 2-3 days to retest.   He would like something prescribed for his symptoms if he needs to wait to retest as he is not getting any better.     Forwarding to provider to review.   "

## 2024-03-25 NOTE — TELEPHONE ENCOUNTER
Called and spoke with patient to advise that per Dr. Ha patient is to take naprosyn 250 mg BID. Medication sent to pharmacy

## 2024-03-25 NOTE — TELEPHONE ENCOUNTER
Can repeat urine testing as last culture was negative. Increase hydration, avoidance of bladder irritants, avoidance of constipation, probiotics, cranberry. Thanks

## 2024-03-25 NOTE — TELEPHONE ENCOUNTER
Patient called stating he has been taking his antibiotic for his UTI and he is currently on his last tablet. Patient stated he still has persistent symptoms. Patient is wondering what else can be done. Patient stated he is also not sleeping due to the UTI. Patient states he has to continuously use the bathroom during the night. Please advise.

## 2024-03-26 DIAGNOSIS — N30.00 ACUTE CYSTITIS WITHOUT HEMATURIA: ICD-10-CM

## 2024-03-26 RX ORDER — PHENAZOPYRIDINE HYDROCHLORIDE 100 MG/1
100 TABLET, FILM COATED ORAL 3 TIMES DAILY PRN
Qty: 10 TABLET | Refills: 0 | Status: SHIPPED | OUTPATIENT
Start: 2024-03-26

## 2024-03-26 NOTE — TELEPHONE ENCOUNTER
Call placed to patient and spoke with him. Informed him of the AP recommendations. Pt states he has been taking OTC AZO and this is helping his symptoms for now.   He is planning to go to the lab on Thursday for urine testing as this will be 2 days s/p antibiotic treatment.     Will postpone message and monitor for urine testing results.     Offered for patient to come into the office to discuss his concerns with recurrent UTI the past 2 weeks.   Pt states he would like to wait for urine testing to result and then go from there.

## 2024-03-26 NOTE — TELEPHONE ENCOUNTER
Repeat urine testing. Wait 2-3 days prior to retesting as stated prior. Pyridium will be sent to patients pharmacy to help with urinary symptoms and bladder pain until he can re-peat testing. It also appears patient was started on Levaquin on 3/18 but then his medications was changed to Macrobid, please ask patient if he ever took Levaquin and if not why. Thank you!

## 2024-03-28 ENCOUNTER — APPOINTMENT (OUTPATIENT)
Dept: LAB | Facility: MEDICAL CENTER | Age: 64
End: 2024-03-28
Payer: COMMERCIAL

## 2024-03-28 DIAGNOSIS — N13.8 BPH WITH URINARY OBSTRUCTION: ICD-10-CM

## 2024-03-28 DIAGNOSIS — N40.1 BPH WITH URINARY OBSTRUCTION: ICD-10-CM

## 2024-03-28 PROCEDURE — 87086 URINE CULTURE/COLONY COUNT: CPT

## 2024-03-29 NOTE — TELEPHONE ENCOUNTER
Spoke to pt and advised he contact our office tomorrow to speak to on call nurse about his UC results which are still pending.  Pt states he is feeling much better.

## 2024-03-30 LAB — BACTERIA UR CULT: NORMAL

## 2024-04-01 NOTE — TELEPHONE ENCOUNTER
Spoke to pt and advised:    BUSHRA Kaplan-C14 minutes ago (10:04 AM)     Urine culture is negative for infection, no bacteria present. 10k-19k mixed contaminants - no need to treat. If the patient is feeling better I do not think further antibiotics are warranted at this time.

## 2024-04-01 NOTE — TELEPHONE ENCOUNTER
Urine culture is negative for infection, no bacteria present. 10k-19k mixed contaminants - no need to treat. If the patient is feeling better I do not think further antibiotics are warranted at this time.

## 2024-04-09 NOTE — TELEPHONE ENCOUNTER
Pt called stated that he has UTI for 2 weeks and just finished abx and still has symptoms. PCP recommended that pt to reach out to us. Pt lost weight for not being able to eat dur to the painful urination.     Please review    with patient

## 2024-04-30 ENCOUNTER — PROCEDURE VISIT (OUTPATIENT)
Dept: UROLOGY | Facility: MEDICAL CENTER | Age: 64
End: 2024-04-30
Payer: COMMERCIAL

## 2024-04-30 VITALS
WEIGHT: 209 LBS | BODY MASS INDEX: 29.92 KG/M2 | DIASTOLIC BLOOD PRESSURE: 72 MMHG | HEIGHT: 70 IN | SYSTOLIC BLOOD PRESSURE: 120 MMHG | HEART RATE: 75 BPM | OXYGEN SATURATION: 97 %

## 2024-04-30 DIAGNOSIS — N13.8 BPH WITH URINARY OBSTRUCTION: ICD-10-CM

## 2024-04-30 DIAGNOSIS — N40.1 BPH WITH URINARY OBSTRUCTION: ICD-10-CM

## 2024-04-30 DIAGNOSIS — C61 PROSTATE CANCER (HCC): ICD-10-CM

## 2024-04-30 DIAGNOSIS — Z85.51 HISTORY OF BLADDER CANCER: Primary | ICD-10-CM

## 2024-04-30 LAB
SL AMB  POCT GLUCOSE, UA: ABNORMAL
SL AMB LEUKOCYTE ESTERASE,UA: ABNORMAL
SL AMB POCT BILIRUBIN,UA: ABNORMAL
SL AMB POCT BLOOD,UA: ABNORMAL
SL AMB POCT CLARITY,UA: CLEAR
SL AMB POCT COLOR,UA: YELLOW
SL AMB POCT KETONES,UA: ABNORMAL
SL AMB POCT NITRITE,UA: ABNORMAL
SL AMB POCT PH,UA: 5.5
SL AMB POCT SPECIFIC GRAVITY,UA: 1.03
SL AMB POCT URINE PROTEIN: 30
SL AMB POCT UROBILINOGEN: 0.2

## 2024-04-30 PROCEDURE — 52000 CYSTOURETHROSCOPY: CPT | Performed by: UROLOGY

## 2024-04-30 PROCEDURE — 81003 URINALYSIS AUTO W/O SCOPE: CPT | Performed by: UROLOGY

## 2024-04-30 PROCEDURE — 1160F RVW MEDS BY RX/DR IN RCRD: CPT | Performed by: UROLOGY

## 2024-04-30 PROCEDURE — 99213 OFFICE O/P EST LOW 20 MIN: CPT | Performed by: UROLOGY

## 2024-04-30 PROCEDURE — 1159F MED LIST DOCD IN RCRD: CPT | Performed by: UROLOGY

## 2024-04-30 NOTE — PROGRESS NOTES
HISTORY:    Follow-up:    1.  Follow-up bladder cancer.  In February 2023, underwent TUR of tumor at the left trigone, pathology showed noninvasive, probable high-grade.    2.  Prostate cancer, on active surveillance.  Initial biopsy in March 2020 showed 2 cores of [place] 6 on the left side of the prostate.    Repeat biopsy in November 2022 showed 2 areas of [place] 6 cancer    3.  Recent UTI, symptoms were consistent with acute prostatitis.  Burning urgency frequency perineal discomfort, radiating pain.  Urine showed Enterococcus.  Finally better after 2 weeks of antibiotics    PSA has been high the past 2 years, see numbers below     Cystoscopy     Date/Time  4/30/2024 2:30 PM     Performed by  Thierry Anthony MD   Authorized by  Thierry Anthony MD         Procedure Details:  Procedure type: cystoscopy    Patient tolerance: Patient tolerated the procedure well with no immediate complications    Additional Procedure Details:      Patient presents for cystoscopy.  I have discussed the reasons for doing the exam, and the potential risks and complications.  Patient expressed understanding, and signed informed consent document.    The patient was carefully  positioned supine on the examining table.  Sterile preparation was performed on the urethra.  Xylocaine jelly was instilled and left  Indwelling for the procedure.  The 15 Spanish flexible cystoscope was passed with the following findings:      Urethra: No stenosis    Prostate:  lateral lobes moderate enlargement                  Bladder: Mild trabeculation, no lesions, tumor, or stones.     Residual urine: Minimal    Patient tolerated the procedure well and was escorted from the examining table.             ASSESSMENT / PLAN:    1.  No recurrence of bladder cancer    2.  Resolved UTI, unclear cause.  Nothing found on cystoscopy today to explain that    3.  Follow-up cystoscopy 3 to 4 months    4.  Continue surveillance for his [place] 6 prostate cancer    The  "following portions of the patient's history were reviewed and updated as appropriate: allergies, current medications, past family history, past medical history, past social history, past surgical history, and problem list.    Review of Systems      Objective:     Physical Exam      0   Lab Value Date/Time    PSA 9.65 (H) 03/04/2024 0806    PSA 9.8 (H) 09/07/2022 0635    PSA 6.5 (H) 04/21/2022 0636    PSA 6.2 (H) 05/14/2021 0857    PSA 6.5 (H) 12/03/2019 0709    PSA 5.4 (H) 11/07/2019 0904   ]  BUN   Date Value Ref Range Status   03/18/2024 15 5 - 25 mg/dL Final   03/23/2020 15 7 - 28 mg/dL Final     Creatinine   Date Value Ref Range Status   03/18/2024 0.96 0.60 - 1.30 mg/dL Final     Comment:     Standardized to IDMS reference method   03/23/2020 0.90 0.53 - 1.30 mg/dL Final     No components found for: \"CBC\"      Patient Active Problem List   Diagnosis    CAD in native artery    History of acute inferior wall MI 2011,s/p BMS RCA    Presence of bare metal stent in right coronary artery    Essential hypertension    Hyperlipidemia    Chronic left-sided low back pain without sciatica    Fatty liver    Benign prostatic hyperplasia without lower urinary tract symptoms    Gastroesophageal reflux disease without esophagitis    PSA elevation    Prostate nodule without urinary obstruction    Bacteremia due to Escherichia coli    Bursitis of shoulder    Contusion of left shoulder    Injury of tendon of rotator cuff    Adenocarcinoma of prostate (HCC)    Encounter for well adult exam with abnormal findings    Myalgia with higher doses statins    Colon polyp    COVID-19 virus infection    Gold's esophagus    Bloating    Flank pain    IFG (impaired fasting glucose)    Left upper quadrant abdominal mass    Abnormal urine findings    Bradycardia    Lesion of urinary bladder    PONV (postoperative nausea and vomiting)    MIR (obstructive sleep apnea)    Class 1 obesity with serious comorbidity and body mass index (BMI) of 31.0 " to 31.9 in adult    Epigastric pain    Hematemesis with nausea    Polycystic kidney disease    CKD (chronic kidney disease) stage 2, GFR 60-89 ml/min    Vertigo    History of bladder cancer    Hiatal hernia    H. pylori infection    Acute gastroenteritis    Frequent urination    Viral upper respiratory tract infection        Diagnoses and all orders for this visit:    History of bladder cancer  -     POCT urine dip auto non-scope  -     Cystoscopy    BPH with urinary obstruction  -     POCT urine dip auto non-scope    Prostate cancer (HCC)  -     PSA Total, Diagnostic; Future           Patient ID: Tacos Asencio is a 63 y.o. male.      Current Outpatient Medications:     phenazopyridine (PYRIDIUM) 100 mg tablet, Take 1 tablet (100 mg total) by mouth 3 (three) times a day as needed for bladder spasms, Disp: 10 tablet, Rfl: 0    aspirin (ECOTRIN LOW STRENGTH) 81 mg EC tablet, Take 81 mg by mouth every evening, Disp: , Rfl:     atorvastatin (LIPITOR) 20 mg tablet, TAKE 1 TABLET BY MOUTH EVERY DAY, Disp: 90 tablet, Rfl: 1    ezetimibe (ZETIA) 10 mg tablet, Take 1 tablet (10 mg total) by mouth daily, Disp: 90 tablet, Rfl: 3    lisinopril (ZESTRIL) 20 mg tablet, TAKE 1 TABLET BY MOUTH EVERY DAY, Disp: 90 tablet, Rfl: 1    meclizine (ANTIVERT) 25 mg tablet, Take 1 tablet (25 mg total) by mouth daily at bedtime, Disp: 30 tablet, Rfl: 0    naproxen (NAPROSYN) 250 mg tablet, Take 1 tablet (250 mg total) by mouth 2 (two) times a day with meals for 30 doses, Disp: 30 tablet, Rfl: 0    omeprazole (PriLOSEC) 40 MG capsule, TAKE 1 CAPSULE (40 MG TOTAL) BY MOUTH DAILY., Disp: 90 capsule, Rfl: 0    ondansetron (ZOFRAN) 4 mg tablet, Take 1 tablet (4 mg total) by mouth every 8 (eight) hours as needed for nausea or vomiting, Disp: 20 tablet, Rfl: 0    polyethylene glycol (Golytely) 4000 mL solution, Take 4,000 mL by mouth once for 1 dose Take 4000 mL by mouth once for 1 dose. Use as directed (Patient not taking: Reported on 2/12/2024),  Disp: 4000 mL, Rfl: 0    Past Medical History:   Diagnosis Date    Abnormal WBC count 05/06/2021    Adenocarcinoma of prostate (HCC) 03/27/2020    Gold's esophagus 01/04/2021    EGD 01/04/21 recommend to repeat in 3 y    Cancer (HCC)     prostate    Colon polyp     Coronary artery disease     COVID 11/24/2020    Elevated PSA     Fever 11/24/2020    Gastroesophageal reflux disease without esophagitis 12/13/2019    GERD (gastroesophageal reflux disease)     Hiatal hernia 12/05/2023    2 cm hiatal hernia on EGD on 12/05/23    Hypercholesterolemia     Hypertension     Inferior MI (HCC) 04/07/2016    LLQ pain 10/13/2020    LUQ pain 11/05/2019    Myocardial infarction ([facility]) 2011    Papillary growth of urinary bladder     TURBT  today   2/7/2023    PONV (postoperative nausea and vomiting)     only  w/ prostate bx    Sepsis due to Escherichia coli (HCC) 03/19/2020    Swelling 08/25/2021    UTI (urinary tract infection) 03/18/2020    Wears glasses        Past Surgical History:   Procedure Laterality Date    COLONOSCOPY      CORONARY STENT PLACEMENT      bare metal stent in Rt coronary artery    CYSTOSCOPY W/ RETROGRADES Bilateral 2/7/2023    Procedure: CYSTO W/ RETROGRADE PYELOGRAM;  Surgeon: Thierry Anthony MD;  [facility];  Service: Urology    FL RETROGRADE PYELOGRAM  2/7/2023    [place] BLADDER INSTILLATION ANTICARCINOGENIC AGENT N/A 2/7/2023    Procedure: INSTILLATION Gemcitabine;  Surgeon: Thierry Anthony MD;  [facility];  Service: Urology    SC BX PROSTATE STRTCTC SATURATION SAMPLING IMG GID N/A 11/29/2022    Procedure: TRANSPERINEAL MRI FUSION  BIOPSY PROSTATE;  Surgeon: Bradly Hendreson MD;  [facility];  Service: Urology    SC CYSTO W/REMOVAL OF LESIONS SMALL N/A 2/7/2023    Procedure: (TURBT);  Surgeon: Thierry Anthony MD;  [facility];  Service: Urology    SHOULDER SURGERY Left        Social History

## 2024-05-01 PROBLEM — J06.9 VIRAL UPPER RESPIRATORY TRACT INFECTION: Status: RESOLVED | Noted: 2024-03-14 | Resolved: 2024-05-01

## 2024-05-01 PROBLEM — K52.9 ACUTE GASTROENTERITIS: Status: RESOLVED | Noted: 2024-03-14 | Resolved: 2024-05-01

## 2024-05-03 ENCOUNTER — RA CDI HCC (OUTPATIENT)
Dept: OTHER | Facility: HOSPITAL | Age: 64
End: 2024-05-03

## 2024-05-10 ENCOUNTER — OFFICE VISIT (OUTPATIENT)
Dept: FAMILY MEDICINE CLINIC | Facility: CLINIC | Age: 64
End: 2024-05-10
Payer: COMMERCIAL

## 2024-05-10 VITALS
BODY MASS INDEX: 30.21 KG/M2 | TEMPERATURE: 97.6 F | OXYGEN SATURATION: 99 % | SYSTOLIC BLOOD PRESSURE: 145 MMHG | HEART RATE: 55 BPM | WEIGHT: 211 LBS | DIASTOLIC BLOOD PRESSURE: 90 MMHG | HEIGHT: 70 IN

## 2024-05-10 DIAGNOSIS — Z00.01 ENCOUNTER FOR WELL ADULT EXAM WITH ABNORMAL FINDINGS: Primary | ICD-10-CM

## 2024-05-10 DIAGNOSIS — I10 ESSENTIAL HYPERTENSION: ICD-10-CM

## 2024-05-10 DIAGNOSIS — G47.33 OSA (OBSTRUCTIVE SLEEP APNEA): ICD-10-CM

## 2024-05-10 DIAGNOSIS — E66.9 CLASS 1 OBESITY WITH SERIOUS COMORBIDITY AND BODY MASS INDEX (BMI) OF 30.0 TO 30.9 IN ADULT, UNSPECIFIED OBESITY TYPE: ICD-10-CM

## 2024-05-10 DIAGNOSIS — R10.13 EPIGASTRIC PAIN: ICD-10-CM

## 2024-05-10 PROBLEM — R10.9 FLANK PAIN: Status: RESOLVED | Noted: 2021-08-11 | Resolved: 2024-05-10

## 2024-05-10 PROCEDURE — 99386 PREV VISIT NEW AGE 40-64: CPT | Performed by: FAMILY MEDICINE

## 2024-05-10 PROCEDURE — 99213 OFFICE O/P EST LOW 20 MIN: CPT | Performed by: FAMILY MEDICINE

## 2024-05-10 RX ORDER — OMEPRAZOLE 40 MG/1
40 CAPSULE, DELAYED RELEASE ORAL DAILY
Qty: 90 CAPSULE | Refills: 0 | Status: SHIPPED | OUTPATIENT
Start: 2024-05-10

## 2024-05-10 NOTE — ASSESSMENT & PLAN NOTE
Chronic fair control on omeprazole 40 mg once a day continue current management avoid provoke food

## 2024-05-10 NOTE — PROGRESS NOTES
ADULT ANNUAL PHYSICAL  [facility] PRIMARY CARE [facility]    NAME: Tacos Asencio  AGE: 63 y.o. SEX: male  : 1960     DATE: 5/10/2024     Assessment and Plan:     Problem List Items Addressed This Visit       Essential hypertension     Chronic blood pressure today uncontrolled patient did not take his medication for the pressure yet recommend to continue current management lisinopril 20 mg once a day and plan to recheck blood pressure if persistent to be high to change medication         Encounter for well adult exam with abnormal findings - Primary     Advice and education were given regarding nutrition, aerobic exercises, weight-bearing exercises, cardiovascular risk reduction, fall risk reduction, and age-appropriate supplements.     The patient was counseled regarding instructions for management, risk factor reductions, prognosis, risks and benefits of treatment options, patient and family education, and importance of compliance with treatment.  Patient due for the Pneumovax 20 he will hold on it until he come back from vacation         MIR (obstructive sleep apnea)     Chronic patient currently not on CPAP discussed important lose weight proper sleep position         Class 1 obesity with serious comorbidity and body mass index (BMI) of 30.0 to 30.9 in adult     Chronic improving BMI compared with before BMI today 30.2 discussed portion control low-carb low-fat diet increase physical activity          Epigastric pain     Chronic fair control on omeprazole 40 mg once a day continue current management avoid provoke food         Relevant Medications    omeprazole (PriLOSEC) 40 MG capsule       Immunizations and preventive care screenings were discussed with patient today. Appropriate education was printed on patient's after visit summary.    Discussed risks and benefits of prostate cancer screening. We discussed the controversial history of PSA screening for  prostate cancer in the [place] as well as the risk of over detection and over treatment of prostate cancer by way of PSA screening.  The patient understands that PSA blood testing is an imperfect way to screen for prostate cancer and that elevated PSA levels in the blood may also be caused by infection, inflammation, prostatic trauma or manipulation, urological procedures, or by benign prostatic enlargement.    The role of the digital rectal examination in prostate cancer screening was also discussed and I discussed with him that there is large interobserver variability in the findings of digital rectal examination.    Counseling:  Alcohol/drug use: discussed moderation in alcohol intake, the recommendations for healthy alcohol use, and avoidance of illicit drug use.  Dental Health: discussed importance of regular tooth brushing, flossing, and dental visits.  Injury prevention: discussed safety/seat belts, safety helmets, smoke detectors, carbon dioxide detectors, and smoking near bedding or upholstery.  Sexual health: discussed sexually transmitted diseases, partner selection, use of condoms, avoidance of unintended pregnancy, and contraceptive alternatives.  Exercise: the importance of regular exercise/physical activity was discussed. Recommend exercise 3-5 times per week for at least 30 minutes.       Depression Screening and Follow-up Plan: Patient was screened for depression during today's encounter. They screened negative with a PHQ-2 score of 0.        Return in about 1 year (around 5/10/2025).     Chief Complaint:     Chief Complaint   Patient presents with    Physical Exam      History of Present Illness:     Adult Annual Physical   Patient here for a comprehensive physical exam. The patient reports  looking at vital signs blood pressure uncontrolled patient on lisinopril 20 mg he has been taking it daily but patient did not take it today denies any chest pain short of breath no palpitation no dyspnea  on exertion no lower extremity edema patient with epigastric pain and bloating since been taking the omeprazole symptom improved request refill past medical surgical family and social history discussed with patient .    Diet and Physical Activity  Diet/Nutrition:  No special diet .   Exercise: no formal exercise.      Depression Screening  PHQ-2/9 Depression Screening    Little interest or pleasure in doing things: 0 - not at all  Feeling down, depressed, or hopeless: 0 - not at all  PHQ-2 Score: 0  PHQ-2 Interpretation: Negative depression screen       General Health  Sleep: sleeps well.   Hearing: normal - bilateral.  Vision: wears glasses and incomplete cataract  .   Dental: brushes teeth twice daily.        Health  Symptoms include: Patient follow-up with the urology    Advanced Care Planning  Do you have an advanced directive? no  Do you have a durable medical power of ? no  ACP document given to patient? no     Review of Systems:     Review of Systems   Constitutional:  Negative for chills and fever.   HENT:  Negative for ear pain and sore throat.    Eyes:  Negative for pain and visual disturbance.   Respiratory:  Negative for cough and shortness of breath.    Cardiovascular:  Negative for chest pain and palpitations.   Gastrointestinal:  Negative for abdominal pain, constipation, diarrhea and vomiting.   Genitourinary:  Negative for dysuria and hematuria.   Musculoskeletal:  Negative for arthralgias and back pain.   Skin:  Negative for color change and rash.   Neurological:  Negative for seizures and syncope.   All other systems reviewed and are negative.     Past Medical History:     Past Medical History:   Diagnosis Date    Abnormal WBC count 05/06/2021    Adenocarcinoma of prostate (HCC) 03/27/2020    Gold's esophagus 01/04/2021    EGD 01/04/21 recommend to repeat in 3 y    Cancer (HCC)     prostate    Colon polyp     Coronary artery disease     COVID 11/24/2020    Elevated PSA     Fever  11/24/2020    Flank pain 08/11/2021    Gastroesophageal reflux disease without esophagitis 12/13/2019    GERD (gastroesophageal reflux disease)     Hiatal hernia 12/05/2023    2 cm hiatal hernia on EGD on 12/05/23    Hypercholesterolemia     Hypertension     Inferior MI (HCC) 04/07/2016    LLQ pain 10/13/2020    LUQ pain 11/05/2019    Myocardial infarction (HCC) 2011    Papillary growth of urinary bladder     TURBT  today   2/7/2023    PONV (postoperative nausea and vomiting)     only  w/ prostate bx    Sepsis due to Escherichia coli (HCC) 03/19/2020    Swelling 08/25/2021    UTI (urinary tract infection) 03/18/2020    Wears glasses       Past Surgical History:     Past Surgical History:   Procedure Laterality Date    COLONOSCOPY      CORONARY STENT PLACEMENT      bare metal stent in Rt coronary artery    CYSTOSCOPY W/ RETROGRADES Bilateral 2/7/2023    Procedure: CYSTO W/ RETROGRADE PYELOGRAM;  Surgeon: Thierry Anthony MD;  [facility];  Service: Urology    FL RETROGRADE PYELOGRAM  2/7/2023    [place] BLADDER INSTILLATION ANTICARCINOGENIC AGENT N/A 2/7/2023    Procedure: INSTILLATION Gemcitabine;  Surgeon: Thierry Anthony MD;  [facility];  Service: Urology    KY BX PROSTATE STRTCTC SATURATION SAMPLING IMG GID N/A 11/29/2022    Procedure: TRANSPERINEAL MRI FUSION  BIOPSY PROSTATE;  Surgeon: Bradly Henderson MD;  [facility];  Service: Urology    [place] CYSTO W/REMOVAL OF LESIONS SMALL N/A 2/7/2023    Procedure: (TURBT);  Surgeon: Thierry Anthony MD;  [facility];  Service: Urology    SHOULDER SURGERY Left       Family History:     Family History   Problem Relation Age of Onset    Sleep apnea Mother     Heart attack Father       Social History:     Social History     Socioeconomic History    Marital status: /Civil Union     Spouse name: None    Number of children: None    Years of education: None    Highest education level: None   Occupational History    None   Tobacco Use    Smoking status:  Former     Current packs/day: 0.00     Average packs/day: 0.2 packs/day for 37.0 years (9.2 ttl pk-yrs)     Types: Cigarettes     Start date: 1974     Quit date:      Years since quittin.3     Passive exposure: Never    Smokeless tobacco: Never   Vaping Use    Vaping status: Never Used   Substance and Sexual Activity    Alcohol use: Yes     Comment: 6 x  yearly; rarely    Drug use: Never    Sexual activity: Yes     Partners: Female   Other Topics Concern    None   Social History Narrative    None     Social Determinants of Health     Financial Resource Strain: Low Risk  (3/8/2022)    Overall Financial Resource Strain (CARDIA)     Difficulty of Paying Living Expenses: Not hard at all   Food Insecurity: No Food Insecurity (3/8/2022)    Hunger Vital Sign     Worried About Running Out of Food in the Last Year: Never true     Ran Out of Food in the Last Year: Never true   Transportation Needs: No Transportation Needs (3/8/2022)    PRAPARE - Transportation     Lack of Transportation (Medical): No     Lack of Transportation (Non-Medical): No   Physical Activity: Sufficiently Active (2021)    Exercise Vital Sign     Days of Exercise per Week: 5 days     Minutes of Exercise per Session: 30 min   Stress: No Stress Concern Present (3/8/2022)    [employer] - Occupational Stress Questionnaire     Feeling of Stress : Not at all   Social Connections: Moderately Isolated (3/8/2022)    Social Connection and Isolation Panel [NHANES]     Frequency of Communication with Friends and Family: More than three times a week     Frequency of Social Gatherings with Friends and Family: Twice a week     Attends Restorationism Services: Never     Active Member of Clubs or Organizations: No     Attends Club or Organization Meetings: Never     Marital Status:    Intimate Partner Violence: Not At Risk (3/8/2022)    Humiliation, Afraid, Rape, and Kick questionnaire     Fear of Current or Ex-Partner:  "No     Emotionally Abused: No     Physically Abused: No     Sexually Abused: No   Housing Stability: Unknown (3/8/2022)    Housing Stability Vital Sign     Unable to Pay for Housing in the Last Year: No     Number of Places Lived in the Last Year: Not on file     Unstable Housing in the Last Year: No      Current Medications:     Current Outpatient Medications   Medication Sig Dispense Refill    omeprazole (PriLOSEC) 40 MG capsule Take 1 capsule (40 mg total) by mouth daily 90 capsule 0    aspirin (ECOTRIN LOW STRENGTH) 81 mg EC tablet Take 81 mg by mouth every evening      atorvastatin (LIPITOR) 20 mg tablet TAKE 1 TABLET BY MOUTH EVERY DAY 90 tablet 1    ezetimibe (ZETIA) 10 mg tablet Take 1 tablet (10 mg total) by mouth daily 90 tablet 3    lisinopril (ZESTRIL) 20 mg tablet TAKE 1 TABLET BY MOUTH EVERY DAY 90 tablet 1    meclizine (ANTIVERT) 25 mg tablet Take 1 tablet (25 mg total) by mouth daily at bedtime 30 tablet 0    polyethylene glycol (Golytely) 4000 mL solution Take 4,000 mL by mouth once for 1 dose Take 4000 mL by mouth once for 1 dose. Use as directed (Patient not taking: Reported on 2/12/2024) 4000 mL 0     No current facility-administered medications for this visit.      Allergies:     No Known Allergies   Physical Exam:     /90   Pulse 55   Temp 97.6 °F (36.4 °C) (Tympanic)   Ht 5' 10\" (1.778 m)   Wt 95.7 kg (211 lb)   SpO2 99%   BMI 30.28 kg/m²     Physical Exam  Vitals and nursing note reviewed.   Constitutional:       General: He is not in acute distress.     Appearance: He is well-developed.   HENT:      Head: Normocephalic and atraumatic.      Right Ear: Tympanic membrane normal. There is no impacted cerumen.      Left Ear: Tympanic membrane normal. There is no impacted cerumen.      Nose: No congestion or rhinorrhea.      Mouth/Throat:      Pharynx: No oropharyngeal exudate or posterior oropharyngeal erythema.   Eyes:      General:         Right eye: No discharge.         Left eye: " No discharge.      Conjunctiva/sclera: Conjunctivae normal.   Cardiovascular:      Rate and Rhythm: Normal rate and regular rhythm.      Heart sounds: No murmur heard.  Pulmonary:      Effort: Pulmonary effort is normal. No respiratory distress.      Breath sounds: Normal breath sounds.   Abdominal:      Palpations: Abdomen is soft.      Tenderness: There is no abdominal tenderness.   Musculoskeletal:         General: No swelling.      Cervical back: Neck supple.   Skin:     General: Skin is warm and dry.      Capillary Refill: Capillary refill takes less than 2 seconds.      Findings: No rash.   Neurological:      Mental Status: He is alert and oriented to person, place, and time.   Psychiatric:         Mood and Affect: Mood normal.          Leona Ha MD  [place] PRIMARY [facility]

## 2024-05-10 NOTE — ASSESSMENT & PLAN NOTE
Chronic improving BMI compared with before BMI today 30.2 discussed portion control low-carb low-fat diet increase physical activity

## 2024-05-10 NOTE — ASSESSMENT & PLAN NOTE
Advice and education were given regarding nutrition, aerobic exercises, weight-bearing exercises, cardiovascular risk reduction, fall risk reduction, and age-appropriate supplements.     The patient was counseled regarding instructions for management, risk factor reductions, prognosis, risks and benefits of treatment options, patient and family education, and importance of compliance with treatment.  Patient due for the Pneumovax 20 he will hold on it until he come back from vacation

## 2024-05-10 NOTE — ASSESSMENT & PLAN NOTE
Chronic blood pressure today uncontrolled patient did not take his medication for the pressure yet recommend to continue current management lisinopril 20 mg once a day and plan to recheck blood pressure if persistent to be high to change medication

## 2024-05-31 ENCOUNTER — ANESTHESIA EVENT (OUTPATIENT)
Dept: ANESTHESIOLOGY | Facility: HOSPITAL | Age: 64
End: 2024-05-31

## 2024-05-31 ENCOUNTER — ANESTHESIA (OUTPATIENT)
Dept: ANESTHESIOLOGY | Facility: HOSPITAL | Age: 64
End: 2024-05-31

## 2024-06-11 RX ORDER — SODIUM CHLORIDE 9 MG/ML
125 INJECTION, SOLUTION INTRAVENOUS CONTINUOUS
Status: CANCELLED | OUTPATIENT
Start: 2024-06-11

## 2024-06-12 ENCOUNTER — ANESTHESIA (OUTPATIENT)
Dept: GASTROENTEROLOGY | Facility: MEDICAL CENTER | Age: 64
End: 2024-06-12

## 2024-06-12 ENCOUNTER — ANESTHESIA EVENT (OUTPATIENT)
Dept: GASTROENTEROLOGY | Facility: MEDICAL CENTER | Age: 64
End: 2024-06-12

## 2024-06-12 ENCOUNTER — HOSPITAL ENCOUNTER (OUTPATIENT)
Dept: GASTROENTEROLOGY | Facility: MEDICAL CENTER | Age: 64
Setting detail: OUTPATIENT SURGERY
Discharge: HOME | End: 2024-06-12
Payer: COMMERCIAL

## 2024-06-12 VITALS
WEIGHT: 210 LBS | BODY MASS INDEX: 30.06 KG/M2 | HEART RATE: 56 BPM | DIASTOLIC BLOOD PRESSURE: 75 MMHG | RESPIRATION RATE: 20 BRPM | OXYGEN SATURATION: 98 % | HEIGHT: 70 IN | TEMPERATURE: 97.2 F | SYSTOLIC BLOOD PRESSURE: 144 MMHG

## 2024-06-12 DIAGNOSIS — Z86.010 HISTORY OF COLON POLYPS: ICD-10-CM

## 2024-06-12 PROCEDURE — 88305 TISSUE EXAM BY PATHOLOGIST: CPT | Performed by: PATHOLOGY

## 2024-06-12 PROCEDURE — 45385 COLONOSCOPY W/LESION REMOVAL: CPT | Performed by: INTERNAL MEDICINE

## 2024-06-12 RX ORDER — LIDOCAINE HYDROCHLORIDE 20 MG/ML
INJECTION, SOLUTION EPIDURAL; INFILTRATION; INTRACAUDAL; PERINEURAL AS NEEDED
Status: DISCONTINUED | OUTPATIENT
Start: 2024-06-12 | End: 2024-06-12

## 2024-06-12 RX ORDER — GLYCOPYRROLATE 0.2 MG/ML
INJECTION INTRAMUSCULAR; INTRAVENOUS AS NEEDED
Status: DISCONTINUED | OUTPATIENT
Start: 2024-06-12 | End: 2024-06-12

## 2024-06-12 RX ORDER — PROPOFOL 10 MG/ML
INJECTION, EMULSION INTRAVENOUS AS NEEDED
Status: DISCONTINUED | OUTPATIENT
Start: 2024-06-12 | End: 2024-06-12

## 2024-06-12 RX ORDER — PROPOFOL 10 MG/ML
INJECTION, EMULSION INTRAVENOUS CONTINUOUS PRN
Status: DISCONTINUED | OUTPATIENT
Start: 2024-06-12 | End: 2024-06-12

## 2024-06-12 RX ORDER — SODIUM CHLORIDE 9 MG/ML
125 INJECTION, SOLUTION INTRAVENOUS CONTINUOUS
Status: DISCONTINUED | OUTPATIENT
Start: 2024-06-12 | End: 2024-06-16 | Stop reason: HOSPADM

## 2024-06-12 RX ADMIN — LIDOCAINE HYDROCHLORIDE 60 MG: 20 INJECTION, SOLUTION EPIDURAL; INFILTRATION; INTRACAUDAL at 10:34

## 2024-06-12 RX ADMIN — PROPOFOL 40 MG: 10 INJECTION, EMULSION INTRAVENOUS at 10:36

## 2024-06-12 RX ADMIN — PROPOFOL 100 MG: 10 INJECTION, EMULSION INTRAVENOUS at 10:34

## 2024-06-12 RX ADMIN — SODIUM CHLORIDE 125 ML/HR: 0.9 INJECTION, SOLUTION INTRAVENOUS at 10:04

## 2024-06-12 RX ADMIN — Medication 40 MG: at 10:42

## 2024-06-12 RX ADMIN — GLYCOPYRROLATE 0.2 MG: 0.2 INJECTION, SOLUTION INTRAMUSCULAR; INTRAVENOUS at 10:30

## 2024-06-12 RX ADMIN — PROPOFOL 120 MCG/KG/MIN: 10 INJECTION, EMULSION INTRAVENOUS at 10:34

## 2024-06-12 NOTE — ANESTHESIA PREPROCEDURE EVALUATION
Procedure:  COLONOSCOPY    Relevant Problems   ANESTHESIA   (+) PONV (postoperative nausea and vomiting)      CARDIO  2011   MI with stent    denies any recent CP/SOB/nitro use   (+) CAD in native artery   (+) Essential hypertension   (+) Hyperlipidemia      GI/HEPATIC   (+) Fatty liver   (+) Gastroesophageal reflux disease without esophagitis   (+) Hematemesis with nausea   (+) Hiatal hernia      /RENAL   (+) Adenocarcinoma of prostate (HCC)   (+) Benign prostatic hyperplasia without lower urinary tract symptoms   (+) CKD (chronic kidney disease) stage 2, GFR 60-89 ml/min   (+) Polycystic kidney disease   (+) Prostate nodule without urinary obstruction      MUSCULOSKELETAL   (+) Chronic left-sided low back pain without sciatica   (+) Hiatal hernia      NEURO/PSYCH   (+) Chronic left-sided low back pain without sciatica      PULMONARY   (+) MIR (obstructive sleep apnea)        Physical Exam    Airway    Mallampati score: II         Dental   No notable dental hx     Cardiovascular  Cardiovascular exam normal    Pulmonary  Pulmonary exam normal Breath sounds clear to auscultation    Other Findings        Anesthesia Plan  ASA Score- 3     Anesthesia Type- IV sedation with anesthesia with ASA Monitors.         Additional Monitors:     Airway Plan:            Plan Factors-    Chart reviewed.   Existing labs reviewed. Patient summary reviewed.    Patient is not a current smoker.              Induction- intravenous.    Postoperative Plan-         Informed Consent- Anesthetic plan and risks discussed with patient.

## 2024-06-12 NOTE — H&P
History and Physical - [facility]  Tacos Asencio 64 y.o. male MRN: [id number]    HPI: Tacos Asencio is a 64 y.o. year old male who presents with personal history of colon polyp in 2021.       Review of Systems    Historical Information   Past Medical History:   Diagnosis Date    Abnormal WBC count 05/06/2021    Adenocarcinoma of prostate (HCC) 03/27/2020    Gold's esophagus 01/04/2021    EGD 01/04/21 recommend to repeat in 3 y    Cancer (HCC)     prostate    Colon polyp     Coronary artery disease     COVID 11/24/2020    Elevated PSA     Fever 11/24/2020    Flank pain 08/11/2021    Gastroesophageal reflux disease without esophagitis 12/13/2019    GERD (gastroesophageal reflux disease)     Hiatal hernia 12/05/2023    2 cm hiatal hernia on EGD on 12/05/23    Hypercholesterolemia     Hypertension     Inferior MI (HCC) 04/07/2016    LLQ pain 10/13/2020    LUQ pain 11/05/2019    Myocardial infarction (HCC) 2011    Papillary growth of urinary bladder     TURBT  today   2/7/2023    PONV (postoperative nausea and vomiting)     only  w/ prostate bx    Sepsis due to Escherichia coli (HCC) 03/19/2020    Swelling 08/25/2021    UTI (urinary tract infection) 03/18/2020    Wears glasses      Past Surgical History:   Procedure Laterality Date    COLONOSCOPY      CORONARY STENT PLACEMENT      bare metal stent in Rt coronary artery    CYSTOSCOPY W/ RETROGRADES Bilateral 2/7/2023    Procedure: CYSTO W/ RETROGRADE PYELOGRAM;  Surgeon: Thierry Anthony MD;  [facility];  Service: Urology    FL RETROGRADE PYELOGRAM  2/7/2023    UT BLADDER INSTILLATION ANTICARCINOGENIC AGENT N/A 2/7/2023    Procedure: INSTILLATION Gemcitabine;  Surgeon: Thierry Anthony MD;  [facility];  Service: Urology    UT BX PROSTATE STRTCTC SATURATION SAMPLING IMG GID N/A 11/29/2022    Procedure: TRANSPERINEAL MRI FUSION  BIOPSY PROSTATE;  Surgeon: Bradly Henderson MD;  [facility];  Service: Urology    UT CYSTO W/REMOVAL OF  LESIONS SMALL N/A 2023    Procedure: (TURBT);  Surgeon: Thierry Anthony MD;  [facility];  Service: Urology    SHOULDER SURGERY Left      Social History   Social History     Substance and Sexual Activity   Alcohol Use Yes    Comment: 6 x  yearly; rarely     Social History     Substance and Sexual Activity   Drug Use Never     Social History     Tobacco Use   Smoking Status Former    Current packs/day: 0.00    Average packs/day: 0.2 packs/day for 37.0 years (9.2 ttl pk-yrs)    Types: Cigarettes    Start date: 1974    Quit date:     Years since quittin.4    Passive exposure: Never   Smokeless Tobacco Never     Family History   Problem Relation Age of Onset    Sleep apnea Mother     Heart attack Father        Meds/Allergies     Not in a hospital admission.    No Known Allergies    Objective     There were no vitals taken for this visit.      PHYSICAL EXAM    Gen: NAD  CV: RRR  CHEST: Clear  ABD: soft, NT/ND  EXT: no edema  Neuro: AAO      ASSESSMENT/PLAN:  This is a 64 y.o. year old male here for colonoscopy for colon polyp surveillance.     PLAN:   Procedure: colonoscopy.

## 2024-06-17 PROCEDURE — 88305 TISSUE EXAM BY PATHOLOGIST: CPT | Performed by: PATHOLOGY

## 2024-06-21 ENCOUNTER — OFFICE VISIT (OUTPATIENT)
Dept: FAMILY MEDICINE CLINIC | Facility: CLINIC | Age: 64
End: 2024-06-21
Payer: COMMERCIAL

## 2024-06-21 VITALS
SYSTOLIC BLOOD PRESSURE: 140 MMHG | HEART RATE: 60 BPM | TEMPERATURE: 97.5 F | WEIGHT: 213 LBS | DIASTOLIC BLOOD PRESSURE: 84 MMHG | BODY MASS INDEX: 30.49 KG/M2 | HEIGHT: 70 IN | OXYGEN SATURATION: 97 %

## 2024-06-21 DIAGNOSIS — Z13.220 SCREENING, LIPID: ICD-10-CM

## 2024-06-21 DIAGNOSIS — K63.5 POLYP OF COLON, UNSPECIFIED PART OF COLON, UNSPECIFIED TYPE: ICD-10-CM

## 2024-06-21 DIAGNOSIS — I10 ESSENTIAL HYPERTENSION: Primary | ICD-10-CM

## 2024-06-21 DIAGNOSIS — Z13.29 SCREENING FOR THYROID DISORDER: ICD-10-CM

## 2024-06-21 DIAGNOSIS — N18.2 CKD (CHRONIC KIDNEY DISEASE) STAGE 2, GFR 60-89 ML/MIN: ICD-10-CM

## 2024-06-21 DIAGNOSIS — Z23 ENCOUNTER FOR IMMUNIZATION: ICD-10-CM

## 2024-06-21 DIAGNOSIS — R73.01 IFG (IMPAIRED FASTING GLUCOSE): ICD-10-CM

## 2024-06-21 PROCEDURE — 90677 PCV20 VACCINE IM: CPT | Performed by: FAMILY MEDICINE

## 2024-06-21 PROCEDURE — 99214 OFFICE O/P EST MOD 30 MIN: CPT | Performed by: FAMILY MEDICINE

## 2024-06-21 PROCEDURE — 90471 IMMUNIZATION ADMIN: CPT | Performed by: FAMILY MEDICINE

## 2024-06-23 NOTE — ASSESSMENT & PLAN NOTE
Patient recently in June 2024 had colonoscopy report reviewed with the patient multiple polyps recommend to repeat in 3 years

## 2024-06-23 NOTE — ASSESSMENT & PLAN NOTE
Chronic asymptomatic slight improved compared with before encourage patient to continue current management continue monitor blood pressure

## 2024-06-23 NOTE — PROGRESS NOTES
Ambulatory Visit  Name: Tacos Asencio      : 1960      MRN: [id number]  Encounter Provider: Leona Ha MD  Encounter Date: 2024   Encounter department: [facility]    Assessment & Plan   1. Essential hypertension  Assessment & Plan:  Chronic asymptomatic slight improved compared with before encourage patient to continue current management continue monitor blood pressure  Orders:  -     CBC and differential; Future  -     Comprehensive metabolic panel; Future  2. IFG (impaired fasting glucose)  Assessment & Plan:  Chronic asymptomatic encourage patient to continue with a low-carb diet  Orders:  -     CBC and differential; Future  -     Comprehensive metabolic panel; Future  3. Polyp of colon, unspecified part of colon, unspecified type  Assessment & Plan:  Patient recently in 2024 had colonoscopy report reviewed with the patient multiple polyps recommend to repeat in 3 years  Orders:  -     CBC and differential; Future  -     Comprehensive metabolic panel; Future  4. Screening, lipid  -     Lipid Panel with Direct LDL reflex; Future  5. Screening for thyroid disorder  -     TSH, 3rd generation with Free T4 reflex; Future  6. Encounter for immunization  -     Pneumococcal Conjugate Vaccine 20-valent (Pcv20)  7. CKD (chronic kidney disease) stage 2, GFR 60-89 ml/min  Assessment & Plan:  Lab Results   Component Value Date    EGFR 82 2024    EGFR 70 2024    EGFR 63 2024    CREATININE 0.96 2024    CREATININE 1.10 2024    CREATININE 1.19 2024     Chronic asymptomatic improvement the GFR compared with before encourage patient to continue well hydration avoid NSAIDs       History of Present Illness     Patient here follow-up with a chronic condition compliant with the medication tolerated well without side effect no new concerns        Review of Systems   Constitutional:  Negative for activity change, appetite change, fatigue and  "fever.   HENT:  Negative for congestion, ear pain, sinus pressure, sinus pain and sore throat.    Eyes:  Negative for pain, discharge, redness and itching.   Respiratory:  Negative for cough, chest tightness, shortness of breath and stridor.    Cardiovascular:  Negative for chest pain, palpitations and leg swelling.   Gastrointestinal:  Negative for abdominal pain, blood in stool, constipation, diarrhea and nausea.   Genitourinary:  Negative for dysuria, flank pain, frequency and hematuria.   Musculoskeletal:  Negative for back pain, joint swelling and neck pain.   Skin:  Negative for pallor and rash.   Neurological:  Negative for dizziness, tremors, weakness, numbness and headaches.   Hematological:  Does not bruise/bleed easily.       Objective     /84 (BP Location: Left arm, Patient Position: Sitting, Cuff Size: Standard)   Pulse 60   Temp 97.5 °F (36.4 °C) (Tympanic)   Ht 5' 10\" (1.778 m)   Wt 96.6 kg (213 lb)   SpO2 97%   BMI 30.56 kg/m²     Physical Exam  Vitals and nursing note reviewed.   Constitutional:       General: He is not in acute distress.     Appearance: He is well-developed. He is not diaphoretic.   HENT:      Head: Normocephalic.      Right Ear: Tympanic membrane and external ear normal.      Left Ear: Tympanic membrane and external ear normal.      Nose: No rhinorrhea.      Mouth/Throat:      Pharynx: No posterior oropharyngeal erythema.   Eyes:      General:         Right eye: No discharge.         Left eye: No discharge.      Conjunctiva/sclera: Conjunctivae normal.   Neck:      Vascular: No JVD.   Cardiovascular:      Rate and Rhythm: Normal rate and regular rhythm.      Heart sounds: Normal heart sounds. No murmur heard.     No gallop.   Pulmonary:      Effort: Pulmonary effort is normal. No respiratory distress.      Breath sounds: Normal breath sounds. No stridor. No wheezing or rales.   Chest:      Chest wall: No tenderness.   Abdominal:      General: There is no distension.    "   Palpations: Abdomen is soft. There is no mass.      Tenderness: There is no abdominal tenderness. There is no rebound.   Musculoskeletal:         General: No tenderness.      Cervical back: Normal range of motion and neck supple.   Lymphadenopathy:      Cervical: No cervical adenopathy.   Skin:     General: Skin is warm.      Findings: No erythema or rash.   Neurological:      Mental Status: He is alert and oriented to person, place, and time.       Administrative Statements

## 2024-06-23 NOTE — ASSESSMENT & PLAN NOTE
Lab Results   Component Value Date    EGFR 82 03/18/2024    EGFR 70 03/14/2024    EGFR 63 03/04/2024    CREATININE 0.96 03/18/2024    CREATININE 1.10 03/14/2024    CREATININE 1.19 03/04/2024     Chronic asymptomatic improvement the GFR compared with before encourage patient to continue well hydration avoid NSAIDs

## 2024-06-27 NOTE — RESULT ENCOUNTER NOTE
Inform patient via [employer].  Please review the pathology/lab result of further discussion.    Copied from [employer] message :       Whit Balderrama,     Your colon polyp(s) were benign but precancerous. These were successfully removed. This means the risk of these progressing to cancer is eliminated. This is great news. I would recommend to repeat colonoscopy in 3 years. Thank you.       Best regards,     Fidel Hunter MD

## 2024-06-29 DIAGNOSIS — Z95.5 PRESENCE OF BARE METAL STENT IN RIGHT CORONARY ARTERY: ICD-10-CM

## 2024-06-29 DIAGNOSIS — I25.10 CAD IN NATIVE ARTERY: ICD-10-CM

## 2024-06-29 DIAGNOSIS — I10 ESSENTIAL HYPERTENSION: ICD-10-CM

## 2024-06-29 RX ORDER — EZETIMIBE 10 MG/1
10 TABLET ORAL DAILY
Qty: 30 TABLET | Refills: 0 | Status: SHIPPED | OUTPATIENT
Start: 2024-06-29

## 2024-07-26 ENCOUNTER — OFFICE VISIT (OUTPATIENT)
Dept: GASTROENTEROLOGY | Facility: MEDICAL CENTER | Age: 64
End: 2024-07-26

## 2024-07-26 VITALS
OXYGEN SATURATION: 95 % | SYSTOLIC BLOOD PRESSURE: 144 MMHG | TEMPERATURE: 96.6 F | BODY MASS INDEX: 29.2 KG/M2 | HEIGHT: 71 IN | DIASTOLIC BLOOD PRESSURE: 76 MMHG | HEART RATE: 51 BPM | WEIGHT: 208.6 LBS

## 2024-07-26 DIAGNOSIS — A04.8 H. PYLORI INFECTION: Primary | ICD-10-CM

## 2024-07-26 DIAGNOSIS — Z86.010 HISTORY OF COLON POLYPS: ICD-10-CM

## 2024-07-26 NOTE — PROGRESS NOTES
[facility] - Outpatient Follow-up Note  Tacos Asencio 64 y.o. male MRN: [id number]  Encounter: [id number]          ASSESSMENT AND PLAN:      1.  Personal history of colon polyps    Recent colonoscopy noted 8 polyps that were HP's and TA's.  Repeat colonoscopy recommended in 3 years.  BMs are brown and formed daily.  Denies any melena hematochezia.    -Repeat colonoscopy 6/27    2.  H. pylori gastritis  3.  GERD    History of H. pylori gastritis.  He was treated with quadruple therapy several months ago.  Never went for study to assess for eradication.  Currently taking omeprazole 40 mg daily and feeling well overall.  Recently retired and reports he is having no GI symptoms since residential.    -Stool for H. pylori (off PPI for 2 weeks)  -Will contact patient with results  ______________________________________________________________________    SUBJECTIVE: 64-year-old male here for follow-up.  He was last seen by myself 2/2/2024 for history of H. pylori gastritis, hematemesis, nondysplastic Gold's esophagus and personal history of colon polyps.    He had 2 episodes of hematemesis in August 2023 that lasted 1 day. He did have a recent EGD 12/23 which noted a 2 cm hiatal hernia, mild gastritis, single 3 mm mucosal nodule in the second part of the duodenum. Biopsies were consistent with nondysplastic Gold's esophagus and were positive for H. pylori. Biopsies were negative for celiac disease. Duodenal nodule was benign mucosa. He was treated with quadruple therapy.  Patient did not go for repeat stool study to assess for eradication.    BMs are brown and formed daily.  Denies any melena hematochezia.  Recent colonoscopy noted 8 polyps that were TA's and HP's.  Repeat colonoscopy recommended in 3 years.    Prior EGD/colonoscopy     Colonoscopy 6/24-8 polyps.  Repeat colonoscopy recommended in 3 years.  Biopsy review revealed HP and TA's.    EGD 12/23-2 cm hiatal hernia, mild gastritis,  single 3 mm mucosal nodule observed in the second part of the duodenum which was biopsied.  Repeat EGD recommended in 5 years.  Biopsies were negative for celiac disease.  Biopsies were positive for H. pylori gastritis.  Gold's esophagus with no dysplasia was also noted.     Colonoscopy 6/21-7 subcentimeter polyps.  Recall colonoscopy 3 years.  Biopsies noted tubular adenomas.     EGD 1/21-small sliding hiatal hernia otherwise normal.  Biopsies noted nondysplastic Gold's esophagus.  Negative for H. pylori    REVIEW OF SYSTEMS IS OTHERWISE NEGATIVE.  10 point review of systems negative other than per HPI      Historical Information   Past Medical History:   Diagnosis Date   • Abnormal WBC count 05/06/2021   • Adenocarcinoma of prostate (HCC) 03/27/2020   • Gold's esophagus 01/04/2021    EGD 01/04/21 recommend to repeat in 3 y   • Cancer (HCC)     prostate   • Colon polyp    • Coronary artery disease    • COVID 11/24/2020   • Elevated PSA    • Fever 11/24/2020   • Flank pain 08/11/2021   • Gastroesophageal reflux disease without esophagitis 12/13/2019   • GERD (gastroesophageal reflux disease)    • Hiatal hernia 12/05/2023    2 cm hiatal hernia on EGD on 12/05/23   • Hypercholesterolemia    • Hypertension    • Inferior MI (HCC) 04/07/2016   • LLQ pain 10/13/2020   • LUQ pain 11/05/2019   • Myocardial infarction (HCC) 2011   • Papillary growth of urinary bladder     TURBT  today   2/7/2023   • PONV (postoperative nausea and vomiting)     only  w/ prostate bx   • Sepsis due to Escherichia coli (HCC) 03/19/2020   • Swelling 08/25/2021   • UTI (urinary tract infection) 03/18/2020   • Wears glasses      Past Surgical History:   Procedure Laterality Date   • COLONOSCOPY     • CORONARY STENT PLACEMENT      bare metal stent in Rt coronary artery   • CYSTOSCOPY W/ RETROGRADES Bilateral 2/7/2023    Procedure: CYSTO W/ RETROGRADE PYELOGRAM;  Surgeon: Thierry Anthony MD;  [facility];  Service: Urology   • FL  "RETROGRADE PYELOGRAM  2023   • DE BLADDER INSTILLATION ANTICARCINOGENIC AGENT N/A 2023    Procedure: INSTILLATION Gemcitabine;  Surgeon: Thierry Anthony MD;  [facility];  Service: Urology   • DE BX PROSTATE STRTCTC SATURATION SAMPLING IMG GID N/A 2022    Procedure: TRANSPERINEAL MRI FUSION  BIOPSY PROSTATE;  Surgeon: Bradly Henderson MD;  [facility];  Service: Urology   • DE CYSTO W/REMOVAL OF LESIONS SMALL N/A 2023    Procedure: (TURBT);  Surgeon: Thierry Anthony MD;  [facility];  Service: Urology   • SHOULDER SURGERY Left      Social History   Social History     Substance and Sexual Activity   Alcohol Use Yes    Comment: 6 x  yearly; rarely     Social History     Substance and Sexual Activity   Drug Use Never     Social History     Tobacco Use   Smoking Status Former   • Current packs/day: 0.00   • Average packs/day: 0.2 packs/day for 37.0 years (9.2 ttl pk-yrs)   • Types: Cigarettes   • Start date: 1974   • Quit date:    • Years since quittin.5   • Passive exposure: Never   Smokeless Tobacco Former     Family History   Problem Relation Age of Onset   • Sleep apnea Mother    • Heart attack Father        Meds/Allergies       Current Outpatient Medications:   •  aspirin (ECOTRIN LOW STRENGTH) 81 mg EC tablet  •  atorvastatin (LIPITOR) 20 mg tablet  •  ezetimibe (ZETIA) 10 mg tablet  •  lisinopril (ZESTRIL) 20 mg tablet  •  omeprazole (PriLOSEC) 40 MG capsule  •  meclizine (ANTIVERT) 25 mg tablet    No Known Allergies        Objective     Blood pressure 144/76, pulse (!) 51, temperature (!) 96.6 °F (35.9 °C), temperature source Tympanic, height 5' 10.5\" (1.791 m), weight 94.6 kg (208 lb 9.6 oz), SpO2 95%. Body mass index is 29.51 kg/m².      PHYSICAL EXAM:      General Appearance:   Alert, cooperative, no distress   HEENT:   Normocephalic, atraumatic, anicteric.     Neck:  Supple, symmetrical, trachea midline   Lungs:   Clear to auscultation bilaterally; no rales, " rhonchi or wheezing; respirations unlabored    Heart::   Regular rate and rhythm; no murmur, rub, or gallop.   Abdomen:   Soft, non-tender, non-distended; normal bowel sounds; no masses, no organomegaly    Genitalia:   Deferred    Rectal:   Deferred    Extremities:  No cyanosis, clubbing or edema    Pulses:  2+ and symmetric    Skin:  No jaundice, rashes, or lesions    Lymph nodes:  No palpable cervical lymphadenopathy        Lab Results:   No visits with results within 1 Day(s) from this visit.   Latest known visit with results is:   Hospital Outpatient Visit on 06/12/2024   Component Date Value   • Case Report 06/12/2024                      Value:Surgical Pathology Report                         Case: [id number]                                  Authorizing Provider:  Fidel Hunter MD             Collected:           06/12/2024 1042              Ordering Location:     [facility]        Received:            06/12/2024 [address] [place] [address] Endoscopy                                                     Pathologist:           Fede Grant MD                                                           Specimens:   A) - Large Intestine, Cecum, polyp x 2 cecum cold snare                                             B) - Large Intestine, Transverse Colon, polyp x 2 transverse colon cold snare                       C) - Large Intestine, Left/Descending Colon, polyp descending colon cold snare                      D) - Large Intestine, Sigmoid Colon, polyp x 2 sigmoid colon cold snare                             E) - Rectum, polyp  x 2 rectum cold snare                                                 • Final Diagnosis 06/12/2024                      Value:A. Colon, “Large intestine, cecum polyp x 2,” Biopsy:  - Fragments of tubular adenoma  - Negative for high grade dysplasia or carcinoma    B. Colon, “Transverse colon polyp x 2,” Biopsy:  - Fragments of tubular adenoma  - Negative  for high grade dysplasia or carcinoma    C. Colon, “Left descending colon polyp,” Biopsy:  - Hyperplastic polyp  - Negative for dysplasia or carcinoma    D. Colon, “Sigmoid colon polyp x 2,” Biopsy:  - Fragments of tubular adenoma and hyperplastic polyp  - Negative for high grade dysplasia or carcinoma    E. Colon, “Rectum polyp x 2,” Biopsy:  - Hyperplastic polyp  - Negative for dysplasia or carcinoma       • Additional Information 06/12/2024                      Value:All reported additional testing was performed with appropriately reactive controls.  These tests were developed and their performance characteristics determined by [facility] or appropriate performing facility, though some tests may be performed on tissues which have not been validated for performance characteristics (such as staining performed on alcohol exposed cell blocks and decalcified tissues).  Results should be interpreted with caution and in the context of the patients’ clinical condition. These tests may not be cleared or approved by the U.S. Food and Drug Administration, though the FDA has determined that such clearance or approval is not necessary. These tests are used for clinical purposes and they should not be regarded as investigational or for research. This laboratory has been approved by CLIA 88, designated as a high-complexity laboratory and is qualified to perform these tests.  .Interpretation performed at [facility], [address] [place] [id number]       • Synoptic Checklist 06/12/2024                      Value:                            COLON/RECTUM POLYP FORM - GI - All Specimens                                                                                     :    Adenoma(s)                                                         :    Other     • Gross Description 06/12/2024                      Value:A. The specimen is received in formalin, labeled with the  "patient's name and hospital number, and is designated \" large intestine, cecum polyp x 2\".  The specimen consists of multiple tan irregularly shaped tissue fragments measuring in aggregate of 1.8 x 0.8 x 0.2 cm.  Entirely submitted.  One screened cassette.    B. The specimen is received in formalin, labeled with the patient's name and hospital number, and is designated \" transverse colon polyp x 2\".  The specimen consists of multiple tan irregularly shaped tissue fragments measuring in aggregate of 1.6 x 0.9 x 0.1 cm.  Entirely submitted.  One screened cassette.    C. The specimen is received in formalin, labeled with the patient's name and hospital number, and is designated \" left descending colon polyp\".  The specimen consists of a single tan flat and elongated tissue fragment measuring 0.7 x 0.7 x 0.1 cm.  The margin of resection is inked blue and the specimen is trisected revealing grossly unremarkable cut surfaces.                            Entirely submitted.  One screened cassette.    D. The specimen is received in formalin, labeled with the patient's name and hospital number, and is designated \" sigmoid colon polyp x 2\".  The specimen consists of multiple tan irregularly shaped tissue fragments measuring in aggregate of 1.8 x 1.0 x 0.3 cm.  Entirely submitted.  One screened cassette.    E. The specimen is received in formalin, labeled with the patient's name and hospital number, and is designated \" rectum polyp x 2\".  The specimen consists of multiple tan-pink irregularly shaped and friable tissue fragments measuring in aggregate of 1.4 x 0.3 x 0.3 cm.  The specimen is entirely submitted in a screened cassette.    Note: The estimated total formalin fixation time based upon information provided by the submitting clinician and the standard processing schedule is under 72 hours. Yvonne Estes     • Clinical Information 06/12/2024                      Value:FINDINGS:  · Two polyps measuring smaller than 5 " mm; performed cold snare with en bloc removal and retrieved specimen  · Two polyps measuring smaller than 5 mm in the transverse colon; performed cold snare with en bloc removal and retrieved specimen  · One polyp measuring smaller than 5 mm in the descending colon; performed cold snare with en bloc removal and retrieved specimen  · Two polyps measuring smaller than 5 mm in the sigmoid colon; performed cold snare with en bloc removal and retrieved specimen  · One polyp measuring smaller than 5 mm in the rectum; performed cold snare with en bloc removal and retrieved specimen  · Otherwise normal examination.           Radiology Results:   No results found.

## 2024-07-31 DIAGNOSIS — I25.10 CAD IN NATIVE ARTERY: ICD-10-CM

## 2024-07-31 DIAGNOSIS — Z95.5 PRESENCE OF BARE METAL STENT IN RIGHT CORONARY ARTERY: ICD-10-CM

## 2024-07-31 DIAGNOSIS — I10 ESSENTIAL HYPERTENSION: ICD-10-CM

## 2024-07-31 RX ORDER — EZETIMIBE 10 MG/1
10 TABLET ORAL DAILY
Qty: 30 TABLET | Refills: 5 | Status: SHIPPED | OUTPATIENT
Start: 2024-07-31

## 2024-07-31 RX ORDER — LISINOPRIL 20 MG/1
TABLET ORAL
Qty: 100 TABLET | Refills: 1 | Status: SHIPPED | OUTPATIENT
Start: 2024-07-31

## 2024-08-16 DIAGNOSIS — E78.2 MIXED HYPERLIPIDEMIA: ICD-10-CM

## 2024-08-16 RX ORDER — ATORVASTATIN CALCIUM 20 MG/1
20 TABLET, FILM COATED ORAL DAILY
Qty: 90 TABLET | Refills: 1 | Status: SHIPPED | OUTPATIENT
Start: 2024-08-16

## 2024-08-19 ENCOUNTER — APPOINTMENT (OUTPATIENT)
Dept: LAB | Facility: MEDICAL CENTER | Age: 64
End: 2024-08-19

## 2024-08-19 DIAGNOSIS — R10.13 EPIGASTRIC PAIN: ICD-10-CM

## 2024-08-20 RX ORDER — OMEPRAZOLE 40 MG/1
40 CAPSULE, DELAYED RELEASE ORAL DAILY
Qty: 90 CAPSULE | Refills: 1 | Status: SHIPPED | OUTPATIENT
Start: 2024-08-20

## 2024-08-22 ENCOUNTER — APPOINTMENT (OUTPATIENT)
Dept: LAB | Facility: MEDICAL CENTER | Age: 64
End: 2024-08-22
Payer: COMMERCIAL

## 2024-08-22 DIAGNOSIS — A04.8 H. PYLORI INFECTION: ICD-10-CM

## 2024-08-22 PROCEDURE — 87338 HPYLORI STOOL AG IA: CPT

## 2024-08-26 LAB — H PYLORI AG STL QL IA: NEGATIVE

## 2024-08-30 ENCOUNTER — PROCEDURE VISIT (OUTPATIENT)
Dept: UROLOGY | Facility: MEDICAL CENTER | Age: 64
End: 2024-08-30
Payer: COMMERCIAL

## 2024-08-30 VITALS
OXYGEN SATURATION: 97 % | DIASTOLIC BLOOD PRESSURE: 90 MMHG | HEART RATE: 53 BPM | HEIGHT: 71 IN | WEIGHT: 208 LBS | SYSTOLIC BLOOD PRESSURE: 144 MMHG | BODY MASS INDEX: 29.12 KG/M2

## 2024-08-30 DIAGNOSIS — C61 ADENOCARCINOMA OF PROSTATE (HCC): ICD-10-CM

## 2024-08-30 DIAGNOSIS — N39.0 UTI (URINARY TRACT INFECTION): ICD-10-CM

## 2024-08-30 DIAGNOSIS — Z85.46 HISTORY OF PROSTATE CANCER: ICD-10-CM

## 2024-08-30 DIAGNOSIS — Z85.51 HISTORY OF BLADDER CANCER: Primary | ICD-10-CM

## 2024-08-30 LAB
SL AMB  POCT GLUCOSE, UA: NORMAL
SL AMB LEUKOCYTE ESTERASE,UA: NORMAL
SL AMB POCT BILIRUBIN,UA: NORMAL
SL AMB POCT BLOOD,UA: NORMAL
SL AMB POCT CLARITY,UA: CLEAR
SL AMB POCT COLOR,UA: YELLOW
SL AMB POCT KETONES,UA: NORMAL
SL AMB POCT NITRITE,UA: NORMAL
SL AMB POCT PH,UA: 5.5
SL AMB POCT SPECIFIC GRAVITY,UA: 1.02
SL AMB POCT URINE PROTEIN: NORMAL
SL AMB POCT UROBILINOGEN: 0.2

## 2024-08-30 PROCEDURE — 52000 CYSTOURETHROSCOPY: CPT | Performed by: UROLOGY

## 2024-08-30 PROCEDURE — 81003 URINALYSIS AUTO W/O SCOPE: CPT | Performed by: UROLOGY

## 2024-08-30 PROCEDURE — 99213 OFFICE O/P EST LOW 20 MIN: CPT | Performed by: UROLOGY

## 2024-08-30 NOTE — PROGRESS NOTES
HISTORY:    1.  Follow-up bladder cancer.  In February 2023, underwent TUR of tumor at the left trigone, pathology showed noninvasive, probable high-grade.     2.  Prostate cancer, on active surveillance.  Initial biopsy in March 2020 showed 2 cores of [place] 6 on the left side of the prostate.     Repeat biopsy in November 2022 showed 2 areas of [place] 6 cancer     3.  Recent UTI, symptoms were consistent with acute prostatitis.  Burning urgency frequency perineal discomfort, radiating pain.  Urine showed Enterococcus.  Finally better after 2 weeks of antibiotics     PSA has been high the past 2 years, see numbers below            Cystoscopy     Date/Time  8/30/2024 10:00 AM     Performed by  Thierry Anthony MD   Authorized by  Thierry Anthony MD         Procedure Details:  Procedure type: cystoscopy    Patient tolerance: Patient tolerated the procedure well with no immediate complications    Additional Procedure Details:      Patient presents for cystoscopy.  I have discussed the reasons for doing the exam, and the potential risks and complications.  Patient expressed understanding, and signed informed consent document.    The patient was carefully  positioned supine on the examining table.  Sterile preparation was performed on the urethra.  Xylocaine jelly was instilled and left  Indwelling for the procedure.  The 15 Lao flexible cystoscope was passed with the following findings:      Urethra: No stricture    Prostate:  lateral lobes moderate enlargement, some obstruction                  Bladder: Severe trabeculation, a few scars 100 mL, no lesions, tumor, or stones.     Residual urine: 100 mL    Patient tolerated the procedure well and was escorted from the examining table.      ASSESSMENT / PLAN:    No recurrence of cancer    Cystoscopy 5 months    We discussed his active surveillance for prostate cancer, we will continue that    Tolerates large prostate    The following portions of the patient's history  "were reviewed and updated as appropriate: allergies, current medications, past family history, past medical history, past social history, past surgical history, and problem list.    Review of Systems      Objective:     Physical Exam      0   Lab Value Date/Time    PSA 9.65 (H) 03/04/2024 0806    PSA 9.8 (H) 09/07/2022 0635    PSA 6.5 (H) 04/21/2022 0636    PSA 6.2 (H) 05/14/2021 0857    PSA 6.5 (H) 12/03/2019 0709    PSA 5.4 (H) 11/07/2019 0904   ]  BUN   Date Value Ref Range Status   03/18/2024 15 5 - 25 mg/dL Final   03/23/2020 15 7 - 28 mg/dL Final     Creatinine   Date Value Ref Range Status   03/18/2024 0.96 0.60 - 1.30 mg/dL Final     Comment:     Standardized to IDMS reference method   03/23/2020 0.90 0.53 - 1.30 mg/dL Final     No components found for: \"CBC\"      Patient Active Problem List   Diagnosis    CAD in native artery    History of acute inferior wall MI 2011,s/p BMS RCA    Presence of bare metal stent in right coronary artery    Essential hypertension    Hyperlipidemia    Chronic left-sided low back pain without sciatica    Fatty liver    Benign prostatic hyperplasia without lower urinary tract symptoms    Gastroesophageal reflux disease without esophagitis    PSA elevation    Prostate nodule without urinary obstruction    Bacteremia due to Escherichia coli    Bursitis of shoulder    Contusion of left shoulder    Injury of tendon of rotator cuff    Adenocarcinoma of prostate (HCC)    Encounter for well adult exam with abnormal findings    Myalgia with higher doses statins    Colon polyp    COVID-19 virus infection    Gold's esophagus    Bloating    IFG (impaired fasting glucose)    Left upper quadrant abdominal mass    Abnormal urine findings    Bradycardia    Lesion of urinary bladder    PONV (postoperative nausea and vomiting)    MIR (obstructive sleep apnea)    Class 1 obesity with serious comorbidity and body mass index (BMI) of 30.0 to 30.9 in adult    Epigastric pain    Hematemesis with " nausea    Polycystic kidney disease    CKD (chronic kidney disease) stage 2, GFR 60-89 ml/min    Vertigo    History of bladder cancer    Hiatal hernia    H. pylori infection    Frequent urination        Diagnoses and all orders for this visit:    History of bladder cancer  -     POCT urine dip auto non-scope    History of prostate cancer    UTI (urinary tract infection)  -     Ambulatory Referral to Urology    Adenocarcinoma of prostate (HCC)  -     Ambulatory Referral to Urology    Other orders  -     Cystoscopy           Patient ID: Tacos Asencio is a 64 y.o. male.      Current Outpatient Medications:     aspirin (ECOTRIN LOW STRENGTH) 81 mg EC tablet, Take 81 mg by mouth every evening, Disp: , Rfl:     atorvastatin (LIPITOR) 20 mg tablet, TAKE 1 TABLET BY MOUTH EVERY DAY, Disp: 90 tablet, Rfl: 1    ezetimibe (ZETIA) 10 mg tablet, TAKE 1 TABLET BY MOUTH EVERY DAY, Disp: 30 tablet, Rfl: 5    lisinopril (ZESTRIL) 20 mg tablet, TAKE 1 TABLET BY MOUTH EVERY DAY, Disp: 100 tablet, Rfl: 1    meclizine (ANTIVERT) 25 mg tablet, Take 1 tablet (25 mg total) by mouth daily at bedtime (Patient not taking: Reported on 7/26/2024), Disp: 30 tablet, Rfl: 0    omeprazole (PriLOSEC) 40 MG capsule, TAKE 1 CAPSULE (40 MG TOTAL) BY MOUTH DAILY., Disp: 90 capsule, Rfl: 1    Past Medical History:   Diagnosis Date    Abnormal WBC count 05/06/2021    Adenocarcinoma of prostate (HCC) 03/27/2020    Gold's esophagus 01/04/2021    EGD 01/04/21 recommend to repeat in 3 y    Cancer (HCC)     prostate    Colon polyp     Coronary artery disease     COVID 11/24/2020    Elevated PSA     Fever 11/24/2020    Flank pain 08/11/2021    Gastroesophageal reflux disease without esophagitis 12/13/2019    GERD (gastroesophageal reflux disease)     Hiatal hernia 12/05/2023    2 cm hiatal hernia on EGD on 12/05/23    Hypercholesterolemia     Hypertension     Inferior MI (HCC) 04/07/2016    LLQ pain 10/13/2020    LUQ pain 11/05/2019    Myocardial infarction  (HCC) 2011    Papillary growth of urinary bladder     TURBT  today   2/7/2023    PONV (postoperative nausea and vomiting)     only  w/ prostate bx    Sepsis due to Escherichia coli (HCC) 03/19/2020    Swelling 08/25/2021    UTI (urinary tract infection) 03/18/2020    Wears glasses        Past Surgical History:   Procedure Laterality Date    COLONOSCOPY      CORONARY STENT PLACEMENT      bare metal stent in Rt coronary artery    CYSTOSCOPY W/ RETROGRADES Bilateral 2/7/2023    Procedure: CYSTO W/ RETROGRADE PYELOGRAM;  Surgeon: Thierry Anthony MD;  Location: [facility];  Service: Urology    FL RETROGRADE PYELOGRAM  2/7/2023    ME BLADDER INSTILLATION ANTICARCINOGENIC AGENT N/A 2/7/2023    Procedure: INSTILLATION Gemcitabine;  Surgeon: Thierry Anthony MD;  Location: [facility];  Service: Urology    ME BX PROSTATE STRTCTC SATURATION SAMPLING IMG GID N/A 11/29/2022    Procedure: TRANSPERINEAL MRI FUSION  BIOPSY PROSTATE;  Surgeon: Bradly Henderson MD;  Location: [facility];  Service: Urology    ME CYSTO W/REMOVAL OF LESIONS SMALL N/A 2/7/2023    Procedure: (TURBT);  Surgeon: Thierry Anthony MD;  Location: [facility];  Service: Urology    SHOULDER SURGERY Left        Social History

## 2024-09-13 ENCOUNTER — OFFICE VISIT (OUTPATIENT)
Dept: FAMILY MEDICINE CLINIC | Facility: CLINIC | Age: 64
End: 2024-09-13
Payer: COMMERCIAL

## 2024-09-13 VITALS
WEIGHT: 208.2 LBS | TEMPERATURE: 97.5 F | BODY MASS INDEX: 29.15 KG/M2 | OXYGEN SATURATION: 97 % | DIASTOLIC BLOOD PRESSURE: 70 MMHG | HEIGHT: 71 IN | SYSTOLIC BLOOD PRESSURE: 110 MMHG | HEART RATE: 59 BPM

## 2024-09-13 DIAGNOSIS — N30.00 ACUTE CYSTITIS WITHOUT HEMATURIA: Primary | ICD-10-CM

## 2024-09-13 DIAGNOSIS — Z23 NEED FOR COVID-19 VACCINE: ICD-10-CM

## 2024-09-13 DIAGNOSIS — K76.0 FATTY LIVER: ICD-10-CM

## 2024-09-13 PROCEDURE — 99214 OFFICE O/P EST MOD 30 MIN: CPT | Performed by: FAMILY MEDICINE

## 2024-09-13 PROCEDURE — 91320 SARSCV2 VAC 30MCG TRS-SUC IM: CPT

## 2024-09-13 PROCEDURE — 90480 ADMN SARSCOV2 VAC 1/ONLY CMP: CPT

## 2024-09-13 RX ORDER — LEVOFLOXACIN 750 MG/1
750 TABLET, FILM COATED ORAL DAILY
COMMUNITY
Start: 2024-09-05 | End: 2024-09-14

## 2024-09-13 RX ORDER — PHENAZOPYRIDINE HYDROCHLORIDE 200 MG/1
200 TABLET, FILM COATED ORAL 3 TIMES DAILY PRN
COMMUNITY
Start: 2024-09-05

## 2024-09-14 PROBLEM — N30.00 ACUTE CYSTITIS WITHOUT HEMATURIA: Status: ACTIVE | Noted: 2024-09-14

## 2024-09-14 NOTE — ASSESSMENT & PLAN NOTE
Chronic recent CAT scan of the abdomen pelvis obtained on the during ER visit fatty liver discussed with the patient finding and discussed important lose weight low-carb low-fat diet review

## 2024-09-14 NOTE — PROGRESS NOTES
Ambulatory Visit  Name: Tacos Asencio      : 1960      MRN: [id number]  Encounter Provider: Leona Ha MD  Encounter Date: 2024   Encounter department: [facility]      Assessment & Plan  Acute cystitis without hematuria  New diagnosis patient developed urine infection status post cystoscopy patient was ER treated with Levaquin record review recommend to repeat UA and CS we will follow-up with the result accordingly    Orders:    UA w Reflex to Microscopic w Reflex to Culture; Future    Fatty liver  Chronic recent CAT scan of the abdomen pelvis obtained on the during ER visit fatty liver discussed with the patient finding and discussed important lose weight low-carb low-fat diet review         Need for COVID-19 vaccine  Benefit versus side effect discussed the patient patient tolerated well  Orders:    COVID-19 Pfizer mRNA vaccine 12 yr and older (Comirnaty pre-filled syringe)         History of Present Illness     Patient here status post ER he was in the ER on  patient had a cystoscopy and couple days later he developed abdomen pain ER he had abnormal urine test he had elevated white blood cell patient started on Levaquin CAT scan of the abdomen pelvic reviewed with the patient since been on the antibiotic improving the symptoms  ER record review      Review of Systems   Constitutional:  Negative for activity change, appetite change, fatigue and fever.   HENT:  Negative for congestion, ear pain, sinus pressure, sinus pain and sore throat.    Eyes:  Negative for discharge and redness.   Respiratory:  Negative for cough, chest tightness and shortness of breath.    Cardiovascular:  Negative for chest pain, palpitations and leg swelling.   Gastrointestinal:  Negative for abdominal pain, constipation and diarrhea.   Genitourinary:  Negative for dysuria, flank pain, frequency and hematuria.   Musculoskeletal:  Negative for gait problem.   Skin:  Negative for  "pallor and rash.   Neurological:  Negative for dizziness, tremors, weakness, numbness and headaches.   Hematological:  Does not bruise/bleed easily.     Objective     /70 (BP Location: Left arm, Patient Position: Sitting)   Pulse 59   Temp 97.5 °F (36.4 °C) (Tympanic)   Ht 5' 10.5\" (1.791 m)   Wt 94.4 kg (208 lb 3.2 oz)   SpO2 97%   BMI 29.45 kg/m²     Physical Exam  Vitals and nursing note reviewed.   Constitutional:       General: He is not in acute distress.     Appearance: He is well-developed. He is not diaphoretic.   HENT:      Head: Normocephalic.      Right Ear: Tympanic membrane and external ear normal.      Left Ear: Tympanic membrane and external ear normal.      Nose: No rhinorrhea.      Mouth/Throat:      Pharynx: No posterior oropharyngeal erythema.   Eyes:      General:         Right eye: No discharge.         Left eye: No discharge.      Conjunctiva/sclera: Conjunctivae normal.   Neck:      Vascular: No JVD.   Cardiovascular:      Rate and Rhythm: Normal rate and regular rhythm.      Heart sounds: Normal heart sounds.      No gallop.   Pulmonary:      Effort: Pulmonary effort is normal. No respiratory distress.      Breath sounds: Normal breath sounds. No wheezing.   Abdominal:      General: There is no distension.      Palpations: Abdomen is soft.      Tenderness: There is no abdominal tenderness. There is no rebound.   Musculoskeletal:         General: No tenderness.      Cervical back: Normal range of motion and neck supple.   Lymphadenopathy:      Cervical: No cervical adenopathy.   Skin:     General: Skin is warm.      Findings: No rash.   Neurological:      Mental Status: He is alert and oriented to person, place, and time.         Leona Ha MD     "

## 2024-09-14 NOTE — ASSESSMENT & PLAN NOTE
New diagnosis patient developed urine infection status post cystoscopy patient was ER treated with Levaquin record review recommend to repeat UA and CS we will follow-up with the result accordingly    Orders:    UA w Reflex to Microscopic w Reflex to Culture; Future

## 2024-09-20 ENCOUNTER — APPOINTMENT (OUTPATIENT)
Dept: LAB | Facility: MEDICAL CENTER | Age: 64
End: 2024-09-20
Payer: COMMERCIAL

## 2024-09-20 DIAGNOSIS — Z13.29 SCREENING FOR THYROID DISORDER: ICD-10-CM

## 2024-09-20 DIAGNOSIS — C61 PROSTATE CANCER (HCC): ICD-10-CM

## 2024-09-20 DIAGNOSIS — Z13.220 SCREENING, LIPID: ICD-10-CM

## 2024-09-20 DIAGNOSIS — R73.01 IFG (IMPAIRED FASTING GLUCOSE): ICD-10-CM

## 2024-09-20 DIAGNOSIS — N30.00 ACUTE CYSTITIS WITHOUT HEMATURIA: ICD-10-CM

## 2024-09-20 DIAGNOSIS — I10 ESSENTIAL HYPERTENSION: ICD-10-CM

## 2024-09-20 DIAGNOSIS — K63.5 POLYP OF COLON, UNSPECIFIED PART OF COLON, UNSPECIFIED TYPE: ICD-10-CM

## 2024-09-20 LAB
ALBUMIN SERPL BCG-MCNC: 4.4 G/DL (ref 3.5–5)
ALP SERPL-CCNC: 80 U/L (ref 34–104)
ALT SERPL W P-5'-P-CCNC: 24 U/L (ref 7–52)
ANION GAP SERPL CALCULATED.3IONS-SCNC: 7 MMOL/L (ref 4–13)
AST SERPL W P-5'-P-CCNC: 18 U/L (ref 13–39)
BACTERIA UR QL AUTO: ABNORMAL /HPF
BASOPHILS # BLD AUTO: 0.09 THOUSANDS/ΜL (ref 0–0.1)
BASOPHILS NFR BLD AUTO: 1 % (ref 0–1)
BILIRUB SERPL-MCNC: 0.77 MG/DL (ref 0.2–1)
BILIRUB UR QL STRIP: NEGATIVE
BUN SERPL-MCNC: 22 MG/DL (ref 5–25)
CALCIUM SERPL-MCNC: 9.1 MG/DL (ref 8.4–10.2)
CHLORIDE SERPL-SCNC: 104 MMOL/L (ref 96–108)
CHOLEST SERPL-MCNC: 168 MG/DL
CLARITY UR: CLEAR
CO2 SERPL-SCNC: 29 MMOL/L (ref 21–32)
COLOR UR: YELLOW
CREAT SERPL-MCNC: 1.03 MG/DL (ref 0.6–1.3)
EOSINOPHIL # BLD AUTO: 0.19 THOUSAND/ΜL (ref 0–0.61)
EOSINOPHIL NFR BLD AUTO: 2 % (ref 0–6)
ERYTHROCYTE [DISTWIDTH] IN BLOOD BY AUTOMATED COUNT: 13.2 % (ref 11.6–15.1)
GFR SERPL CREATININE-BSD FRML MDRD: 75 ML/MIN/1.73SQ M
GLUCOSE P FAST SERPL-MCNC: 89 MG/DL (ref 65–99)
GLUCOSE UR STRIP-MCNC: NEGATIVE MG/DL
HCT VFR BLD AUTO: 44.9 % (ref 36.5–49.3)
HDLC SERPL-MCNC: 36 MG/DL
HGB BLD-MCNC: 14.6 G/DL (ref 12–17)
HGB UR QL STRIP.AUTO: NEGATIVE
IMM GRANULOCYTES # BLD AUTO: 0.03 THOUSAND/UL (ref 0–0.2)
IMM GRANULOCYTES NFR BLD AUTO: 0 % (ref 0–2)
KETONES UR STRIP-MCNC: NEGATIVE MG/DL
LDLC SERPL CALC-MCNC: 99 MG/DL (ref 0–100)
LEUKOCYTE ESTERASE UR QL STRIP: ABNORMAL
LYMPHOCYTES # BLD AUTO: 2.44 THOUSANDS/ΜL (ref 0.6–4.47)
LYMPHOCYTES NFR BLD AUTO: 27 % (ref 14–44)
MCH RBC QN AUTO: 30.5 PG (ref 26.8–34.3)
MCHC RBC AUTO-ENTMCNC: 32.5 G/DL (ref 31.4–37.4)
MCV RBC AUTO: 94 FL (ref 82–98)
MONOCYTES # BLD AUTO: 0.59 THOUSAND/ΜL (ref 0.17–1.22)
MONOCYTES NFR BLD AUTO: 7 % (ref 4–12)
MUCOUS THREADS UR QL AUTO: ABNORMAL
NEUTROPHILS # BLD AUTO: 5.8 THOUSANDS/ΜL (ref 1.85–7.62)
NEUTS SEG NFR BLD AUTO: 63 % (ref 43–75)
NITRITE UR QL STRIP: NEGATIVE
NON-SQ EPI CELLS URNS QL MICRO: ABNORMAL /HPF
NRBC BLD AUTO-RTO: 0 /100 WBCS
PH UR STRIP.AUTO: 6 [PH]
PLATELET # BLD AUTO: 359 THOUSANDS/UL (ref 149–390)
PMV BLD AUTO: 9.9 FL (ref 8.9–12.7)
POTASSIUM SERPL-SCNC: 4.4 MMOL/L (ref 3.5–5.3)
PROT SERPL-MCNC: 7 G/DL (ref 6.4–8.4)
PROT UR STRIP-MCNC: ABNORMAL MG/DL
PSA SERPL-MCNC: 21.38 NG/ML (ref 0–4)
RBC # BLD AUTO: 4.79 MILLION/UL (ref 3.88–5.62)
RBC #/AREA URNS AUTO: ABNORMAL /HPF
SODIUM SERPL-SCNC: 140 MMOL/L (ref 135–147)
SP GR UR STRIP.AUTO: 1.03 (ref 1–1.03)
TRIGL SERPL-MCNC: 167 MG/DL
TSH SERPL DL<=0.05 MIU/L-ACNC: 0.94 UIU/ML (ref 0.45–4.5)
UROBILINOGEN UR STRIP-ACNC: <2 MG/DL
WBC # BLD AUTO: 9.14 THOUSAND/UL (ref 4.31–10.16)
WBC #/AREA URNS AUTO: ABNORMAL /HPF

## 2024-09-20 PROCEDURE — 36415 COLL VENOUS BLD VENIPUNCTURE: CPT

## 2024-09-20 PROCEDURE — 84153 ASSAY OF PSA TOTAL: CPT

## 2024-09-20 PROCEDURE — 84443 ASSAY THYROID STIM HORMONE: CPT

## 2024-09-20 PROCEDURE — 80053 COMPREHEN METABOLIC PANEL: CPT

## 2024-09-20 PROCEDURE — 85025 COMPLETE CBC W/AUTO DIFF WBC: CPT

## 2024-09-20 PROCEDURE — 80061 LIPID PANEL: CPT

## 2024-09-20 PROCEDURE — 81001 URINALYSIS AUTO W/SCOPE: CPT

## 2024-09-23 DIAGNOSIS — N30.00 ACUTE CYSTITIS WITHOUT HEMATURIA: Primary | ICD-10-CM

## 2024-09-23 DIAGNOSIS — Z85.46 HISTORY OF PROSTATE CANCER: ICD-10-CM

## 2024-09-23 NOTE — PROGRESS NOTES
PSA came back at 21.9.    He gave history of recent UTI, this is likely the cause    Will repeat PSA in 6 weeks, after November 3.    Urine culture at lab this week, now

## 2024-09-24 ENCOUNTER — TELEPHONE (OUTPATIENT)
Dept: UROLOGY | Facility: MEDICAL CENTER | Age: 64
End: 2024-09-24

## 2024-09-24 NOTE — TELEPHONE ENCOUNTER
----- Message from Thierry Anthony MD sent at 9/23/2024  2:43 PM EDT -----   Tell patient needs urine culture this week at lab, PSA after 6 weeks.  Recent UTI was probably the reason for PSA being so

## 2024-09-24 NOTE — TELEPHONE ENCOUNTER
Called pt and left msg on VM per comm consent     Message from Thierry Anthony MD sent at 9/23/2024  2:43 PM EDT -----   Tell patient needs urine culture this week at lab, PSA after 6 weeks.  Recent UTI was probably the reason for PSA being so elevated    Pt advised order for UC is in chart and he can just walk into any [facility] lab.

## 2024-09-26 ENCOUNTER — NURSE TRIAGE (OUTPATIENT)
Age: 64
End: 2024-09-26

## 2024-09-26 NOTE — TELEPHONE ENCOUNTER
"Patient scheduled for tomorrow at 1030 and would like a call if something sooner becomes available.       Reason for Disposition   Abdominal pain is a chronic symptom (recurrent or ongoing AND lasting > 4 weeks)    Answer Assessment - Initial Assessment Questions  1. LOCATION: \"Where does it hurt?\"       L lower/middle abdomen  2. RADIATION: \"Does the pain shoot anywhere else?\" (e.g., chest, back)      Denies  3. ONSET: \"When did the pain begin?\" (Minutes, hours or days ago)       Months ago, 4-5 days ago   4. SUDDEN: \"Gradual or sudden onset?\"      Sudden  5. PATTERN \"Does the pain come and go, or is it constant?\"     - If constant: \"Is it getting better, staying the same, or worsening?\"       (Note: Constant means the pain never goes away completely; most serious pain is constant and it progresses)      - If intermittent: \"How long does it last?\" \"Do you have pain now?\"      (Note: Intermittent means the pain goes away completely between bouts)      Constant, comes and goes over time   6. SEVERITY: \"How bad is the pain?\"  (e.g., Scale 1-10; mild, moderate, or severe)     - MILD (1-3): doesn't interfere with normal activities, abdomen soft and not tender to touch      - MODERATE (4-7): interferes with normal activities or awakens from sleep, tender to touch      - SEVERE (8-10): excruciating pain, doubled over, unable to do any normal activities        Moderate  7. RECURRENT SYMPTOM: \"Have you ever had this type of stomach pain before?\" If Yes, ask: \"When was the last time?\" and \"What happened that time?\"       Yes, ongoing for months, tests, etc    8. CAUSE: \"What do you think is causing the stomach pain?\"      Unaware  9. RELIEVING/AGGRAVATING FACTORS: \"What makes it better or worse?\" (e.g., movement, antacids, bowel movement)      Heat worse   10. OTHER SYMPTOMS: \"Has there been any vomiting, diarrhea, constipation, or urine problems?\"       Diarrhea, belching, nausea    Protocols used: Abdominal Pain - " Male-ADULT-[place]

## 2024-09-27 ENCOUNTER — OFFICE VISIT (OUTPATIENT)
Dept: FAMILY MEDICINE CLINIC | Facility: CLINIC | Age: 64
End: 2024-09-27
Payer: COMMERCIAL

## 2024-09-27 VITALS
BODY MASS INDEX: 30.07 KG/M2 | DIASTOLIC BLOOD PRESSURE: 90 MMHG | WEIGHT: 203 LBS | SYSTOLIC BLOOD PRESSURE: 140 MMHG | HEART RATE: 79 BPM | OXYGEN SATURATION: 97 % | HEIGHT: 69 IN | TEMPERATURE: 98 F

## 2024-09-27 DIAGNOSIS — R97.20 ELEVATED PSA: ICD-10-CM

## 2024-09-27 DIAGNOSIS — R82.90 ABNORMAL URINE FINDINGS: ICD-10-CM

## 2024-09-27 DIAGNOSIS — K21.9 GASTROESOPHAGEAL REFLUX DISEASE WITHOUT ESOPHAGITIS: ICD-10-CM

## 2024-09-27 DIAGNOSIS — M54.6 ACUTE BILATERAL THORACIC BACK PAIN: Primary | ICD-10-CM

## 2024-09-27 LAB
BACTERIA UR QL AUTO: ABNORMAL /HPF
BILIRUB UR QL STRIP: NEGATIVE
CAOX CRY URNS QL MICRO: ABNORMAL /HPF
CLARITY UR: CLEAR
COLOR UR: YELLOW
GLUCOSE UR STRIP-MCNC: NEGATIVE MG/DL
HGB UR QL STRIP.AUTO: NEGATIVE
KETONES UR STRIP-MCNC: NEGATIVE MG/DL
LEUKOCYTE ESTERASE UR QL STRIP: NEGATIVE
MUCOUS THREADS UR QL AUTO: ABNORMAL
NITRITE UR QL STRIP: NEGATIVE
NON-SQ EPI CELLS URNS QL MICRO: ABNORMAL /HPF
PH UR STRIP.AUTO: 5.5 [PH]
PROT UR STRIP-MCNC: ABNORMAL MG/DL
RBC #/AREA URNS AUTO: ABNORMAL /HPF
SP GR UR STRIP.AUTO: 1.02 (ref 1–1.03)
UROBILINOGEN UR STRIP-ACNC: <2 MG/DL
WBC #/AREA URNS AUTO: ABNORMAL /HPF

## 2024-09-27 PROCEDURE — 87086 URINE CULTURE/COLONY COUNT: CPT | Performed by: FAMILY MEDICINE

## 2024-09-27 PROCEDURE — 99214 OFFICE O/P EST MOD 30 MIN: CPT | Performed by: FAMILY MEDICINE

## 2024-09-27 PROCEDURE — 81001 URINALYSIS AUTO W/SCOPE: CPT | Performed by: FAMILY MEDICINE

## 2024-09-27 RX ORDER — METHOCARBAMOL 500 MG/1
500 TABLET, FILM COATED ORAL 2 TIMES DAILY
Qty: 20 TABLET | Refills: 0 | Status: SHIPPED | OUTPATIENT
Start: 2024-09-27

## 2024-09-27 RX ORDER — FAMOTIDINE 20 MG/1
20 TABLET, FILM COATED ORAL 2 TIMES DAILY
Qty: 60 TABLET | Refills: 1 | Status: SHIPPED | OUTPATIENT
Start: 2024-09-27 | End: 2025-09-22

## 2024-09-27 NOTE — TELEPHONE ENCOUNTER
Spoke to pt.  He saw PCP today and stated she sent urine for culture.  Advised that Dr. Anthony ordered pt to have another PSA lab drawn in 6 weeks as recent UTI may have been responsible for recent PSA elevation.

## 2024-09-28 LAB — BACTERIA UR CULT: NORMAL

## 2024-09-30 PROBLEM — M54.6 ACUTE BILATERAL THORACIC BACK PAIN: Status: ACTIVE | Noted: 2024-09-30

## 2024-09-30 NOTE — ASSESSMENT & PLAN NOTE
New diagnosis acute symptomatic started today no acute deflexed symptom no rash recommend to use Robaxin 500 mg twice a day proper use and possible side effect review if no improvement to call the office    Orders:    methocarbamol (ROBAXIN) 500 mg tablet; Take 1 tablet (500 mg total) by mouth 2 (two) times a day    Urine culture    UA (URINE) with reflex to Scope    Urine Microscopic

## 2024-09-30 NOTE — PROGRESS NOTES
Ambulatory Visit  Name: Tacos Aesncio      : 1960      MRN: [id number]  Encounter Provider: Leona Ha MD  Encounter Date: 2024   Encounter department: [facility]      Assessment & Plan  Acute bilateral thoracic back pain  New diagnosis acute symptomatic started today no acute deflexed symptom no rash recommend to use Robaxin 500 mg twice a day proper use and possible side effect review if no improvement to call the office    Orders:    methocarbamol (ROBAXIN) 500 mg tablet; Take 1 tablet (500 mg total) by mouth 2 (two) times a day    Urine culture    UA (URINE) with reflex to Scope    Urine Microscopic    Elevated PSA  Recent blood work show elevated PSA and patient with history of prostate cancer urology already contacted him recommend to repeat the PSA possible increase in the number secondary to infection recommend also to repeat UA and CS         Gastroesophageal reflux disease without esophagitis  Chronic symptomatic recommend to continue with omeprazole 40 mg once a day discussed avoid before food do not eat and lie down recommend to start famotidine 20 mg twice a day proper use and possible side effect review if no improvement plan to send the patient to see GI    Orders:    famotidine (PEPCID) 20 mg tablet; Take 1 tablet (20 mg total) by mouth 2 (two) times a day    Abnormal urine findings    Orders:    Urine culture    UA (URINE) with reflex to Scope    Urine Microscopic         History of Present Illness     Patient here concerned about abdomen pain he feels like bloating and he felt his left side of the abdomen's swelling no nausea no vomiting no diarrhea no constipation no change in his diet no recent traveling no blood in the stool patient known to have history of GERD he is on omeprazole 40 mg and he feels is not helping also patient today started having pain in the middle of his back radiate to bilateral side no numbness no muscle weakness no recent  "fall or trauma certain twisting movement aggravate the pain recent blood work which showed elevated PSA patient with history of prostate cancer denies any blood in the urine patient already follow-up with the urology      Review of Systems   Constitutional:  Negative for chills and fever.   HENT:  Negative for ear pain and sore throat.    Eyes:  Negative for pain and visual disturbance.   Respiratory:  Negative for cough and shortness of breath.    Cardiovascular:  Negative for chest pain and palpitations.   Gastrointestinal:  Positive for abdominal pain. Negative for blood in stool, constipation, diarrhea, nausea and vomiting.   Genitourinary:  Negative for dysuria and hematuria.   Musculoskeletal:  Positive for back pain. Negative for arthralgias.   Skin:  Negative for color change and rash.   Neurological:  Negative for seizures and syncope.   All other systems reviewed and are negative.    Objective     /90 (BP Location: Left arm, Patient Position: Sitting)   Pulse 79   Temp 98 °F (36.7 °C) (Tympanic)   Ht 5' 9\" (1.753 m)   Wt 92.1 kg (203 lb)   SpO2 97%   BMI 29.98 kg/m²     Physical Exam  Vitals and nursing note reviewed.   Constitutional:       General: He is not in acute distress.     Appearance: He is well-developed. He is not diaphoretic.   HENT:      Head: Normocephalic.      Right Ear: Tympanic membrane and external ear normal.      Left Ear: Tympanic membrane and external ear normal.      Nose: No rhinorrhea.      Mouth/Throat:      Pharynx: No posterior oropharyngeal erythema.   Eyes:      General:         Right eye: No discharge.         Left eye: No discharge.      Conjunctiva/sclera: Conjunctivae normal.   Neck:      Vascular: No JVD.   Cardiovascular:      Rate and Rhythm: Normal rate and regular rhythm.      Heart sounds: Normal heart sounds. No murmur heard.     No gallop.   Pulmonary:      Effort: Pulmonary effort is normal. No respiratory distress.      Breath sounds: Normal breath " sounds. No wheezing.   Abdominal:      General: There is no distension.      Palpations: Abdomen is soft.      Tenderness: There is abdominal tenderness in the epigastric area and left upper quadrant. There is no right CVA tenderness, left CVA tenderness, guarding or rebound.   Musculoskeletal:         General: No tenderness.      Cervical back: Normal range of motion and neck supple.      Thoracic back: Spasms present.   Lymphadenopathy:      Cervical: No cervical adenopathy.   Skin:     General: Skin is warm.      Findings: No rash.   Neurological:      Mental Status: He is alert and oriented to person, place, and time.         Leona Ha MD

## 2024-09-30 NOTE — ASSESSMENT & PLAN NOTE
Recent blood work show elevated PSA and patient with history of prostate cancer urology already contacted him recommend to repeat the PSA possible increase in the number secondary to infection recommend also to repeat UA and CS

## 2024-09-30 NOTE — ASSESSMENT & PLAN NOTE
Chronic symptomatic recommend to continue with omeprazole 40 mg once a day discussed avoid before food do not eat and lie down recommend to start famotidine 20 mg twice a day proper use and possible side effect review if no improvement plan to send the patient to see GI    Orders:    famotidine (PEPCID) 20 mg tablet; Take 1 tablet (20 mg total) by mouth 2 (two) times a day

## 2024-10-11 ENCOUNTER — RA CDI HCC (OUTPATIENT)
Dept: OTHER | Facility: HOSPITAL | Age: 64
End: 2024-10-11

## 2024-10-14 PROBLEM — N30.00 ACUTE CYSTITIS WITHOUT HEMATURIA: Status: RESOLVED | Noted: 2024-09-14 | Resolved: 2024-10-14

## 2024-10-21 ENCOUNTER — OFFICE VISIT (OUTPATIENT)
Dept: FAMILY MEDICINE CLINIC | Facility: CLINIC | Age: 64
End: 2024-10-21
Payer: COMMERCIAL

## 2024-10-21 VITALS
HEART RATE: 70 BPM | HEIGHT: 69 IN | DIASTOLIC BLOOD PRESSURE: 80 MMHG | OXYGEN SATURATION: 97 % | WEIGHT: 210 LBS | SYSTOLIC BLOOD PRESSURE: 120 MMHG | BODY MASS INDEX: 31.1 KG/M2 | TEMPERATURE: 98.4 F

## 2024-10-21 DIAGNOSIS — Z23 ENCOUNTER FOR IMMUNIZATION: ICD-10-CM

## 2024-10-21 DIAGNOSIS — G47.33 OSA (OBSTRUCTIVE SLEEP APNEA): ICD-10-CM

## 2024-10-21 DIAGNOSIS — E78.2 MIXED HYPERLIPIDEMIA: Primary | ICD-10-CM

## 2024-10-21 DIAGNOSIS — Z23 NEED FOR INFLUENZA VACCINATION: ICD-10-CM

## 2024-10-21 DIAGNOSIS — I10 ESSENTIAL HYPERTENSION: ICD-10-CM

## 2024-10-21 DIAGNOSIS — R97.20 ELEVATED PSA: ICD-10-CM

## 2024-10-21 DIAGNOSIS — Z13.29 SCREENING FOR THYROID DISORDER: ICD-10-CM

## 2024-10-21 PROCEDURE — 90662 IIV NO PRSV INCREASED AG IM: CPT

## 2024-10-21 PROCEDURE — 90471 IMMUNIZATION ADMIN: CPT

## 2024-10-21 PROCEDURE — 99214 OFFICE O/P EST MOD 30 MIN: CPT | Performed by: FAMILY MEDICINE

## 2024-10-25 NOTE — ASSESSMENT & PLAN NOTE
Chronic asymptomatic fair control continue atorvastatin 20 mg once a day Zetia 10 mg once a day    Orders:    CBC and differential; Future    Basic metabolic panel; Future    Lipid Panel with Direct LDL reflex; Future

## 2024-10-25 NOTE — ASSESSMENT & PLAN NOTE
Chronic asymptomatic fair control continue current management including lisinopril 20 mg once a day low-salt diet review    Orders:    CBC and differential; Future    Basic metabolic panel; Future

## 2024-10-25 NOTE — ASSESSMENT & PLAN NOTE
Patient follow-up with the urology    Orders:    CBC and differential; Future    Basic metabolic panel; Future

## 2024-10-25 NOTE — PROGRESS NOTES
Ambulatory Visit  Name: Tacos Asencio      : 1960      MRN: [id number]  Encounter Provider: Leona Ha MD  Encounter Date: 10/21/2024   Encounter department: [facility]      Assessment & Plan  Mixed hyperlipidemia  Chronic asymptomatic fair control continue atorvastatin 20 mg once a day Zetia 10 mg once a day    Orders:    CBC and differential; Future    Basic metabolic panel; Future    Lipid Panel with Direct LDL reflex; Future    Encounter for immunization    Orders:    Fluzone High-Dose 0.5 mL IM    CBC and differential; Future    Basic metabolic panel; Future    Need for influenza vaccination    Orders:    Fluzone High-Dose 0.5 mL IM    CBC and differential; Future    Basic metabolic panel; Future    Elevated PSA  Patient follow-up with the urology    Orders:    CBC and differential; Future    Basic metabolic panel; Future    Essential hypertension  Chronic asymptomatic fair control continue current management including lisinopril 20 mg once a day low-salt diet review    Orders:    CBC and differential; Future    Basic metabolic panel; Future    Screening for thyroid disorder    Orders:    TSH, 3rd generation with Free T4 reflex; Future    MIR (obstructive sleep apnea)  Discussed proper sleep position important to lose weight              History of Present Illness     Patient here follow-up with a chronic condition compliant with the medication tolerated well without side effect no new concern recent blood work review      Review of Systems   Constitutional:  Negative for chills and fever.   HENT:  Negative for ear pain and sore throat.    Eyes:  Negative for pain and visual disturbance.   Respiratory:  Negative for cough and shortness of breath.    Cardiovascular:  Negative for chest pain and palpitations.   Gastrointestinal:  Negative for abdominal pain, constipation, diarrhea and vomiting.   Genitourinary:  Negative for dysuria and hematuria.   Musculoskeletal:   "Negative for gait problem.   Skin:  Negative for color change and rash.   Neurological:  Negative for seizures and syncope.   All other systems reviewed and are negative.    Objective     /80 (BP Location: Left arm, Patient Position: Sitting)   Pulse 70   Temp 98.4 °F (36.9 °C) (Tympanic)   Ht 5' 9\" (1.753 m)   Wt 95.3 kg (210 lb)   SpO2 97%   BMI 31.01 kg/m²     Physical Exam  Vitals and nursing note reviewed.   Constitutional:       General: He is not in acute distress.     Appearance: He is well-developed. He is not diaphoretic.   HENT:      Head: Normocephalic.      Right Ear: Tympanic membrane and external ear normal.      Left Ear: Tympanic membrane and external ear normal.      Nose: No rhinorrhea.      Mouth/Throat:      Pharynx: No posterior oropharyngeal erythema.   Eyes:      General:         Right eye: No discharge.         Left eye: No discharge.      Conjunctiva/sclera: Conjunctivae normal.   Neck:      Vascular: No JVD.   Cardiovascular:      Rate and Rhythm: Normal rate and regular rhythm.      Heart sounds: Normal heart sounds.      No gallop.   Pulmonary:      Effort: Pulmonary effort is normal. No respiratory distress.      Breath sounds: Normal breath sounds. No wheezing.   Abdominal:      General: There is no distension.      Palpations: Abdomen is soft.      Tenderness: There is no abdominal tenderness. There is no rebound.   Musculoskeletal:         General: No tenderness.      Cervical back: Normal range of motion and neck supple.   Lymphadenopathy:      Cervical: No cervical adenopathy.   Skin:     General: Skin is warm.      Findings: No rash.   Neurological:      Mental Status: He is alert and oriented to person, place, and time.         Leona Ha MD     "

## 2024-11-10 ENCOUNTER — VBI (OUTPATIENT)
Dept: ADMINISTRATIVE | Facility: OTHER | Age: 64
End: 2024-11-10

## 2024-11-10 NOTE — TELEPHONE ENCOUNTER
11/10/24 6:17 PM     Chart reviewed for   Controlling High Blood Pressure    was/were submitted to the patient's insurance.     HARVEY HAQ MA   [facility]

## 2024-11-18 ENCOUNTER — APPOINTMENT (OUTPATIENT)
Dept: LAB | Facility: MEDICAL CENTER | Age: 64
End: 2024-11-18
Payer: COMMERCIAL

## 2024-11-18 DIAGNOSIS — Z23 ENCOUNTER FOR IMMUNIZATION: ICD-10-CM

## 2024-11-18 DIAGNOSIS — N30.00 ACUTE CYSTITIS WITHOUT HEMATURIA: ICD-10-CM

## 2024-11-18 DIAGNOSIS — Z23 NEED FOR INFLUENZA VACCINATION: ICD-10-CM

## 2024-11-18 DIAGNOSIS — Z85.46 HISTORY OF PROSTATE CANCER: ICD-10-CM

## 2024-11-18 DIAGNOSIS — Z13.29 SCREENING FOR THYROID DISORDER: ICD-10-CM

## 2024-11-18 DIAGNOSIS — E78.2 MIXED HYPERLIPIDEMIA: ICD-10-CM

## 2024-11-18 DIAGNOSIS — R97.20 ELEVATED PSA: ICD-10-CM

## 2024-11-18 DIAGNOSIS — I10 ESSENTIAL HYPERTENSION: ICD-10-CM

## 2024-11-18 LAB
ANION GAP SERPL CALCULATED.3IONS-SCNC: 7 MMOL/L (ref 4–13)
BASOPHILS # BLD AUTO: 0.12 THOUSANDS/ÂΜL (ref 0–0.1)
BASOPHILS NFR BLD AUTO: 2 % (ref 0–1)
BUN SERPL-MCNC: 26 MG/DL (ref 5–25)
CALCIUM SERPL-MCNC: 9.3 MG/DL (ref 8.4–10.2)
CHLORIDE SERPL-SCNC: 103 MMOL/L (ref 96–108)
CHOLEST SERPL-MCNC: 162 MG/DL (ref ?–200)
CO2 SERPL-SCNC: 30 MMOL/L (ref 21–32)
CREAT SERPL-MCNC: 1.16 MG/DL (ref 0.6–1.3)
EOSINOPHIL # BLD AUTO: 0.25 THOUSAND/ÂΜL (ref 0–0.61)
EOSINOPHIL NFR BLD AUTO: 3 % (ref 0–6)
ERYTHROCYTE [DISTWIDTH] IN BLOOD BY AUTOMATED COUNT: 12.7 % (ref 11.6–15.1)
GFR SERPL CREATININE-BSD FRML MDRD: 65 ML/MIN/1.73SQ M
GLUCOSE P FAST SERPL-MCNC: 104 MG/DL (ref 65–99)
HCT VFR BLD AUTO: 44.2 % (ref 36.5–49.3)
HDLC SERPL-MCNC: 42 MG/DL
HGB BLD-MCNC: 14.7 G/DL (ref 12–17)
IMM GRANULOCYTES # BLD AUTO: 0.02 THOUSAND/UL (ref 0–0.2)
IMM GRANULOCYTES NFR BLD AUTO: 0 % (ref 0–2)
LDLC SERPL CALC-MCNC: 93 MG/DL (ref 0–100)
LYMPHOCYTES # BLD AUTO: 2.46 THOUSANDS/ÂΜL (ref 0.6–4.47)
LYMPHOCYTES NFR BLD AUTO: 32 % (ref 14–44)
MCH RBC QN AUTO: 30.4 PG (ref 26.8–34.3)
MCHC RBC AUTO-ENTMCNC: 33.3 G/DL (ref 31.4–37.4)
MCV RBC AUTO: 92 FL (ref 82–98)
MONOCYTES # BLD AUTO: 0.51 THOUSAND/ÂΜL (ref 0.17–1.22)
MONOCYTES NFR BLD AUTO: 7 % (ref 4–12)
NEUTROPHILS # BLD AUTO: 4.45 THOUSANDS/ÂΜL (ref 1.85–7.62)
NEUTS SEG NFR BLD AUTO: 56 % (ref 43–75)
NRBC BLD AUTO-RTO: 0 /100 WBCS
PLATELET # BLD AUTO: 247 THOUSANDS/UL (ref 149–390)
PMV BLD AUTO: 10.4 FL (ref 8.9–12.7)
POTASSIUM SERPL-SCNC: 5 MMOL/L (ref 3.5–5.3)
PSA SERPL-MCNC: 10.34 NG/ML (ref 0–4)
RBC # BLD AUTO: 4.83 MILLION/UL (ref 3.88–5.62)
SODIUM SERPL-SCNC: 140 MMOL/L (ref 135–147)
TRIGL SERPL-MCNC: 134 MG/DL (ref ?–150)
TSH SERPL DL<=0.05 MIU/L-ACNC: 0.94 UIU/ML (ref 0.45–4.5)
WBC # BLD AUTO: 7.81 THOUSAND/UL (ref 4.31–10.16)

## 2024-11-18 PROCEDURE — 85025 COMPLETE CBC W/AUTO DIFF WBC: CPT

## 2024-11-18 PROCEDURE — 80061 LIPID PANEL: CPT

## 2024-11-18 PROCEDURE — 84153 ASSAY OF PSA TOTAL: CPT

## 2024-11-18 PROCEDURE — 87086 URINE CULTURE/COLONY COUNT: CPT

## 2024-11-18 PROCEDURE — 84443 ASSAY THYROID STIM HORMONE: CPT

## 2024-11-18 PROCEDURE — 36415 COLL VENOUS BLD VENIPUNCTURE: CPT

## 2024-11-18 PROCEDURE — 80048 BASIC METABOLIC PNL TOTAL CA: CPT

## 2024-11-19 LAB — BACTERIA UR CULT: NORMAL

## 2024-11-20 ENCOUNTER — RESULTS FOLLOW-UP (OUTPATIENT)
Dept: UROLOGY | Facility: MEDICAL CENTER | Age: 64
End: 2024-11-20

## 2024-11-20 NOTE — TELEPHONE ENCOUNTER
----- Message from Thierry Anthony MD sent at 11/20/2024  9:38 AM EST -----  Tell pt: Good, PSA coming down.  It was real high in September because of recent infection.  All discussed more while we do a cystoscopy later on    Spoke with pt regarding PSA and bladder infection.  Pt requested antibiotics prior to cystoscopy.  Informed him that I would send a message to Dr Anthony .

## 2024-11-22 ENCOUNTER — OFFICE VISIT (OUTPATIENT)
Dept: FAMILY MEDICINE CLINIC | Facility: CLINIC | Age: 64
End: 2024-11-22
Payer: COMMERCIAL

## 2024-11-22 VITALS
HEIGHT: 69 IN | WEIGHT: 215.2 LBS | OXYGEN SATURATION: 96 % | TEMPERATURE: 97.6 F | DIASTOLIC BLOOD PRESSURE: 80 MMHG | BODY MASS INDEX: 31.87 KG/M2 | SYSTOLIC BLOOD PRESSURE: 120 MMHG | HEART RATE: 62 BPM

## 2024-11-22 DIAGNOSIS — N18.2 CKD (CHRONIC KIDNEY DISEASE) STAGE 2, GFR 60-89 ML/MIN: Primary | ICD-10-CM

## 2024-11-22 DIAGNOSIS — R73.01 IFG (IMPAIRED FASTING GLUCOSE): ICD-10-CM

## 2024-11-22 DIAGNOSIS — Z13.29 SCREENING FOR THYROID DISORDER: ICD-10-CM

## 2024-11-22 DIAGNOSIS — I10 ESSENTIAL HYPERTENSION: ICD-10-CM

## 2024-11-22 DIAGNOSIS — K21.9 GASTROESOPHAGEAL REFLUX DISEASE WITHOUT ESOPHAGITIS: ICD-10-CM

## 2024-11-22 DIAGNOSIS — E78.2 MIXED HYPERLIPIDEMIA: ICD-10-CM

## 2024-11-22 PROCEDURE — 99214 OFFICE O/P EST MOD 30 MIN: CPT | Performed by: FAMILY MEDICINE

## 2024-11-22 NOTE — ASSESSMENT & PLAN NOTE
Lab Results   Component Value Date    EGFR 65 11/18/2024    EGFR 75 09/20/2024    EGFR 72 09/05/2024    CREATININE 1.16 11/18/2024    CREATININE 1.03 09/20/2024    CREATININE 1.13 09/05/2024   Chronic asymptomatic slight decline in the GFR compared with before discussed well hydration avoid NSAID blood pressure at the goal    Orders:    Basic metabolic panel; Future    CBC and differential; Future

## 2024-11-22 NOTE — ASSESSMENT & PLAN NOTE
Chronic asymptomatic fair control continue current management including omeprazole and famotidine

## 2024-11-22 NOTE — ASSESSMENT & PLAN NOTE
Orders:    Basic metabolic panel; Future    CBC and differential; Future    Lipid Panel with Direct LDL reflex; Future

## 2024-11-22 NOTE — PROGRESS NOTES
Name: Tacos Asencio      : 1960      MRN: [id number]  Encounter Provider: Leona Ha MD  Encounter Date: 2024   Encounter department: [facility]      Assessment & Plan  CKD (chronic kidney disease) stage 2, GFR 60-89 ml/min  Lab Results   Component Value Date    EGFR 65 2024    EGFR 75 2024    EGFR 72 2024    CREATININE 1.16 2024    CREATININE 1.03 2024    CREATININE 1.13 2024   Chronic asymptomatic slight decline in the GFR compared with before discussed well hydration avoid NSAID blood pressure at the goal    Orders:    Basic metabolic panel; Future    CBC and differential; Future    IFG (impaired fasting glucose)  Chronic asymptomatic fair control low-carb diet discussed with the patient         Gastroesophageal reflux disease without esophagitis  Chronic asymptomatic fair control continue current management including omeprazole and famotidine         Essential hypertension    Orders:    Basic metabolic panel; Future    CBC and differential; Future    Mixed hyperlipidemia    Orders:    Basic metabolic panel; Future    CBC and differential; Future    Lipid Panel with Direct LDL reflex; Future    Screening for thyroid disorder    Orders:    TSH, 3rd generation with Free T4 reflex; Future         History of Present Illness     Patient here follow-up with a chronic condition compliant with the medication fighters well without side effect no new concern recent blood work discussed with patient      Review of Systems   Constitutional:  Negative for chills and fever.   HENT:  Negative for ear pain and sore throat.    Eyes:  Negative for pain and visual disturbance.   Respiratory:  Negative for cough and shortness of breath.    Cardiovascular:  Negative for chest pain and palpitations.   Gastrointestinal:  Negative for abdominal pain, constipation, diarrhea and vomiting.   Genitourinary:  Negative for dysuria and hematuria.  "  Musculoskeletal:  Negative for gait problem.   Skin:  Negative for color change and rash.   Neurological:  Negative for seizures and syncope.   All other systems reviewed and are negative.    Objective     /80   Pulse 62   Temp 97.6 °F (36.4 °C) (Tympanic)   Ht 5' 9\" (1.753 m)   Wt 97.6 kg (215 lb 3.2 oz)   SpO2 96%   BMI 31.78 kg/m²     Physical Exam  Vitals and nursing note reviewed.   Constitutional:       General: He is not in acute distress.     Appearance: He is well-developed. He is not diaphoretic.   HENT:      Head: Normocephalic.      Right Ear: Tympanic membrane and external ear normal.      Left Ear: Tympanic membrane and external ear normal.      Nose: No rhinorrhea.      Mouth/Throat:      Pharynx: No posterior oropharyngeal erythema.   Eyes:      General:         Right eye: No discharge.         Left eye: No discharge.      Conjunctiva/sclera: Conjunctivae normal.   Neck:      Vascular: No JVD.   Cardiovascular:      Rate and Rhythm: Normal rate and regular rhythm.      Heart sounds: Normal heart sounds. No murmur heard.     No gallop.   Pulmonary:      Effort: Pulmonary effort is normal. No respiratory distress.      Breath sounds: Normal breath sounds. No wheezing.   Abdominal:      General: There is no distension.      Palpations: Abdomen is soft.      Tenderness: There is no abdominal tenderness. There is no rebound.   Musculoskeletal:         General: No tenderness.      Cervical back: Normal range of motion and neck supple.   Lymphadenopathy:      Cervical: No cervical adenopathy.   Skin:     General: Skin is warm.      Findings: No rash.   Neurological:      Mental Status: He is alert and oriented to person, place, and time.         Leona Ha MD     "

## 2024-11-25 DIAGNOSIS — K21.9 GASTROESOPHAGEAL REFLUX DISEASE WITHOUT ESOPHAGITIS: ICD-10-CM

## 2024-11-27 RX ORDER — FAMOTIDINE 20 MG/1
20 TABLET, FILM COATED ORAL 2 TIMES DAILY
Qty: 60 TABLET | Refills: 2 | Status: SHIPPED | OUTPATIENT
Start: 2024-11-27

## 2024-12-10 ENCOUNTER — OFFICE VISIT (OUTPATIENT)
Dept: GASTROENTEROLOGY | Facility: MEDICAL CENTER | Age: 64
End: 2024-12-10
Payer: COMMERCIAL

## 2024-12-10 VITALS
WEIGHT: 215.17 LBS | OXYGEN SATURATION: 98 % | SYSTOLIC BLOOD PRESSURE: 122 MMHG | HEART RATE: 66 BPM | HEIGHT: 69 IN | DIASTOLIC BLOOD PRESSURE: 78 MMHG | BODY MASS INDEX: 31.87 KG/M2 | TEMPERATURE: 97.8 F

## 2024-12-10 DIAGNOSIS — K29.70 HELICOBACTER PYLORI GASTRITIS: ICD-10-CM

## 2024-12-10 DIAGNOSIS — B96.81 HELICOBACTER PYLORI GASTRITIS: ICD-10-CM

## 2024-12-10 DIAGNOSIS — K21.9 GASTROESOPHAGEAL REFLUX DISEASE WITHOUT ESOPHAGITIS: Primary | ICD-10-CM

## 2024-12-10 DIAGNOSIS — Z86.0100 PERSONAL HISTORY OF COLON POLYPS, UNSPECIFIED: ICD-10-CM

## 2024-12-10 DIAGNOSIS — K76.0 FATTY LIVER: ICD-10-CM

## 2024-12-10 PROCEDURE — 99214 OFFICE O/P EST MOD 30 MIN: CPT | Performed by: NURSE PRACTITIONER

## 2024-12-10 NOTE — PROGRESS NOTES
Name: Tacos Asencio      : 1960      MRN: [id number]  Encounter Provider: AVIVA Person  Encounter Date: 12/10/2024   Encounter department: [facility]  :  Assessment & Plan  Gastroesophageal reflux disease without esophagitis  Currently taking omeprazole 40 mg daily.  Reports he has occasional breakthrough reflux.  Does use Pepcid nightly as needed.  We did discuss trying to reduce his omeprazole 20 mg daily but he would prefer keeping it at 40 mg daily for now.  Agreed to reassess this in 6 months.  Denies any nausea, vomiting or dysphagia.    -Antireflux diet  -Continue omeprazole 40 mg daily.  Consider reducing dose to lowest effective dose at next visit.  -Follow-up in office in 6 months or sooner       Helicobacter pylori gastritis  History of H. pylori gastritis. He was treated with quadruple therapy several months ago.  Stool to assess for eradication was negative.    -Continue omeprazole 40 mg daily       Fatty liver  Recent CT abdomen pelvis noted fatty liver.  LFTs are normal.  Fib 4 score is 0.98 which is low likelihood of advanced fibrosis.  We did discuss disease process, treatment and potential complications.    -Repeat LFTs every 6 months  -Fib 4 score yearly  -Low-fat low-cholesterol diet  -Weight reduction       Personal history of colon polyps, unspecified  Recent colonoscopy noted 8 polyps that were HP's and TA's.  Repeat colonoscopy recommended in 3 years.  BMs are brown and formed daily.  Denies any melena hematochezia.     -Repeat colonoscopy            History of Present Illness   HPI  Tacos Asencio is a 64 y.o. male who presents for follow-up.  He was last seen by myself  for personal history of colon polyps, H. pylori gastritis and GERD.    Newly diagnosed fatty liver.  LFTs are normal.  Fib 4 score is 0.98.  Low likelihood of significant fibrosis.  Feeling well overall.  Recent stool for H. pylori was negative.  He  completed quadruple therapy.  Does get occasional heartburn/reflux symptoms if he eats later at night.  Currently on omeprazole 40 mg daily.  Denies any nausea, vomiting or dysphagia.  History of prostate cancer.  Recent urinary tract infection with PSA of 21.3 but now 10.3.      CT abdomen pelvis 9/24 fatty liver.  Bilateral renal low-attenuation lesions with interval increase in the largest lesion at the lower pole of the kidney representing cysts.  Mucosal thickening in the urinary bladder.    Labs 9/24-CMP normal, PSA was 21.3 but now 10.3, CBC normal with platelets 359.    Fibrosis-4 (FIB-4) Score is: .98    Indication for testing Absence of advanced fibrosis Presence of advanced fibrosis Indeterminate result   NAFLD <2.00 >2.67 2.00-2.67   Hepatitis C <1.45 >3.25 1.45-3.25   Hepatitis B <1.00 >2.65 1.00-2.65     FIB-4 Score Component Values:  Component Value Date   Age: 64 y.o.     AST: 18 U/L 9/20/2024   Platelet: 247 Thousands/uL 11/18/2024   ALT: 24 U/L 9/20/2024        Prior EGD/colonoscopy      Colonoscopy 6/24-8 polyps.  Repeat colonoscopy recommended in 3 years.  Biopsy review revealed HP and TA's.     EGD 12/23-2 cm hiatal hernia, mild gastritis, single 3 mm mucosal nodule observed in the second part of the duodenum which was biopsied.  Repeat EGD recommended in 5 years.  Biopsies were negative for celiac disease.  Biopsies were positive for H. pylori gastritis.  Gold's esophagus with no dysplasia was also noted.     Colonoscopy 6/21-7 subcentimeter polyps.  Recall colonoscopy 3 years.  Biopsies noted tubular adenomas.     EGD 1/21-small sliding hiatal hernia otherwise normal.  Biopsies noted nondysplastic Gold's esophagus.  Negative for H. Pylori    History obtained from: patient    Review of Systems   All other systems reviewed and are negative.    Medical History Reviewed by provider this encounter:  Tobacco  Allergies  Problems  Med Hx  Surg Hx  Fam Hx     .  Current Outpatient  "Medications on File Prior to Visit   Medication Sig Dispense Refill   • aspirin (ECOTRIN LOW STRENGTH) 81 mg EC tablet Take 81 mg by mouth every evening     • atorvastatin (LIPITOR) 20 mg tablet TAKE 1 TABLET BY MOUTH EVERY DAY 90 tablet 1   • ezetimibe (ZETIA) 10 mg tablet TAKE 1 TABLET BY MOUTH EVERY DAY 30 tablet 5   • famotidine (PEPCID) 20 mg tablet TAKE 1 TABLET BY MOUTH TWICE A DAY 60 tablet 2   • lisinopril (ZESTRIL) 20 mg tablet TAKE 1 TABLET BY MOUTH EVERY  tablet 1   • meclizine (ANTIVERT) 25 mg tablet Take 1 tablet (25 mg total) by mouth daily at bedtime 30 tablet 0   • omeprazole (PriLOSEC) 40 MG capsule TAKE 1 CAPSULE (40 MG TOTAL) BY MOUTH DAILY. 90 capsule 1   • methocarbamol (ROBAXIN) 500 mg tablet Take 1 tablet (500 mg total) by mouth 2 (two) times a day (Patient not taking: Reported on 12/10/2024) 20 tablet 0   • phenazopyridine (PYRIDIUM) 200 mg tablet Take 200 mg by mouth 3 (three) times a day as needed for bladder spasms (Patient not taking: Reported on 2024)       No current facility-administered medications on file prior to visit.      Social History     Tobacco Use   • Smoking status: Former     Current packs/day: 0.00     Average packs/day: 0.2 packs/day for 37.0 years (9.2 ttl pk-yrs)     Types: Cigarettes     Start date: 1974     Quit date:      Years since quittin.9     Passive exposure: Never   • Smokeless tobacco: Former   Vaping Use   • Vaping status: Never Used   Substance and Sexual Activity   • Alcohol use: Yes     Comment: 6 x  yearly; rarely   • Drug use: Never   • Sexual activity: Yes     Partners: Female        Objective   /78 (BP Location: Left arm)   Pulse 66   Temp 97.8 °F (36.6 °C)   Ht 5' 9\" (1.753 m)   Wt 97.6 kg (215 lb 2.7 oz)   SpO2 98%   BMI 31.77 kg/m²      Physical Exam  Constitutional:       Appearance: Normal appearance.   Abdominal:      General: Bowel sounds are normal.      Palpations: Abdomen is soft.   Neurological:      " Mental Status: He is alert and oriented to person, place, and time.

## 2024-12-10 NOTE — ASSESSMENT & PLAN NOTE
Currently taking omeprazole 40 mg daily.  Reports he has occasional breakthrough reflux.  Does use Pepcid nightly as needed.  We did discuss trying to reduce his omeprazole 20 mg daily but he would prefer keeping it at 40 mg daily for now.  Agreed to reassess this in 6 months.  Denies any nausea, vomiting or dysphagia.    -Antireflux diet  -Continue omeprazole 40 mg daily.  Consider reducing dose to lowest effective dose at next visit.  -Follow-up in office in 6 months or sooner

## 2024-12-10 NOTE — ASSESSMENT & PLAN NOTE
Recent CT abdomen pelvis noted fatty liver.  LFTs are normal.  Fib 4 score is 0.98 which is low likelihood of advanced fibrosis.  We did discuss disease process, treatment and potential complications.    -Repeat LFTs every 6 months  -Fib 4 score yearly  -Low-fat low-cholesterol diet  -Weight reduction

## 2025-01-02 ENCOUNTER — TELEPHONE (OUTPATIENT)
Age: 65
End: 2025-01-02

## 2025-01-02 DIAGNOSIS — N30.00 ACUTE CYSTITIS WITHOUT HEMATURIA: Primary | ICD-10-CM

## 2025-01-02 NOTE — TELEPHONE ENCOUNTER
Upcoming cystoscopy 1/8/25;  Patient calling for antibiotic prior to upcoming cystoscopy;  he states that he has ended up with a UTI both times that he has had a cystoscopy performed.    Pharmacy confirmed.

## 2025-01-06 NOTE — TELEPHONE ENCOUNTER
Patient called in stating he went to the [facility] pharmacy to  the medication but they claim they don't have anything for him. Please call patient once updated. Please advise.

## 2025-01-06 NOTE — TELEPHONE ENCOUNTER
Antibiotic resent to [facility]- medication was not to be started until 1/8/25 which is likely why the pharmacy did not have it yet.

## 2025-01-06 NOTE — TELEPHONE ENCOUNTER
Call placed. Spoke to pt and advised an new script was sent to his preferred pharmacy. Advised if any other questions or concerns to give our office a call.

## 2025-01-08 ENCOUNTER — PROCEDURE VISIT (OUTPATIENT)
Dept: UROLOGY | Facility: MEDICAL CENTER | Age: 65
End: 2025-01-08
Payer: COMMERCIAL

## 2025-01-08 VITALS
OXYGEN SATURATION: 99 % | WEIGHT: 212 LBS | HEART RATE: 65 BPM | BODY MASS INDEX: 31.4 KG/M2 | HEIGHT: 69 IN | SYSTOLIC BLOOD PRESSURE: 132 MMHG | DIASTOLIC BLOOD PRESSURE: 80 MMHG

## 2025-01-08 DIAGNOSIS — N39.0 URINARY TRACT INFECTION WITHOUT HEMATURIA, SITE UNSPECIFIED: ICD-10-CM

## 2025-01-08 DIAGNOSIS — N13.8 BPH WITH URINARY OBSTRUCTION: ICD-10-CM

## 2025-01-08 DIAGNOSIS — Z85.46 HISTORY OF PROSTATE CANCER: ICD-10-CM

## 2025-01-08 DIAGNOSIS — N40.1 BPH WITH URINARY OBSTRUCTION: ICD-10-CM

## 2025-01-08 DIAGNOSIS — R97.20 ELEVATED PROSTATE SPECIFIC ANTIGEN (PSA): ICD-10-CM

## 2025-01-08 DIAGNOSIS — Z85.51 HISTORY OF BLADDER CANCER: Primary | ICD-10-CM

## 2025-01-08 PROCEDURE — 99213 OFFICE O/P EST LOW 20 MIN: CPT | Performed by: UROLOGY

## 2025-01-08 PROCEDURE — 52000 CYSTOURETHROSCOPY: CPT | Performed by: UROLOGY

## 2025-01-08 PROCEDURE — 81003 URINALYSIS AUTO W/O SCOPE: CPT | Performed by: UROLOGY

## 2025-01-08 RX ORDER — CEFUROXIME AXETIL 250 MG/1
250 TABLET ORAL EVERY 12 HOURS SCHEDULED
Qty: 10 TABLET | Refills: 0 | Status: SHIPPED | OUTPATIENT
Start: 2025-01-08 | End: 2025-01-13

## 2025-01-08 NOTE — PROGRESS NOTES
HISTORY:    Follow-up several issues    Info September 2024:  1.  Follow-up bladder cancer.  In February 2023, underwent TUR of tumor at the left trigone, pathology showed noninvasive, probable high-grade.     2.  Prostate cancer, on active surveillance.  Initial biopsy in March 2020 showed 2 cores of B[place] 6 on the left side of the prostate.     Repeat biopsy in November 2022 showed 2 areas of [place] 6 cancer     3.  In September 2024, had UTI, symptoms were consistent with acute prostatitis.  Burning urgency frequency perineal discomfort, radiating pain.  Urine showed Enterococcus.  Finally better after 2 weeks of antibiotics     PSA chronically elevated.  However, spiked up to 21.3 in September 2024 from UTI.  Repeat was 10.3 in November 2024    4.  BPH, tolerates okay without meds    Regarding UTIs, he has requested taking antibiotics before any routine office cystoscopy, cephalexin for this 1     Cystoscopy     Date/Time  1/8/2025 9:30 AM     Performed by  Thierry Anthony MD   Authorized by  Thierry Anthony MD         Procedure Details:  Procedure type: cystoscopy    Patient tolerance: Patient tolerated the procedure well with no immediate complications    Additional Procedure Details:      Patient presents for cystoscopy.  I have discussed the reasons for doing the exam, and the potential risks and complications.  Patient expressed understanding, and signed informed consent document.    The patient was carefully  positioned supine on the examining table.  Sterile preparation was performed on the urethra.  Xylocaine jelly was instilled and left  Indwelling for the procedure.  The 15 Telugu flexible cystoscope was passed with the following findings:      Urethra: No stricture    Prostate:  lateral lobes significant large, obstructing                  Bladder: Mild trabeculation, no lesions, tumor, or stones.     Residual urine: Minimal    Patient tolerated the procedure well and was escorted from the examining  "table.             ASSESSMENT / PLAN:    1.  No recurrence of bladder cancer    2.  PSA which has been chronically elevated for a long time, came down to 10.3 in November.    Repeat PSA in March 2025    3.  Cystoscopy 5 months, prescription sent to pharmacy to start cefuroxime twice per day, 2 days before procedure        Review of Systems      Objective:     Physical Exam      0   Lab Value Date/Time    PSA 10.340 (H) 11/18/2024 0856    PSA 21.376 (H) 09/20/2024 1041    PSA 9.65 (H) 03/04/2024 0806    PSA 9.8 (H) 09/07/2022 0635    PSA 6.5 (H) 04/21/2022 0636    PSA 6.2 (H) 05/14/2021 0857    PSA 6.5 (H) 12/03/2019 0709    PSA 5.4 (H) 11/07/2019 0904   ]  BUN   Date Value Ref Range Status   11/18/2024 26 (H) 5 - 25 mg/dL Final   09/05/2024 28 7 - 28 mg/dL Final     Creatinine   Date Value Ref Range Status   11/18/2024 1.16 0.60 - 1.30 mg/dL Final     Comment:     Standardized to IDMS reference method   09/05/2024 1.13 0.53 - 1.30 mg/dL Final     No components found for: \"CBC\"    Patient Active Problem List   Diagnosis    CAD in native artery    History of acute inferior wall MI 2011,s/p BMS RCA    Presence of bare metal stent in right coronary artery    Essential hypertension    Hyperlipidemia    Chronic left-sided low back pain without sciatica    Fatty liver    BPH with urinary obstruction    Gastroesophageal reflux disease without esophagitis    Elevated PSA    Prostate nodule without urinary obstruction    Bacteremia due to Escherichia coli    Bursitis of shoulder    Contusion of left shoulder    Injury of tendon of rotator cuff    Adenocarcinoma of prostate (HCC)    Encounter for well adult exam with abnormal findings    Myalgia with higher doses statins    Colon polyp    COVID-19 virus infection    Gold's esophagus    Bloating    IFG (impaired fasting glucose)    Left upper quadrant abdominal mass    Abnormal urine findings    Bradycardia    Lesion of urinary bladder    PONV (postoperative nausea and " vomiting)    MIR (obstructive sleep apnea)    Class 1 obesity with serious comorbidity and body mass index (BMI) of 30.0 to 30.9 in adult    Epigastric pain    Hematemesis with nausea    Polycystic kidney disease    CKD (chronic kidney disease) stage 2, GFR 60-89 ml/min    Vertigo    History of bladder cancer    Hiatal hernia    H. pylori infection    Frequent urination    Acute bilateral thoracic back pain        Patient ID: Tacos Asencio is a 64 y.o. male.      Current Outpatient Medications:     aspirin (ECOTRIN LOW STRENGTH) 81 mg EC tablet, Take 81 mg by mouth every evening, Disp: , Rfl:     atorvastatin (LIPITOR) 20 mg tablet, TAKE 1 TABLET BY MOUTH EVERY DAY, Disp: 90 tablet, Rfl: 1    cephalexin (KEFLEX) 250 mg capsule, Take 2 capsules (500 mg total) by mouth every 12 (twelve) hours for 5 days, Disp: 20 capsule, Rfl: 0    ezetimibe (ZETIA) 10 mg tablet, TAKE 1 TABLET BY MOUTH EVERY DAY, Disp: 30 tablet, Rfl: 5    famotidine (PEPCID) 20 mg tablet, TAKE 1 TABLET BY MOUTH TWICE A DAY, Disp: 60 tablet, Rfl: 2    lisinopril (ZESTRIL) 20 mg tablet, TAKE 1 TABLET BY MOUTH EVERY DAY, Disp: 100 tablet, Rfl: 1    meclizine (ANTIVERT) 25 mg tablet, Take 1 tablet (25 mg total) by mouth daily at bedtime, Disp: 30 tablet, Rfl: 0    methocarbamol (ROBAXIN) 500 mg tablet, Take 1 tablet (500 mg total) by mouth 2 (two) times a day (Patient not taking: Reported on 12/10/2024), Disp: 20 tablet, Rfl: 0    omeprazole (PriLOSEC) 40 MG capsule, TAKE 1 CAPSULE (40 MG TOTAL) BY MOUTH DAILY., Disp: 90 capsule, Rfl: 1    phenazopyridine (PYRIDIUM) 200 mg tablet, Take 200 mg by mouth 3 (three) times a day as needed for bladder spasms (Patient not taking: Reported on 9/13/2024), Disp: , Rfl:

## 2025-01-24 DIAGNOSIS — I10 ESSENTIAL HYPERTENSION: ICD-10-CM

## 2025-01-24 RX ORDER — LISINOPRIL 20 MG/1
20 TABLET ORAL DAILY
Qty: 90 TABLET | Refills: 1 | Status: SHIPPED | OUTPATIENT
Start: 2025-01-24

## 2025-02-08 DIAGNOSIS — I25.10 CAD IN NATIVE ARTERY: ICD-10-CM

## 2025-02-08 DIAGNOSIS — Z95.5 PRESENCE OF BARE METAL STENT IN RIGHT CORONARY ARTERY: ICD-10-CM

## 2025-02-08 DIAGNOSIS — I10 ESSENTIAL HYPERTENSION: ICD-10-CM

## 2025-02-10 RX ORDER — EZETIMIBE 10 MG/1
10 TABLET ORAL DAILY
Qty: 90 TABLET | Refills: 3 | Status: SHIPPED | OUTPATIENT
Start: 2025-02-10

## 2025-02-16 DIAGNOSIS — E78.2 MIXED HYPERLIPIDEMIA: ICD-10-CM

## 2025-02-17 RX ORDER — ATORVASTATIN CALCIUM 20 MG/1
20 TABLET, FILM COATED ORAL DAILY
Qty: 90 TABLET | Refills: 1 | Status: SHIPPED | OUTPATIENT
Start: 2025-02-17

## 2025-02-22 DIAGNOSIS — R10.13 EPIGASTRIC PAIN: ICD-10-CM

## 2025-02-23 RX ORDER — OMEPRAZOLE 40 MG/1
40 CAPSULE, DELAYED RELEASE ORAL DAILY
Qty: 90 CAPSULE | Refills: 1 | Status: SHIPPED | OUTPATIENT
Start: 2025-02-23

## 2025-03-10 DIAGNOSIS — K21.9 GASTROESOPHAGEAL REFLUX DISEASE WITHOUT ESOPHAGITIS: ICD-10-CM

## 2025-03-11 ENCOUNTER — TELEPHONE (OUTPATIENT)
Age: 65
End: 2025-03-11

## 2025-03-11 RX ORDER — FAMOTIDINE 20 MG/1
20 TABLET, FILM COATED ORAL 2 TIMES DAILY
Qty: 60 TABLET | Refills: 5 | Status: SHIPPED | OUTPATIENT
Start: 2025-03-11

## 2025-03-11 NOTE — TELEPHONE ENCOUNTER
PA for famotidine (PEPCID) 20 mg tablet  SUBMITTED to [facility]    via    []CMM-KEY:   [x]Surescripts-Case ID #   []Availity-Auth ID # NDC #   []Faxed to plan   []Other website   []Phone call Case ID #     [x]PA sent as URGENT    All office notes, labs and other pertaining documents and studies sent. Clinical questions answered. Awaiting determination from insurance company.     Turnaround time for your insurance to make a decision on your Prior Authorization can take 7-21 business days.

## 2025-03-12 NOTE — TELEPHONE ENCOUNTER
PA for     famotidine (PEPCID) 20 mg tablet   DENIED    Reason:(Screenshot if applicable)      Message sent to office clinical pool Yes    Denial letter scanned into Media Yes    Appeal started No (Provider will need to decide if appeal is warranted and send clinical documentation to Prior Authorization Team for initiation.)    **Please follow up with your patient regarding denial and next steps**

## 2025-03-24 ENCOUNTER — OFFICE VISIT (OUTPATIENT)
Dept: FAMILY MEDICINE CLINIC | Facility: CLINIC | Age: 65
End: 2025-03-24
Payer: COMMERCIAL

## 2025-03-24 VITALS
DIASTOLIC BLOOD PRESSURE: 80 MMHG | BODY MASS INDEX: 32.73 KG/M2 | TEMPERATURE: 98.5 F | OXYGEN SATURATION: 98 % | SYSTOLIC BLOOD PRESSURE: 140 MMHG | HEART RATE: 62 BPM | WEIGHT: 221 LBS | HEIGHT: 69 IN

## 2025-03-24 DIAGNOSIS — I10 ESSENTIAL HYPERTENSION: ICD-10-CM

## 2025-03-24 DIAGNOSIS — D17.1 LIPOMA OF TORSO: Primary | ICD-10-CM

## 2025-03-24 DIAGNOSIS — C61 ADENOCARCINOMA OF PROSTATE (HCC): ICD-10-CM

## 2025-03-24 PROCEDURE — 99214 OFFICE O/P EST MOD 30 MIN: CPT | Performed by: FAMILY MEDICINE

## 2025-03-26 PROBLEM — D17.1 LIPOMA OF TORSO: Status: ACTIVE | Noted: 2025-03-26

## 2025-03-26 NOTE — PROGRESS NOTES
Name: Tacos Asencio      : 1960      MRN: [id number]  Encounter Provider: Leona Ha MD  Encounter Date: 3/24/2025   Encounter department: [facility]  :  Assessment & Plan  Lipoma of torso  Symptomatic getting bigger previous ultrasound was done and it showed lipoma change in the size compared to before recommend to be evaluated by surgeon possible remove  Orders:  •  Ambulatory Referral to General Surgery; Future    Adenocarcinoma of prostate (HCC)  Patient follow-up with the urology periodically       Essential hypertension  Chronic asymptomatic blood pressure today 140/80 I rechecked it personally continue to be 140/80 patient on lisinopril 20 mg once a day recommend to continue current management recommend to monitor blood pressure at home and keep log will reevaluate if persistent to be abnormal to adjust medication patient agree                History of Present Illness   Patient here for follow-up and concerned about a lump on his upper back is getting bigger no change in the color of the skin his wife noticed the size of the skin changing and concerned about it patient history of hypertension on lisinopril blood pressure today is 140/80 patient asymptomatic      Review of Systems   Constitutional:  Negative for chills and fever.   HENT:  Negative for ear pain and sore throat.    Eyes:  Negative for pain and visual disturbance.   Respiratory:  Negative for cough and shortness of breath.    Cardiovascular:  Negative for chest pain and palpitations.   Gastrointestinal:  Negative for abdominal pain, constipation, diarrhea and vomiting.   Genitourinary:  Negative for dysuria and hematuria.   Musculoskeletal:  Negative for gait problem.   Skin:  Negative for color change and rash.   Neurological:  Negative for seizures and syncope.   All other systems reviewed and are negative.      Objective   /80 (BP Location: Left arm, Patient Position: Sitting)   Pulse 62   " Temp 98.5 °F (36.9 °C) (Tympanic)   Ht 5' 9\" (1.753 m)   Wt 100 kg (221 lb)   SpO2 98%   BMI 32.64 kg/m²      Physical Exam  Vitals and nursing note reviewed.   Constitutional:       General: He is not in acute distress.     Appearance: He is well-developed. He is not diaphoretic.   HENT:      Head: Normocephalic.      Right Ear: Tympanic membrane and external ear normal.      Left Ear: Tympanic membrane and external ear normal.      Nose: No rhinorrhea.      Mouth/Throat:      Pharynx: No posterior oropharyngeal erythema.   Eyes:      General:         Right eye: No discharge.         Left eye: No discharge.      Conjunctiva/sclera: Conjunctivae normal.   Neck:      Vascular: No JVD.   Cardiovascular:      Rate and Rhythm: Normal rate and regular rhythm.      Heart sounds: Normal heart sounds. No murmur heard.     No gallop.   Pulmonary:      Effort: Pulmonary effort is normal. No respiratory distress.      Breath sounds: Normal breath sounds. No wheezing.   Abdominal:      General: There is no distension.      Palpations: Abdomen is soft.      Tenderness: There is no abdominal tenderness. There is no rebound.   Musculoskeletal:         General: No tenderness.      Cervical back: Normal range of motion and neck supple.   Lymphadenopathy:      Cervical: No cervical adenopathy.   Skin:     General: Skin is warm.      Findings: No rash.          Neurological:      Mental Status: He is alert and oriented to person, place, and time.         "

## 2025-03-26 NOTE — ASSESSMENT & PLAN NOTE
Symptomatic getting bigger previous ultrasound was done and it showed lipoma change in the size compared to before recommend to be evaluated by surgeon possible remove  Orders:  •  Ambulatory Referral to General Surgery; Future

## 2025-03-26 NOTE — ASSESSMENT & PLAN NOTE
Chronic asymptomatic blood pressure today 140/80 I rechecked it personally continue to be 140/80 patient on lisinopril 20 mg once a day recommend to continue current management recommend to monitor blood pressure at home and keep log will reevaluate if persistent to be abnormal to adjust medication patient agree

## 2025-03-27 NOTE — PROGRESS NOTES
Name: Tacos Asencio      : 1960      MRN: [id number]  Encounter Provider: AVIVA Ennis  Encounter Date: 3/31/2025   [facility]  :  Assessment & Plan  Mass of subcutaneous tissue of back  Patient has a subcutaneous mass/lipoma on his mid back about 3.5 to 4 cm. Had an ultrasound in  which shown suggestive of lipoma. Discussed observation vs. surgical excision. Patient would like to wait until after his  vacation and then like this removed. Will plan for a surgical excision of subcutaneous mass on back after that. All risks vs. benefits reviewed. All questions asked and answered.   Orders:    Diet NPO; Sips with meds; Standing    Apply Sequential Compression Device; Standing    Place sequential compression device; Standing    Case request operating room: EXCISION  BIOPSY LESION/MASS MID-BACK; Standing    CBC and differential; Future    Comprehensive metabolic panel; Future    EKG 12 lead; Future    Lipoma of torso    Orders:    Ambulatory Referral to General Surgery    CLEARANCE/ANTICOAGULANTS: Medical and on aspirin 81mg    History of Present Illness   HPI  Tacos Asencio is a 64 y.o. male who presents for a subcutaneous mass of the right upper/mid back. Has had for about 5 years now. Had an ultrasound done of this mass back in  which shown a lipoma. Since then, the mass has grown slightly in size. Denies pain or tenderness. Denies fever, chills.     ULTRASOUND 2021:  FINDINGS:    Targeted sonographic evaluation of the area of palpable concern right upper/mid back was performed.  There is a 4.6 x 3.9 x 1.1 cm oval mass in the subcutaneous tissues which is isoechoic to the adjacent fat.  No additional masses or areas of   shadowing.    Review of Systems   Constitutional:  Negative for chills and fever.   Eyes:  Negative for pain and visual disturbance.   Respiratory:  Negative for cough and shortness of breath.    Cardiovascular:   "Negative for chest pain and palpitations.   Gastrointestinal:  Negative for abdominal pain and vomiting.   Genitourinary:  Negative for dysuria and hematuria.   Musculoskeletal:  Negative for arthralgias and back pain.   Skin:  Negative for color change and rash.   Neurological:  Negative for seizures and syncope.   All other systems reviewed and are negative.         Objective   /82 (BP Location: Left arm, Patient Position: Sitting, Cuff Size: Large)   Pulse 76   Temp 97.5 °F (36.4 °C) (Tympanic)   Resp 16   Ht 5' 9\" (1.753 m)   Wt 100 kg (221 lb 3.2 oz)   SpO2 97%   BMI 32.67 kg/m²      Physical Exam  Vitals and nursing note reviewed.   Constitutional:       General: He is not in acute distress.     Appearance: He is well-developed.   HENT:      Head: Normocephalic and atraumatic.   Eyes:      Conjunctiva/sclera: Conjunctivae normal.   Cardiovascular:      Rate and Rhythm: Normal rate and regular rhythm.      Heart sounds: No murmur heard.  Pulmonary:      Effort: Pulmonary effort is normal. No respiratory distress.      Breath sounds: Normal breath sounds.   Abdominal:      Palpations: Abdomen is soft.      Tenderness: There is no abdominal tenderness.   Musculoskeletal:         General: No swelling.      Cervical back: Neck supple.   Skin:     General: Skin is warm and dry.      Capillary Refill: Capillary refill takes less than 2 seconds.             Comments: About 3.5 to 4 cm soft non tender moveable mass. No skin discoloration.    Neurological:      Mental Status: He is alert and oriented to person, place, and time.   Psychiatric:         Mood and Affect: Mood normal.           "

## 2025-03-31 ENCOUNTER — OFFICE VISIT (OUTPATIENT)
Dept: SURGERY | Facility: CLINIC | Age: 65
End: 2025-03-31
Payer: COMMERCIAL

## 2025-03-31 ENCOUNTER — TELEPHONE (OUTPATIENT)
Age: 65
End: 2025-03-31

## 2025-03-31 VITALS
OXYGEN SATURATION: 97 % | HEIGHT: 69 IN | SYSTOLIC BLOOD PRESSURE: 118 MMHG | TEMPERATURE: 97.5 F | RESPIRATION RATE: 16 BRPM | WEIGHT: 221.2 LBS | DIASTOLIC BLOOD PRESSURE: 82 MMHG | HEART RATE: 76 BPM | BODY MASS INDEX: 32.76 KG/M2

## 2025-03-31 DIAGNOSIS — D17.1 LIPOMA OF TORSO: ICD-10-CM

## 2025-03-31 DIAGNOSIS — R22.2 MASS OF SUBCUTANEOUS TISSUE OF BACK: Primary | ICD-10-CM

## 2025-03-31 PROCEDURE — 99243 OFF/OP CNSLTJ NEW/EST LOW 30: CPT

## 2025-03-31 RX ORDER — SODIUM CHLORIDE, SODIUM LACTATE, POTASSIUM CHLORIDE, CALCIUM CHLORIDE 600; 310; 30; 20 MG/100ML; MG/100ML; MG/100ML; MG/100ML
125 INJECTION, SOLUTION INTRAVENOUS CONTINUOUS
OUTPATIENT
Start: 2025-07-08

## 2025-03-31 NOTE — TELEPHONE ENCOUNTER
Sera called in regards to aspirin and wanted to know if he was taking asprin, but he hasn't taking that since 2023

## 2025-04-01 ENCOUNTER — TELEPHONE (OUTPATIENT)
Dept: FAMILY MEDICINE CLINIC | Facility: CLINIC | Age: 65
End: 2025-04-01

## 2025-04-01 NOTE — TELEPHONE ENCOUNTER
Medical Clearance was received. Patient has surgery on 7/8/25. No pre op scheduled at this time, clearance placed in Dr. Ha's folder.

## 2025-05-14 ENCOUNTER — RESULTS FOLLOW-UP (OUTPATIENT)
Dept: FAMILY MEDICINE CLINIC | Facility: CLINIC | Age: 65
End: 2025-05-14

## 2025-05-14 ENCOUNTER — APPOINTMENT (OUTPATIENT)
Dept: LAB | Facility: MEDICAL CENTER | Age: 65
End: 2025-05-14
Payer: COMMERCIAL

## 2025-05-14 DIAGNOSIS — Z13.29 SCREENING FOR THYROID DISORDER: ICD-10-CM

## 2025-05-14 DIAGNOSIS — E78.2 MIXED HYPERLIPIDEMIA: ICD-10-CM

## 2025-05-14 DIAGNOSIS — N18.2 CKD (CHRONIC KIDNEY DISEASE) STAGE 2, GFR 60-89 ML/MIN: ICD-10-CM

## 2025-05-14 DIAGNOSIS — I10 ESSENTIAL HYPERTENSION: ICD-10-CM

## 2025-05-14 DIAGNOSIS — Z85.46 HISTORY OF PROSTATE CANCER: ICD-10-CM

## 2025-05-14 DIAGNOSIS — N17.9 ACUTE KIDNEY INJURY (HCC): Primary | ICD-10-CM

## 2025-05-14 LAB
ANION GAP SERPL CALCULATED.3IONS-SCNC: 8 MMOL/L (ref 4–13)
BASOPHILS # BLD AUTO: 0.1 THOUSANDS/ÂΜL (ref 0–0.1)
BASOPHILS NFR BLD AUTO: 1 % (ref 0–1)
BUN SERPL-MCNC: 35 MG/DL (ref 5–25)
CALCIUM SERPL-MCNC: 9.1 MG/DL (ref 8.4–10.2)
CHLORIDE SERPL-SCNC: 107 MMOL/L (ref 96–108)
CHOLEST SERPL-MCNC: 179 MG/DL (ref ?–200)
CO2 SERPL-SCNC: 26 MMOL/L (ref 21–32)
CREAT SERPL-MCNC: 1.94 MG/DL (ref 0.6–1.3)
EOSINOPHIL # BLD AUTO: 0.26 THOUSAND/ÂΜL (ref 0–0.61)
EOSINOPHIL NFR BLD AUTO: 3 % (ref 0–6)
ERYTHROCYTE [DISTWIDTH] IN BLOOD BY AUTOMATED COUNT: 12.9 % (ref 11.6–15.1)
GFR SERPL CREATININE-BSD FRML MDRD: 34 ML/MIN/1.73SQ M
GLUCOSE P FAST SERPL-MCNC: 96 MG/DL (ref 65–99)
HCT VFR BLD AUTO: 45.1 % (ref 36.5–49.3)
HDLC SERPL-MCNC: 38 MG/DL
HGB BLD-MCNC: 14.2 G/DL (ref 12–17)
IMM GRANULOCYTES # BLD AUTO: 0.02 THOUSAND/UL (ref 0–0.2)
IMM GRANULOCYTES NFR BLD AUTO: 0 % (ref 0–2)
LDLC SERPL CALC-MCNC: 104 MG/DL (ref 0–100)
LYMPHOCYTES # BLD AUTO: 2.27 THOUSANDS/ÂΜL (ref 0.6–4.47)
LYMPHOCYTES NFR BLD AUTO: 28 % (ref 14–44)
MCH RBC QN AUTO: 29.6 PG (ref 26.8–34.3)
MCHC RBC AUTO-ENTMCNC: 31.5 G/DL (ref 31.4–37.4)
MCV RBC AUTO: 94 FL (ref 82–98)
MONOCYTES # BLD AUTO: 0.63 THOUSAND/ÂΜL (ref 0.17–1.22)
MONOCYTES NFR BLD AUTO: 8 % (ref 4–12)
NEUTROPHILS # BLD AUTO: 4.84 THOUSANDS/ÂΜL (ref 1.85–7.62)
NEUTS SEG NFR BLD AUTO: 60 % (ref 43–75)
NRBC BLD AUTO-RTO: 0 /100 WBCS
PLATELET # BLD AUTO: 249 THOUSANDS/UL (ref 149–390)
PMV BLD AUTO: 10.2 FL (ref 8.9–12.7)
POTASSIUM SERPL-SCNC: 5 MMOL/L (ref 3.5–5.3)
PSA SERPL-MCNC: 17.44 NG/ML (ref 0–4)
RBC # BLD AUTO: 4.8 MILLION/UL (ref 3.88–5.62)
SODIUM SERPL-SCNC: 141 MMOL/L (ref 135–147)
TRIGL SERPL-MCNC: 187 MG/DL (ref ?–150)
TSH SERPL DL<=0.05 MIU/L-ACNC: 0.64 UIU/ML (ref 0.45–4.5)
WBC # BLD AUTO: 8.12 THOUSAND/UL (ref 4.31–10.16)

## 2025-05-14 PROCEDURE — 80061 LIPID PANEL: CPT

## 2025-05-14 PROCEDURE — 84443 ASSAY THYROID STIM HORMONE: CPT

## 2025-05-14 PROCEDURE — 84153 ASSAY OF PSA TOTAL: CPT

## 2025-05-14 PROCEDURE — 85025 COMPLETE CBC W/AUTO DIFF WBC: CPT

## 2025-05-14 PROCEDURE — 80048 BASIC METABOLIC PNL TOTAL CA: CPT

## 2025-05-14 PROCEDURE — 36415 COLL VENOUS BLD VENIPUNCTURE: CPT

## 2025-05-14 NOTE — TELEPHONE ENCOUNTER
I called the patient he had acute kidney injury drop in his GFR increase in the creatinine patient is symptomatic no abdomen pain no flank pain no blood in the urine and no lower extremity edema recommended to repeat BMP in the morning we will follow-up with the result accordingly

## 2025-05-15 ENCOUNTER — APPOINTMENT (OUTPATIENT)
Dept: LAB | Facility: MEDICAL CENTER | Age: 65
End: 2025-05-15
Payer: COMMERCIAL

## 2025-05-15 DIAGNOSIS — R22.2 MASS OF SUBCUTANEOUS TISSUE OF BACK: ICD-10-CM

## 2025-05-15 DIAGNOSIS — N17.9 ACUTE KIDNEY INJURY (HCC): ICD-10-CM

## 2025-05-15 DIAGNOSIS — Z85.46 HISTORY OF PROSTATE CANCER: Primary | ICD-10-CM

## 2025-05-15 LAB
ANION GAP SERPL CALCULATED.3IONS-SCNC: 4 MMOL/L (ref 4–13)
BACTERIA UR QL AUTO: ABNORMAL /HPF
BILIRUB UR QL STRIP: NEGATIVE
BUN SERPL-MCNC: 29 MG/DL (ref 5–25)
CALCIUM SERPL-MCNC: 9.5 MG/DL (ref 8.4–10.2)
CHLORIDE SERPL-SCNC: 106 MMOL/L (ref 96–108)
CLARITY UR: CLEAR
CO2 SERPL-SCNC: 29 MMOL/L (ref 21–32)
COLOR UR: ABNORMAL
CREAT SERPL-MCNC: 1.49 MG/DL (ref 0.6–1.3)
GFR SERPL CREATININE-BSD FRML MDRD: 47 ML/MIN/1.73SQ M
GLUCOSE P FAST SERPL-MCNC: 98 MG/DL (ref 65–99)
GLUCOSE UR STRIP-MCNC: NEGATIVE MG/DL
HGB UR QL STRIP.AUTO: NEGATIVE
HYALINE CASTS #/AREA URNS LPF: ABNORMAL /LPF
KETONES UR STRIP-MCNC: NEGATIVE MG/DL
LEUKOCYTE ESTERASE UR QL STRIP: NEGATIVE
NITRITE UR QL STRIP: NEGATIVE
NON-SQ EPI CELLS URNS QL MICRO: ABNORMAL /HPF
PH UR STRIP.AUTO: 5.5 [PH]
POTASSIUM SERPL-SCNC: 5.4 MMOL/L (ref 3.5–5.3)
PROT UR STRIP-MCNC: NEGATIVE MG/DL
RBC #/AREA URNS AUTO: ABNORMAL /HPF
SODIUM SERPL-SCNC: 139 MMOL/L (ref 135–147)
SP GR UR STRIP.AUTO: 1.02 (ref 1–1.03)
UROBILINOGEN UR STRIP-ACNC: <2 MG/DL
WBC #/AREA URNS AUTO: ABNORMAL /HPF

## 2025-05-15 PROCEDURE — 36415 COLL VENOUS BLD VENIPUNCTURE: CPT

## 2025-05-15 PROCEDURE — 80048 BASIC METABOLIC PNL TOTAL CA: CPT

## 2025-05-15 PROCEDURE — 81001 URINALYSIS AUTO W/SCOPE: CPT

## 2025-05-16 ENCOUNTER — RESULTS FOLLOW-UP (OUTPATIENT)
Dept: FAMILY MEDICINE CLINIC | Facility: CLINIC | Age: 65
End: 2025-05-16

## 2025-05-19 ENCOUNTER — TELEPHONE (OUTPATIENT)
Dept: UROLOGY | Facility: MEDICAL CENTER | Age: 65
End: 2025-05-19

## 2025-05-19 DIAGNOSIS — C61 PROSTATE CANCER (HCC): Primary | ICD-10-CM

## 2025-05-19 DIAGNOSIS — E87.5 SERUM POTASSIUM ELEVATED: Primary | ICD-10-CM

## 2025-05-19 NOTE — TELEPHONE ENCOUNTER
----- Message from Thierry Anthony MD sent at 5/19/2025  2:18 PM EDT -----   Pt agrees to MRI prostate, schedule it

## 2025-05-20 ENCOUNTER — APPOINTMENT (OUTPATIENT)
Dept: LAB | Facility: MEDICAL CENTER | Age: 65
End: 2025-05-20
Payer: COMMERCIAL

## 2025-05-20 DIAGNOSIS — E87.5 SERUM POTASSIUM ELEVATED: ICD-10-CM

## 2025-05-20 LAB — POTASSIUM SERPL-SCNC: 5.2 MMOL/L (ref 3.5–5.3)

## 2025-05-20 PROCEDURE — 36415 COLL VENOUS BLD VENIPUNCTURE: CPT

## 2025-05-20 PROCEDURE — 84132 ASSAY OF SERUM POTASSIUM: CPT

## 2025-05-21 ENCOUNTER — OFFICE VISIT (OUTPATIENT)
Dept: FAMILY MEDICINE CLINIC | Facility: CLINIC | Age: 65
End: 2025-05-21
Payer: COMMERCIAL

## 2025-05-21 ENCOUNTER — TELEPHONE (OUTPATIENT)
Dept: UROLOGY | Facility: MEDICAL CENTER | Age: 65
End: 2025-05-21

## 2025-05-21 VITALS
DIASTOLIC BLOOD PRESSURE: 70 MMHG | TEMPERATURE: 96.2 F | OXYGEN SATURATION: 96 % | WEIGHT: 223 LBS | BODY MASS INDEX: 33.03 KG/M2 | HEIGHT: 69 IN | SYSTOLIC BLOOD PRESSURE: 138 MMHG | HEART RATE: 62 BPM

## 2025-05-21 DIAGNOSIS — Z13.6 SCREENING FOR AAA (ABDOMINAL AORTIC ANEURYSM): ICD-10-CM

## 2025-05-21 DIAGNOSIS — R73.01 IFG (IMPAIRED FASTING GLUCOSE): ICD-10-CM

## 2025-05-21 DIAGNOSIS — E66.811 CLASS 1 OBESITY WITH SERIOUS COMORBIDITY AND BODY MASS INDEX (BMI) OF 32.0 TO 32.9 IN ADULT, UNSPECIFIED OBESITY TYPE: ICD-10-CM

## 2025-05-21 DIAGNOSIS — E78.2 MIXED HYPERLIPIDEMIA: ICD-10-CM

## 2025-05-21 DIAGNOSIS — I10 ESSENTIAL HYPERTENSION: ICD-10-CM

## 2025-05-21 DIAGNOSIS — N17.9 ACUTE KIDNEY INJURY (HCC): ICD-10-CM

## 2025-05-21 DIAGNOSIS — Z00.01 ENCOUNTER FOR WELL ADULT EXAM WITH ABNORMAL FINDINGS: Primary | ICD-10-CM

## 2025-05-21 PROCEDURE — 99397 PER PM REEVAL EST PAT 65+ YR: CPT | Performed by: FAMILY MEDICINE

## 2025-05-21 PROCEDURE — 99214 OFFICE O/P EST MOD 30 MIN: CPT | Performed by: FAMILY MEDICINE

## 2025-05-21 NOTE — PROGRESS NOTES
Adult Annual Physical  Name: Tacos Asencio      : 1960      MRN: [id number]  Encounter Provider: Leona Ha MD  Encounter Date: 2025   Encounter department: [facility] PRIMARY CARE    :  Assessment & Plan  Encounter for well adult exam with abnormal findings  Advice and education were given regarding nutrition, aerobic exercises, weight-bearing exercises, cardiovascular risk reduction, fall risk reduction, and age-appropriate supplements.     The patient was counseled regarding instructions for management, risk factor reductions, prognosis, risks and benefits of treatment options, patient and family education, and importance of compliance with treatment.       Acute kidney injury (HCC)  Acute symptomatic increase in the creatinine decrease in the GFR blood work may 14 show decrease in the GFR from 65-34 increase in the creatinine from 1.1-1.9 repeat blood work on May 15 slight improved compared with before creatinine down to 1.4 GFR increased to 47 but still low blood work show elevated PSA for outpatient follow-up with the urology  urine negative for blood  I recommend ultrasound of the kidney patient having MRI of the prostate already scheduled he will hold on the ultrasound  patient to continue well hydration avoid NSAID repeat BMP in 1 week   Orders:  •  Basic metabolic panel; Future    Screening for AAA (abdominal aortic aneurysm)  Patient candidate for screening for abdominal aneurysm discussed with him agree  Orders:  •  US abdominal aorta; Future    Class 1 obesity with serious comorbidity and body mass index (BMI) of 32.0 to 32.9 in adult, unspecified obesity type  BMI Counseling: Body mass index is 32.93 kg/m². The BMI is above normal. Nutrition recommendations include reducing portion sizes, 3-5 servings of fruits/vegetables daily, and consuming healthier snacks. Exercise recommendations include moderate aerobic physical activity for 150 minutes/week.        Mixed  hyperlipidemia  Chronic asymptomatic slight increase in lipid panel compared with before patient admits he been not been compliant with the diet continue current statin atorvastatin 20 mg and Zetia 10 mg       IFG (impaired fasting glucose)  Chronic asymptomatic fair control continue low-carb diet           Preventive Screenings:  - Diabetes Screening: screening up-to-date  - Cholesterol Screening: screening not indicated, has hyperlipidemia and screening up-to-date   - Hepatitis C screening: screening up-to-date   - HIV screening: patient declines   - Colon cancer screening: screening up-to-date   - Lung cancer screening: screening not indicated   - Prostate cancer screening: has history of prostate cancer          History of Present Illness   Patient here follow-up with a chronic condition compliant with the medication tolerated well no new concerns recent blood work discussed with the patient previously I communicated with the patient to the phone when I get the result of the blood work with acute kidney injury increase in the creatinine and decrease in the GFR patient is symptomatic no swelling in the lower extremity no abdomen pain no flank pain urine was negative for blood patient had elevated PSA for which he been follow-up with the urology and already MRI of the prostate was ordered repeat blood work result reviewed with the patient past medical surgical family and social history reviewed    Adult Annual Physical:  Patient presents for annual physical.     Diet and Physical Activity:  - Diet/Nutrition: no special diet.  - Exercise: no formal exercise, walking, 3-4 times a week on average and less than 30 minutes on average.    Depression Screening:  - PHQ-2 Score: 0    General Health:  - Sleep: 4-6 hours of sleep on average, 7-8 hours of sleep on average and sleeps well.  - Hearing: normal hearing right ear and normal hearing left ear.  - Vision: no vision problems and wears glasses.  - Dental: regular  "dental visits and brushes teeth twice daily.     Health:  - History of STDs: no.   - Urinary symptoms: none.     Advanced Care Planning:  - Has an advanced directive?: no    - Has a durable medical POA?: no      Review of Systems   Constitutional:  Negative for chills and fever.   HENT:  Negative for ear pain and sore throat.    Eyes:  Negative for pain and visual disturbance.   Respiratory:  Negative for cough and shortness of breath.    Cardiovascular:  Negative for chest pain and palpitations.   Gastrointestinal:  Negative for abdominal pain, constipation, diarrhea and vomiting.   Genitourinary:  Negative for dysuria and hematuria.   Musculoskeletal:  Negative for gait problem.   Skin:  Negative for color change and rash.   Neurological:  Negative for seizures and syncope.   All other systems reviewed and are negative.        Objective   /70   Pulse 62   Temp (!) 96.2 °F (35.7 °C)   Ht 5' 9\" (1.753 m)   Wt 101 kg (223 lb)   SpO2 96%   BMI 32.93 kg/m²     Physical Exam  Vitals and nursing note reviewed.   Constitutional:       General: He is not in acute distress.     Appearance: He is well-developed. He is not diaphoretic.   HENT:      Head: Normocephalic.      Right Ear: Tympanic membrane and external ear normal.      Left Ear: Tympanic membrane and external ear normal.      Nose: No rhinorrhea.      Mouth/Throat:      Pharynx: No posterior oropharyngeal erythema.     Eyes:      General:         Right eye: No discharge.         Left eye: No discharge.      Conjunctiva/sclera: Conjunctivae normal.     Neck:      Vascular: No JVD.     Cardiovascular:      Rate and Rhythm: Normal rate and regular rhythm.      Heart sounds: Normal heart sounds. No murmur heard.     No gallop.   Pulmonary:      Effort: Pulmonary effort is normal. No respiratory distress.      Breath sounds: Normal breath sounds. No wheezing.   Abdominal:      General: There is no distension.      Palpations: Abdomen is soft.      " Tenderness: There is no abdominal tenderness. There is no rebound.     Musculoskeletal:         General: No tenderness.      Cervical back: Normal range of motion and neck supple.   Lymphadenopathy:      Cervical: No cervical adenopathy.     Skin:     General: Skin is warm.      Findings: No rash.     Neurological:      Mental Status: He is alert and oriented to person, place, and time.

## 2025-05-21 NOTE — ASSESSMENT & PLAN NOTE
Acute symptomatic increase in the creatinine decrease in the GFR blood work may 14 show decrease in the GFR from 65-34 increase in the creatinine from 1.1-1.9 repeat blood work on May 15 slight improved compared with before creatinine down to 1.4 GFR increased to 47 but still low blood work show elevated PSA for outpatient follow-up with the urology  urine negative for blood  I recommend ultrasound of the kidney patient having MRI of the prostate already scheduled he will hold on the ultrasound  patient to continue well hydration avoid NSAID repeat BMP in 1 week   Orders:  •  Basic metabolic panel; Future

## 2025-05-21 NOTE — ASSESSMENT & PLAN NOTE
BMI Counseling: Body mass index is 32.93 kg/m². The BMI is above normal. Nutrition recommendations include reducing portion sizes, 3-5 servings of fruits/vegetables daily, and consuming healthier snacks. Exercise recommendations include moderate aerobic physical activity for 150 minutes/week.

## 2025-05-21 NOTE — ASSESSMENT & PLAN NOTE
Chronic asymptomatic slight increase in lipid panel compared with before patient admits he been not been compliant with the diet continue current statin atorvastatin 20 mg and Zetia 10 mg

## 2025-05-21 NOTE — PATIENT INSTRUCTIONS
"Patient Education     Routine physical for adults   The Basics   Written by the doctors and editors at [facility]   What is a physical? -- A physical is a routine visit, or \"check-up,\" with your doctor. You might also hear it called a \"wellness visit\" or \"preventive visit.\"  During each visit, the doctor will:   Ask about your physical and mental health   Ask about your habits, behaviors, and lifestyle   Do an exam   Give you vaccines if needed   Talk to you about any medicines you take   Give advice about your health   Answer your questions  Getting regular check-ups is an important part of taking care of your health. It can help your doctor find and treat any problems you have. But it's also important for preventing health problems.  A routine physical is different from a \"sick visit.\" A sick visit is when you see a doctor because of a health concern or problem. Since physicals are scheduled ahead of time, you can think about what you want to ask the doctor.  How often should I get a physical? -- It depends on your age and health. In general, for people age 21 years and older:   If you are younger than 50 years, you might be able to get a physical every 3 years.   If you are 50 years or older, your doctor might recommend a physical every year.  If you have an ongoing health condition, like diabetes or high blood pressure, your doctor will probably want to see you more often.  What happens during a physical? -- In general, each visit will include:   Physical exam - The doctor or nurse will check your height, weight, heart rate, and blood pressure. They will also look at your eyes and ears. They will ask about how you are feeling and whether you have any symptoms that bother you.   Medicines - It's a good idea to bring a list of all the medicines you take to each doctor visit. Your doctor will talk to you about your medicines and answer any questions. Tell them if you are having any side effects that bother you. You " "should also tell them if you are having trouble paying for any of your medicines.   Habits and behaviors - This includes:   Your diet   Your exercise habits   Whether you smoke, drink alcohol, or use drugs   Whether you are sexually active   Whether you feel safe at home  Your doctor will talk to you about things you can do to improve your health and lower your risk of health problems. They will also offer help and support. For example, if you want to quit smoking, they can give you advice and might prescribe medicines. If you want to improve your diet or get more physical activity, they can help you with this, too.   Lab tests, if needed - The tests you get will depend on your age and situation. For example, your doctor might want to check your:   Cholesterol   Blood sugar   Iron level   Vaccines - The recommended vaccines will depend on your age, health, and what vaccines you already had. Vaccines are very important because they can prevent certain serious or deadly infections.   Discussion of screening - \"Screening\" means checking for diseases or other health problems before they cause symptoms. Your doctor can recommend screening based on your age, risk, and preferences. This might include tests to check for:   Cancer, such as breast, prostate, cervical, ovarian, colorectal, prostate, lung, or skin cancer   Sexually transmitted infections, such as chlamydia and gonorrhea   Mental health conditions like depression and anxiety  Your doctor will talk to you about the different types of screening tests. They can help you decide which screenings to have. They can also explain what the results might mean.   Answering questions - The physical is a good time to ask the doctor or nurse questions about your health. If needed, they can refer you to other doctors or specialists, too.  Adults older than 65 years often need other care, too. As you get older, your doctor will talk to you about:   How to prevent falling at " home   Hearing or vision tests   Memory testing   How to take your medicines safely   Making sure that you have the help and support you need at home  All topics are updated as new evidence becomes available and our peer review process is complete.  This topic retrieved from [id number] on: May 02, 2024.  Topic 757457 Version 1.0  Release: 32.4.3 - C32.122  © 2024 UpToDate, Inc[employer] and/or its affiliates. All rights reserved.  Consumer Information Use and Disclaimer   Disclaimer: This generalized information is a limited summary of diagnosis, treatment, and/or medication information. It is not meant to be comprehensive and should be used as a tool to help the user understand and/or assess potential diagnostic and treatment options. It does NOT include all information about conditions, treatments, medications, side effects, or risks that may apply to a specific patient. It is not intended to be medical advice or a substitute for the medical advice, diagnosis, or treatment of a health care provider based on the health care provider's examination and assessment of a patient's specific and unique circumstances. Patients must speak with a health care provider for complete information about their health, medical questions, and treatment options, including any risks or benefits regarding use of medications. This information does not endorse any treatments or medications as safe, effective, or approved for treating a specific patient. Up[employer]Date, Inc. and its affiliates disclaim any warranty or liability relating to this information or the use thereof.The use of this information is governed by the Terms of Use, available at https://www.woltersCoreworksuwer.com/en/know/clinical-effectiveness-terms. 2024© UpToDate, Inc. and its affiliates and/or licensors. All rights reserved.  Copyright   © 2024 UpToDate, Inc. and/or its affiliates. All rights reserved.

## 2025-05-21 NOTE — ASSESSMENT & PLAN NOTE
Patient candidate for screening for abdominal aneurysm discussed with him agree  Orders:  •  US abdominal aorta; Future

## 2025-05-22 RX ORDER — LISINOPRIL 20 MG/1
20 TABLET ORAL DAILY
Qty: 90 TABLET | Refills: 1 | Status: SHIPPED | OUTPATIENT
Start: 2025-05-22

## 2025-05-31 ENCOUNTER — HOSPITAL ENCOUNTER (OUTPATIENT)
Facility: MEDICAL CENTER | Age: 65
Discharge: HOME | End: 2025-05-31
Attending: UROLOGY
Payer: COMMERCIAL

## 2025-05-31 DIAGNOSIS — Z85.46 HISTORY OF PROSTATE CANCER: ICD-10-CM

## 2025-05-31 PROCEDURE — A9585 GADOBUTROL INJECTION: HCPCS | Performed by: UROLOGY

## 2025-05-31 PROCEDURE — 76377 3D RENDER W/INTRP POSTPROCES: CPT

## 2025-05-31 PROCEDURE — 72197 MRI PELVIS W/O & W/DYE: CPT

## 2025-05-31 RX ORDER — GADOBUTROL 604.72 MG/ML
9 INJECTION INTRAVENOUS
Status: COMPLETED | OUTPATIENT
Start: 2025-05-31 | End: 2025-05-31

## 2025-05-31 RX ADMIN — GADOBUTROL 9 ML: 604.72 INJECTION INTRAVENOUS at 12:42

## 2025-06-04 ENCOUNTER — PROCEDURE VISIT (OUTPATIENT)
Dept: UROLOGY | Facility: MEDICAL CENTER | Age: 65
End: 2025-06-04
Payer: COMMERCIAL

## 2025-06-04 VITALS
BODY MASS INDEX: 33.03 KG/M2 | SYSTOLIC BLOOD PRESSURE: 140 MMHG | HEART RATE: 46 BPM | DIASTOLIC BLOOD PRESSURE: 78 MMHG | OXYGEN SATURATION: 97 % | WEIGHT: 223 LBS | HEIGHT: 69 IN

## 2025-06-04 DIAGNOSIS — Z85.51 HISTORY OF BLADDER CANCER: Primary | ICD-10-CM

## 2025-06-04 DIAGNOSIS — C61 PROSTATE CANCER MANAGED WITH ACTIVE SURVEILLANCE (HCC): ICD-10-CM

## 2025-06-04 LAB
SL AMB  POCT GLUCOSE, UA: NORMAL
SL AMB LEUKOCYTE ESTERASE,UA: NORMAL
SL AMB POCT BILIRUBIN,UA: NORMAL
SL AMB POCT BLOOD,UA: NORMAL
SL AMB POCT CLARITY,UA: CLEAR
SL AMB POCT COLOR,UA: YELLOW
SL AMB POCT KETONES,UA: NORMAL
SL AMB POCT NITRITE,UA: NORMAL
SL AMB POCT PH,UA: 6
SL AMB POCT SPECIFIC GRAVITY,UA: 1.02
SL AMB POCT URINE PROTEIN: NORMAL
SL AMB POCT UROBILINOGEN: 0.2

## 2025-06-04 PROCEDURE — 52000 CYSTOURETHROSCOPY: CPT | Performed by: UROLOGY

## 2025-06-04 PROCEDURE — 99213 OFFICE O/P EST LOW 20 MIN: CPT | Performed by: UROLOGY

## 2025-06-04 PROCEDURE — 81003 URINALYSIS AUTO W/O SCOPE: CPT | Performed by: UROLOGY

## 2025-06-04 NOTE — PROGRESS NOTES
HISTORY:      Follow-up several issues     Info September 2024:  1.  Follow-up bladder cancer.  In February 2023, underwent TUR of tumor at the left trigone, pathology showed noninvasive, probable high-grade.     2.  Prostate cancer, on active surveillance.  Initial biopsy in March 2020 showed 2 cores of [place] 6 on the left side of the prostate.     Repeat biopsy in November 2022 showed 2 areas of [place] 6 cancer     3.  In September 2024, had UTI, symptoms were consistent with acute prostatitis.  Burning urgency frequency perineal discomfort, radiating pain.  Urine showed Enterococcus.  Finally better after 2 weeks of antibiotics     PSA chronically elevated.  However, spiked up to 21.3 in September 2024 from UTI.  Repeat was 10.3 in November 2024     Cystoscopy     Date/Time  6/4/2025 2:00 PM     Performed by  Thierry Anthony MD   Authorized by  Thierry Anthony MD         Procedure Details:  Procedure type: cystoscopy    Patient tolerance: Patient tolerated the procedure well with no immediate complications    Additional Procedure Details:      Patient presents for cystoscopy.  I have discussed the reasons for doing the exam, and the potential risks and complications.  Patient expressed understanding, and signed informed consent document.    The patient was carefully  positioned supine on the examining table.  Sterile preparation was performed on the urethra.  Xylocaine jelly was instilled and left  Indwelling for the procedure.  The 15 Serbian flexible cystoscope was passed with the following findings:      Urethra: No stricture    Prostate:  lateral lobes severely enlarged    Prostate is 102 mL volume on MRI                  Bladder: Moderate trabeculations, no lesions, tumor, or stones.     Residual urine: 100 mL    Patient tolerated the procedure well and was escorted from the examining table.             ASSESSMENT / PLAN:    1.  No recurrence of bladder cancer    Cystoscopy in 6 months    2.  MRI does not  "show any suspicious lesions.  However, he had his biopsy in late 2022.  We will plan a confirmatory biopsy this will be transperineal in the OR, but not fusion.  He knows we are backed up for that        Review of Systems      Objective:     Physical Exam      0   Lab Value Date/Time    PSA 17.443 (H) 05/14/2025 1103    PSA 10.340 (H) 11/18/2024 0856    PSA 21.376 (H) 09/20/2024 1041    PSA 9.65 (H) 03/04/2024 0806    PSA 6.5 (H) 04/21/2022 0636    PSA 6.2 (H) 05/14/2021 0857    PSA 5.4 (H) 11/07/2019 0904   ]  BUN   Date Value Ref Range Status   05/15/2025 29 (H) 5 - 25 mg/dL Final   09/05/2024 28 7 - 28 mg/dL Final     Creatinine   Date Value Ref Range Status   05/15/2025 1.49 (H) 0.60 - 1.30 mg/dL Final     Comment:     Standardized to IDMS reference method   09/05/2024 1.13 0.53 - 1.30 mg/dL Final     No components found for: \"CBC\"    Problem List[1]     Patient ID: Tacos Asencio is a 65 y.o. male.    Current Medications[2]                       [1]   Patient Active Problem List  Diagnosis    CAD in native artery    History of acute inferior wall MI 2011,s/p BMS RCA    Presence of bare metal stent in right coronary artery    Essential hypertension    Hyperlipidemia    Chronic left-sided low back pain without sciatica    Fatty liver    BPH with urinary obstruction    Gastroesophageal reflux disease without esophagitis    Elevated PSA    Prostate nodule without urinary obstruction    Bacteremia due to Escherichia coli    Bursitis of shoulder    Contusion of left shoulder    Injury of tendon of rotator cuff    Adenocarcinoma of prostate (HCC)    Encounter for well adult exam with abnormal findings    Myalgia with higher doses statins    Colon polyp    COVID-19 virus infection    Gold's esophagus    Bloating    IFG (impaired fasting glucose)    Left upper quadrant abdominal mass    Abnormal urine findings    Bradycardia    Lesion of urinary bladder    PONV (postoperative nausea and vomiting)    MIR (obstructive " sleep apnea)    Class 1 obesity with serious comorbidity and body mass index (BMI) of 32.0 to 32.9 in adult    Epigastric pain    Hematemesis with nausea    Polycystic kidney disease    CKD (chronic kidney disease) stage 2, GFR 60-89 ml/min    Vertigo    History of bladder cancer    Hiatal hernia    H. pylori infection    Frequent urination    Acute bilateral thoracic back pain    Lipoma of torso    Acute kidney injury (HCC)    Screening for AAA (abdominal aortic aneurysm)   [2]   Current Outpatient Medications:     aspirin (ECOTRIN LOW STRENGTH) 81 mg EC tablet, Take 81 mg by mouth every evening, Disp: , Rfl:     atorvastatin (LIPITOR) 20 mg tablet, TAKE 1 TABLET BY MOUTH EVERY DAY, Disp: 90 tablet, Rfl: 1    ezetimibe (ZETIA) 10 mg tablet, TAKE 1 TABLET BY MOUTH EVERY DAY, Disp: 90 tablet, Rfl: 3    famotidine (PEPCID) 20 mg tablet, TAKE 1 TABLET BY MOUTH TWICE A DAY, Disp: 60 tablet, Rfl: 5    lisinopril (ZESTRIL) 20 mg tablet, TAKE 1 TABLET BY MOUTH EVERY DAY, Disp: 90 tablet, Rfl: 1    meclizine (ANTIVERT) 25 mg tablet, Take 1 tablet (25 mg total) by mouth daily at bedtime, Disp: 30 tablet, Rfl: 0    omeprazole (PriLOSEC) 40 MG capsule, TAKE 1 CAPSULE (40 MG TOTAL) BY MOUTH DAILY., Disp: 90 capsule, Rfl: 1

## 2025-06-06 ENCOUNTER — TELEPHONE (OUTPATIENT)
Dept: UROLOGY | Facility: MEDICAL CENTER | Age: 65
End: 2025-06-06

## 2025-06-16 ENCOUNTER — TELEPHONE (OUTPATIENT)
Dept: UROLOGY | Facility: MEDICAL CENTER | Age: 65
End: 2025-06-16

## 2025-06-16 NOTE — TELEPHONE ENCOUNTER
Spoke with patient and confirmed surgery date of: 8/13/25  Type of surgery: TRANSP US GUIDED BX  Operating physician: DR. TOPETE  Location of surgery: [facility]     Verbally went over prep with patient on: 6/16/25  NPO   Bowel prep? Yes, 1 enema 1 hour prior to leaving the house for the procedure   Hospital calls afternoon prior with arrival time -Calls Friday afternoon for Monday surgeries   Patient needs ride to and from surgery (outpatient)    Pre-op testing to be done 2 weeks prior to surgery    CBC,CMP,U/C,EKG   Blood thinners:Aspirin 81 mg    Clearances needed: none     Mailed packet on:   Copy of packet scanned into [employer] on:   Labs in packet   Soap instructions in packet    Post-op in packet   H&P on admit   PO  8/25, 7:30, dr. topete       Consent: On admit

## 2025-06-20 ENCOUNTER — TELEPHONE (OUTPATIENT)
Age: 65
End: 2025-06-20

## 2025-06-20 PROBLEM — Z13.6 SCREENING FOR AAA (ABDOMINAL AORTIC ANEURYSM): Status: RESOLVED | Noted: 2025-05-21 | Resolved: 2025-06-20

## 2025-06-20 NOTE — TELEPHONE ENCOUNTER
Petty called in to speak with [place] regarding patient. Warm transferred caller to [place] for further assistance.

## 2025-06-23 ENCOUNTER — TELEPHONE (OUTPATIENT)
Dept: FAMILY MEDICINE CLINIC | Facility: CLINIC | Age: 65
End: 2025-06-23

## 2025-06-23 NOTE — TELEPHONE ENCOUNTER
Type of form: pre op form via form .     No pre op appt scheduled. Surgery is scheduled fro 7/8/25    Forms have been placed in Leona Ha MD folder to be completed for clinical staff.     Routing to clinical pool.

## 2025-06-25 ENCOUNTER — ANESTHESIA EVENT (OUTPATIENT)
Dept: PERIOP | Facility: HOSPITAL | Age: 65
End: 2025-06-25
Payer: COMMERCIAL

## 2025-06-26 ENCOUNTER — APPOINTMENT (OUTPATIENT)
Dept: LAB | Facility: MEDICAL CENTER | Age: 65
End: 2025-06-26
Payer: COMMERCIAL

## 2025-06-26 ENCOUNTER — OFFICE VISIT (OUTPATIENT)
Dept: GASTROENTEROLOGY | Facility: MEDICAL CENTER | Age: 65
End: 2025-06-26
Payer: COMMERCIAL

## 2025-06-26 VITALS
DIASTOLIC BLOOD PRESSURE: 89 MMHG | WEIGHT: 223 LBS | SYSTOLIC BLOOD PRESSURE: 147 MMHG | HEIGHT: 69 IN | OXYGEN SATURATION: 98 % | HEART RATE: 52 BPM | TEMPERATURE: 98.2 F | BODY MASS INDEX: 33.03 KG/M2

## 2025-06-26 DIAGNOSIS — N17.9 ACUTE KIDNEY INJURY (HCC): ICD-10-CM

## 2025-06-26 DIAGNOSIS — K76.0 FATTY LIVER: Primary | ICD-10-CM

## 2025-06-26 DIAGNOSIS — Z86.0100 PERSONAL HISTORY OF COLON POLYPS, UNSPECIFIED: ICD-10-CM

## 2025-06-26 DIAGNOSIS — K76.0 FATTY LIVER: ICD-10-CM

## 2025-06-26 DIAGNOSIS — K21.9 GASTROESOPHAGEAL REFLUX DISEASE WITHOUT ESOPHAGITIS: ICD-10-CM

## 2025-06-26 LAB
ALBUMIN SERPL BCG-MCNC: 4.5 G/DL (ref 3.5–5)
ALP SERPL-CCNC: 85 U/L (ref 34–104)
ALT SERPL W P-5'-P-CCNC: 20 U/L (ref 7–52)
ANION GAP SERPL CALCULATED.3IONS-SCNC: 4 MMOL/L (ref 4–13)
AST SERPL W P-5'-P-CCNC: 17 U/L (ref 13–39)
BASOPHILS # BLD AUTO: 0.11 THOUSANDS/ÂΜL (ref 0–0.1)
BASOPHILS NFR BLD AUTO: 1 % (ref 0–1)
BILIRUB DIRECT SERPL-MCNC: 0.08 MG/DL (ref 0–0.2)
BILIRUB SERPL-MCNC: 0.55 MG/DL (ref 0.2–1)
BUN SERPL-MCNC: 26 MG/DL (ref 5–25)
CALCIUM SERPL-MCNC: 9.2 MG/DL (ref 8.4–10.2)
CHLORIDE SERPL-SCNC: 106 MMOL/L (ref 96–108)
CO2 SERPL-SCNC: 28 MMOL/L (ref 21–32)
CREAT SERPL-MCNC: 1.17 MG/DL (ref 0.6–1.3)
EOSINOPHIL # BLD AUTO: 0.2 THOUSAND/ÂΜL (ref 0–0.61)
EOSINOPHIL NFR BLD AUTO: 3 % (ref 0–6)
ERYTHROCYTE [DISTWIDTH] IN BLOOD BY AUTOMATED COUNT: 13 % (ref 11.6–15.1)
GFR SERPL CREATININE-BSD FRML MDRD: 64 ML/MIN/1.73SQ M
GLUCOSE P FAST SERPL-MCNC: 98 MG/DL (ref 65–99)
HCT VFR BLD AUTO: 42.6 % (ref 36.5–49.3)
HGB BLD-MCNC: 14.2 G/DL (ref 12–17)
IMM GRANULOCYTES # BLD AUTO: 0.03 THOUSAND/UL (ref 0–0.2)
IMM GRANULOCYTES NFR BLD AUTO: 0 % (ref 0–2)
LYMPHOCYTES # BLD AUTO: 1.98 THOUSANDS/ÂΜL (ref 0.6–4.47)
LYMPHOCYTES NFR BLD AUTO: 25 % (ref 14–44)
MCH RBC QN AUTO: 30.7 PG (ref 26.8–34.3)
MCHC RBC AUTO-ENTMCNC: 33.3 G/DL (ref 31.4–37.4)
MCV RBC AUTO: 92 FL (ref 82–98)
MONOCYTES # BLD AUTO: 0.67 THOUSAND/ÂΜL (ref 0.17–1.22)
MONOCYTES NFR BLD AUTO: 8 % (ref 4–12)
NEUTROPHILS # BLD AUTO: 4.98 THOUSANDS/ÂΜL (ref 1.85–7.62)
NEUTS SEG NFR BLD AUTO: 63 % (ref 43–75)
NRBC BLD AUTO-RTO: 0 /100 WBCS
PLATELET # BLD AUTO: 252 THOUSANDS/UL (ref 149–390)
PMV BLD AUTO: 10.6 FL (ref 8.9–12.7)
POTASSIUM SERPL-SCNC: 4.5 MMOL/L (ref 3.5–5.3)
PROT SERPL-MCNC: 7.3 G/DL (ref 6.4–8.4)
RBC # BLD AUTO: 4.63 MILLION/UL (ref 3.88–5.62)
SODIUM SERPL-SCNC: 138 MMOL/L (ref 135–147)
WBC # BLD AUTO: 7.97 THOUSAND/UL (ref 4.31–10.16)

## 2025-06-26 PROCEDURE — 36415 COLL VENOUS BLD VENIPUNCTURE: CPT

## 2025-06-26 PROCEDURE — 99213 OFFICE O/P EST LOW 20 MIN: CPT | Performed by: NURSE PRACTITIONER

## 2025-06-26 PROCEDURE — 82248 BILIRUBIN DIRECT: CPT

## 2025-06-26 PROCEDURE — 80053 COMPREHEN METABOLIC PANEL: CPT

## 2025-06-26 PROCEDURE — 85025 COMPLETE CBC W/AUTO DIFF WBC: CPT

## 2025-06-26 NOTE — ASSESSMENT & PLAN NOTE
History of fatty liver disease.  He has a low fib 4 score.  Most recent LFTs were 9/24 and were normal.  He is going for lab work today.  Will continue to monitor LFTs every 6 months.    Orders:    Hepatic function panel; Future  Calculate fib 4 score with labs  Low-fat/low-cholesterol diet  Weight reduction

## 2025-06-26 NOTE — PROGRESS NOTES
Name: Tacos Asencio      : 1960      MRN: [id number]  Encounter Provider: AVIVA Person  Encounter Date: 2025   Encounter department: [facility]  :  Assessment & Plan  Fatty liver  History of fatty liver disease.  He has a low fib 4 score.  Most recent LFTs were  and were normal.  He is going for lab work today.  Will continue to monitor LFTs every 6 months.    Orders:    Hepatic function panel; Future  Calculate fib 4 score with labs  Low-fat/low-cholesterol diet  Weight reduction  Gastroesophageal reflux disease without esophagitis  Currently on omeprazole 40 mg daily.  Reports symptoms are controlled with this dosing.  We did discuss reducing the dose to 20 mg with patient would like to keep 40 mg daily.  Denies any nausea, vomiting or dysphagia.  Last EGD was  which noted 2 cm hiatal hernia, mild gastritis.  Will discuss at next visit with trying to reduce to lowest effective dose possible of omeprazole.    Continue omeprazole 40 mg daily  Antireflux diet  Follow-up in office in 1 year or sooner if needed       Personal history of colon polyps, unspecified  Recent colonoscopy noted 8 polyps that were HP's and TA's. Repeat colonoscopy recommended in 3 years. BMs are brown and formed daily. Denies any melena hematochezia     Recall:            History of Present Illness   Tacos Asencio is a 65 y.o. male who presents for follow-up.  He was last seen by myself  for GERD, H. pylori gastritis, fatty liver and personal history of colon polyps.    HPI    Currently taking omeprazole 40 mg daily.  Reports he has occasional breakthrough reflux.  Does use Pepcid nightly as needed.  We did discuss trying to reduce his omeprazole 20 mg daily but he would prefer keeping it at 40 mg daily for now.     Fibrosis-4 (FIB-4) Score is: .99    Indication for testing Absence of advanced fibrosis Presence of advanced fibrosis Indeterminate result   NAFLD  "<2.00 >2.67 2.00-2.67   Hepatitis C <1.45 >3.25 1.45-3.25   Hepatitis B <1.00 >2.65 1.00-2.65     FIB-4 Score Component Values:  Component Value Date   Age: 65 y.o.     AST: 18 U/L 9/20/2024   Platelet: 249 Thousands/uL 5/14/2025   ALT: 24 U/L 9/20/2024      Has not had LFT since 9/24.  Diagnosed with fatty liver disease but low fib 4 score in the past.    Prior EGD/colonoscopy      Colonoscopy 6/24-8 polyps.  Repeat colonoscopy recommended in 3 years.  Biopsy review revealed HP and TA's.     EGD 12/23-2 cm hiatal hernia, mild gastritis, single 3 mm mucosal nodule observed in the second part of the duodenum which was biopsied.  Repeat EGD recommended in 5 years.  Biopsies were negative for celiac disease.  Biopsies were positive for H. pylori gastritis.  Gold's esophagus with no dysplasia was also noted.     Colonoscopy 6/21-7 subcentimeter polyps.  Recall colonoscopy 3 years.  Biopsies noted tubular adenomas.     EGD 1/21-small sliding hiatal hernia otherwise normal.  Biopsies noted nondysplastic Gold's esophagus.  Negative for H. Pylori           Review of Systems   Constitutional:  Negative for chills and fever.   HENT:  Negative for ear pain and sore throat.    Eyes:  Negative for pain and visual disturbance.   Respiratory:  Negative for cough and shortness of breath.    Cardiovascular:  Negative for chest pain and palpitations.   Gastrointestinal:  Negative for abdominal pain and vomiting.   Genitourinary:  Negative for dysuria and hematuria.   Musculoskeletal:  Negative for arthralgias and back pain.   Skin:  Negative for color change and rash.   Neurological:  Negative for seizures and syncope.   All other systems reviewed and are negative.   A complete review of systems is negative other than that noted above in the HPI.      Current Medications[1]  Objective   /89 (BP Location: Left arm)   Pulse (!) 52   Temp 98.2 °F (36.8 °C)   Ht 5' 9\" (1.753 m)   Wt 101 kg (223 lb)   SpO2 98%   BMI " 32.93 kg/m²     Physical Exam  Vitals and nursing note reviewed.   Constitutional:       General: He is not in acute distress.     Appearance: He is well-developed.   HENT:      Head: Normocephalic and atraumatic.     Eyes:      Conjunctiva/sclera: Conjunctivae normal.       Cardiovascular:      Rate and Rhythm: Normal rate and regular rhythm.      Heart sounds: No murmur heard.  Pulmonary:      Effort: Pulmonary effort is normal. No respiratory distress.      Breath sounds: Normal breath sounds.   Abdominal:      Palpations: Abdomen is soft.      Tenderness: There is no abdominal tenderness.     Musculoskeletal:         General: No swelling.      Cervical back: Neck supple.     Skin:     General: Skin is warm and dry.      Capillary Refill: Capillary refill takes less than 2 seconds.     Neurological:      Mental Status: He is alert and oriented to person, place, and time.     Psychiatric:         Mood and Affect: Mood normal.            Lab Results: I personally reviewed relevant lab results.       Results for orders placed during the hospital encounter of 06/12/24    Colonoscopy    Impression  8 subcentimeter polyps were removed with cold snare  Otherwise normal examination.        RECOMMENDATION:  Await pathology results  Repeat colonoscopy in 3 years, due: 6/12/2027  Personal history of colon polyps              Fidel Hunter MD               [1]   Current Outpatient Medications   Medication Sig Dispense Refill    aspirin (ECOTRIN LOW STRENGTH) 81 mg EC tablet Take 81 mg by mouth every evening      atorvastatin (LIPITOR) 20 mg tablet TAKE 1 TABLET BY MOUTH EVERY DAY 90 tablet 1    ezetimibe (ZETIA) 10 mg tablet TAKE 1 TABLET BY MOUTH EVERY DAY 90 tablet 3    famotidine (PEPCID) 20 mg tablet TAKE 1 TABLET BY MOUTH TWICE A DAY 60 tablet 5    lisinopril (ZESTRIL) 20 mg tablet TAKE 1 TABLET BY MOUTH EVERY DAY 90 tablet 1    meclizine (ANTIVERT) 25 mg tablet Take 1 tablet (25 mg total) by mouth daily at bedtime 30  tablet 0    omeprazole (PriLOSEC) 40 MG capsule TAKE 1 CAPSULE (40 MG TOTAL) BY MOUTH DAILY. 90 capsule 1     No current facility-administered medications for this visit.

## 2025-06-26 NOTE — ASSESSMENT & PLAN NOTE
Currently on omeprazole 40 mg daily.  Reports symptoms are controlled with this dosing.  We did discuss reducing the dose to 20 mg with patient would like to keep 40 mg daily.  Denies any nausea, vomiting or dysphagia.  Last EGD was 12/23 which noted 2 cm hiatal hernia, mild gastritis.  Will discuss at next visit with trying to reduce to lowest effective dose possible of omeprazole.    Continue omeprazole 40 mg daily  Antireflux diet  Follow-up in office in 1 year or sooner if needed

## 2025-06-30 NOTE — PRE-PROCEDURE INSTRUCTIONS
Pre-Surgery Instructions:   Medication Instructions    aspirin (ECOTRIN LOW STRENGTH) 81 mg EC tablet Instructions provided by [facility] cardiac stent. Will discuss hold with  appointment 7/1    atorvastatin (LIPITOR) 20 mg tablet Take day of surgery.    ezetimibe (ZETIA) 10 mg tablet Take day of surgery.    famotidine (PEPCID) 20 mg tablet Take day of surgery.    lisinopril (ZESTRIL) 20 mg tablet Hold day of surgery.    meclizine (ANTIVERT) 25 mg tablet Uses PRN- OK to take day of surgery    omeprazole (PriLOSEC) 40 MG capsule Take day of surgery.    Medication instructions for day of surgery reviewed. Please take all instructed medications with only a sip of water. Please do not take any over the counter (non-prescribed) vitamins or supplements for one week prior to date of surgery.      You will receive a call one business day prior to surgery with an arrival time and hospital directions. If your surgery is scheduled on a Monday, the hospital will be calling you on the Friday prior to your surgery. If you have not heard from anyone by 8pm, please call the hospital supervisor through the hospital  at [phone]. ([place] [phone] or [place] [phone]).    Do not eat or drink anything after midnight the night before your surgery, including candy, mints, lifesavers, or chewing gum. Do not drink alcohol 24hrs before your surgery. Try not to smoke at least 24hrs before your surgery.       Follow the pre surgery showering instructions as listed in the “My Surgical Experience Booklet” or otherwise provided by your surgeon's office. Do not use a blade to shave the surgical area 1 week before surgery. It is okay to use a clean electric clippers up to 24 hours before surgery. Do not apply any lotions, creams, including makeup, cologne, deodorant, or perfumes after showering on the day of your surgery. Do not use dry shampoo, hair spray, hair gel, or any type of hair products.     No contact lenses, eye  make-up, or artificial eyelashes. Remove nail polish, including gel polish, and any artificial, gel, or acrylic nails if possible. Remove all jewelry including rings and body piercing jewelry.     Wear causal clothing that is easy to take on and off. Consider your type of surgery.    Keep any valuables, jewelry, piercings at home. Please bring any specially ordered equipment (sling, braces) if indicated.    Arrange for a responsible person to drive you to and from the hospital on the day of your surgery. Please confirm the visitor policy for the day of your procedure when you receive your phone call with an arrival time.     Call the surgeon's office with any new illnesses, exposures, or additional questions prior to surgery.    Please reference your “My Surgical Experience Booklet” for additional information to prepare for your upcoming surgery.

## 2025-07-01 ENCOUNTER — CONSULT (OUTPATIENT)
Dept: FAMILY MEDICINE CLINIC | Facility: CLINIC | Age: 65
End: 2025-07-01
Payer: COMMERCIAL

## 2025-07-01 VITALS
DIASTOLIC BLOOD PRESSURE: 84 MMHG | OXYGEN SATURATION: 98 % | SYSTOLIC BLOOD PRESSURE: 150 MMHG | TEMPERATURE: 97.7 F | HEART RATE: 51 BPM | BODY MASS INDEX: 30.88 KG/M2 | HEIGHT: 71 IN | WEIGHT: 220.6 LBS

## 2025-07-01 DIAGNOSIS — Z01.818 PRE-OP EXAMINATION: Primary | ICD-10-CM

## 2025-07-01 DIAGNOSIS — R00.1 BRADYCARDIA: ICD-10-CM

## 2025-07-01 DIAGNOSIS — I10 UNCONTROLLED HYPERTENSION: ICD-10-CM

## 2025-07-01 PROCEDURE — 99243 OFF/OP CNSLTJ NEW/EST LOW 30: CPT | Performed by: FAMILY MEDICINE

## 2025-07-01 RX ORDER — AMLODIPINE BESYLATE 5 MG/1
5 TABLET ORAL DAILY
Qty: 30 TABLET | Refills: 1 | Status: SHIPPED | OUTPATIENT
Start: 2025-07-01 | End: 2025-07-07 | Stop reason: SDUPTHER

## 2025-07-01 NOTE — PROGRESS NOTES
Pre-operative Clearance  Name: Tacos Asencio      : 1960      MRN: [id number]  Encounter Provider: Leona Ha MD  Encounter Date: 2025   Encounter department: [facility] PRIMARY CARE    :  Assessment & Plan  Pre-op examination  Patient is having procedure biopsy for lump on the mid back blood pressure today 150/84 recommend to start amlodipine 5 mg will evaluate him in 1 week recommend to hold aspirin 81 mg for 5 days before the procedure recommend to hold lisinopril the day of the procedure patient considered having low risk surgery patient optimized for the surgery    Orders:  •  POCT ECG    Uncontrolled hypertension  Chronic asymptomatic blood pressure uncontrolled recommend to continue lisinopril 20 mg will add amlodipine 5 mg once a day proper use review will reevaluate in 1 week  Orders:  •  amLODIPine (NORVASC) 5 mg tablet; Take 1 tablet (5 mg total) by mouth daily    Bradycardia  Chronic asymptomatic EKG in the office sinus bradycardia I secure message cardiology patient asymptomatic no concern will continue to monitor patient been evaluated previously                Assessment & Plan          Pre-operative Clearance:     Revised Cardiac Risk Index:  RCI RISK CLASS II (1 risk factor, risk of major cardiac complications approximately 1.3%)    Clearance:  Patient is medically optimized (CLEARED) for proposed surgery without any additional cardiac testing.      Medication Instructions:   - Avoid herbs or non-directed vitamins one week prior to surgery    - Avoid aspirin containing medications or non-steroidal anti-inflammatory drugs one week preceding surgery    - May take tylenol for pain up until the night before surgery    - ACE Inhibitors or ARBs: Continue this medication up to the evening before surgery/procedure, but do not take the morning of the day of surgery.  - Calcium channel blockers: Continue to take this medication on your normal schedule.  - H2 Blocker: Continue to  take this medication on your normal schedule.  - Hyperlipidemia meds: Continue to take this medication on your normal schedule.       History of Present Illness     Pre-Op Examination     Surgery: Excision biopsy of mass on mid back   Anticipated Date of Surgery: July 8, 2025   Surgeon: Colton Flanagan     Patient here for clearance he is can to have a biopsy of lump on the mid back anesthesia of choice on July 8 patient any chest pain shortness of palpitation no dyspnea on exertion look at the vital sign blood pressure uncontrolled he been taking lisinopril 20 mg and tolerated well no close family history of bleeding disorder     Previous history of bleeding disorders or clots?: No    Previous Anesthesia reaction?: No    Prolonged steroid use in the last 6 months?: No      Assessment of Cardiac Risk:   - Unstable or severe angina or MI in the last 6 weeks or history of stent placement in the last year?: No    - Decompensated heart failure (e.g. New onset heart failure, NYHA  Class IV heart failure, or worsening existing heart failure)?: No    - Significant arrhythmias such as high grade AV block, symptomatic ventricular arrhythmia, newly recognized ventricular tachycardia, supraventricular tachycardia with resting heart rate >100, or symptomatic bradycardia?: No    - Severe heart valve disease including aortic stenosis or symptomatic mitral stenosis?: No      Pre-operative Risk Factors:  - Elevated-risk surgery: No    - History of cerebrovascular disease: No    - History of ischemic heart disease: Yes    - History of congestive heart failure: No    - Pre-operative treatment with insulin: No    - Pre-operative creatinine >2 mg/dL: No      Duke Activity Status Index (DASI):      - METs: >4     Medications of Perioperative Concern:    Anti-platelet (aspirin, clopidogrel, ticagrelor, prasugrel, cilostazol, dipyridamole), calcium channel blockers and ACE inhibitors/ARBs    Review of Systems   Constitutional:   Negative for activity change, appetite change, fatigue and fever.   HENT:  Negative for congestion, ear pain, sinus pressure, sinus pain and sore throat.    Eyes:  Negative for discharge and redness.   Respiratory:  Negative for cough, chest tightness and shortness of breath.    Cardiovascular:  Negative for chest pain, palpitations and leg swelling.   Gastrointestinal:  Negative for abdominal pain, constipation and diarrhea.   Genitourinary:  Negative for dysuria, flank pain, frequency and hematuria.   Musculoskeletal:  Negative for gait problem.   Skin:  Negative for pallor and rash.   Neurological:  Negative for dizziness, tremors, weakness, numbness and headaches.   Hematological:  Does not bruise/bleed easily.     Past Medical History   Past Medical History:   Diagnosis Date   • Abnormal WBC count 05/06/2021   • Adenocarcinoma of prostate (HCC) 03/27/2020   • Gold's esophagus 01/04/2021    EGD 01/04/21 recommend to repeat in 3 y   • Cancer (HCC)     prostate   • Colon polyp    • Coronary artery disease    • COVID 11/24/2020   • Elevated PSA    • Fever 11/24/2020   • Flank pain 08/11/2021   • Gastroesophageal reflux disease without esophagitis 12/13/2019   • GERD (gastroesophageal reflux disease)    • Hiatal hernia 12/05/2023    2 cm hiatal hernia on EGD on 12/05/23   • Hypercholesterolemia    • Hypertension    • Inferior MI ([facility]) 04/07/2016   • LLQ pain 10/13/2020   • LUQ pain 11/05/2019   • Myocardial infarction ([facility]) 2011   • Papillary growth of urinary bladder     TURBT  today   2/7/2023   • PONV (postoperative nausea and vomiting)     only  w/ prostate bx   • Sepsis due to Escherichia coli ([facility]) 03/19/2020   • Swelling 08/25/2021   • UTI (urinary tract infection) 03/18/2020   • Wears glasses      Past Surgical History:   Procedure Laterality Date   • COLONOSCOPY     • CORONARY STENT PLACEMENT      bare metal stent in Rt coronary artery   • CYSTOSCOPY W/ RETROGRADES Bilateral 2/7/2023    Procedure: CYSTO  W/ RETROGRADE PYELOGRAM;  Surgeon: Thierry Anthony MD;  [facility];  Service: Urology   • FL RETROGRADE PYELOGRAM  2023   • CA BLADDER INSTILLATION ANTICARCINOGENIC AGENT N/A 2023    Procedure: INSTILLATION Gemcitabine;  Surgeon: Thierry Anthony MD;  [facility];  Service: Urology   • CA BX PROSTATE STRTCTC SATURATION SAMPLING IMG GID N/A 2022    Procedure: TRANSPERINEAL MRI FUSION  BIOPSY PROSTATE;  Surgeon: Bradly Henderson MD;  [facility];  Service: Urology   • CA CYSTO W/REMOVAL OF LESIONS SMALL N/A 2023    Procedure: (TURBT);  Surgeon: Thierry Anthony MD;  [facility];  Service: Urology   • SHOULDER SURGERY Left      Family History   Problem Relation Name Age of Onset   • Sleep apnea Mother     • Heart attack Father       Social History     Tobacco Use   • Smoking status: Former     Current packs/day: 0.00     Average packs/day: 0.2 packs/day for 37.0 years (9.2 ttl pk-yrs)     Types: Cigarettes     Start date: 1974     Quit date:      Years since quittin.5     Passive exposure: Never   • Smokeless tobacco: Former   Vaping Use   • Vaping status: Never Used   Substance and Sexual Activity   • Alcohol use: Yes     Comment: 6 x  yearly; rarely   • Drug use: Never   • Sexual activity: Yes     Partners: Female     Current Outpatient Medications on File Prior to Visit   Medication Sig   • aspirin (ECOTRIN LOW STRENGTH) 81 mg EC tablet Take 81 mg by mouth every evening   • atorvastatin (LIPITOR) 20 mg tablet TAKE 1 TABLET BY MOUTH EVERY DAY   • ezetimibe (ZETIA) 10 mg tablet TAKE 1 TABLET BY MOUTH EVERY DAY   • famotidine (PEPCID) 20 mg tablet TAKE 1 TABLET BY MOUTH TWICE A DAY   • lisinopril (ZESTRIL) 20 mg tablet TAKE 1 TABLET BY MOUTH EVERY DAY   • meclizine (ANTIVERT) 25 mg tablet Take 1 tablet (25 mg total) by mouth daily at bedtime   • omeprazole (PriLOSEC) 40 MG capsule TAKE 1 CAPSULE (40 MG TOTAL) BY MOUTH DAILY.     No Known Allergies  Objective   BP  "150/84 (BP Location: Left arm, Patient Position: Sitting, Cuff Size: Large)   Pulse (!) 51   Temp 97.7 °F (36.5 °C) (Temporal)   Ht 5' 11.1\" (1.806 m)   Wt 100 kg (220 lb 9.6 oz)   SpO2 98%   BMI 30.68 kg/m²     Physical Exam  Vitals and nursing note reviewed.   Constitutional:       General: He is not in acute distress.     Appearance: He is well-developed. He is not diaphoretic.   HENT:      Head: Normocephalic.      Right Ear: Tympanic membrane and external ear normal.      Left Ear: Tympanic membrane and external ear normal.      Nose: No rhinorrhea.      Mouth/Throat:      Pharynx: No posterior oropharyngeal erythema.     Eyes:      General:         Right eye: No discharge.         Left eye: No discharge.      Conjunctiva/sclera: Conjunctivae normal.     Neck:      Vascular: No JVD.     Cardiovascular:      Rate and Rhythm: Normal rate and regular rhythm.      Heart sounds: Normal heart sounds. No murmur heard.     No gallop.   Pulmonary:      Effort: Pulmonary effort is normal. No respiratory distress.      Breath sounds: Normal breath sounds. No wheezing.   Abdominal:      General: There is no distension.      Palpations: Abdomen is soft.      Tenderness: There is no abdominal tenderness. There is no rebound.     Musculoskeletal:         General: No tenderness.      Cervical back: Normal range of motion and neck supple.   Lymphadenopathy:      Cervical: No cervical adenopathy.     Skin:     General: Skin is warm.      Findings: No rash.     Neurological:      Mental Status: He is alert and oriented to person, place, and time.           Leona Ha MD  "

## 2025-07-01 NOTE — ASSESSMENT & PLAN NOTE
Chronic asymptomatic blood pressure uncontrolled recommend to continue lisinopril 20 mg will add amlodipine 5 mg once a day proper use review will reevaluate in 1 week  Orders:  •  amLODIPine (NORVASC) 5 mg tablet; Take 1 tablet (5 mg total) by mouth daily

## 2025-07-01 NOTE — LETTER
2025     Colton Flanagan DO  [address] [address]    Patient: Tacos Asencio   YOB: 1960   Date of Visit: 2025       Dear Dr. Colton Flanagan DO:    Thank you for referring Tacos Asencio to me for evaluation. Below are my notes for this consultation.    If you have questions, please do not hesitate to call me. I look forward to following your patient along with you.         Sincerely,        Leona Ha MD        CC: No Recipients    Leona Ha MD  2025  8:21 AM  Incomplete  Pre-operative Clearance  Name: Tacos Asencio      : 1960      MRN: [id number]  Encounter Provider: Leona Ha MD  Encounter Date: 2025   Encounter department: [facility] PRIMARY CARE    :  Assessment & Plan  Pre-op examination  Patient is having procedure biopsy for lump on the mid back blood pressure today 150/84 recommend to start amlodipine 5 mg will evaluate him in 1 week recommend to hold aspirin 81 mg for 5 days before the procedure    Orders:  •  POCT ECG    Uncontrolled hypertension  Chronic asymptomatic blood pressure uncontrolled recommend to continue lisinopril 20 mg will add amlodipine 5 mg once a day proper use review will reevaluate in 1 week       Pre-op chest exam         Uncontrolled hypertension           Assessment & Plan          Pre-operative Clearance:     Revised Cardiac Risk Index:  RCI RISK CLASS II (1 risk factor, risk of major cardiac complications approximately 1.3%)    Clearance:  Patient is medically optimized (CLEARED) for proposed surgery without any additional cardiac testing.      Medication Instructions:   - Avoid herbs or non-directed vitamins one week prior to surgery    - Avoid aspirin containing medications or non-steroidal anti-inflammatory drugs one week preceding surgery    - May take tylenol for pain up until the night before surgery    - ACE Inhibitors or ARBs: Continue this medication up to the evening  before surgery/procedure, but do not take the morning of the day of surgery.  - Calcium channel blockers: Continue to take this medication on your normal schedule.  - H2 Blocker: Continue to take this medication on your normal schedule.  - Hyperlipidemia meds: Continue to take this medication on your normal schedule.       History of Present Illness    Pre-Op Examination     Surgery: Excision biopsy of mass on mid back   Anticipated Date of Surgery: July 8, 2025   Surgeon: Colton Flanagan     Patient here for clearance he is can to have a biopsy of lump on the mid back anesthesia of choice on July 8 patient any chest pain shortness of palpitation no dyspnea on exertion look at the vital sign blood pressure uncontrolled he been taking lisinopril 20 mg and tolerated well no close family history of bleeding disorder     Previous history of bleeding disorders or clots?: No    Previous Anesthesia reaction?: No    Prolonged steroid use in the last 6 months?: No      Assessment of Cardiac Risk:   - Unstable or severe angina or MI in the last 6 weeks or history of stent placement in the last year?: No    - Decompensated heart failure (e.g. New onset heart failure, NYHA  Class IV heart failure, or worsening existing heart failure)?: No    - Significant arrhythmias such as high grade AV block, symptomatic ventricular arrhythmia, newly recognized ventricular tachycardia, supraventricular tachycardia with resting heart rate >100, or symptomatic bradycardia?: No    - Severe heart valve disease including aortic stenosis or symptomatic mitral stenosis?: No      Pre-operative Risk Factors:  - Elevated-risk surgery: No    - History of cerebrovascular disease: No    - History of ischemic heart disease: Yes    - History of congestive heart failure: No    - Pre-operative treatment with insulin: No    - Pre-operative creatinine >2 mg/dL: No      Duke Activity Status Index (DASI):      - METs: >4     Medications of Perioperative  Concern:    Anti-platelet (aspirin, clopidogrel, ticagrelor, prasugrel, cilostazol, dipyridamole), calcium channel blockers and ACE inhibitors/ARBs    Review of Systems   Constitutional:  Negative for activity change, appetite change, fatigue and fever.   HENT:  Negative for congestion, ear pain, sinus pressure, sinus pain and sore throat.    Eyes:  Negative for discharge and redness.   Respiratory:  Negative for cough, chest tightness and shortness of breath.    Cardiovascular:  Negative for chest pain, palpitations and leg swelling.   Gastrointestinal:  Negative for abdominal pain, constipation and diarrhea.   Genitourinary:  Negative for dysuria, flank pain, frequency and hematuria.   Musculoskeletal:  Negative for gait problem.   Skin:  Negative for pallor and rash.   Neurological:  Negative for dizziness, tremors, weakness, numbness and headaches.   Hematological:  Does not bruise/bleed easily.     Past Medical History  Past Medical History:   Diagnosis Date   • Abnormal WBC count 05/06/2021   • Adenocarcinoma of prostate (HCC) 03/27/2020   • Gold's esophagus 01/04/2021    EGD 01/04/21 recommend to repeat in 3 y   • Cancer (HCC)     prostate   • Colon polyp    • Coronary artery disease    • COVID 11/24/2020   • Elevated PSA    • Fever 11/24/2020   • Flank pain 08/11/2021   • Gastroesophageal reflux disease without esophagitis 12/13/2019   • GERD (gastroesophageal reflux disease)    • Hiatal hernia 12/05/2023    2 cm hiatal hernia on EGD on 12/05/23   • Hypercholesterolemia    • Hypertension    • Inferior MI ([facility]) 04/07/2016   • LLQ pain 10/13/2020   • LUQ pain 11/05/2019   • Myocardial infarction ([facility]) 2011   • Papillary growth of urinary bladder     TURBT  today   2/7/2023   • PONV (postoperative nausea and vomiting)     only  w/ prostate bx   • Sepsis due to Escherichia coli ([facility]) 03/19/2020   • Swelling 08/25/2021   • UTI (urinary tract infection) 03/18/2020   • Wears glasses      Past Surgical History:    Procedure Laterality Date   • COLONOSCOPY     • CORONARY STENT PLACEMENT      bare metal stent in Rt coronary artery   • CYSTOSCOPY W/ RETROGRADES Bilateral 2023    Procedure: CYSTO W/ RETROGRADE PYELOGRAM;  Surgeon: Thierry Anthony MD;  [facility];  Service: Urology   • FL RETROGRADE PYELOGRAM  2023   • SC BLADDER INSTILLATION ANTICARCINOGENIC AGENT N/A 2023    Procedure: INSTILLATION Gemcitabine;  Surgeon: Thierry Anthony MD;  [facility];  Service: Urology   • SC BX PROSTATE STRTCTC SATURATION SAMPLING IMG GID N/A 2022    Procedure: TRANSPERINEAL MRI FUSION  BIOPSY PROSTATE;  Surgeon: Bradly Henderson MD;  [facility];  Service: Urology   • SC CYSTO W/REMOVAL OF LESIONS SMALL N/A 2023    Procedure: (TURBT);  Surgeon: Thierry Anthony MD;  [facility];  Service: Urology   • SHOULDER SURGERY Left      Family History   Problem Relation Name Age of Onset   • Sleep apnea Mother     • Heart attack Father       Social History     Tobacco Use   • Smoking status: Former     Current packs/day: 0.00     Average packs/day: 0.2 packs/day for 37.0 years (9.2 ttl pk-yrs)     Types: Cigarettes     Start date: 1974     Quit date:      Years since quittin.5     Passive exposure: Never   • Smokeless tobacco: Former   Vaping Use   • Vaping status: Never Used   Substance and Sexual Activity   • Alcohol use: Yes     Comment: 6 x  yearly; rarely   • Drug use: Never   • Sexual activity: Yes     Partners: Female     Current Outpatient Medications on File Prior to Visit   Medication Sig   • aspirin (ECOTRIN LOW STRENGTH) 81 mg EC tablet Take 81 mg by mouth every evening   • atorvastatin (LIPITOR) 20 mg tablet TAKE 1 TABLET BY MOUTH EVERY DAY   • ezetimibe (ZETIA) 10 mg tablet TAKE 1 TABLET BY MOUTH EVERY DAY   • famotidine (PEPCID) 20 mg tablet TAKE 1 TABLET BY MOUTH TWICE A DAY   • lisinopril (ZESTRIL) 20 mg tablet TAKE 1 TABLET BY MOUTH EVERY DAY   • meclizine (ANTIVERT) 25 mg  "tablet Take 1 tablet (25 mg total) by mouth daily at bedtime   • omeprazole (PriLOSEC) 40 MG capsule TAKE 1 CAPSULE (40 MG TOTAL) BY MOUTH DAILY.     No Known Allergies  Objective  /84 (BP Location: Left arm, Patient Position: Sitting, Cuff Size: Large)   Pulse (!) 51   Temp 97.7 °F (36.5 °C) (Temporal)   Ht 5' 11.1\" (1.806 m)   Wt 100 kg (220 lb 9.6 oz)   SpO2 98%   BMI 30.68 kg/m²     Physical Exam  Vitals and nursing note reviewed.   Constitutional:       General: He is not in acute distress.     Appearance: He is well-developed. He is not diaphoretic.   HENT:      Head: Normocephalic.      Right Ear: Tympanic membrane and external ear normal.      Left Ear: Tympanic membrane and external ear normal.      Nose: No rhinorrhea.      Mouth/Throat:      Pharynx: No posterior oropharyngeal erythema.     Eyes:      General:         Right eye: No discharge.         Left eye: No discharge.      Conjunctiva/sclera: Conjunctivae normal.     Neck:      Vascular: No JVD.     Cardiovascular:      Rate and Rhythm: Normal rate and regular rhythm.      Heart sounds: Normal heart sounds. No murmur heard.     No gallop.   Pulmonary:      Effort: Pulmonary effort is normal. No respiratory distress.      Breath sounds: Normal breath sounds. No wheezing.   Abdominal:      General: There is no distension.      Palpations: Abdomen is soft.      Tenderness: There is no abdominal tenderness. There is no rebound.     Musculoskeletal:         General: No tenderness.      Cervical back: Normal range of motion and neck supple.   Lymphadenopathy:      Cervical: No cervical adenopathy.     Skin:     General: Skin is warm.      Findings: No rash.     Neurological:      Mental Status: He is alert and oriented to person, place, and time.           MD Leona Barriga MD  2025  9:23 AM  Sign when Signing Visit  Pre-operative Clearance  Name: Tacos Asencio      : 1960      MRN: [id number]  Encounter Provider: " Leona Ha MD  Encounter Date: 7/1/2025   Encounter department: [facility] PRIMARY CARE    :  Assessment & Plan  Pre-op chest exam    Orders:  •  POCT ECG    Uncontrolled hypertension           Assessment & Plan          Pre-op Plan     History of Present Illness    Pre-op Exam  Review of Systems  Past Medical History  Past Medical History:   Diagnosis Date   • Abnormal WBC count 05/06/2021   • Adenocarcinoma of prostate (HCC) 03/27/2020   • Gold's esophagus 01/04/2021    EGD 01/04/21 recommend to repeat in 3 y   • Cancer (HCC)     prostate   • Colon polyp    • Coronary artery disease    • COVID 11/24/2020   • Elevated PSA    • Fever 11/24/2020   • Flank pain 08/11/2021   • Gastroesophageal reflux disease without esophagitis 12/13/2019   • GERD (gastroesophageal reflux disease)    • Hiatal hernia 12/05/2023    2 cm hiatal hernia on EGD on 12/05/23   • Hypercholesterolemia    • Hypertension    • Inferior MI (HCC) 04/07/2016   • LLQ pain 10/13/2020   • LUQ pain 11/05/2019   • Myocardial infarction ([facility]) 2011   • Papillary growth of urinary bladder     TURBT  today   2/7/2023   • PONV (postoperative nausea and vomiting)     only  w/ prostate bx   • Sepsis due to Escherichia coli ([facility]) 03/19/2020   • Swelling 08/25/2021   • UTI (urinary tract infection) 03/18/2020   • Wears glasses      Past Surgical History:   Procedure Laterality Date   • COLONOSCOPY     • CORONARY STENT PLACEMENT      bare metal stent in Rt coronary artery   • CYSTOSCOPY W/ RETROGRADES Bilateral 2/7/2023    Procedure: CYSTO W/ RETROGRADE PYELOGRAM;  Surgeon: Thierry Anthony MD;  [facility];  Service: Urology   • FL RETROGRADE PYELOGRAM  2/7/2023   • MI BLADDER INSTILLATION ANTICARCINOGENIC AGENT N/A 2/7/2023    Procedure: INSTILLATION Gemcitabine;  Surgeon: Thierry Anthony MD;  [facility];  Service: Urology   • MI BX PROSTATE STRTCTC SATURATION SAMPLING IMG GID N/A 11/29/2022    Procedure: TRANSPERINEAL MRI FUSION   "BIOPSY PROSTATE;  Surgeon: Bradly Henderson MD;  [facility];  Service: Urology   • UT CYSTO W/REMOVAL OF LESIONS SMALL N/A 2023    Procedure: (TURBT);  Surgeon: Thierry Anthony MD;  [facility];  Service: Urology   • SHOULDER SURGERY Left      Family History   Problem Relation Name Age of Onset   • Sleep apnea Mother     • Heart attack Father       Social History     Tobacco Use   • Smoking status: Former     Current packs/day: 0.00     Average packs/day: 0.2 packs/day for 37.0 years (9.2 ttl pk-yrs)     Types: Cigarettes     Start date: 1974     Quit date:      Years since quittin.5     Passive exposure: Never   • Smokeless tobacco: Former   Vaping Use   • Vaping status: Never Used   Substance and Sexual Activity   • Alcohol use: Yes     Comment: 6 x  yearly; rarely   • Drug use: Never   • Sexual activity: Yes     Partners: Female     Current Outpatient Medications on File Prior to Visit   Medication Sig   • aspirin (ECOTRIN LOW STRENGTH) 81 mg EC tablet Take 81 mg by mouth every evening   • atorvastatin (LIPITOR) 20 mg tablet TAKE 1 TABLET BY MOUTH EVERY DAY   • ezetimibe (ZETIA) 10 mg tablet TAKE 1 TABLET BY MOUTH EVERY DAY   • famotidine (PEPCID) 20 mg tablet TAKE 1 TABLET BY MOUTH TWICE A DAY   • lisinopril (ZESTRIL) 20 mg tablet TAKE 1 TABLET BY MOUTH EVERY DAY   • meclizine (ANTIVERT) 25 mg tablet Take 1 tablet (25 mg total) by mouth daily at bedtime   • omeprazole (PriLOSEC) 40 MG capsule TAKE 1 CAPSULE (40 MG TOTAL) BY MOUTH DAILY.     No Known Allergies  Objective  /84 (BP Location: Left arm, Patient Position: Sitting, Cuff Size: Large)   Pulse (!) 51   Temp 97.7 °F (36.5 °C) (Temporal)   Ht 5' 11.1\" (1.806 m)   Wt 100 kg (220 lb 9.6 oz)   SpO2 98%   BMI 30.68 kg/m²     Physical Exam      Leona Ha MD  "

## 2025-07-01 NOTE — PATIENT INSTRUCTIONS
Pre-operative Medication Instructions    Avoid herbs or non-directed vitamins one week prior to surgery  Avoid aspirin containing medications or non-steroidal anti-inflammatory drugs one week preceding surgery  May take tylenol for pain up until the night before surgery    ACE Inhibitors or ARBs     Medication Name     lisinopril (ZESTRIL) 20 mg tablet      Continue this medication up to the evening before surgery/procedure, but do not take the morning of the day of surgery.    H2 Blocker     Medication Name     famotidine (PEPCID) 20 mg tablet      Continue to take this medication on your normal schedule.  If this is an oral medication and you take it in the morning, then you may take this medicine with a sip of water.    Cholesterol lowering meds     Medication Name     atorvastatin (LIPITOR) 20 mg tablet     ezetimibe (ZETIA) 10 mg tablet      Continue to take this medication on your normal schedule.  If this is an oral medication and you take it in the morning, then you may take this medicine with a sip of water.

## 2025-07-01 NOTE — LETTER
2025     Colton Flanagan DO  [address] [address]    Patient: Tacos Asencio   YOB: 1960   Date of Visit: 2025       Dear Dr. Colton Flanagan DO:    Thank you for referring Tacos Asencio to me for evaluation. Below are my notes for this consultation.    If you have questions, please do not hesitate to call me. I look forward to following your patient along with you.         Sincerely,        Leona Ha MD        CC: No Recipients    Leona Ha MD  2025  8:22 AM  Sign when Signing Visit  Pre-operative Clearance  Name: Tacos Asencio      : 1960      MRN: [id number]  Encounter Provider: Leona Ha MD  Encounter Date: 2025   Encounter department: [facility] PRIMARY CARE    :  Assessment & Plan  Pre-op examination  Patient is having procedure biopsy for lump on the mid back blood pressure today 150/84 recommend to start amlodipine 5 mg will evaluate him in 1 week recommend to hold aspirin 81 mg for 5 days before the procedure recommend to hold lisinopril the day of the procedure patient considered having low risk surgery patient optimized for the surgery    Orders:  •  POCT ECG    Uncontrolled hypertension  Chronic asymptomatic blood pressure uncontrolled recommend to continue lisinopril 20 mg will add amlodipine 5 mg once a day proper use review will reevaluate in 1 week  Orders:  •  amLODIPine (NORVASC) 5 mg tablet; Take 1 tablet (5 mg total) by mouth daily    Bradycardia  Chronic asymptomatic EKG in the office sinus bradycardia I secure message cardiology patient asymptomatic no concern will continue to monitor patient been evaluated previously            Assessment & Plan          Pre-operative Clearance:     Revised Cardiac Risk Index:  RCI RISK CLASS II (1 risk factor, risk of major cardiac complications approximately 1.3%)    Clearance:  Patient is medically optimized (CLEARED) for proposed surgery without any  additional cardiac testing.      Medication Instructions:   - Avoid herbs or non-directed vitamins one week prior to surgery    - Avoid aspirin containing medications or non-steroidal anti-inflammatory drugs one week preceding surgery    - May take tylenol for pain up until the night before surgery    - ACE Inhibitors or ARBs: Continue this medication up to the evening before surgery/procedure, but do not take the morning of the day of surgery.  - Calcium channel blockers: Continue to take this medication on your normal schedule.  - H2 Blocker: Continue to take this medication on your normal schedule.  - Hyperlipidemia meds: Continue to take this medication on your normal schedule.       History of Present Illness    Pre-Op Examination     Surgery: Excision biopsy of mass on mid back   Anticipated Date of Surgery: July 8, 2025   Surgeon: Colton Flanagan     Patient here for clearance he is can to have a biopsy of lump on the mid back anesthesia of choice on July 8 patient any chest pain shortness of palpitation no dyspnea on exertion look at the vital sign blood pressure uncontrolled he been taking lisinopril 20 mg and tolerated well no close family history of bleeding disorder     Previous history of bleeding disorders or clots?: No    Previous Anesthesia reaction?: No    Prolonged steroid use in the last 6 months?: No      Assessment of Cardiac Risk:   - Unstable or severe angina or MI in the last 6 weeks or history of stent placement in the last year?: No    - Decompensated heart failure (e.g. New onset heart failure, NYHA  Class IV heart failure, or worsening existing heart failure)?: No    - Significant arrhythmias such as high grade AV block, symptomatic ventricular arrhythmia, newly recognized ventricular tachycardia, supraventricular tachycardia with resting heart rate >100, or symptomatic bradycardia?: No    - Severe heart valve disease including aortic stenosis or symptomatic mitral stenosis?: No       Pre-operative Risk Factors:  - Elevated-risk surgery: No    - History of cerebrovascular disease: No    - History of ischemic heart disease: Yes    - History of congestive heart failure: No    - Pre-operative treatment with insulin: No    - Pre-operative creatinine >2 mg/dL: No      Duke Activity Status Index (DASI):      - METs: >4     Medications of Perioperative Concern:    Anti-platelet (aspirin, clopidogrel, ticagrelor, prasugrel, cilostazol, dipyridamole), calcium channel blockers and ACE inhibitors/ARBs    Review of Systems   Constitutional:  Negative for activity change, appetite change, fatigue and fever.   HENT:  Negative for congestion, ear pain, sinus pressure, sinus pain and sore throat.    Eyes:  Negative for discharge and redness.   Respiratory:  Negative for cough, chest tightness and shortness of breath.    Cardiovascular:  Negative for chest pain, palpitations and leg swelling.   Gastrointestinal:  Negative for abdominal pain, constipation and diarrhea.   Genitourinary:  Negative for dysuria, flank pain, frequency and hematuria.   Musculoskeletal:  Negative for gait problem.   Skin:  Negative for pallor and rash.   Neurological:  Negative for dizziness, tremors, weakness, numbness and headaches.   Hematological:  Does not bruise/bleed easily.     Past Medical History  Past Medical History:   Diagnosis Date   • Abnormal WBC count 05/06/2021   • Adenocarcinoma of prostate (HCC) 03/27/2020   • Gold's esophagus 01/04/2021    EGD 01/04/21 recommend to repeat in 3 y   • Cancer (HCC)     prostate   • Colon polyp    • Coronary artery disease    • COVID 11/24/2020   • Elevated PSA    • Fever 11/24/2020   • Flank pain 08/11/2021   • Gastroesophageal reflux disease without esophagitis 12/13/2019   • GERD (gastroesophageal reflux disease)    • Hiatal hernia 12/05/2023    2 cm hiatal hernia on EGD on 12/05/23   • Hypercholesterolemia    • Hypertension    • Inferior MI (HCC) 04/07/2016   • LLQ pain  10/13/2020   • LUQ pain 2019   • Myocardial infarction (HCC)    • Papillary growth of urinary bladder     TURBT  today   2023   • PONV (postoperative nausea and vomiting)     only  w/ prostate bx   • Sepsis due to Escherichia coli (HCC) 2020   • Swelling 2021   • UTI (urinary tract infection) 2020   • Wears glasses      Past Surgical History:   Procedure Laterality Date   • COLONOSCOPY     • CORONARY STENT PLACEMENT      bare metal stent in Rt coronary artery   • CYSTOSCOPY W/ RETROGRADES Bilateral 2023    Procedure: CYSTO W/ RETROGRADE PYELOGRAM;  Surgeon: Thierry Anthony MD;  Location: [facility];  Service: Urology   • FL RETROGRADE PYELOGRAM  2023   • WI BLADDER INSTILLATION ANTICARCINOGENIC AGENT N/A 2023    Procedure: INSTILLATION Gemcitabine;  Surgeon: Thierry Anthony MD;  Location: [facility];  Service: Urology   • WI BX PROSTATE STRTCTC SATURATION SAMPLING IMG GID N/A 2022    Procedure: TRANSPERINEAL MRI FUSION  BIOPSY PROSTATE;  Surgeon: Bradly Henderson MD;  [facility]: [facility];  Service: Urology   • WI CYSTO W/REMOVAL OF LESIONS SMALL N/A 2023    Procedure: (TURBT);  Surgeon: Thierry Anthony MD;  Location: [facility];  Service: Urology   • SHOULDER SURGERY Left      Family History   Problem Relation Name Age of Onset   • Sleep apnea Mother     • Heart attack Father       Social History     Tobacco Use   • Smoking status: Former     Current packs/day: 0.00     Average packs/day: 0.2 packs/day for 37.0 years (9.2 ttl pk-yrs)     Types: Cigarettes     Start date: 1974     Quit date:      Years since quittin.5     Passive exposure: Never   • Smokeless tobacco: Former   Vaping Use   • Vaping status: Never Used   Substance and Sexual Activity   • Alcohol use: Yes     Comment: 6 x  yearly; rarely   • Drug use: Never   • Sexual activity: Yes     Partners: Female     Current Outpatient Medications on File Prior to Visit   Medication Sig   • aspirin (ECOTRIN  "LOW STRENGTH) 81 mg EC tablet Take 81 mg by mouth every evening   • atorvastatin (LIPITOR) 20 mg tablet TAKE 1 TABLET BY MOUTH EVERY DAY   • ezetimibe (ZETIA) 10 mg tablet TAKE 1 TABLET BY MOUTH EVERY DAY   • famotidine (PEPCID) 20 mg tablet TAKE 1 TABLET BY MOUTH TWICE A DAY   • lisinopril (ZESTRIL) 20 mg tablet TAKE 1 TABLET BY MOUTH EVERY DAY   • meclizine (ANTIVERT) 25 mg tablet Take 1 tablet (25 mg total) by mouth daily at bedtime   • omeprazole (PriLOSEC) 40 MG capsule TAKE 1 CAPSULE (40 MG TOTAL) BY MOUTH DAILY.     No Known Allergies  Objective  /84 (BP Location: Left arm, Patient Position: Sitting, Cuff Size: Large)   Pulse (!) 51   Temp 97.7 °F (36.5 °C) (Temporal)   Ht 5' 11.1\" (1.806 m)   Wt 100 kg (220 lb 9.6 oz)   SpO2 98%   BMI 30.68 kg/m²     Physical Exam  Vitals and nursing note reviewed.   Constitutional:       General: He is not in acute distress.     Appearance: He is well-developed. He is not diaphoretic.   HENT:      Head: Normocephalic.      Right Ear: Tympanic membrane and external ear normal.      Left Ear: Tympanic membrane and external ear normal.      Nose: No rhinorrhea.      Mouth/Throat:      Pharynx: No posterior oropharyngeal erythema.     Eyes:      General:         Right eye: No discharge.         Left eye: No discharge.      Conjunctiva/sclera: Conjunctivae normal.     Neck:      Vascular: No JVD.     Cardiovascular:      Rate and Rhythm: Normal rate and regular rhythm.      Heart sounds: Normal heart sounds. No murmur heard.     No gallop.   Pulmonary:      Effort: Pulmonary effort is normal. No respiratory distress.      Breath sounds: Normal breath sounds. No wheezing.   Abdominal:      General: There is no distension.      Palpations: Abdomen is soft.      Tenderness: There is no abdominal tenderness. There is no rebound.     Musculoskeletal:         General: No tenderness.      Cervical back: Normal range of motion and neck supple.   Lymphadenopathy:      " Cervical: No cervical adenopathy.     Skin:     General: Skin is warm.      Findings: No rash.     Neurological:      Mental Status: He is alert and oriented to person, place, and time.           Leona Ha MD

## 2025-07-02 PROBLEM — Z01.818 PRE-OP EXAMINATION: Status: ACTIVE | Noted: 2025-07-02

## 2025-07-02 PROCEDURE — 93000 ELECTROCARDIOGRAM COMPLETE: CPT | Performed by: FAMILY MEDICINE

## 2025-07-02 NOTE — ASSESSMENT & PLAN NOTE
Patient is having procedure biopsy for lump on the mid back blood pressure today 150/84 recommend to start amlodipine 5 mg will evaluate him in 1 week recommend to hold aspirin 81 mg for 5 days before the procedure recommend to hold lisinopril the day of the procedure patient considered having low risk surgery patient optimized for the surgery    Orders:  •  POCT ECG

## 2025-07-02 NOTE — ASSESSMENT & PLAN NOTE
Chronic asymptomatic EKG in the office sinus bradycardia I secure message cardiology patient asymptomatic no concern will continue to monitor patient been evaluated previously

## 2025-07-07 ENCOUNTER — OFFICE VISIT (OUTPATIENT)
Dept: FAMILY MEDICINE CLINIC | Facility: CLINIC | Age: 65
End: 2025-07-07
Payer: COMMERCIAL

## 2025-07-07 VITALS
BODY MASS INDEX: 30.78 KG/M2 | HEART RATE: 60 BPM | HEIGHT: 70 IN | SYSTOLIC BLOOD PRESSURE: 130 MMHG | WEIGHT: 215 LBS | OXYGEN SATURATION: 96 % | DIASTOLIC BLOOD PRESSURE: 78 MMHG | TEMPERATURE: 97.6 F

## 2025-07-07 DIAGNOSIS — I10 PRIMARY HYPERTENSION: Primary | ICD-10-CM

## 2025-07-07 PROCEDURE — 99213 OFFICE O/P EST LOW 20 MIN: CPT | Performed by: FAMILY MEDICINE

## 2025-07-07 RX ORDER — AMLODIPINE BESYLATE 5 MG/1
5 TABLET ORAL DAILY
Qty: 90 TABLET | Refills: 1 | Status: SHIPPED | OUTPATIENT
Start: 2025-07-07

## 2025-07-08 ENCOUNTER — ANESTHESIA (OUTPATIENT)
Dept: PERIOP | Facility: HOSPITAL | Age: 65
End: 2025-07-08
Payer: COMMERCIAL

## 2025-07-08 ENCOUNTER — HOSPITAL ENCOUNTER (OUTPATIENT)
Facility: HOSPITAL | Age: 65
Setting detail: OUTPATIENT SURGERY
Discharge: HOME | End: 2025-07-08
Attending: STUDENT IN AN ORGANIZED HEALTH CARE EDUCATION/TRAINING PROGRAM | Admitting: STUDENT IN AN ORGANIZED HEALTH CARE EDUCATION/TRAINING PROGRAM
Payer: COMMERCIAL

## 2025-07-08 VITALS
SYSTOLIC BLOOD PRESSURE: 152 MMHG | HEIGHT: 70 IN | OXYGEN SATURATION: 96 % | HEART RATE: 71 BPM | DIASTOLIC BLOOD PRESSURE: 78 MMHG | BODY MASS INDEX: 30.78 KG/M2 | WEIGHT: 215 LBS | TEMPERATURE: 97.5 F | RESPIRATION RATE: 16 BRPM

## 2025-07-08 DIAGNOSIS — R22.2 MASS OF SUBCUTANEOUS TISSUE OF BACK: ICD-10-CM

## 2025-07-08 DIAGNOSIS — Z98.890 S/P EXCISION OF LIPOMA: Primary | ICD-10-CM

## 2025-07-08 DIAGNOSIS — Z86.018 S/P EXCISION OF LIPOMA: Primary | ICD-10-CM

## 2025-07-08 PROCEDURE — NC001 PR NO CHARGE: Performed by: STUDENT IN AN ORGANIZED HEALTH CARE EDUCATION/TRAINING PROGRAM

## 2025-07-08 PROCEDURE — 88304 TISSUE EXAM BY PATHOLOGIST: CPT | Performed by: STUDENT IN AN ORGANIZED HEALTH CARE EDUCATION/TRAINING PROGRAM

## 2025-07-08 PROCEDURE — 21933 EXC BACK TUM DEEP 5 CM/>: CPT | Performed by: STUDENT IN AN ORGANIZED HEALTH CARE EDUCATION/TRAINING PROGRAM

## 2025-07-08 PROCEDURE — 21933 EXC BACK TUM DEEP 5 CM/>: CPT | Performed by: PHYSICIAN ASSISTANT

## 2025-07-08 RX ORDER — LIDOCAINE HYDROCHLORIDE 10 MG/ML
INJECTION, SOLUTION EPIDURAL; INFILTRATION; INTRACAUDAL; PERINEURAL AS NEEDED
Status: DISCONTINUED | OUTPATIENT
Start: 2025-07-08 | End: 2025-07-08

## 2025-07-08 RX ORDER — OXYCODONE HYDROCHLORIDE 5 MG/1
5 TABLET ORAL EVERY 6 HOURS PRN
Refills: 0 | Status: DISCONTINUED | OUTPATIENT
Start: 2025-07-08 | End: 2025-07-08 | Stop reason: HOSPADM

## 2025-07-08 RX ORDER — MAGNESIUM HYDROXIDE 1200 MG/15ML
LIQUID ORAL AS NEEDED
Status: DISCONTINUED | OUTPATIENT
Start: 2025-07-08 | End: 2025-07-08 | Stop reason: HOSPADM

## 2025-07-08 RX ORDER — FENTANYL CITRATE/PF 50 MCG/ML
25 SYRINGE (ML) INJECTION
Refills: 0 | Status: DISCONTINUED | OUTPATIENT
Start: 2025-07-08 | End: 2025-07-08 | Stop reason: HOSPADM

## 2025-07-08 RX ORDER — ALBUTEROL SULFATE 0.83 MG/ML
2.5 SOLUTION RESPIRATORY (INHALATION) ONCE AS NEEDED
Status: DISCONTINUED | OUTPATIENT
Start: 2025-07-08 | End: 2025-07-08 | Stop reason: HOSPADM

## 2025-07-08 RX ORDER — DEXAMETHASONE SODIUM PHOSPHATE 10 MG/ML
INJECTION, SOLUTION INTRAMUSCULAR; INTRAVENOUS AS NEEDED
Status: DISCONTINUED | OUTPATIENT
Start: 2025-07-08 | End: 2025-07-08

## 2025-07-08 RX ORDER — SODIUM CHLORIDE, SODIUM LACTATE, POTASSIUM CHLORIDE, CALCIUM CHLORIDE 600; 310; 30; 20 MG/100ML; MG/100ML; MG/100ML; MG/100ML
INJECTION, SOLUTION INTRAVENOUS CONTINUOUS PRN
Status: DISCONTINUED | OUTPATIENT
Start: 2025-07-08 | End: 2025-07-08

## 2025-07-08 RX ORDER — MIDAZOLAM HYDROCHLORIDE 2 MG/2ML
INJECTION, SOLUTION INTRAMUSCULAR; INTRAVENOUS AS NEEDED
Status: DISCONTINUED | OUTPATIENT
Start: 2025-07-08 | End: 2025-07-08

## 2025-07-08 RX ORDER — ACETAMINOPHEN 325 MG/1
975 TABLET ORAL EVERY 6 HOURS PRN
Status: DISCONTINUED | OUTPATIENT
Start: 2025-07-08 | End: 2025-07-08 | Stop reason: HOSPADM

## 2025-07-08 RX ORDER — GINSENG 100 MG
CAPSULE ORAL AS NEEDED
Status: DISCONTINUED | OUTPATIENT
Start: 2025-07-08 | End: 2025-07-08 | Stop reason: HOSPADM

## 2025-07-08 RX ORDER — CEFAZOLIN SODIUM 2 G/50ML
2000 SOLUTION INTRAVENOUS ONCE
Status: COMPLETED | OUTPATIENT
Start: 2025-07-08 | End: 2025-07-08

## 2025-07-08 RX ORDER — FENTANYL CITRATE 50 UG/ML
INJECTION, SOLUTION INTRAMUSCULAR; INTRAVENOUS AS NEEDED
Status: DISCONTINUED | OUTPATIENT
Start: 2025-07-08 | End: 2025-07-08

## 2025-07-08 RX ORDER — BUPIVACAINE HYDROCHLORIDE 2.5 MG/ML
INJECTION, SOLUTION EPIDURAL; INFILTRATION; INTRACAUDAL; PERINEURAL AS NEEDED
Status: DISCONTINUED | OUTPATIENT
Start: 2025-07-08 | End: 2025-07-08 | Stop reason: HOSPADM

## 2025-07-08 RX ORDER — ONDANSETRON 2 MG/ML
INJECTION INTRAMUSCULAR; INTRAVENOUS AS NEEDED
Status: DISCONTINUED | OUTPATIENT
Start: 2025-07-08 | End: 2025-07-08

## 2025-07-08 RX ORDER — ROCURONIUM BROMIDE 10 MG/ML
INJECTION, SOLUTION INTRAVENOUS AS NEEDED
Status: DISCONTINUED | OUTPATIENT
Start: 2025-07-08 | End: 2025-07-08

## 2025-07-08 RX ORDER — NEOSTIGMINE METHYLSULFATE 1 MG/ML
INJECTION INTRAVENOUS AS NEEDED
Status: DISCONTINUED | OUTPATIENT
Start: 2025-07-08 | End: 2025-07-08

## 2025-07-08 RX ORDER — OXYCODONE HYDROCHLORIDE 5 MG/1
5 TABLET ORAL EVERY 6 HOURS PRN
Qty: 6 TABLET | Refills: 0 | Status: SHIPPED | OUTPATIENT
Start: 2025-07-08

## 2025-07-08 RX ORDER — PROPOFOL 10 MG/ML
INJECTION, EMULSION INTRAVENOUS AS NEEDED
Status: DISCONTINUED | OUTPATIENT
Start: 2025-07-08 | End: 2025-07-08

## 2025-07-08 RX ORDER — GLYCOPYRROLATE 0.2 MG/ML
INJECTION INTRAMUSCULAR; INTRAVENOUS AS NEEDED
Status: DISCONTINUED | OUTPATIENT
Start: 2025-07-08 | End: 2025-07-08

## 2025-07-08 RX ORDER — SODIUM CHLORIDE, SODIUM LACTATE, POTASSIUM CHLORIDE, CALCIUM CHLORIDE 600; 310; 30; 20 MG/100ML; MG/100ML; MG/100ML; MG/100ML
125 INJECTION, SOLUTION INTRAVENOUS CONTINUOUS
Status: DISCONTINUED | OUTPATIENT
Start: 2025-07-08 | End: 2025-07-08 | Stop reason: HOSPADM

## 2025-07-08 RX ORDER — ONDANSETRON 2 MG/ML
4 INJECTION INTRAMUSCULAR; INTRAVENOUS ONCE AS NEEDED
Status: DISCONTINUED | OUTPATIENT
Start: 2025-07-08 | End: 2025-07-08 | Stop reason: HOSPADM

## 2025-07-08 RX ADMIN — CEFAZOLIN SODIUM 2000 MG: 2 SOLUTION INTRAVENOUS at 11:57

## 2025-07-08 RX ADMIN — ACETAMINOPHEN 975 MG: 325 TABLET, FILM COATED ORAL at 13:55

## 2025-07-08 RX ADMIN — GLYCOPYRROLATE 0.4 MG: 0.2 INJECTION, SOLUTION INTRAMUSCULAR; INTRAVENOUS at 12:56

## 2025-07-08 RX ADMIN — LIDOCAINE HYDROCHLORIDE 50 MG: 10 INJECTION, SOLUTION EPIDURAL; INFILTRATION; INTRACAUDAL at 11:56

## 2025-07-08 RX ADMIN — ONDANSETRON 4 MG: 2 INJECTION INTRAMUSCULAR; INTRAVENOUS at 12:18

## 2025-07-08 RX ADMIN — SODIUM CHLORIDE, SODIUM LACTATE, POTASSIUM CHLORIDE, AND CALCIUM CHLORIDE 125 ML/HR: .6; .31; .03; .02 INJECTION, SOLUTION INTRAVENOUS at 10:38

## 2025-07-08 RX ADMIN — PROPOFOL 200 MG: 10 INJECTION, EMULSION INTRAVENOUS at 11:56

## 2025-07-08 RX ADMIN — DEXAMETHASONE SODIUM PHOSPHATE 10 MG: 10 INJECTION, SOLUTION INTRAMUSCULAR; INTRAVENOUS at 12:18

## 2025-07-08 RX ADMIN — NEOSTIGMINE METHYLSULFATE 2 MG: 1 INJECTION INTRAVENOUS at 12:56

## 2025-07-08 RX ADMIN — FENTANYL CITRATE 100 MCG: 50 INJECTION, SOLUTION INTRAMUSCULAR; INTRAVENOUS at 11:56

## 2025-07-08 RX ADMIN — SODIUM CHLORIDE, SODIUM LACTATE, POTASSIUM CHLORIDE, AND CALCIUM CHLORIDE: .6; .31; .03; .02 INJECTION, SOLUTION INTRAVENOUS at 11:46

## 2025-07-08 RX ADMIN — MIDAZOLAM 2 MG: 1 INJECTION INTRAMUSCULAR; INTRAVENOUS at 11:50

## 2025-07-08 RX ADMIN — SODIUM CHLORIDE, SODIUM LACTATE, POTASSIUM CHLORIDE, AND CALCIUM CHLORIDE: .6; .31; .03; .02 INJECTION, SOLUTION INTRAVENOUS at 12:52

## 2025-07-08 RX ADMIN — PROPOFOL 50 MG: 10 INJECTION, EMULSION INTRAVENOUS at 12:29

## 2025-07-08 RX ADMIN — PROPOFOL 50 MG: 10 INJECTION, EMULSION INTRAVENOUS at 12:49

## 2025-07-08 RX ADMIN — ROCURONIUM BROMIDE 50 MG: 10 INJECTION INTRAVENOUS at 11:56

## 2025-07-08 NOTE — DISCHARGE INSTR - AVS FIRST PAGE
**you can restart all your home medications as usual, except for aspirin. Can restart your aspirin on Thursday 7/10**    Discharge Instructions:  -Take over the counter Tylenol or Advil/Motrin as needed for mild to moderate pain. Read labels for dosing instructions. You may alternate them every 3 hours.   -Script sent for oxycodone. Use as needed if no relief from the above medications. Try to use sparingly. If you do not think you want to take the narcotic, you don't have to pick it up. Take with food, possible side effect of nausea. Stay hydrated, eat fiber, possible side effect of constipation. Can take over the counter stool softeners or laxatives if needed. Do not drive for 24hrs after taking this medication. Do not drink alcohol or take anxiolytics with this medication.   -Apply ice to the incision site as needed to decrease discomfort/swelling. Place on for 15 minutes at a time. Do not place directly on the skin.   -You have non-dissolvable sutures. These will need to be removed at your follow up appointment.  The incision is covered with a pressure dressing. Keep on for 48 hours, then you can remove it and shower, pat dry.   -Avoid submersion in water for long periods of time (baths, pools, hot tubs, etc.), typically up to 6 weeks or until cleared by your doctor at follow up visit.   -It is normal to have minor bruising around the area.   -Regular activity is fine. Avoid strenuous exercises that involved the area of the incision for a few days so the incision has time to properly heal.   -Please call the surgery office to schedule a follow up appointment with Dr. Flanagan in approximately 1-2 weeks.  -Call the office sooner if you start to develop any of the following signs and symptoms: uncontrolled bleeding, pain uncontrolled with analgesics, fevers/chills >100.4F, redness, warmth, swelling/firmness, tenderness, copious drainage, green/yellow thick drainage, nausea/vomiting, removal of the stiches/opening  of the incision.

## 2025-07-08 NOTE — ANESTHESIA POSTPROCEDURE EVALUATION
Post-Op Assessment Note    Last Filed PACU Vitals:  Vitals Value Taken Time   Temp 97.6    Pulse 70 07/08/25 13:10   /83 07/08/25 13:08   Resp 14    SpO2 95 % 07/08/25 13:10   Vitals shown include unfiled device data.

## 2025-07-08 NOTE — INTERVAL H&P NOTE
H&P reviewed. After examining the patient I find no changes in the patients condition since the H&P had been written.    Vitals:    07/08/25 1028   BP: 148/89   Pulse: (!) 54   Resp: 18   Temp: 97.5 °F (36.4 °C)   SpO2: 96%

## 2025-07-08 NOTE — OP NOTE
OPERATIVE REPORT  PATIENT NAME: Tacos Asencio    :  1960  MRN: [id number]  Pt Location:  OR ROOM 10    SURGERY DATE: 2025    Surgeons and Role:     * Colton Flanagan DO - Primary     * Kiersten Franklin PA-C - Assisting    Preop Diagnosis:  Mass of subcutaneous tissue of back [R22.2]    Post-Op Diagnosis Codes:     * Mass of subcutaneous tissue of back [R22.2]    Procedure(s):  EXCISION  soft tissue mass back    Specimen(s):  ID Type Source Tests Collected by Time Destination   1 : MID BACK SOFT TISSUE LIPOMA Tissue Soft Tissue, Other TISSUE EXAM Colton FischerDO dandre 2025 1224        Estimated Blood Loss:   5 mL    Drains:  * No LDAs found *    Anesthesia Type:   Choice    Operative Indications:  Mass of subcutaneous tissue of back [R22.2]      Operative Findings:  5 cm soft tissue mass intramuscular removed       Complications:   None    Procedure and Technique:  The patient was seen in the Holding Room. The risks, benefits, complications, treatment options, and expected outcomes were discussed with the patient. The possibilities of reaction to medication, bleeding, infection, the need for additional procedures, failure to diagnose a condition, and creating a complication were discussed with the patient. The patient concurred with the proposed plan, giving informed consent.  The site of surgery properly noted/marked. The patient was taken to Operating Room, identified as Tacos Asencio and staff verified patient name, , procedure, site, and laterality. A Time Out was held and the above information confirmed.    The patient was placed lying prone.  The back was prepped and draped in standard fashion.  Local anesthesia was used to anesthetize the skin surrounding  the lesion.  A transverse incision was made over the lesion.  Sharp and blunt dissection,Using scissors, knife, and cautery, were used to mobilize the mass which was in a intramuscular location and measured 5 cm. Mass was removed.  Hemostasis was achieved with cautery. The wound was irrigated.  The wound was closed in multiple layers using 3-0 Vicryl suture for subcutaneous tissue and 0 Silk vertical mattress stitch for skin. The wound was dressed, and the patient was taken to PACU in stable condition.    Sponge, needle, and instrument counts were correct x2.      I was present for the entire procedure., A qualified resident physician was not available., and A physician assistant was required during the procedure for retraction, tissue handling, dissection and suturing.    Patient Disposition:  PACU          SIGNATURE: Colton Flanagan DO  DATE: July 8, 2025  TIME: 12:49 PM

## 2025-07-08 NOTE — ANESTHESIA PREPROCEDURE EVALUATION
Procedure:  EXCISION  BIOPSY LESION/MASS MID-BACK (Back)    Relevant Problems   ANESTHESIA   (+) PONV (postoperative nausea and vomiting)      CARDIO   (+) Hyperlipidemia   (+) Presence of bare metal stent in right coronary artery   (+) Primary hypertension      GI/HEPATIC   (+) Fatty liver   (+) Gastroesophageal reflux disease without esophagitis   (+) Hiatal hernia      /RENAL   (+) BPH with urinary obstruction   (+) CKD (chronic kidney disease) stage 2, GFR 60-89 ml/min   (+) Polycystic kidney disease   (+) Prostate cancer managed with active surveillance (HCC)   (+) Prostate nodule without urinary obstruction      MUSCULOSKELETAL   (+) Acute bilateral thoracic back pain   (+) Chronic left-sided low back pain without sciatica      NEURO/PSYCH   (+) Chronic left-sided low back pain without sciatica      PULMONARY   (+) MIR (obstructive sleep apnea)      Other   (+) History of acute inferior wall MI 2011,s/p BMS RCA   S/p coronary stenting in 2011, reports normal cardiac testing since    Physical Exam    Airway     Mallampati score: III  TM Distance: >3 FB  Neck ROM: full      Cardiovascular  Cardiovascular exam normal    Dental   Comment: Denies loose teeth     Pulmonary  Pulmonary exam normal     Neurological    He appears awake, alert and oriented x3.      Other Findings        Anesthesia Plan  ASA Score- 3     Anesthesia Type- IV sedation with anesthesia and general with ASA Monitors.         Additional Monitors:     Airway Plan: Oral ETT.    Comment: Will discuss with surgeon if appropriate for sedation vs. GA. Patient and wife are unsure if there is one or two lipomas requiring resection..       Plan Factors-Exercise tolerance (METS): >4 METS.    Chart reviewed.   Existing labs reviewed. Patient summary reviewed.    Patient is not a current smoker.      Obstructive sleep apnea risk education given perioperatively.        Induction- intravenous.    Postoperative Plan- .   Monitoring Plan - Monitoring plan -  standard ASA monitoring          Informed Consent- Anesthetic plan and risks discussed with patient.  I personally reviewed this patient with the CRNA. Discussed and agreed on the Anesthesia Plan with the CRNA..      NPO Status:  Vitals Value Taken Time   Date of last liquid 07/08/25 07/08/25 10:23   Time of last liquid 0500 07/08/25 10:23   Date of last solid 07/07/25 07/08/25 10:23   Time of last solid 2000 07/08/25 10:23

## 2025-07-08 NOTE — H&P
Name: Tacos Asencio      : 1960      MRN: [id number]  Encounter Provider: AVIVA Ennis  Encounter Date: 3/31/2025   Encounter department: [facility]  :  Assessment & Plan  Mass of subcutaneous tissue of back  Patient has a subcutaneous mass/lipoma on his mid back about 3.5 to 4 cm. Had an ultrasound in  which shown suggestive of lipoma. Discussed observation vs. surgical excision. Patient would like to wait until after his  vacation and then like this removed. Will plan for a surgical excision of subcutaneous mass on back after that. All risks vs. benefits reviewed. All questions asked and answered.   Orders:    Diet NPO; Sips with meds; Standing    Apply Sequential Compression Device; Standing    Place sequential compression device; Standing    Case request operating room: EXCISION  BIOPSY LESION/MASS MID-BACK; Standing    CBC and differential; Future    Comprehensive metabolic panel; Future    EKG 12 lead; Future     Lipoma of torso     Orders:    Ambulatory Referral to General Surgery     CLEARANCE/ANTICOAGULANTS: Medical and on aspirin 81mg     History of Present Illness     HPI  Tacos Asencio is a 64 y.o. male who presents for a subcutaneous mass of the right upper/mid back. Has had for about 5 years now. Had an ultrasound done of this mass back in  which shown a lipoma. Since then, the mass has grown slightly in size. Denies pain or tenderness. Denies fever, chills.      ULTRASOUND 2021:  FINDINGS:    Targeted sonographic evaluation of the area of palpable concern right upper/mid back was performed.  There is a 4.6 x 3.9 x 1.1 cm oval mass in the subcutaneous tissues which is isoechoic to the adjacent fat.  No additional masses or areas of   shadowing.     Review of Systems   Constitutional:  Negative for chills and fever.   Eyes:  Negative for pain and visual disturbance.   Respiratory:  Negative for cough and shortness of breath.   "  Cardiovascular:  Negative for chest pain and palpitations.   Gastrointestinal:  Negative for abdominal pain and vomiting.   Genitourinary:  Negative for dysuria and hematuria.   Musculoskeletal:  Negative for arthralgias and back pain.   Skin:  Negative for color change and rash.   Neurological:  Negative for seizures and syncope.   All other systems reviewed and are negative.           Objective     /82 (BP Location: Left arm, Patient Position: Sitting, Cuff Size: Large)   Pulse 76   Temp 97.5 °F (36.4 °C) (Tympanic)   Resp 16   Ht 5' 9\" (1.753 m)   Wt 100 kg (221 lb 3.2 oz)   SpO2 97%   BMI 32.67 kg/m²      Physical Exam  Vitals and nursing note reviewed.   Constitutional:       General: He is not in acute distress.     Appearance: He is well-developed.   HENT:      Head: Normocephalic and atraumatic.   Eyes:      Conjunctiva/sclera: Conjunctivae normal.   Cardiovascular:      Rate and Rhythm: Normal rate and regular rhythm.      Heart sounds: No murmur heard.  Pulmonary:      Effort: Pulmonary effort is normal. No respiratory distress.      Breath sounds: Normal breath sounds.   Abdominal:      Palpations: Abdomen is soft.      Tenderness: There is no abdominal tenderness.   Musculoskeletal:         General: No swelling.      Cervical back: Neck supple.   Skin:     General: Skin is warm and dry.      Capillary Refill: Capillary refill takes less than 2 seconds.              Comments: About 3.5 to 4 cm soft non tender moveable mass. No skin discoloration.    Neurological:      Mental Status: He is alert and oriented to person, place, and time.   Psychiatric:         Mood and Affect: Mood normal.      "

## 2025-07-08 NOTE — NURSING NOTE
Denies pain at incision site mid back.  Pain 4/10 left shoulder. Medicated with Tylenol 975mg po.

## 2025-07-08 NOTE — ANESTHESIA POSTPROCEDURE EVALUATION
Post-Op Assessment Note    CV Status:  Stable    Pain management: adequate       Mental Status:  Alert and awake   Hydration Status:  Euvolemic   PONV Controlled:  Controlled   Airway Patency:  Patent     Post Op Vitals Reviewed: Yes    No anethesia notable event occurred.            Last Filed PACU Vitals:  Vitals Value Taken Time   Temp     Pulse 57 07/08/25 13:33   /87 07/08/25 13:30   Resp 16 07/08/25 13:30   SpO2 64 % 07/08/25 13:34   Vitals shown include unfiled device data.    Modified Chantelle:     Vitals Value Taken Time   Activity 2 07/08/25 13:30   Respiration 2 07/08/25 13:30   Circulation 2 07/08/25 13:30   Consciousness 2 07/08/25 13:30   Oxygen Saturation 2 07/08/25 13:30     Modified Chantelle Score: 10

## 2025-07-09 NOTE — PROGRESS NOTES
"Name: Tacos Asencio      : 1960      MRN: [id number]  Encounter Provider: Leona Ha MD  Encounter Date: 2025   Encounter department: [facility] PRIMARY CARE  :  Assessment & Plan  Primary hypertension  Chronic asymptomatic improvement of blood pressure compared with before patient currently on amlodipine tolerated well 5 mg continue and continue lisinopril 20 mg once a day low-salt diet review  Orders:  •  amLODIPine (NORVASC) 5 mg tablet; Take 1 tablet (5 mg total) by mouth daily      Assessment & Plan           History of Present Illness   Patient here for follow-up with a blood pressure Patient blood pressure was uncontrolled last visit and he is ready for surgery and we will start him on amlodipine 5 mg continue to take lisinopril patient been tolerating medication well      Review of Systems   Constitutional:  Negative for activity change, appetite change, fatigue and fever.   HENT:  Negative for congestion, ear pain, sinus pressure, sinus pain and sore throat.    Eyes:  Negative for discharge and redness.   Respiratory:  Negative for cough, chest tightness and shortness of breath.    Cardiovascular:  Negative for chest pain, palpitations and leg swelling.   Gastrointestinal:  Negative for abdominal pain, constipation and diarrhea.   Genitourinary:  Negative for dysuria, flank pain, frequency and hematuria.   Musculoskeletal:  Negative for gait problem.   Skin:  Negative for pallor and rash.   Neurological:  Negative for dizziness, tremors, weakness, numbness and headaches.   Hematological:  Does not bruise/bleed easily.       Objective   /78   Pulse 60   Temp 97.6 °F (36.4 °C) (Tympanic)   Ht 5' 10\" (1.778 m)   Wt 97.5 kg (215 lb)   SpO2 96%   BMI 30.85 kg/m²      Physical Exam  Vitals and nursing note reviewed.   Constitutional:       General: He is not in acute distress.     Appearance: He is well-developed. He is not diaphoretic.   HENT:      Head: Normocephalic.      " Right Ear: Tympanic membrane and external ear normal.      Left Ear: Tympanic membrane and external ear normal.      Nose: No rhinorrhea.      Mouth/Throat:      Pharynx: No posterior oropharyngeal erythema.     Eyes:      General:         Right eye: No discharge.         Left eye: No discharge.      Conjunctiva/sclera: Conjunctivae normal.     Neck:      Vascular: No JVD.     Cardiovascular:      Rate and Rhythm: Normal rate and regular rhythm.      Heart sounds: Normal heart sounds. No murmur heard.     No gallop.   Pulmonary:      Effort: Pulmonary effort is normal. No respiratory distress.      Breath sounds: Normal breath sounds. No wheezing.   Abdominal:      General: There is no distension.      Palpations: Abdomen is soft.      Tenderness: There is no abdominal tenderness. There is no rebound.     Musculoskeletal:         General: No tenderness.      Cervical back: Normal range of motion and neck supple.   Lymphadenopathy:      Cervical: No cervical adenopathy.     Skin:     General: Skin is warm.      Findings: No rash.     Neurological:      Mental Status: He is alert and oriented to person, place, and time.       Disease

## 2025-07-09 NOTE — ASSESSMENT & PLAN NOTE
Chronic asymptomatic improvement of blood pressure compared with before patient currently on amlodipine tolerated well 5 mg continue and continue lisinopril 20 mg once a day low-salt diet review  Orders:  •  amLODIPine (NORVASC) 5 mg tablet; Take 1 tablet (5 mg total) by mouth daily

## 2025-07-14 PROCEDURE — 88304 TISSUE EXAM BY PATHOLOGIST: CPT | Performed by: STUDENT IN AN ORGANIZED HEALTH CARE EDUCATION/TRAINING PROGRAM

## 2025-07-28 ENCOUNTER — TELEPHONE (OUTPATIENT)
Dept: UROLOGY | Facility: MEDICAL CENTER | Age: 65
End: 2025-07-28

## 2025-07-28 ENCOUNTER — OFFICE VISIT (OUTPATIENT)
Dept: SURGERY | Facility: CLINIC | Age: 65
End: 2025-07-28

## 2025-07-28 VITALS
HEART RATE: 63 BPM | TEMPERATURE: 98.7 F | DIASTOLIC BLOOD PRESSURE: 79 MMHG | SYSTOLIC BLOOD PRESSURE: 118 MMHG | OXYGEN SATURATION: 97 %

## 2025-07-28 DIAGNOSIS — R22.2 MASS OF SUBCUTANEOUS TISSUE OF BACK: Primary | ICD-10-CM

## 2025-07-28 PROCEDURE — 99024 POSTOP FOLLOW-UP VISIT: CPT

## 2025-08-19 DIAGNOSIS — R10.13 EPIGASTRIC PAIN: ICD-10-CM

## 2025-08-19 DIAGNOSIS — E78.2 MIXED HYPERLIPIDEMIA: ICD-10-CM

## 2025-08-20 RX ORDER — ATORVASTATIN CALCIUM 20 MG/1
20 TABLET, FILM COATED ORAL DAILY
Qty: 90 TABLET | Refills: 1 | Status: SHIPPED | OUTPATIENT
Start: 2025-08-20

## 2025-08-20 RX ORDER — OMEPRAZOLE 40 MG/1
40 CAPSULE, DELAYED RELEASE ORAL DAILY
Qty: 90 CAPSULE | Refills: 1 | Status: SHIPPED | OUTPATIENT
Start: 2025-08-20

## 2025-08-22 ENCOUNTER — PREP FOR PROCEDURE (OUTPATIENT)
Dept: UROLOGY | Facility: MEDICAL CENTER | Age: 65
End: 2025-08-22

## 2025-08-22 DIAGNOSIS — R39.89 SUSPECTED UTI: ICD-10-CM

## 2025-08-22 DIAGNOSIS — C61 PROSTATE CANCER (HCC): Primary | ICD-10-CM

## 2025-08-22 DIAGNOSIS — Z01.812 PRE-OPERATIVE LABORATORY EXAMINATION: ICD-10-CM

## 2025-08-22 DIAGNOSIS — Z01.810 PRE-OPERATIVE CARDIOVASCULAR EXAMINATION: ICD-10-CM

## (undated) DEVICE — SYSTEM TRANSPERINEAL ACCESS PRECISIONPOINT

## (undated) DEVICE — Device

## (undated) DEVICE — EXIDINE 4 PCT

## (undated) DEVICE — STERILE SURGICAL LUBRICANT,  TUBE: Brand: SURGILUBE

## (undated) DEVICE — CHEMOTHERAPY CONTAINER,HINGED LID, YELLOW: Brand: SHARPSAFETY

## (undated) DEVICE — BAG DECANTER

## (undated) DEVICE — SCD SEQUENTIAL COMPRESSION COMFORT SLEEVE MEDIUM KNEE LENGTH: Brand: KENDALL SCD

## (undated) DEVICE — TELFA NON-ADHERENT ABSORBENT DRESSING: Brand: TELFA

## (undated) DEVICE — INVIEW CLEAR LEGGINGS: Brand: CONVERTORS

## (undated) DEVICE — NEEDLE 18 G X 1 1/2

## (undated) DEVICE — SLEEVE SCD KNEE MEDIUM

## (undated) DEVICE — PREMIUM DRY TRAY LF: Brand: MEDLINE INDUSTRIES, INC.

## (undated) DEVICE — HF-RESECTION ELECTRODE PLASMALOOP LOOP, LARGE, 24 FR., 12°-30°, ESG TURIS: Brand: OLYMPUS

## (undated) DEVICE — PACK TUR

## (undated) DEVICE — TUBING SUCTION 5MM X 12 FT

## (undated) DEVICE — CATH FOLEY 24FR 5ML 2 WAY SILICONE ELASTIMER

## (undated) DEVICE — BASIC SINGLE BASIN-LF: Brand: MEDLINE INDUSTRIES, INC.

## (undated) DEVICE — CATH FOLEY 18FR 5ML 2WAY LUBRICATH

## (undated) DEVICE — BAG URINE DRAINAGE 2000ML ANTI RFLX LF

## (undated) DEVICE — 3M™ STERI-STRIP™ COMPOUND BENZOIN TINCTURE 40 BAGS/CARTON 4 CARTONS/CASE C1544: Brand: 3M™ STERI-STRIP™

## (undated) DEVICE — CATH FOLEY 20FR 5ML 2 WAY SILICONE ELASTIMER

## (undated) DEVICE — GUARDIAN LVC: Brand: GUARDIAN

## (undated) DEVICE — UROCATCH BAG

## (undated) DEVICE — PACK PBDS GENERAL MINOR RF

## (undated) DEVICE — GLOVE SRG BIOGEL 7.5

## (undated) DEVICE — SPECIMEN CONTAINER STERILE PEEL PACK